# Patient Record
Sex: FEMALE | Race: ASIAN | NOT HISPANIC OR LATINO
[De-identification: names, ages, dates, MRNs, and addresses within clinical notes are randomized per-mention and may not be internally consistent; named-entity substitution may affect disease eponyms.]

---

## 2017-10-30 ENCOUNTER — APPOINTMENT (OUTPATIENT)
Dept: VASCULAR SURGERY | Facility: CLINIC | Age: 52
End: 2017-10-30
Payer: MEDICARE

## 2017-10-30 VITALS
HEIGHT: 62 IN | SYSTOLIC BLOOD PRESSURE: 130 MMHG | HEART RATE: 63 BPM | BODY MASS INDEX: 20.8 KG/M2 | OXYGEN SATURATION: 99 % | WEIGHT: 113 LBS | DIASTOLIC BLOOD PRESSURE: 74 MMHG

## 2017-10-30 DIAGNOSIS — L81.9 DISORDER OF PIGMENTATION, UNSPECIFIED: ICD-10-CM

## 2017-10-30 PROBLEM — Z00.00 ENCOUNTER FOR PREVENTIVE HEALTH EXAMINATION: Status: ACTIVE | Noted: 2017-10-30

## 2017-10-30 PROCEDURE — 99203 OFFICE O/P NEW LOW 30 MIN: CPT | Mod: 25

## 2017-10-30 PROCEDURE — 93970 EXTREMITY STUDY: CPT

## 2017-12-11 ENCOUNTER — APPOINTMENT (OUTPATIENT)
Dept: VASCULAR SURGERY | Facility: CLINIC | Age: 52
End: 2017-12-11

## 2019-03-01 ENCOUNTER — INPATIENT (INPATIENT)
Facility: HOSPITAL | Age: 54
LOS: 5 days | Discharge: HOME | End: 2019-03-07
Attending: HOSPITALIST | Admitting: HOSPITALIST
Payer: MEDICAID

## 2019-03-01 ENCOUNTER — OUTPATIENT (OUTPATIENT)
Dept: OUTPATIENT SERVICES | Facility: HOSPITAL | Age: 54
LOS: 1 days | End: 2019-03-01
Payer: MEDICAID

## 2019-03-01 VITALS
HEART RATE: 118 BPM | DIASTOLIC BLOOD PRESSURE: 74 MMHG | SYSTOLIC BLOOD PRESSURE: 121 MMHG | TEMPERATURE: 98 F | RESPIRATION RATE: 24 BRPM | OXYGEN SATURATION: 98 %

## 2019-03-01 NOTE — ED ADULT TRIAGE NOTE - CHIEF COMPLAINT QUOTE
Pt st" I have cough and shortness of breath this past week it started I was seen in [facility] just dx with Lung cancer. " + dry cough, +exhertional sob, Pt c/o lower back pain" .

## 2019-03-02 DIAGNOSIS — J90 PLEURAL EFFUSION, NOT ELSEWHERE CLASSIFIED: ICD-10-CM

## 2019-03-02 DIAGNOSIS — R07.9 CHEST PAIN, UNSPECIFIED: ICD-10-CM

## 2019-03-02 DIAGNOSIS — C34.90 MALIGNANT NEOPLASM OF UNSPECIFIED PART OF UNSPECIFIED BRONCHUS OR LUNG: ICD-10-CM

## 2019-03-02 DIAGNOSIS — I26.99 OTHER PULMONARY EMBOLISM WITHOUT ACUTE COR PULMONALE: ICD-10-CM

## 2019-03-02 DIAGNOSIS — Z29.9 ENCOUNTER FOR PROPHYLACTIC MEASURES, UNSPECIFIED: ICD-10-CM

## 2019-03-02 LAB
ALBUMIN FLD-MCNC: 2.9 G/DL — SIGNIFICANT CHANGE UP
ALBUMIN SERPL ELPH-MCNC: 3.6 G/DL — SIGNIFICANT CHANGE UP (ref 3.3–5)
ALP SERPL-CCNC: 63 U/L — SIGNIFICANT CHANGE UP (ref 40–120)
ALT FLD-CCNC: 19 U/L — SIGNIFICANT CHANGE UP (ref 4–33)
ANION GAP SERPL CALC-SCNC: 14 MMO/L — SIGNIFICANT CHANGE UP (ref 7–14)
APTT BLD: 27.1 SEC — LOW (ref 27.5–36.3)
AST SERPL-CCNC: 18 U/L — SIGNIFICANT CHANGE UP (ref 4–32)
BASOPHILS # BLD AUTO: 0.05 K/UL — SIGNIFICANT CHANGE UP (ref 0–0.2)
BASOPHILS NFR BLD AUTO: 0.7 % — SIGNIFICANT CHANGE UP (ref 0–2)
BILIRUB SERPL-MCNC: 0.3 MG/DL — SIGNIFICANT CHANGE UP (ref 0.2–1.2)
BODY FLUID TYPE: SIGNIFICANT CHANGE UP
BUN SERPL-MCNC: 8 MG/DL — SIGNIFICANT CHANGE UP (ref 7–23)
CALCIUM SERPL-MCNC: 9 MG/DL — SIGNIFICANT CHANGE UP (ref 8.4–10.5)
CHLORIDE SERPL-SCNC: 104 MMOL/L — SIGNIFICANT CHANGE UP (ref 98–107)
CLARITY SPEC: SIGNIFICANT CHANGE UP
CO2 SERPL-SCNC: 24 MMOL/L — SIGNIFICANT CHANGE UP (ref 22–31)
COLOR FLD: YELLOW — SIGNIFICANT CHANGE UP
CREAT SERPL-MCNC: 0.64 MG/DL — SIGNIFICANT CHANGE UP (ref 0.5–1.3)
CULTURE - ACID FAST SMEAR CONCENTRATED: SIGNIFICANT CHANGE UP
EOSINOPHIL # BLD AUTO: 0.68 K/UL — HIGH (ref 0–0.5)
EOSINOPHIL # FLD: 6 % — SIGNIFICANT CHANGE UP
EOSINOPHIL NFR BLD AUTO: 9.7 % — HIGH (ref 0–6)
GLUCOSE FLD-MCNC: 94 MG/DL — SIGNIFICANT CHANGE UP
GLUCOSE SERPL-MCNC: 92 MG/DL — SIGNIFICANT CHANGE UP (ref 70–99)
GRAM STN FLD: SIGNIFICANT CHANGE UP
HCT VFR BLD CALC: 40.5 % — SIGNIFICANT CHANGE UP (ref 34.5–45)
HGB BLD-MCNC: 12.5 G/DL — SIGNIFICANT CHANGE UP (ref 11.5–15.5)
IMM GRANULOCYTES NFR BLD AUTO: 0.3 % — SIGNIFICANT CHANGE UP (ref 0–1.5)
INR BLD: 1.03 — SIGNIFICANT CHANGE UP (ref 0.88–1.17)
LDH SERPL L TO P-CCNC: 417 U/L — SIGNIFICANT CHANGE UP
LYMPHOCYTES # BLD AUTO: 1.31 K/UL — SIGNIFICANT CHANGE UP (ref 1–3.3)
LYMPHOCYTES # BLD AUTO: 18.7 % — SIGNIFICANT CHANGE UP (ref 13–44)
LYMPHOCYTES NFR FLD: 38 % — SIGNIFICANT CHANGE UP
MACROPHAGES # FLD: 17 % — SIGNIFICANT CHANGE UP
MCHC RBC-ENTMCNC: 27.4 PG — SIGNIFICANT CHANGE UP (ref 27–34)
MCHC RBC-ENTMCNC: 30.9 % — LOW (ref 32–36)
MCV RBC AUTO: 88.6 FL — SIGNIFICANT CHANGE UP (ref 80–100)
MESOTHL CELL # FLD: 35 % — SIGNIFICANT CHANGE UP
MONOCYTES # BLD AUTO: 0.79 K/UL — SIGNIFICANT CHANGE UP (ref 0–0.9)
MONOCYTES # FLD: 3 % — SIGNIFICANT CHANGE UP
MONOCYTES NFR BLD AUTO: 11.3 % — SIGNIFICANT CHANGE UP (ref 2–14)
NEUTROPHILS # BLD AUTO: 4.15 K/UL — SIGNIFICANT CHANGE UP (ref 1.8–7.4)
NEUTROPHILS NFR BLD AUTO: 59.3 % — SIGNIFICANT CHANGE UP (ref 43–77)
NEUTS SEG NFR FLD MANUAL: 1 % — SIGNIFICANT CHANGE UP
NRBC # FLD: 0 K/UL — LOW (ref 25–125)
NT-PROBNP SERPL-SCNC: 54.85 PG/ML — SIGNIFICANT CHANGE UP
PLATELET # BLD AUTO: 507 K/UL — HIGH (ref 150–400)
PMV BLD: 9.3 FL — SIGNIFICANT CHANGE UP (ref 7–13)
POTASSIUM SERPL-MCNC: 3.9 MMOL/L — SIGNIFICANT CHANGE UP (ref 3.5–5.3)
POTASSIUM SERPL-SCNC: 3.9 MMOL/L — SIGNIFICANT CHANGE UP (ref 3.5–5.3)
PROT FLD-MCNC: 4.6 G/DL — SIGNIFICANT CHANGE UP
PROT SERPL-MCNC: 6.5 G/DL — SIGNIFICANT CHANGE UP (ref 6–8.3)
PROTHROM AB SERPL-ACNC: 11.5 SEC — SIGNIFICANT CHANGE UP (ref 9.8–13.1)
RBC # BLD: 4.57 M/UL — SIGNIFICANT CHANGE UP (ref 3.8–5.2)
RBC # FLD: 13.4 % — SIGNIFICANT CHANGE UP (ref 10.3–14.5)
RCV VOL RI: 1000 CELL/UL — HIGH (ref 0–5)
SODIUM SERPL-SCNC: 142 MMOL/L — SIGNIFICANT CHANGE UP (ref 135–145)
SPECIMEN SOURCE: SIGNIFICANT CHANGE UP
SPECIMEN SOURCE: SIGNIFICANT CHANGE UP
TOTAL CELLS COUNTED, BODY FLUID: 100 CELLS — SIGNIFICANT CHANGE UP
TOTAL NUCLEATED CELL COUNT, BODY FLUID: 1416 CELL/UL — HIGH (ref 0–5)
TROPONIN T, HIGH SENSITIVITY: < 6 NG/L — SIGNIFICANT CHANGE UP (ref ?–14)
WBC # BLD: 7 K/UL — SIGNIFICANT CHANGE UP (ref 3.8–10.5)
WBC # FLD AUTO: 7 K/UL — SIGNIFICANT CHANGE UP (ref 3.8–10.5)

## 2019-03-02 PROCEDURE — 88108 CYTOPATH CONCENTRATE TECH: CPT | Mod: 26

## 2019-03-02 PROCEDURE — 99223 1ST HOSP IP/OBS HIGH 75: CPT | Mod: GC

## 2019-03-02 PROCEDURE — 71045 X-RAY EXAM CHEST 1 VIEW: CPT | Mod: 26,59

## 2019-03-02 PROCEDURE — 99221 1ST HOSP IP/OBS SF/LOW 40: CPT

## 2019-03-02 PROCEDURE — 71275 CT ANGIOGRAPHY CHEST: CPT | Mod: 26

## 2019-03-02 PROCEDURE — 71046 X-RAY EXAM CHEST 2 VIEWS: CPT | Mod: 26

## 2019-03-02 RX ORDER — ENOXAPARIN SODIUM 100 MG/ML
48 INJECTION SUBCUTANEOUS
Qty: 0 | Refills: 0 | Status: DISCONTINUED | OUTPATIENT
Start: 2019-03-02 | End: 2019-03-02

## 2019-03-02 RX ORDER — DIPHENHYDRAMINE HCL 50 MG
25 CAPSULE ORAL EVERY 4 HOURS
Qty: 0 | Refills: 0 | Status: DISCONTINUED | OUTPATIENT
Start: 2019-03-02 | End: 2019-03-07

## 2019-03-02 RX ORDER — MORPHINE SULFATE 50 MG/1
2 CAPSULE, EXTENDED RELEASE ORAL ONCE
Qty: 0 | Refills: 0 | Status: DISCONTINUED | OUTPATIENT
Start: 2019-03-02 | End: 2019-03-02

## 2019-03-02 RX ORDER — ENOXAPARIN SODIUM 100 MG/ML
50 INJECTION SUBCUTANEOUS
Qty: 0 | Refills: 0 | Status: DISCONTINUED | OUTPATIENT
Start: 2019-03-02 | End: 2019-03-02

## 2019-03-02 RX ORDER — DIPHENHYDRAMINE HCL 50 MG
50 CAPSULE ORAL ONCE
Qty: 0 | Refills: 0 | Status: COMPLETED | OUTPATIENT
Start: 2019-03-02 | End: 2019-03-02

## 2019-03-02 RX ORDER — ACETAMINOPHEN 500 MG
650 TABLET ORAL EVERY 6 HOURS
Qty: 0 | Refills: 0 | Status: DISCONTINUED | OUTPATIENT
Start: 2019-03-02 | End: 2019-03-06

## 2019-03-02 RX ORDER — ENOXAPARIN SODIUM 100 MG/ML
50 INJECTION SUBCUTANEOUS
Qty: 0 | Refills: 0 | Status: DISCONTINUED | OUTPATIENT
Start: 2019-03-02 | End: 2019-03-03

## 2019-03-02 RX ORDER — OXYCODONE HYDROCHLORIDE 5 MG/1
5 TABLET ORAL EVERY 6 HOURS
Qty: 0 | Refills: 0 | Status: DISCONTINUED | OUTPATIENT
Start: 2019-03-02 | End: 2019-03-05

## 2019-03-02 RX ADMIN — ENOXAPARIN SODIUM 50 MILLIGRAM(S): 100 INJECTION SUBCUTANEOUS at 09:28

## 2019-03-02 RX ADMIN — Medication 650 MILLIGRAM(S): at 13:59

## 2019-03-02 RX ADMIN — Medication 650 MILLIGRAM(S): at 21:32

## 2019-03-02 RX ADMIN — Medication 650 MILLIGRAM(S): at 20:32

## 2019-03-02 RX ADMIN — ENOXAPARIN SODIUM 50 MILLIGRAM(S): 100 INJECTION SUBCUTANEOUS at 17:03

## 2019-03-02 RX ADMIN — Medication 650 MILLIGRAM(S): at 14:42

## 2019-03-02 RX ADMIN — MORPHINE SULFATE 2 MILLIGRAM(S): 50 CAPSULE, EXTENDED RELEASE ORAL at 05:17

## 2019-03-02 RX ADMIN — Medication 50 MILLIGRAM(S): at 21:52

## 2019-03-02 NOTE — H&P ADULT - ATTENDING COMMENTS
53 yo F recently dx adenocarcinoma of the lung c/b malignant pleural effusion s/p thoracentesis here for recurrent pleural effusion, also found to have acute PE. currently hemodynamically stable, satting well    -s/p chest tube insertion in ER, 2L fluid drained.   -thoracic surgery consult for chest tube management- likely will need pleurodesis vs. tunnelled chest tube placement given rapid recurrence of effusion  -will obtain OSH record and f/u oncology recs.   -therapeutic lovenox for PE. If pt/family unable to do injections will switch to NOAC on DC  -pain control with tylenol and oxycodone prn

## 2019-03-02 NOTE — H&P ADULT - FAMILY HISTORY
Mother  Still living? Yes, Estimated age: 71-80  Family history of diabetes mellitus (DM), Age at diagnosis: Age Unknown  Family history of hypertension, Age at diagnosis: Age Unknown     Sibling  Still living? Yes, Estimated age: 51-60  Family history of coronary artery disease, Age at diagnosis: Age Unknown

## 2019-03-02 NOTE — ED ADULT NURSE REASSESSMENT NOTE - CONDITION
pt had thoracentesis with tube placement and canister placed.  approx liter yellow fluid drained.  canister changed. pt c/o pain but reports breathing better.  given morphine as ordered.  reports feeling better. report given to rn antonette pt tfred to floor.

## 2019-03-02 NOTE — ED PROVIDER NOTE - OBJECTIVE STATEMENT
54F no pmh p/w acute onset SOB and TAMAYO/orthopnea at 3pm this afternoon. Seen at [facility] last week for 8 day admission during which had thoracentesis of a L sided pleural effusion and dx with lung cancer. Has not seen oncologist yet. not on blood thinners. No LE edema/pain. +substernal chest pain. No NV. No syncope.

## 2019-03-02 NOTE — H&P ADULT - HISTORY OF PRESENT ILLNESS
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 She states her symptoms began yesterday afternoon and worsened throughout the rest of the day, which prompted her to come to the ED last night. She was discharged from [place] with instructions to follow up with outpatient oncology which she has not yet done as she wanted to switch to the [facility] system and has an appointment scheduled with Dr. Tyler Stanton (pulmonology) this Tuesday. She also endorses a continuous dry cough with audible wheezing that has been present since her last hospitalization at [place]. On arrival to the ED she was also complaining of substernal chest pain which is now resolved. She had another thoracentesis performed in the ED last night and had some resolution of her shortness of breath following the procedure but is having significant pain in the area of the drain.     ED Course: She had a CXR and CTA performed followed by a thoracentesis and follow up CXR.     OSH Course: Hospitalized for 8 days at [facility] 8 weeks ago. Presented with productive cough refractory to outpatient antibiotics. Received IV antibiotics with no improvement. TB ruled out. CT done followed by thoracentesis. Discharged with 3 days of antibiotics and follow up with outpatient oncology was recommended. 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 She states her symptoms began yesterday afternoon and worsened throughout the rest of the day, which prompted her to come to the ED last night. She was discharged from [place] with instructions to follow up with outpatient oncology which she has not yet done as she wanted to switch to the [facility] system and has an appointment scheduled with Dr. Tyler Stanton (pulmonology) this Tuesday. She also endorses a continuous dry cough with audible wheezing that has been present since her last hospitalization at [place]. On arrival to the ED she was also complaining of substernal chest pain which is now resolved. She had another thoracentesis performed in the ED last night and had some resolution of her shortness of breath following the procedure but is having significant pain in the area of the drain.     ED Course: She had a CXR and CTA performed followed by a thoracentesis and follow up CXR.     OSH Course: Hospitalized for 8 days at [facility] 8 weeks ago. Presented with productive cough refractory to outpatient antibiotics. Received IV antibiotics with no improvement. TB ruled out. CT done followed by thoracentesis. Discharged with 3 days of antibiotics and follow up with outpatient oncology was recommended.         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 She states her symptoms began yesterday afternoon and worsened throughout the rest of the day, which prompted her to come to the ED last night.  She also endorses a continuous dry cough with audible wheezing that has been present since her last hospitalization at [place]. On arrival to the ED she was also complaining of substernal chest pain which is now resolved. She had another thoracentesis performed in the ED last night and had some resolution of her shortness of breath following the procedure but is having significant pain in the area of the drain. She denies fever, chills, night sweats, hemoptysis, calf swelling or pain, n/v/d. She was discharged from [place] with instructions to follow up with outpatient oncology which she has not yet done as she wanted to switch to the [facility] system and has an appointment scheduled with Dr. Tyler Stanton (pulmonology) this Tuesday.    ED Course: Initial CXR showed large R pleural effusion. CTA showed multiple acute PEs in the left upper and lower lobes and in the lingular artery, large R effusion with partial collapse of the R lung and obstruction of the right lower lobe bronchus, and a poorly defined mass in the right lower lobe. Thoracentesis was performed with 2L hazy yellow fluid removed and subsequent improvement in patient's SOB. Repeat CXR showed interval decrease in size of effusion.     OSH Course: Hospitalized for 8 days at [facility] 8 weeks ago. Presented with productive cough refractory to outpatient antibiotics. Received IV antibiotics with no improvement. TB ruled out. CT done followed by thoracentesis. Discharged with 3 days of antibiotics and follow up with outpatient oncology was recommended.       PCP: Dina Lee MD ([phone])   [facility]  HCP: None

## 2019-03-02 NOTE — H&P ADULT - NSHPSOCIALHISTORY_GEN_ALL_CORE
Moved [place] from [facility] [facility] in 2003 with last trip to [facility] 3 years ago. Lives in [place] with  of 27 years. Has 2 sons, ages 27 and 22. Has 8 siblings all in the area who she talks to on a daily basis. She says her family is a good support system. Works as a  at [employer].   She is not currently sexually active. She has no history of STIs.   She has never smoked tobacco products, does not drink alcohol, and has not used any recreational drugs.   She does not have a health care proxy.

## 2019-03-02 NOTE — H&P ADULT - PROBLEM SELECTOR PLAN 2
Large right pleural effusion with partial collapse of the right lung and   obstruction of the right lower lobe bronchus s/p thoracentesis in ED with 2L hazy yellow fluid removed. Drain in place  - Nucleated cells 1416, RBC 1000, , Pro 4.6, Alb 2.9, Glu 94  - Light's criteria - exudative effusion  - repeat CXR showed interval reduction in effusion size  - SOB improved following procedure  - Tylenol 650mg PO prn q6 for pain Large right pleural effusion with partial collapse of the right lung and obstruction of the right lower lobe bronchus s/p thoracentesis in ED with 2L hazy yellow fluid removed. Drain in place  - Nucleated cells 1416, RBC 1000, , Pro 4.6, Alb 2.9, Glu 94  - Light's criteria - exudative effusion  - repeat CXR showed interval reduction in effusion size  - SOB improved following procedure  - Tylenol 650mg PO prn q6 for pain Large right pleural effusion with partial collapse of the right lung and obstruction of the right lower lobe bronchus s/p thoracentesis in ED with 2L hazy yellow fluid removed. Drain in place  - Nucleated cells 1416, RBC 1000, , Pro 4.6, Alb 2.9, Glu 94  - Light's criteria - exudative effusion  - repeat CXR showed interval reduction in effusion size; SOB improved following procedure  - c/s thoracic surgery for evaluation of long term tube placement that the patient can go home with  - patient will need home care following discharge to help with tube/drain maintenance  - Tylenol 650mg PO prn q6 for mild-moderate pain  - oxycodone IR 5mg prn q6h for severe pain

## 2019-03-02 NOTE — PROVIDER CONTACT NOTE (OTHER) - ASSESSMENT
Patient complained of itchiness and rashes noted on bilateral legs and back. Vital stable. No acute distress noted.

## 2019-03-02 NOTE — H&P ADULT - NSHPREVIEWOFSYSTEMS_GEN_ALL_CORE
CONSTITUTIONAL: No fever, no chills, +4lb unintentional weight loss since December  EYES: No eye pain, no visual disturbance  Mouth: no pain in mouth, no cuts  RESPIRATORY: +dry cough, +sob, +audible wheezing  CARDIOVASCULAR: +substernal CP, no palpitations  GASTROINTESTINAL: no abdominal pain, no n/v/d  GENITOURINARY: No dysuria, no hematuria  NEUROLOGICAL: No headaches, +generalized weakness, no numbness or tingling  SKIN: No itching, no rashes  MUSCULOSKELETAL: No joint pain, no joint swelling  EXTREMITIES: no edema, no calf swelling or tenderness

## 2019-03-02 NOTE — ED ADULT NURSE NOTE - NS ED NURSE DISCH DISPOSITION
Update called to Dr. Berman. Patient's cervix remains the same and she's kenneth anywhere from 3-8 minutes apart.  She is uncomfortable, but at this point she is mostly exhausted. Per Dr. RE Shoemaker, because her cervical exam has not changed, we will send her home at this point and encourage warm baths, tylenol as needed for pain, benadryl can also be taken to help with sleep.     Admitted

## 2019-03-02 NOTE — H&P ADULT - ASSESSMENT
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 Found to have multiple PEs in the left upper and lower lobes and a large right-sided pleural effusion with associated partial collapse of R lung and obstruction of the right lower lobe bronchus. Most likely recurrent pleural effusion 2/2 to malignancy in R lung.

## 2019-03-02 NOTE — ED PROVIDER NOTE - ATTENDING CONTRIBUTION TO CARE
Seen and examined, NAD at rest, c/o inc. CP and SOB, pt. with recent pleural effusion drainage with transient relief, told Lung Ca. No fever/chills, no N/V, states sx became suddenly worse Fri. afternoon. Dec. b.s. R side, unlabored resp. at rest, heart reg, abd soft, NT to palp, no edema, NT calves.

## 2019-03-02 NOTE — CHART NOTE - NSCHARTNOTEFT_GEN_A_CORE
MD paged to bedside for new rash.    Per pt, noticed small red bumps on BL thighs this AM shortly after getting CT w/ IV contrast. Has had contrast x1 prior to this and does not recall reaction at this time. Since AM, rash has now spread to upper thighs, back, and chest. Also complaining of sore throat since early this afternoon. No associated w/ itching, pain SOB, lip swelling, tongue swelling, coughing, SOB. On PEx, hemodynamically stable, alert, and appropriately interactive. No evidence of angioedema, oral mucosa intact. No exudate in poster pharynx. Well demarcated red, easily blanching, splotchy papules on BL thighs, back, chest and neck.     Suspect allergic reaction to contrast in AM given temporal relation. Will give PO benadryl. If no better, worsening, or any evidence of hemodynamic or airway compromise, will escalate as appropriate w/ low threshold for MICU if any evidence of anaphylaxis (very low suspicion currently).       ICU Vital Signs Last 24 Hrs  T(C): 37.3 (02 Mar 2019 20:32), Max: 37.3 (02 Mar 2019 20:32)  T(F): 99.2 (02 Mar 2019 20:32), Max: 99.2 (02 Mar 2019 20:32)  HR: 85 (02 Mar 2019 20:32) (78 - 118)  BP: 101/57 (02 Mar 2019 20:32) (101/57 - 133/72)  BP(mean): --  ABP: --  ABP(mean): --  RR: 17 (02 Mar 2019 20:32) (17 - 26)  SpO2: 97% (02 Mar 2019 20:32) (94% - 98%)    Shari Fisher MD PGY-1  [phone]

## 2019-03-02 NOTE — ED PROVIDER NOTE - CLINICAL SUMMARY MEDICAL DECISION MAKING FREE TEXT BOX
54F 54F with newly diagnosed lung cancer, pleural effusion requiring thoracentesis last week, now with reaccumulation of fluid and small PE's. Breathing improved after drainage.

## 2019-03-02 NOTE — ED ADULT NURSE NOTE - CHIEF COMPLAINT QUOTE
Pt st" I have cough and shortness of breath this past week it started I was seen in [facility] just dx with Lung cancer. " + dry cough, +exhertional sob, Pt c/o lower back pain" .

## 2019-03-02 NOTE — H&P ADULT - PROBLEM SELECTOR PLAN 4
Pt advised to follow up with outpatient oncologist following discharge at OSH but has not yet done so.   - Ill-defined low-attenuation mass in the right lower lobe. Interlobular septal thickening concerning for lymphangitic spread. Superimposed infection is not excluded.  - Discharge papers from [facility]: poorly differentiated adenocarcinoma with involvement of hilar and mediastinal nodes  - c/s oncology for recs Pt advised to follow up with outpatient oncologist following discharge at OSH but has not yet done so.   - Ill-defined low-attenuation mass in the right lower lobe. Interlobular septal thickening concerning for lymphangitic spread. Superimposed infection is not excluded.  - Discharge papers from [facility]: poorly differentiated adenocarcinoma with involvement of hilar and mediastinal nodes  - c/s oncology for recs. Per oncology, will need records from [facility] and will reconsult oncology. Pt advised to follow up with outpatient oncologist following discharge at OSH but has not yet done so.   - Ill-defined low-attenuation mass in the right lower lobe. Interlobular septal thickening concerning for lymphangitic spread. Superimposed infection is not excluded.  - Discharge papers from [facility]: poorly differentiated adenocarcinoma with involvement of hilar and mediastinal nodes  - c/s oncology for recs. Per oncology, will need records from [facility] and will reconsult oncology. [facility] medical records open M-F 8a-4p ([phone])

## 2019-03-02 NOTE — H&P ADULT - NSHPPHYSICALEXAM_GEN_ALL_CORE
PHYSICAL EXAM:  GENERAL: NAD, lying in bed  HEAD:  Atraumatic, Normocephalic  EYES: EOMI, PERRLA, conjunctiva and sclera clear  ENT: Moist mucous membranes  NECK: Supple, No JVD  CHEST/LUNG: Decreased lung sounds at right base; No rales, rhonchi, wheezing, or rubs. Shallow breathing; thoracentesis drain in place on right side with associated pain in surrounding area   HEART: Regular rate and rhythm; No murmurs, rubs, or gallops  ABDOMEN: Bowel sounds present; Soft, Nontender, Nondistended. No hepatomegaly  EXTREMITIES:  2+ Peripheral Pulses, brisk capillary refill. No clubbing, cyanosis, or edema; no tenderness in calves bilaterally; no erythema or swelling in bialteral LE  NERVOUS SYSTEM:  Alert & Oriented X3, speech clear. No deficits   MSK: FROM all 4 extremities, generalized weakness  SKIN: No rashes or lesions PHYSICAL EXAM:  GENERAL: NAD, lying in bed  HEAD:  Atraumatic, Normocephalic  EYES: EOMI, PERRLA, conjunctiva and sclera clear  ENT: Moist mucous membranes  NECK: Supple, No JVD  CHEST/LUNG: Decreased lung sounds at right base; No rales, rhonchi, wheezing, or rubs. Shallow breathing; pleuritic chest pain on right side. thoracentesis drain in place on right side with associated pain in surrounding area   HEART: Regular rate and rhythm; No murmurs, rubs, or gallops  ABDOMEN: Bowel sounds present; Soft, Nontender, Nondistended. No hepatomegaly  EXTREMITIES:  2+ Peripheral Pulses, brisk capillary refill. No clubbing, cyanosis, or edema; no tenderness in calves bilaterally; no erythema or swelling in bialteral LE  NERVOUS SYSTEM:  Alert & Oriented X3, speech clear. No deficits   MSK: FROM all 4 extremities, generalized weakness  SKIN: No rashes or lesions

## 2019-03-02 NOTE — ED ADULT NURSE NOTE - OBJECTIVE STATEMENT
rec'd pt in room 11...c/o new onset of sob today...diff to speak in complete sentences.  pt was recently admitted to [facility] and diagnosed with right lung ca. scheduled for oncology and pulmonolgy next week.  has prod cough woth qhite sputum. chest pain .  20 gague isnerted left ac. blood sent. palved on monitor. opulse ox 94 on ra... placed on o2...pulse ox 97 now

## 2019-03-02 NOTE — H&P ADULT - NSHPLABSRESULTS_GEN_ALL_CORE
Personally reviewed available labs, imaging and ekg     Labs  CBC Full  -  ( 02 Mar 2019 00:04 )  WBC Count : 7.00 K/uL  Hemoglobin : 12.5 g/dL  Hematocrit : 40.5 %  Platelet Count - Automated : 507 K/uL  Mean Cell Volume : 88.6 fL  Mean Cell Hemoglobin : 27.4 pg  Mean Cell Hemoglobin Concentration : 30.9 %  Auto Neutrophil # : 4.15 K/uL  Auto Lymphocyte # : 1.31 K/uL  Auto Monocyte # : 0.79 K/uL  Auto Eosinophil # : 0.68 K/uL  Auto Basophil # : 0.05 K/uL  Auto Neutrophil % : 59.3 %  Auto Lymphocyte % : 18.7 %  Auto Monocyte % : 11.3 %  Auto Eosinophil % : 9.7 %  Auto Basophil % : 0.7 %    03-02    142  |  104  |  8   ----------------------------<  92  3.9   |  24  |  0.64    Ca    9.0      02 Mar 2019 00:04    TPro  6.5  /  Alb  3.6  /  TBili  0.3  /  DBili  x   /  AST  18  /  ALT  19  /  AlkPhos  63  03-02    PT/INR - ( 02 Mar 2019 00:04 )   PT: 11.5 SEC;   INR: 1.03       PTT - ( 02 Mar 2019 00:04 )  PTT:27.1 SEC      Imaging  < from: Xray Chest 2 Views PA/Lat (03.02.19 @ 01:06) >    EXAM:  XR CHEST PA LAT 2V      PROCEDURE DATE:  Mar  2 2019     INTERPRETATION:  CLINICAL INDICATION: Shortness of breath, lung cancer.    EXAM: Frontal and lateral radiographs of the chest.    COMPARISON: None available.IMPRESSION:   Large right pleural effusion.    MORGAN RITTER M.D., RADIOLOGY RESIDENT  This document has been electronically signed.  SUDHAKAR MANDEL M.D., ATTENDING RADIOLOGIST  This document has been electronically signed. Mar  2 2019  8:50AM      < end of copied text >      < from: CT Angio Chest w/ IV Cont (03.02.19 @ 02:58) >    EXAM:  CT ANGIO CHEST (W)AW IC      PROCEDURE DATE:  Mar  2 2019     INTERPRETATION:  CLINICAL INFORMATION: Shortness of breath, tachycardia,   recently diagnosed lung cancer    IMPRESSION:     Multiple acute pulmonary emboli involving involving left upper lobe,   lower lobe and lingular pulmonary artery branches. No evidence of right   heart strain.    Large right pleural effusion with partial collapse of the right lung and   obstruction of the right lower lobe bronchus.     Ill-defined low-attenuation mass in the right lower lobe suspicious for   the patient's known malignancy. Interlobular septal thickening throughout   the aerated right upper and middle lobes are concerning for lymphangitic   spread of disease. Superimposed infection is not excluded.    These findings were discussed with Dr. Blanchard on 3/2/2019 at 3:01 AM.    CHEYANNE KASPER M.D., RADIOLOGY RESIDENT  This document has been electronically signed.  RADHA KELLY M.D., ATTENDING RADIOLOGIST  This document has been electronically signed. Mar  2 2019  3:36AM    < end of copied text >        EKG

## 2019-03-02 NOTE — H&P ADULT - PROBLEM SELECTOR PLAN 1
Multiple acute pulmonary emboli involving left upper lobe,   lower lobe and lingular pulmonary artery branches. No evidence of right   heart strain. Likely 2/2 to hypercoagulable state of malignancy.   - No signs of DVT on physical exam  - Therapeutic Lovenox 50mg bid Multiple acute pulmonary emboli involving left upper lobe, lower lobe and lingular pulmonary artery branches. No evidence of right heart strain. Likely 2/2 to hypercoagulable state of malignancy.   - No signs of DVT on physical exam  - Therapeutic Lovenox 50mg bid Multiple acute pulmonary emboli involving left upper lobe, lower lobe and lingular pulmonary artery branches. No evidence of right heart strain. Likely 2/2 to hypercoagulable state of malignancy.   - No signs of DVT on physical exam  - Therapeutic Lovenox 50mg bid  - Will need long term anticoagulation following discharge; patient states she will not inject herself at home and does not think family will help her with injections. will need to consider PO anticoagulation  - if oncology recommends biopsy consider switching Lovenox to heparin for easier reversibility

## 2019-03-03 ENCOUNTER — TRANSCRIPTION ENCOUNTER (OUTPATIENT)
Age: 54
End: 2019-03-03

## 2019-03-03 LAB
ALBUMIN SERPL ELPH-MCNC: 2.9 G/DL — LOW (ref 3.3–5)
ALP SERPL-CCNC: 60 U/L — SIGNIFICANT CHANGE UP (ref 40–120)
ALT FLD-CCNC: 16 U/L — SIGNIFICANT CHANGE UP (ref 4–33)
ANION GAP SERPL CALC-SCNC: 15 MMO/L — HIGH (ref 7–14)
APTT BLD: 33.2 SEC — SIGNIFICANT CHANGE UP (ref 27.5–36.3)
APTT BLD: 45.9 SEC — HIGH (ref 27.5–36.3)
APTT BLD: 79.4 SEC — HIGH (ref 27.5–36.3)
AST SERPL-CCNC: 19 U/L — SIGNIFICANT CHANGE UP (ref 4–32)
BASOPHILS # BLD AUTO: 0.02 K/UL — SIGNIFICANT CHANGE UP (ref 0–0.2)
BASOPHILS NFR BLD AUTO: 0.3 % — SIGNIFICANT CHANGE UP (ref 0–2)
BILIRUB SERPL-MCNC: 0.6 MG/DL — SIGNIFICANT CHANGE UP (ref 0.2–1.2)
BUN SERPL-MCNC: 7 MG/DL — SIGNIFICANT CHANGE UP (ref 7–23)
CALCIUM SERPL-MCNC: 8.7 MG/DL — SIGNIFICANT CHANGE UP (ref 8.4–10.5)
CHLORIDE SERPL-SCNC: 102 MMOL/L — SIGNIFICANT CHANGE UP (ref 98–107)
CO2 SERPL-SCNC: 19 MMOL/L — LOW (ref 22–31)
CREAT SERPL-MCNC: 0.67 MG/DL — SIGNIFICANT CHANGE UP (ref 0.5–1.3)
EOSINOPHIL # BLD AUTO: 0.74 K/UL — HIGH (ref 0–0.5)
EOSINOPHIL NFR BLD AUTO: 10.7 % — HIGH (ref 0–6)
GLUCOSE SERPL-MCNC: 91 MG/DL — SIGNIFICANT CHANGE UP (ref 70–99)
HCT VFR BLD CALC: 42.7 % — SIGNIFICANT CHANGE UP (ref 34.5–45)
HCT VFR BLD CALC: 43.3 % — SIGNIFICANT CHANGE UP (ref 34.5–45)
HCV AB S/CO SERPL IA: 0.28 S/CO — SIGNIFICANT CHANGE UP (ref 0–0.79)
HCV AB SERPL-IMP: SIGNIFICANT CHANGE UP
HGB BLD-MCNC: 13.3 G/DL — SIGNIFICANT CHANGE UP (ref 11.5–15.5)
HGB BLD-MCNC: 13.7 G/DL — SIGNIFICANT CHANGE UP (ref 11.5–15.5)
IMM GRANULOCYTES NFR BLD AUTO: 0.3 % — SIGNIFICANT CHANGE UP (ref 0–1.5)
INR BLD: 1.03 — SIGNIFICANT CHANGE UP (ref 0.88–1.17)
LYMPHOCYTES # BLD AUTO: 0.96 K/UL — LOW (ref 1–3.3)
LYMPHOCYTES # BLD AUTO: 13.8 % — SIGNIFICANT CHANGE UP (ref 13–44)
MAGNESIUM SERPL-MCNC: 1.9 MG/DL — SIGNIFICANT CHANGE UP (ref 1.6–2.6)
MCHC RBC-ENTMCNC: 27.8 PG — SIGNIFICANT CHANGE UP (ref 27–34)
MCHC RBC-ENTMCNC: 28 PG — SIGNIFICANT CHANGE UP (ref 27–34)
MCHC RBC-ENTMCNC: 31.1 % — LOW (ref 32–36)
MCHC RBC-ENTMCNC: 31.6 % — LOW (ref 32–36)
MCV RBC AUTO: 88.5 FL — SIGNIFICANT CHANGE UP (ref 80–100)
MCV RBC AUTO: 89.1 FL — SIGNIFICANT CHANGE UP (ref 80–100)
MONOCYTES # BLD AUTO: 0.53 K/UL — SIGNIFICANT CHANGE UP (ref 0–0.9)
MONOCYTES NFR BLD AUTO: 7.6 % — SIGNIFICANT CHANGE UP (ref 2–14)
NEUTROPHILS # BLD AUTO: 4.67 K/UL — SIGNIFICANT CHANGE UP (ref 1.8–7.4)
NEUTROPHILS NFR BLD AUTO: 67.3 % — SIGNIFICANT CHANGE UP (ref 43–77)
NRBC # FLD: 0 K/UL — LOW (ref 25–125)
NRBC # FLD: 0 K/UL — LOW (ref 25–125)
PHOSPHATE SERPL-MCNC: 3.6 MG/DL — SIGNIFICANT CHANGE UP (ref 2.5–4.5)
PLATELET # BLD AUTO: 507 K/UL — HIGH (ref 150–400)
PLATELET # BLD AUTO: 541 K/UL — HIGH (ref 150–400)
PMV BLD: 9.5 FL — SIGNIFICANT CHANGE UP (ref 7–13)
PMV BLD: 9.6 FL — SIGNIFICANT CHANGE UP (ref 7–13)
POTASSIUM SERPL-MCNC: 4.2 MMOL/L — SIGNIFICANT CHANGE UP (ref 3.5–5.3)
POTASSIUM SERPL-SCNC: 4.2 MMOL/L — SIGNIFICANT CHANGE UP (ref 3.5–5.3)
PROT SERPL-MCNC: 5.9 G/DL — LOW (ref 6–8.3)
PROTHROM AB SERPL-ACNC: 11.5 SEC — SIGNIFICANT CHANGE UP (ref 9.8–13.1)
RBC # BLD: 4.79 M/UL — SIGNIFICANT CHANGE UP (ref 3.8–5.2)
RBC # BLD: 4.89 M/UL — SIGNIFICANT CHANGE UP (ref 3.8–5.2)
RBC # FLD: 13.2 % — SIGNIFICANT CHANGE UP (ref 10.3–14.5)
RBC # FLD: 13.4 % — SIGNIFICANT CHANGE UP (ref 10.3–14.5)
SODIUM SERPL-SCNC: 136 MMOL/L — SIGNIFICANT CHANGE UP (ref 135–145)
WBC # BLD: 6.89 K/UL — SIGNIFICANT CHANGE UP (ref 3.8–10.5)
WBC # BLD: 6.94 K/UL — SIGNIFICANT CHANGE UP (ref 3.8–10.5)
WBC # FLD AUTO: 6.89 K/UL — SIGNIFICANT CHANGE UP (ref 3.8–10.5)
WBC # FLD AUTO: 6.94 K/UL — SIGNIFICANT CHANGE UP (ref 3.8–10.5)

## 2019-03-03 PROCEDURE — 71045 X-RAY EXAM CHEST 1 VIEW: CPT | Mod: 26

## 2019-03-03 PROCEDURE — 99233 SBSQ HOSP IP/OBS HIGH 50: CPT | Mod: GC

## 2019-03-03 RX ORDER — HEPARIN SODIUM 5000 [USP'U]/ML
2000 INJECTION INTRAVENOUS; SUBCUTANEOUS EVERY 6 HOURS
Qty: 0 | Refills: 0 | Status: DISCONTINUED | OUTPATIENT
Start: 2019-03-03 | End: 2019-03-04

## 2019-03-03 RX ORDER — HEPARIN SODIUM 5000 [USP'U]/ML
4000 INJECTION INTRAVENOUS; SUBCUTANEOUS EVERY 6 HOURS
Qty: 0 | Refills: 0 | Status: DISCONTINUED | OUTPATIENT
Start: 2019-03-03 | End: 2019-03-04

## 2019-03-03 RX ORDER — HEPARIN SODIUM 5000 [USP'U]/ML
INJECTION INTRAVENOUS; SUBCUTANEOUS
Qty: 25000 | Refills: 0 | Status: DISCONTINUED | OUTPATIENT
Start: 2019-03-03 | End: 2019-03-04

## 2019-03-03 RX ADMIN — Medication 25 MILLIGRAM(S): at 18:58

## 2019-03-03 RX ADMIN — HEPARIN SODIUM 900 UNIT(S)/HR: 5000 INJECTION INTRAVENOUS; SUBCUTANEOUS at 11:41

## 2019-03-03 RX ADMIN — ENOXAPARIN SODIUM 50 MILLIGRAM(S): 100 INJECTION SUBCUTANEOUS at 06:57

## 2019-03-03 RX ADMIN — HEPARIN SODIUM 900 UNIT(S)/HR: 5000 INJECTION INTRAVENOUS; SUBCUTANEOUS at 18:58

## 2019-03-03 NOTE — CONSULT NOTE ADULT - SUBJECTIVE AND OBJECTIVE BOX
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 She presented to [facility] on 3/1/19 c/o worsening SOB and Orthopnea for one day.  A R-sided Thoracentesis was done in the ER.  The SOB improved but the drain site was painful.  Two liters of hazy, yellow flu were removed.     {[id number]}  Review of Systems:   CONSTITUTIONAL: No fever, no chills, +4lb unintentional weight loss since December  EYES: No eye pain, no visual disturbance  Mouth: no pain in mouth, no cuts  RESPIRATORY: +dry cough, +sob, +wheezing  CARDIOVASCULAR: +substernal CP, no palpitations  GASTROINTESTINAL: no abdominal pain, no n/v/d  GENITOURINARY: No dysuria, no hematuria  NEUROLOGICAL: No headaches, +generalized weakness, no numbness or tingling  SKIN: No itching, no rashes  MUSCULOSKELETAL: No joint pain, no joint swelling  EXTREMITIES: no edema, no calf swelling or tenderness    Other Review of Systems: All other review of systems negative, except as noted in HPI        Allergies and Intolerances:        Allergies:  	No Known Allergies:     {[id number]}Past Medical History:  Lung cancer.    {[id number]}Past Surgical History:  No significant past surgical history.{[id number]}    {[id number]}Social History:   [place] from [facility] in 2003 with last trip to [facility] 3 years ago. [place] 27 years. Has 2 sons, ages 27 and 22. Has 8 siblings all in the area who she talks to on a daily basis. She says her family is a good support system. Works as a  at [email or web address]. She is not currently sexually active. She has no history of STDs.  She has never smoked tobacco, does not drink alcohol, and has not used any recreational drugs.       {[id number]}    PHYSICAL EXAM:  GENERAL: NAD, lying in bed  HEAD:  Atraumatic, Normocephalic  EYES: EOMI, PERRLA, conjunctiva and sclera clear  ENT: Moist mucous membranes  NECK: Supple, No JVD  CHEST/LUNG: Decreased lung sounds at right base; No rales, rhonchi, wheezing. Shallow breathing due to pleuritic chest pain on right side. Thoracentesis drain in place on right side posteriorly with surrounding tenderness.  The drain is to an empty negative pressure canister    HEART: Regular rate and rhythm; No murmurs  ABDOMEN: Bowel sounds present; Soft, Nontender, Nondistended. 	  EXTREMITIES:  2+ Peripheral Pulses, capillary refill < 2 sec; No clubbing, cyanosis, or edema; no tenderness in calves bilaterally; no erythema or swelling in bilateral LE  NERVOUS SYSTEM:  Alert & Oriented X3, speech clear. No deficits   MSK: FROM all 4 extremities, generalized weakness  SKIN: No rashes or lesions        {[id number]} Labs and Results:  Labs 3/2/19:    	WBC Count : 7.00 K/uL  	Hemoglobin : 12.5 g/dL  	Hematocrit : 40.5 %  	Platelet Count - Automated : 507 K/uL  		  	142  |  104  |  8   	----------------------------<  92  	3.9   |  24  |  0.64    	Ca    9.0       	TPro  6.5  /  Alb  3.6  /  TBili  0.3  /  DBili  x   /  AST  18  /  ALT  19  /  AlkPhos  63  03-02    	PT/INR -  PT: 11.5 SEC;   INR: 1.03       PTT -   PTT:27.1 SEC      	Imaging    CXR: Large right pleural effusion.    CTA chest:     Multiple acute pulmonary emboli involving involving left upper lobe,   	lower lobe and lingular pulmonary artery branches. No evidence of right   	heart strain.Large right pleural effusion with partial collapse of the right lung and obstruction of the right lower lobe bronchus. Ill-defined low-attenuation mass in the right lower lobe suspicious for   	the patient's known malignancy. Interlobular septal thickening throughout the aerated right upper and middle lobes are concerning for lymphangitic spread of disease. Superimposed infection is not excluded.

## 2019-03-03 NOTE — CONSULT NOTE ADULT - SUBJECTIVE AND OBJECTIVE BOX
HPI:  54F with poorly differentiated adenocarcinoma of the right lung with involvement of the hilar and mediastinal nodes diagnosed 8 weeks ago at [facility] s/p thoracentesis for pleural effusion presenting with worsening shortness of breath and orthopnea x1 day. She states her symptoms began yesterday afternoon and worsened throughout the rest of the day, which prompted her to come to the ED last night.  She also endorses a continuous dry cough with audible wheezing that has been present since her last hospitalization at [facility]. On arrival to the ED she was also complaining of substernal chest pain which is now resolved. She had another thoracentesis performed in the ED last night and had some resolution of her shortness of breath following the procedure but is having significant pain in the area of the drain. She denies fever, chills, night sweats, hemoptysis, calf swelling or pain, n/v/d. She was discharged from [facility] with instructions to follow up with outpatient oncology which she has not yet done as she wanted to switch to the [facility] system and has an appointment scheduled with Dr. Tyler Stanton (pulmonology) this Tuesday.    Initial CXR showed large R pleural effusion. CTA showed multiple acute PEs in the left upper and lower lobes and in the lingular artery, large R effusion with partial collapse of the R lung and obstruction of the right lower lobe bronchus, and a poorly defined mass in the right lower lobe. Thoracentesis was performed with 2L hazy yellow fluid removed and subsequent improvement in patient's SOB. Repeat CXR showed interval decrease in size of effusion.     OSH Course: Hospitalized for 8 days at [facility] 8 weeks ago. Presented with productive cough refractory to outpatient antibiotics. Received IV antibiotics with no improvement. TB ruled out. CT done followed by thoracentesis. Discharged with 3 days of antibiotics and follow up with outpatient oncology was recommended.     Since admission: patient seen by CT Surgery with plans for Pleurex catheter placement and VATS on 3/4.        PAST MEDICAL & SURGICAL HISTORY:  Lung cancer  No significant past surgical history      Allergies  No Known Allergies      MEDICATIONS  (STANDING):  heparin  Infusion.  Unit(s)/Hr (9 mL/Hr) IV Continuous <Continuous>    MEDICATIONS  (PRN):  acetaminophen   Tablet .. 650 milliGRAM(s) Oral every 6 hours PRN Moderate Pain (4 - 6)  diphenhydrAMINE 25 milliGRAM(s) Oral every 4 hours PRN Rash and/or Itching  oxyCODONE    IR 5 milliGRAM(s) Oral every 6 hours PRN Severe Pain (7 - 10)      FAMILY HISTORY:  Family history of coronary artery disease (Sibling): Brother  Family history of hypertension (Mother): Mother  Family history of diabetes mellitus (DM) (Mother): Mother      SOCIAL HISTORY: No EtOH, no tobacco    REVIEW OF SYSTEMS:    CONSTITUTIONAL: No weakness, fevers or chills  EYES/ENT: No visual changes;  No vertigo or throat pain   NECK: No pain or stiffness  RESPIRATORY: No cough, wheezing, hemoptysis; No shortness of breath  CARDIOVASCULAR: No chest pain or palpitations  GASTROINTESTINAL: No abdominal or epigastric pain. No nausea, vomiting, or hematemesis; No diarrhea or constipation. No melena or hematochezia.  GENITOURINARY: No dysuria, frequency or hematuria  NEUROLOGICAL: No numbness or weakness  SKIN: No itching, burning, rashes, or lesions   All other review of systems is negative unless indicated above.        T(F): 98.9 (03-03-19 @ 05:26), Max: 99.2 (03-02-19 @ 20:32)  HR: 84 (03-03-19 @ 05:26)  BP: 111/69 (03-03-19 @ 05:26)  RR: 17 (03-03-19 @ 05:26)  SpO2: 98% (03-03-19 @ 05:26)  Wt(kg): --    GENERAL: NAD, well-developed  HEAD:  Atraumatic, Normocephalic  EYES: EOMI, PERRLA, conjunctiva and sclera clear  NECK: Supple, No JVD  CHEST/LUNG: Clear to auscultation bilaterally; No wheeze  HEART: Regular rate and rhythm; No murmurs, rubs, or gallops  ABDOMEN: Soft, Nontender, Nondistended; Bowel sounds present  EXTREMITIES:  2+ Peripheral Pulses, No clubbing, cyanosis, or edema  NEUROLOGY: non-focal  SKIN: No rashes or lesions                          13.7   6.94  )-----------( 507      ( 03 Mar 2019 07:00 )             43.3       03-03    136  |  102  |  7   ----------------------------<  91  4.2   |  19<L>  |  0.67    Ca    8.7      03 Mar 2019 07:00  Phos  3.6     03-03  Mg     1.9     03-03    TPro  5.9<L>  /  Alb  2.9<L>  /  TBili  0.6  /  DBili  x   /  AST  19  /  ALT  16  /  AlkPhos  60  03-03      Magnesium, Serum: 1.9 mg/dL (03-03 @ 07:00)  Phosphorus Level, Serum: 3.6 mg/dL (03-03 @ 07:00)      PT/INR - ( 03 Mar 2019 07:00 )   PT: 11.5 SEC;   INR: 1.03          PTT - ( 03 Mar 2019 07:00 )  PTT:33.2 SEC    PLEURAL FLUID  03-02 @ 07:54 --  --    NOS^No Organisms Seen  WBC^White Blood Cells  QNTY CELLS IN GRAM STAIN: FEW (2+)      PLEURAL FLUID  03-02 @ 07:53 --  --  --      RADIOLOGY:   EXAM:  CT ANGIO CHEST (W)AW IC      PROCEDURE DATE:  Mar  2 2019     INTERPRETATION:  CLINICAL INFORMATION: Shortness of breath, tachycardia, recently diagnosed lung cancer    COMPARISON: None    PROCEDURE:   CT Angiography of the Chest.  90 ml of Omnipaque 350 was injected intravenously. 10 ml were discarded.  Sagittal and coronal reformats were performed as well as MIPS.    FINDINGS:    CHEST:     LUNGS AND LARGE AIRWAYS: Compressive atelectasis and partial collapse of the right lung with residual aeration of the right middle and upper lobes. There are diffuse groundglass opacities with interlobular septal thickening throughout the aerated portions of the right lung. Obstruction of the right lower lobe bronchus, possibly due to secondary compression. There is a ill-defined low-attenuation mass centered within the atelectatic right lower lobe, not well delineated from the adjacent atelectatic lung parenchyma. Mild hypoenhancement of the atelectatic right lower lobe. Linear opacity in the lingula which may represent atelectasis versus scarring.  PLEURA: Large right pleural effusion.  VESSELS: Acute pulmonary embolism involving multiple left upper lobe, lower lobe as well as a lingular branch pulmonary artery.  HEART: Heart size is normal. Trace pericardial effusion. No evidence of right heart strain.  MEDIASTINUM AND DIANA: No lymphadenopathy.  CHEST WALL AND LOWER NECK: Within normal limits.  VISUALIZED UPPER ABDOMEN: Within normal limits.  BONES: Degenerative changes.    IMPRESSION:     Multiple acute pulmonary emboli involving involving left upper lobe, lower lobe and lingular pulmonary artery branches. No evidence of right heart strain.    Large right pleural effusion with partial collapse of the right lung and obstruction of the right lower lobe bronchus.     Ill-defined low-attenuation mass in the right lower lobe suspicious for the patient's known malignancy. Interlobular septal thickening throughout the aerated right upper and middle lobes are concerning for lymphangitic spread of disease. Superimposed infection is not excluded.    These findings were discussed with Dr. Blanchard on 3/2/2019 at 3:01 AM.      CHEYANNE KASPER M.D., RADIOLOGY RESIDENT  This document has been electronically signed.  RADHA KELLY M.D., ATTENDING RADIOLOGIST  This document has been electronically signed. Mar  2 2019  3:36AM HPI:  54F with poorly differentiated adenocarcinoma of the right lung with involvement of the hilar and mediastinal nodes diagnosed 8 weeks ago at [facility] s/p thoracentesis for pleural effusion presenting with worsening shortness of breath and orthopnea x1 day. She states her symptoms began yesterday afternoon and worsened throughout the rest of the day, which prompted her to come to the ED last night.  She also endorses a continuous dry cough with audible wheezing that has been present since her last hospitalization at [facility]. On arrival to the ED she was also complaining of substernal chest pain which is now resolved. She had another thoracentesis performed in the ED last night and had some resolution of her shortness of breath following the procedure but is having significant pain in the area of the drain. She denies fever, chills, night sweats, hemoptysis, calf swelling or pain, n/v/d. She was discharged from [facility] with instructions to follow up with outpatient oncology which she has not yet done as she wanted to switch to the [facility] system and has an appointment scheduled with Dr. Tyler Stnaton (pulmonology) this Tuesday.    Initial CXR showed large R pleural effusion. CTA showed multiple acute PEs in the left upper and lower lobes and in the lingular artery, large R effusion with partial collapse of the R lung and obstruction of the right lower lobe bronchus, and a poorly defined mass in the right lower lobe. Thoracentesis was performed with 2L hazy yellow fluid removed and subsequent improvement in patient's SOB. Repeat CXR showed interval decrease in size of effusion.     OSH Course: Hospitalized for 8 days at [facility] 8 weeks ago. Presented with productive cough refractory to outpatient antibiotics. Received IV antibiotics with no improvement. TB ruled out. CT done followed by thoracentesis. Discharged with 3 days of antibiotics and follow up with outpatient oncology was recommended.     Since admission: patient seen by CT Surgery with plans for Pleurex catheter placement and VATS on 3/4.    No family or personal history of malignancy.    Never smoker.  Lives at home with , has two grown sons.       PAST MEDICAL & SURGICAL HISTORY:  Lung cancer  No significant past surgical history      Allergies  No Known Allergies      MEDICATIONS  (STANDING):  heparin  Infusion.  Unit(s)/Hr (9 mL/Hr) IV Continuous <Continuous>    MEDICATIONS  (PRN):  acetaminophen   Tablet .. 650 milliGRAM(s) Oral every 6 hours PRN Moderate Pain (4 - 6)  diphenhydrAMINE 25 milliGRAM(s) Oral every 4 hours PRN Rash and/or Itching  oxyCODONE    IR 5 milliGRAM(s) Oral every 6 hours PRN Severe Pain (7 - 10)      FAMILY HISTORY:  Family history of coronary artery disease (Sibling): Brother  Family history of hypertension (Mother): Mother  Family history of diabetes mellitus (DM) (Mother): Mother      SOCIAL HISTORY: No EtOH, no tobacco    REVIEW OF SYSTEMS:    CONSTITUTIONAL: No weakness, fevers or chills  EYES/ENT: No visual changes;  No vertigo or throat pain   NECK: No pain or stiffness  RESPIRATORY: No cough, wheezing, hemoptysis; No shortness of breath  CARDIOVASCULAR: No chest pain or palpitations  GASTROINTESTINAL: No abdominal or epigastric pain. No nausea, vomiting, or hematemesis; No diarrhea or constipation. No melena or hematochezia.  GENITOURINARY: No dysuria, frequency or hematuria  NEUROLOGICAL: No numbness or weakness  SKIN: No itching, burning, rashes, or lesions   All other review of systems is negative unless indicated above.      T(F): 98.9 (03-03-19 @ 05:26), Max: 99.2 (03-02-19 @ 20:32)  HR: 84 (03-03-19 @ 05:26)  BP: 111/69 (03-03-19 @ 05:26)  RR: 17 (03-03-19 @ 05:26)  SpO2: 98% (03-03-19 @ 05:26)      GENERAL: NAD, well-developed  HEAD:  Atraumatic, Normocephalic  EYES: EOMI, conjunctiva and sclera clear  NECK: Supple  CHEST/LUNG: Decreased breath sounds in bases, R<L, no wheezes or crackles   HEART: Regular rate and rhythm; No murmurs, rubs, or gallops  ABDOMEN: Soft, Nontender, Nondistended; Bowel sounds present  EXTREMITIES:  No clubbing, cyanosis, or edema  NEUROLOGY: non-focal  PSYCH: appropriate affect                           13.7   6.94  )-----------( 507      ( 03 Mar 2019 07:00 )             43.3       03-03    136  |  102  |  7   ----------------------------<  91  4.2   |  19<L>  |  0.67    Ca    8.7      03 Mar 2019 07:00  Phos  3.6     03-03  Mg     1.9     03-03    TPro  5.9<L>  /  Alb  2.9<L>  /  TBili  0.6  /  DBili  x   /  AST  19  /  ALT  16  /  AlkPhos  60  03-03      Magnesium, Serum: 1.9 mg/dL (03-03 @ 07:00)  Phosphorus Level, Serum: 3.6 mg/dL (03-03 @ 07:00)      PT/INR - ( 03 Mar 2019 07:00 )   PT: 11.5 SEC;   INR: 1.03          PTT - ( 03 Mar 2019 07:00 )  PTT:33.2 SEC    PLEURAL FLUID  03-02 @ 07:54 --  --    NOS^No Organisms Seen  WBC^White Blood Cells  QNTY CELLS IN GRAM STAIN: FEW (2+)      PLEURAL FLUID  03-02 @ 07:53 --  --  --      RADIOLOGY:   EXAM:  CT ANGIO CHEST (W)AW IC      PROCEDURE DATE:  Mar  2 2019     INTERPRETATION:  CLINICAL INFORMATION: Shortness of breath, tachycardia, recently diagnosed lung cancer    COMPARISON: None    PROCEDURE:   CT Angiography of the Chest.  90 ml of Omnipaque 350 was injected intravenously. 10 ml were discarded.  Sagittal and coronal reformats were performed as well as MIPS.    FINDINGS:    CHEST:     LUNGS AND LARGE AIRWAYS: Compressive atelectasis and partial collapse of the right lung with residual aeration of the right middle and upper lobes. There are diffuse groundglass opacities with interlobular septal thickening throughout the aerated portions of the right lung. Obstruction of the right lower lobe bronchus, possibly due to secondary compression. There is a ill-defined low-attenuation mass centered within the atelectatic right lower lobe, not well delineated from the adjacent atelectatic lung parenchyma. Mild hypoenhancement of the atelectatic right lower lobe. Linear opacity in the lingula which may represent atelectasis versus scarring.  PLEURA: Large right pleural effusion.  VESSELS: Acute pulmonary embolism involving multiple left upper lobe, lower lobe as well as a lingular branch pulmonary artery.  HEART: Heart size is normal. Trace pericardial effusion. No evidence of right heart strain.  MEDIASTINUM AND DIANA: No lymphadenopathy.  CHEST WALL AND LOWER NECK: Within normal limits.  VISUALIZED UPPER ABDOMEN: Within normal limits.  BONES: Degenerative changes.    IMPRESSION:     Multiple acute pulmonary emboli involving involving left upper lobe, lower lobe and lingular pulmonary artery branches. No evidence of right heart strain.    Large right pleural effusion with partial collapse of the right lung and obstruction of the right lower lobe bronchus.     Ill-defined low-attenuation mass in the right lower lobe suspicious for the patient's known malignancy. Interlobular septal thickening throughout the aerated right upper and middle lobes are concerning for lymphangitic spread of disease. Superimposed infection is not excluded.    These findings were discussed with Dr. Blanchard on 3/2/2019 at 3:01 AM.      CHEYANNE KASPER M.D., RADIOLOGY RESIDENT  This document has been electronically signed.  RADHA KELLY M.D., ATTENDING RADIOLOGIST  This document has been electronically signed. Mar  2 2019  3:36AM

## 2019-03-03 NOTE — DISCHARGE NOTE ADULT - ADDITIONAL INSTRUCTIONS
Please take medications as prescribed. Please follow up with oncology Dr Cisneros 3/15/19 at 2pm. Please see Dr Morris (CT Surgery).

## 2019-03-03 NOTE — DISCHARGE NOTE ADULT - PROVIDER TOKENS
PROVIDER:[TOKEN:[[id number]]],PROVIDER:[TOKEN:[[id number]]],PROVIDER:[TOKEN:[[id number]]]

## 2019-03-03 NOTE — PROGRESS NOTE ADULT - PROBLEM SELECTOR PLAN 2
Large right pleural effusion with partial collapse of the right lung and obstruction of the right lower lobe bronchus s/p thoracentesis in ED with 2L hazy yellow fluid removed. Drain in place  - Nucleated cells 1416, RBC 1000, , Pro 4.6, Alb 2.9, Glu 94  - Light's criteria - exudative effusion  - Pleural fluid cx (3/2)- no organisms seen. AFB pleural fluid cx- Negative  - repeat CXR showed interval reduction in effusion size; SOB improved following procedure  - Plan for PleurX placement on 3/4  - patient will need home care following discharge to help with tube/drain maintenance  - Tylenol 650mg PO prn q6 for mild-moderate pain  - oxycodone IR 5mg prn q6h for severe pain

## 2019-03-03 NOTE — PROGRESS NOTE ADULT - PROBLEM SELECTOR PLAN 1
CT Angio- Multiple acute pulmonary emboli involving left upper lobe, lower lobe and lingular pulmonary artery branches. No evidence of right heart strain. Likely 2/2 to hypercoagulable state of malignancy.   - No signs of DVT on physical exam  - D/c Lovenox. Started heparin drip at 9 mL/hr  - Will need long term anticoagulation following discharge; patient states she will not inject herself at home and does not think family will help her with injections. will need to consider PO anticoagulation  - Pt reported urticaria. Started benadryl 25 mg Q4H PRN  - CT surg will plan for RT VATS w/ bx and PleurX catheter placement on 3/4. Will f/u with oncology if additional testing needed CT Angio- Multiple acute pulmonary emboli involving left upper lobe, lower lobe and lingular pulmonary artery branches. No evidence of right heart strain. Likely 2/2 to hypercoagulable state of malignancy.   - No signs of DVT on physical exam  - D/c Lovenox. Started heparin drip at 9 mL/hr  - Will need long term anticoagulation following discharge; patient states she will not inject herself at home and does not think family will help her with injections. will need to consider PO anticoagulation  - Pt reported urticaria. Started benadryl 25 mg Q4H PRN  - CT surg will plan for RT VATS w/ bx and PleurX catheter placement on 3/4.   - Per oncology recs- MRI brain w/ and w/o contrast, CT A/P w/ IV Contrast to complete staging   - f/u b/l LE dopplers CT Angio- Multiple acute pulmonary emboli involving left upper lobe, lower lobe and lingular pulmonary artery branches. No evidence of right heart strain. Likely 2/2 to hypercoagulable state of malignancy.   - No signs of DVT on physical exam  - D/c Lovenox. Started heparin drip at 9 mL/hr  - Will need long term anticoagulation following discharge; patient states she will not inject herself at home and does not think family will help her with injections. will need to consider PO anticoagulation  - Pt reported urticaria. Started benadryl 25 mg Q4H PRN  - CT surg will plan for RT VATS w/ bx and PleurX catheter placement on 3/4.   - Per oncology recs- MRI brain w/ and w/o contrast. Will hold CT A/P w/ IV Contrast for staging in the setting of urticaria possible 2/2 CTA  - f/u b/l LE dopplers

## 2019-03-03 NOTE — CONSULT NOTE ADULT - ASSESSMENT
54F w/ reportedly newly-diagnosed lung adenocarcinoma (at [facility] about 2 months ago, s/p thoracentesis and biopsy), presenting to [facility] with dyspnea, orthopnea.  Found to have multiple PEs in the left upper and lower lobes and a large right-sided pleural effusion with associated partial collapse of R lung and obstruction of the right lower lobe bronchus. Patient wants to follow at [facility] for management of lung cancer.    # Dyspnea:   - s/p thoracentesis with removal of 2L from right lung  - plan for Pleurex catheter by CT Surgery tomorrow 3/4    # Pulmonary embolisms:  - likely in the setting of newly-diagnosed malignancy  - currently on heparin gtt prior to procedure tomorrow, then transition to lovenox  - check bilateral LE dopplers     # Lung cancer:  - diagnosed at [facility], however no pathology or records available currently.  Primary team pursuing re-workup.  - plan for VATS tomorrow by CT Surgery for tissue  - recommend CT A/P and MRI Brain for staging purposes    Will follow.    Addie Piña MD  Hematology/Oncology Fellow, PGY-4  pager: [phone]  After 5pm or on weekends, please page the on-call fellow. 54F w/ reportedly newly-diagnosed lung adenocarcinoma (at [facility] about 2 months ago, s/p thoracentesis and biopsy), presenting to [facility] with dyspnea, orthopnea.  Found to have multiple PEs in the left upper and lower lobes and a large right-sided pleural effusion with associated partial collapse of R lung and obstruction of the right lower lobe bronchus. Patient wants to follow at [facility] for management of lung cancer.    # Dyspnea:   - s/p thoracentesis with removal of 2L fluid from right lung  - plan for Pleurex catheter by CT Surgery tomorrow 3/4    # Pulmonary embolisms:  - likely in the setting of newly-diagnosed malignancy  - currently on heparin gtt prior to procedure tomorrow, then transition to lovenox  - check bilateral LE dopplers     # Lung cancer:  - diagnosed at [facility], however no pathology or records available currently.  Primary team pursuing re-workup.  - plan for VATS tomorrow by CT Surgery for tissue  - recommend CT A/P and MRI Brain for staging purposes    Will follow.    Addie Piña MD  Hematology/Oncology Fellow, PGY-4  pager: [phone]  After 5pm or on weekends, please page the on-call fellow. 54F w/ reportedly newly-diagnosed lung adenocarcinoma (at [facility] about 2 months ago, s/p thoracentesis and biopsy), presenting to [facility] with dyspnea, orthopnea.  Found to have multiple PEs in the left upper and lower lobes and a large right-sided pleural effusion with associated partial collapse of R lung and obstruction of the right lower lobe bronchus. Patient wants to follow at [facility] for management of lung cancer.    # Dyspnea:   - s/p thoracentesis with removal of 2L fluid from right lung  - plan for Pleurex catheter by CT Surgery tomorrow 3/4    # Pulmonary embolisms:  - likely in the setting of newly-diagnosed malignancy  - currently on heparin gtt prior to procedure tomorrow, then transition to lovenox  - check bilateral LE dopplers     # Lung cancer:  - diagnosed at [facility], however no pathology or records available currently.  Primary team pursuing re-workup.  - plan for VATS tomorrow by CT Surgery for tissue  - recommend CT A/P and MRI Brain w/ contrast for staging purposes    Will follow.    Addie Piña MD  Hematology/Oncology Fellow, PGY-4  pager: [phone]  After 5pm or on weekends, please page the on-call fellow.

## 2019-03-03 NOTE — DISCHARGE NOTE ADULT - PLAN OF CARE
management of condition you came in with shortness of breath. you had a thoracentesis in the ED which drained greater then 2 Liter of fluid and samples were sent to the lab. You had a pleurx chest tube placed on the R side. It drained fluid until it was capped. A visiting nurse will come to you MWF to help drain it. please follow up with CT surgery. you expirenced a great deal of pain and were prescribed a short course of pain killer called dilaudid. please take it no more than prescribed. you were found to have a right leg below-the-knee blood clot. you are already on a blood thinner for treatment called lovenox. continue it as prescribed. you were found to have multiple blood clots in the lungs. you were started on a heparin drip and then transitioned to lovenox subcutaneous injections. your son will deliver these medications. You began to have a skin rash following administration of IV contrast for a CT scan. you were treated with benadryl and a cream. We believe you have an intolerance to contrast. We received all your paperwork from [facility]. you also received a brain MRI here, which was negative for metastasis. You were given a copy to take to your appointment with oncology on 3/15/19 2:00pm with Dr Cisneros, at [facility].

## 2019-03-03 NOTE — DISCHARGE NOTE ADULT - MEDICATION SUMMARY - MEDICATIONS TO TAKE
I will START or STAY ON the medications listed below when I get home from the hospital:    Dilaudid 2 mg oral tablet  -- 0.5 tab(s) by mouth every 6 hours, As Needed for pain MDD:4mg  -- Indication: For Pleural effusion    enoxaparin 60 mg/0.6 mL injectable solution  -- 50 milligram(s) subcutaneously 2 times a day   -- It is very important that you take or use this exactly as directed.  Do not skip doses or discontinue unless directed by your doctor.    -- Indication: For Pulmonary embolism

## 2019-03-03 NOTE — PROGRESS NOTE ADULT - SUBJECTIVE AND OBJECTIVE BOX
Dr. Wilmer Cerda  Internal Medicine PGY-1   Pager# [phone]    Patient is a 54y old  Female who presents with a chief complaint of Recurrent malignant pleural effusion (03 Mar 2019 02:12)      SUBJECTIVE / OVERNIGHT EVENTS: No acute overnight events. Pt was HD stable overnight. Pt reported diffuse rash that blanches and blotches (likely urticaria) and was started on benadryl.     MEDICATIONS  (STANDING):  heparin  Infusion.  Unit(s)/Hr (9 mL/Hr) IV Continuous <Continuous>    MEDICATIONS  (PRN):  acetaminophen   Tablet .. 650 milliGRAM(s) Oral every 6 hours PRN Moderate Pain (4 - 6)  diphenhydrAMINE 25 milliGRAM(s) Oral every 4 hours PRN Rash and/or Itching  oxyCODONE    IR 5 milliGRAM(s) Oral every 6 hours PRN Severe Pain (7 - 10)      Vital Signs Last 24 Hrs  T(C): 37.2 (03 Mar 2019 05:26), Max: 37.3 (02 Mar 2019 20:32)  T(F): 98.9 (03 Mar 2019 05:26), Max: 99.2 (02 Mar 2019 20:32)  HR: 84 (03 Mar 2019 05:26) (84 - 94)  BP: 111/69 (03 Mar 2019 05:26) (101/57 - 111/69)  BP(mean): --  RR: 17 (03 Mar 2019 05:26) (17 - 19)  SpO2: 98% (03 Mar 2019 05:26) (95% - 98%)  CAPILLARY BLOOD GLUCOSE        I&O's Summary      PHYSICAL EXAM:  GENERAL: NAD, well-developed  HEAD:  Atraumatic, Normocephalic  EYES: EOMI, PERRLA, conjunctiva and sclera clear  NECK: Supple, No JVD  CHEST/LUNG: Clear to auscultation bilaterally; No wheeze  HEART: Regular rate and rhythm; No murmurs, rubs, or gallops  ABDOMEN: Soft, Nontender, Nondistended; Bowel sounds present  EXTREMITIES:  2+ Peripheral Pulses, No clubbing, cyanosis, or edema  PSYCH: AAOx3  NEUROLOGY: non-focal  SKIN: No rashes or lesions    LABS:                        13.7   6.94  )-----------( 507      ( 03 Mar 2019 07:00 )             43.3     03-02    142  |  104  |  8   ----------------------------<  92  3.9   |  24  |  0.64    Ca    9.0      02 Mar 2019 00:04    TPro  6.5  /  Alb  3.6  /  TBili  0.3  /  DBili  x   /  AST  18  /  ALT  19  /  AlkPhos  63  03-02    PT/INR - ( 03 Mar 2019 07:00 )   PT: 11.5 SEC;   INR: 1.03          PTT - ( 03 Mar 2019 07:00 )  PTT:33.2 SEC          RADIOLOGY & ADDITIONAL TESTS:    Imaging Personally Reviewed:    Consultant(s) Notes Reviewed:      Care Discussed with Consultants/Other Providers: Dr. Wilmer Cerda  Internal Medicine PGY-1   Pager# [phone]    Patient is a 54y old  Female who presents with a chief complaint of Recurrent malignant pleural effusion (03 Mar 2019 02:12)      SUBJECTIVE / OVERNIGHT EVENTS: No acute overnight events. Pt was HD stable overnight. Pt reported diffuse rash that blanches and blotches (likely urticaria) and was started on benadryl.     MEDICATIONS  (STANDING):  heparin  Infusion.  Unit(s)/Hr (9 mL/Hr) IV Continuous <Continuous>    MEDICATIONS  (PRN):  acetaminophen   Tablet .. 650 milliGRAM(s) Oral every 6 hours PRN Moderate Pain (4 - 6)  diphenhydrAMINE 25 milliGRAM(s) Oral every 4 hours PRN Rash and/or Itching  oxyCODONE    IR 5 milliGRAM(s) Oral every 6 hours PRN Severe Pain (7 - 10)      Vital Signs Last 24 Hrs  T(C): 37.2 (03 Mar 2019 05:26), Max: 37.3 (02 Mar 2019 20:32)  T(F): 98.9 (03 Mar 2019 05:26), Max: 99.2 (02 Mar 2019 20:32)  HR: 84 (03 Mar 2019 05:26) (84 - 94)  BP: 111/69 (03 Mar 2019 05:26) (101/57 - 111/69)  BP(mean): --  RR: 17 (03 Mar 2019 05:26) (17 - 19)  SpO2: 98% (03 Mar 2019 05:26) (95% - 98%)  CAPILLARY BLOOD GLUCOSE        I&O's Summary      PHYSICAL EXAM:  GENERAL: NAD, well-developed  HEAD:  Atraumatic, Normocephalic  EYES: EOMI, PERRLA, conjunctiva and sclera clear  NECK: Supple, No JVD  CHEST/LUNG: Clear to auscultation bilaterally; No wheeze  HEART: Regular rate and rhythm; No murmurs, rubs, or gallops  ABDOMEN: Soft, Nontender, Nondistended; Bowel sounds present  EXTREMITIES:  2+ Peripheral Pulses, No clubbing, cyanosis, or edema  PSYCH: AAOx3  NEUROLOGY: non-focal  SKIN: diffuse rash to chest, abdomen, thigh and back    LABS:                        13.7   6.94  )-----------( 507      ( 03 Mar 2019 07:00 )             43.3     03-02    142  |  104  |  8   ----------------------------<  92  3.9   |  24  |  0.64    Ca    9.0      02 Mar 2019 00:04    TPro  6.5  /  Alb  3.6  /  TBili  0.3  /  DBili  x   /  AST  18  /  ALT  19  /  AlkPhos  63  03-02    PT/INR - ( 03 Mar 2019 07:00 )   PT: 11.5 SEC;   INR: 1.03          PTT - ( 03 Mar 2019 07:00 )  PTT:33.2 SEC          RADIOLOGY & ADDITIONAL TESTS:    Imaging Personally Reviewed:    Consultant(s) Notes Reviewed:      Care Discussed with Consultants/Other Providers:

## 2019-03-03 NOTE — DISCHARGE NOTE ADULT - CARE PROVIDERS DIRECT ADDRESSES
[email or web address]

## 2019-03-03 NOTE — DISCHARGE NOTE ADULT - HOSPITAL COURSE
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 She states her symptoms began yesterday afternoon and worsened throughout the rest of the day, which prompted her to come to the ED last night. Her initial CXR showed large R pleural effusion. CTA showed multiple acute PEs in the left upper and lower lobes and in the lingular artery, large R effusion with partial collapse of the R lung and obstruction of the right lower lobe bronchus, and a poorly defined mass in the right lower lobe. Thoracentesis was performed with 2L hazy yellow fluid removed and subsequent improvement in patient's SOB. Repeat CXR showed interval decrease in size of effusion. Patient admitted to medicine floor for further management. Patient will undergo RT VATS w/ bx and pleurX catheter placement on March 4th. 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 She states her symptoms began yesterday afternoon and worsened throughout the rest of the day, which prompted her to come to the ED last night. Her initial CXR showed large R pleural effusion. CTA showed multiple acute PEs in the left upper and lower lobes and in the lingular artery, large R effusion with partial collapse of the R lung and obstruction of the right lower lobe bronchus, and a poorly defined mass in the right lower lobe. Thoracentesis was performed with 2L hazy yellow fluid removed and subsequent improvement in patient's SOB. Repeat CXR showed interval decrease in size of effusion. Patient admitted to medicine floor for further management. Patient will undergo RT VATS w/ bx and pleurX catheter placement on March 4th.  Patient was intermittently on PCA dilaudid for pain control but was transitioned to PO dilaudid. Patient's pleurx cath was capped on 3/6/19 and patient to receive VNS for pleux drainage MWF. Patient showed slightly higher LFTs likely in setting of concaminant IV tylenol use, downtrending after discontinuation.  Patient to follow up with oncologist, CT surgeon, and PCP.

## 2019-03-03 NOTE — DISCHARGE NOTE ADULT - CARE PLAN
Principal Discharge DX:	Pleural effusion  Goal:	management of condition  Assessment and plan of treatment:	you came in with shortness of breath. you had a thoracentesis in the ED which drained greater then 2 Liter of fluid and samples were sent to the lab. You had a pleurx chest tube placed on the R side. It drained fluid until it was capped. A visiting nurse will come to you MWF to help drain it. please follow up with CT surgery. you expirenced a great deal of pain and were prescribed a short course of pain killer called dilaudid. please take it no more than prescribed.  Secondary Diagnosis:	DVT (deep venous thrombosis)  Goal:	management of condition  Assessment and plan of treatment:	you were found to have a right leg below-the-knee blood clot. you are already on a blood thinner for treatment called lovenox. continue it as prescribed.  Secondary Diagnosis:	Pulmonary thromboembolism  Goal:	management of condition  Assessment and plan of treatment:	you were found to have multiple blood clots in the lungs. you were started on a heparin drip and then transitioned to lovenox subcutaneous injections. your son will deliver these medications.  Secondary Diagnosis:	Skin rash  Goal:	management of condition  Assessment and plan of treatment:	You began to have a skin rash following administration of IV contrast for a CT scan. you were treated with benadryl and a cream. We believe you have an intolerance to contrast.  Secondary Diagnosis:	Lung cancer  Goal:	management of condition  Assessment and plan of treatment:	We received all your paperwork from [facility]. you also received a brain MRI here, which was negative for metastasis. You were given a copy to take to your appointment with oncology on 3/15/19 2:00pm with Dr Cisneros, at [facility].

## 2019-03-03 NOTE — CONSULT NOTE ADULT - ATTENDING COMMENTS
Will follow path result from VATS planned for tomorrow. Please send pleural fluid for cytology.   MRI brain with contrast and CT a/p with contrast to complete staging.   Treatment plan will be based on the path and molecular results.

## 2019-03-03 NOTE — DISCHARGE NOTE ADULT - PATIENT PORTAL LINK FT
You can access the [facility] Patient Portal, offered by [facility], by registering with the following website: [email or web address]

## 2019-03-03 NOTE — DISCHARGE NOTE ADULT - CARE PROVIDER_API CALL
Candace Cisneros; MBBS)  H[facility]; [facility]; Medical Oncology  [address], [place] [id number]  Phone: [phone]  Fax: [phone]  Follow Up Time:     Asif Morris)  Surgery; Thoracic Surgery  [address] [place] [address]  Phone: [phone]  Fax: [phone]  Follow Up Time:     Dina Lee)  Internal Medicine  [address] [address] [place] [address]  Phone: [phone]  Fax: [phone]  Follow Up Time:

## 2019-03-03 NOTE — PROGRESS NOTE ADULT - PROBLEM SELECTOR PLAN 4
Pt advised to follow up with outpatient oncologist following discharge at OSH but has not yet done so.   - Ill-defined low-attenuation mass in the right lower lobe. Interlobular septal thickening concerning for lymphangitic spread. Superimposed infection is not excluded.  - Discharge papers from [facility]: poorly differentiated adenocarcinoma with involvement of hilar and mediastinal nodes  - c/s oncology for recs. Per oncology, will need records from [facility] and will reconsult oncology. [facility] medical records open M-F 8a-4p ([phone])

## 2019-03-04 ENCOUNTER — RESULT REVIEW (OUTPATIENT)
Age: 54
End: 2019-03-04

## 2019-03-04 DIAGNOSIS — I82.409 ACUTE EMBOLISM AND THROMBOSIS OF UNSPECIFIED DEEP VEINS OF UNSPECIFIED LOWER EXTREMITY: ICD-10-CM

## 2019-03-04 LAB
ALBUMIN SERPL ELPH-MCNC: 3 G/DL — LOW (ref 3.3–5)
ALP SERPL-CCNC: 62 U/L — SIGNIFICANT CHANGE UP (ref 40–120)
ALT FLD-CCNC: 22 U/L — SIGNIFICANT CHANGE UP (ref 4–33)
ANION GAP SERPL CALC-SCNC: 12 MMO/L — SIGNIFICANT CHANGE UP (ref 7–14)
APTT BLD: 139.6 SEC — CRITICAL HIGH (ref 27.5–36.3)
APTT BLD: 32.4 SEC — SIGNIFICANT CHANGE UP (ref 27.5–36.3)
APTT BLD: 50.1 SEC — HIGH (ref 27.5–36.3)
AST SERPL-CCNC: 30 U/L — SIGNIFICANT CHANGE UP (ref 4–32)
BASOPHILS # BLD AUTO: 0.03 K/UL — SIGNIFICANT CHANGE UP (ref 0–0.2)
BASOPHILS NFR BLD AUTO: 0.5 % — SIGNIFICANT CHANGE UP (ref 0–2)
BILIRUB SERPL-MCNC: 0.3 MG/DL — SIGNIFICANT CHANGE UP (ref 0.2–1.2)
BLD GP AB SCN SERPL QL: NEGATIVE — SIGNIFICANT CHANGE UP
BLD GP AB SCN SERPL QL: NEGATIVE — SIGNIFICANT CHANGE UP
BUN SERPL-MCNC: 6 MG/DL — LOW (ref 7–23)
CALCIUM SERPL-MCNC: 8.7 MG/DL — SIGNIFICANT CHANGE UP (ref 8.4–10.5)
CHLORIDE SERPL-SCNC: 104 MMOL/L — SIGNIFICANT CHANGE UP (ref 98–107)
CO2 SERPL-SCNC: 25 MMOL/L — SIGNIFICANT CHANGE UP (ref 22–31)
CREAT SERPL-MCNC: 0.7 MG/DL — SIGNIFICANT CHANGE UP (ref 0.5–1.3)
EOSINOPHIL # BLD AUTO: 0.81 K/UL — HIGH (ref 0–0.5)
EOSINOPHIL NFR BLD AUTO: 12.2 % — HIGH (ref 0–6)
GLUCOSE SERPL-MCNC: 99 MG/DL — SIGNIFICANT CHANGE UP (ref 70–99)
HCT VFR BLD CALC: 44.9 % — SIGNIFICANT CHANGE UP (ref 34.5–45)
HCT VFR BLD CALC: 44.9 % — SIGNIFICANT CHANGE UP (ref 34.5–45)
HGB BLD-MCNC: 14 G/DL — SIGNIFICANT CHANGE UP (ref 11.5–15.5)
HGB BLD-MCNC: 14 G/DL — SIGNIFICANT CHANGE UP (ref 11.5–15.5)
IMM GRANULOCYTES NFR BLD AUTO: 0.5 % — SIGNIFICANT CHANGE UP (ref 0–1.5)
INR BLD: 0.99 — SIGNIFICANT CHANGE UP (ref 0.88–1.17)
LYMPHOCYTES # BLD AUTO: 1.26 K/UL — SIGNIFICANT CHANGE UP (ref 1–3.3)
LYMPHOCYTES # BLD AUTO: 19 % — SIGNIFICANT CHANGE UP (ref 13–44)
MCHC RBC-ENTMCNC: 27.3 PG — SIGNIFICANT CHANGE UP (ref 27–34)
MCHC RBC-ENTMCNC: 27.3 PG — SIGNIFICANT CHANGE UP (ref 27–34)
MCHC RBC-ENTMCNC: 31.2 % — LOW (ref 32–36)
MCHC RBC-ENTMCNC: 31.2 % — LOW (ref 32–36)
MCV RBC AUTO: 87.7 FL — SIGNIFICANT CHANGE UP (ref 80–100)
MCV RBC AUTO: 87.7 FL — SIGNIFICANT CHANGE UP (ref 80–100)
MONOCYTES # BLD AUTO: 0.59 K/UL — SIGNIFICANT CHANGE UP (ref 0–0.9)
MONOCYTES NFR BLD AUTO: 8.9 % — SIGNIFICANT CHANGE UP (ref 2–14)
NEUTROPHILS # BLD AUTO: 3.92 K/UL — SIGNIFICANT CHANGE UP (ref 1.8–7.4)
NEUTROPHILS NFR BLD AUTO: 58.9 % — SIGNIFICANT CHANGE UP (ref 43–77)
NRBC # FLD: 0 K/UL — LOW (ref 25–125)
NRBC # FLD: 0 K/UL — LOW (ref 25–125)
PLATELET # BLD AUTO: 504 K/UL — HIGH (ref 150–400)
PLATELET # BLD AUTO: 504 K/UL — HIGH (ref 150–400)
PMV BLD: 9.6 FL — SIGNIFICANT CHANGE UP (ref 7–13)
PMV BLD: 9.6 FL — SIGNIFICANT CHANGE UP (ref 7–13)
POTASSIUM SERPL-MCNC: 4 MMOL/L — SIGNIFICANT CHANGE UP (ref 3.5–5.3)
POTASSIUM SERPL-SCNC: 4 MMOL/L — SIGNIFICANT CHANGE UP (ref 3.5–5.3)
PROT SERPL-MCNC: 5.9 G/DL — LOW (ref 6–8.3)
PROTHROM AB SERPL-ACNC: 11.3 SEC — SIGNIFICANT CHANGE UP (ref 9.8–13.1)
RBC # BLD: 5.12 M/UL — SIGNIFICANT CHANGE UP (ref 3.8–5.2)
RBC # BLD: 5.12 M/UL — SIGNIFICANT CHANGE UP (ref 3.8–5.2)
RBC # FLD: 13.2 % — SIGNIFICANT CHANGE UP (ref 10.3–14.5)
RBC # FLD: 13.2 % — SIGNIFICANT CHANGE UP (ref 10.3–14.5)
RH IG SCN BLD-IMP: POSITIVE — SIGNIFICANT CHANGE UP
RH IG SCN BLD-IMP: POSITIVE — SIGNIFICANT CHANGE UP
SODIUM SERPL-SCNC: 141 MMOL/L — SIGNIFICANT CHANGE UP (ref 135–145)
WBC # BLD: 6.64 K/UL — SIGNIFICANT CHANGE UP (ref 3.8–10.5)
WBC # BLD: 6.64 K/UL — SIGNIFICANT CHANGE UP (ref 3.8–10.5)
WBC # FLD AUTO: 6.64 K/UL — SIGNIFICANT CHANGE UP (ref 3.8–10.5)
WBC # FLD AUTO: 6.64 K/UL — SIGNIFICANT CHANGE UP (ref 3.8–10.5)

## 2019-03-04 PROCEDURE — 88360 TUMOR IMMUNOHISTOCHEM/MANUAL: CPT | Mod: 26

## 2019-03-04 PROCEDURE — 99233 SBSQ HOSP IP/OBS HIGH 50: CPT | Mod: GC

## 2019-03-04 PROCEDURE — 32609 THORACOSCOPY W/BX PLEURA: CPT

## 2019-03-04 PROCEDURE — 88305 TISSUE EXAM BY PATHOLOGIST: CPT | Mod: 26

## 2019-03-04 PROCEDURE — 88342 IMHCHEM/IMCYTCHM 1ST ANTB: CPT | Mod: 26

## 2019-03-04 PROCEDURE — 37191 INS ENDOVAS VENA CAVA FILTR: CPT

## 2019-03-04 PROCEDURE — 88341 IMHCHEM/IMCYTCHM EA ADD ANTB: CPT | Mod: 26

## 2019-03-04 PROCEDURE — 70553 MRI BRAIN STEM W/O & W/DYE: CPT | Mod: 26

## 2019-03-04 PROCEDURE — 32552 REMOVE LUNG CATHETER: CPT

## 2019-03-04 PROCEDURE — 71045 X-RAY EXAM CHEST 1 VIEW: CPT | Mod: 26

## 2019-03-04 PROCEDURE — 93970 EXTREMITY STUDY: CPT | Mod: 26

## 2019-03-04 PROCEDURE — 88331 PATH CONSLTJ SURG 1 BLK 1SPC: CPT | Mod: 26

## 2019-03-04 RX ORDER — HYDROMORPHONE HYDROCHLORIDE 2 MG/ML
0.5 INJECTION INTRAMUSCULAR; INTRAVENOUS; SUBCUTANEOUS
Qty: 0 | Refills: 0 | Status: DISCONTINUED | OUTPATIENT
Start: 2019-03-04 | End: 2019-03-06

## 2019-03-04 RX ORDER — ONDANSETRON 8 MG/1
4 TABLET, FILM COATED ORAL EVERY 6 HOURS
Qty: 0 | Refills: 0 | Status: DISCONTINUED | OUTPATIENT
Start: 2019-03-04 | End: 2019-03-06

## 2019-03-04 RX ORDER — NALOXONE HYDROCHLORIDE 4 MG/.1ML
0.1 SPRAY NASAL
Qty: 0 | Refills: 0 | Status: DISCONTINUED | OUTPATIENT
Start: 2019-03-04 | End: 2019-03-06

## 2019-03-04 RX ORDER — ALBUMIN HUMAN 25 %
250 VIAL (ML) INTRAVENOUS ONCE
Qty: 0 | Refills: 0 | Status: COMPLETED | OUTPATIENT
Start: 2019-03-04 | End: 2019-03-05

## 2019-03-04 RX ORDER — ONDANSETRON 8 MG/1
4 TABLET, FILM COATED ORAL ONCE
Qty: 0 | Refills: 0 | Status: DISCONTINUED | OUTPATIENT
Start: 2019-03-04 | End: 2019-03-05

## 2019-03-04 RX ORDER — ALBUMIN HUMAN 25 %
250 VIAL (ML) INTRAVENOUS ONCE
Qty: 0 | Refills: 0 | Status: COMPLETED | OUTPATIENT
Start: 2019-03-04 | End: 2019-03-04

## 2019-03-04 RX ORDER — HYDROMORPHONE HYDROCHLORIDE 2 MG/ML
0.5 INJECTION INTRAMUSCULAR; INTRAVENOUS; SUBCUTANEOUS
Qty: 0 | Refills: 0 | Status: DISCONTINUED | OUTPATIENT
Start: 2019-03-04 | End: 2019-03-05

## 2019-03-04 RX ORDER — METOCLOPRAMIDE HCL 10 MG
10 TABLET ORAL ONCE
Qty: 0 | Refills: 0 | Status: DISCONTINUED | OUTPATIENT
Start: 2019-03-04 | End: 2019-03-05

## 2019-03-04 RX ORDER — HYDROMORPHONE HYDROCHLORIDE 2 MG/ML
30 INJECTION INTRAMUSCULAR; INTRAVENOUS; SUBCUTANEOUS
Qty: 0 | Refills: 0 | Status: DISCONTINUED | OUTPATIENT
Start: 2019-03-04 | End: 2019-03-05

## 2019-03-04 RX ADMIN — HEPARIN SODIUM 800 UNIT(S)/HR: 5000 INJECTION INTRAVENOUS; SUBCUTANEOUS at 03:08

## 2019-03-04 RX ADMIN — HEPARIN SODIUM 0 UNIT(S)/HR: 5000 INJECTION INTRAVENOUS; SUBCUTANEOUS at 01:56

## 2019-03-04 RX ADMIN — Medication 500 MILLILITER(S): at 22:45

## 2019-03-04 RX ADMIN — HYDROMORPHONE HYDROCHLORIDE 30 MILLILITER(S): 2 INJECTION INTRAMUSCULAR; INTRAVENOUS; SUBCUTANEOUS at 23:05

## 2019-03-04 NOTE — CONSULT NOTE ADULT - SUBJECTIVE AND OBJECTIVE BOX
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 She states her symptoms began yesterday afternoon and worsened throughout the rest of the day, which prompted her to come to the ED last night.  She also endorses a continuous dry cough with audible wheezing that has been present since her last hospitalization at [facility]. On arrival to the ED she was also complaining of substernal chest pain which is now resolved. She had another thoracentesis performed in the ED last night and had some resolution of her shortness of breath following the procedure but is having significant pain in the area of the drain. She denies fever, chills, night sweats, hemoptysis, calf swelling or pain, n/v/d. She was discharged from [facility] with instructions to follow up with outpatient oncology which she has not yet done as she wanted to switch to the [facility] system and has an appointment scheduled with Dr. Tyler Stanton (pulmonology) this Tuesday.    ED Course: Initial CXR showed large R pleural effusion. CTA showed multiple acute PEs in the left upper and lower lobes and in the lingular artery, large R effusion with partial collapse of the R lung and obstruction of the right lower lobe bronchus, and a poorly defined mass in the right lower lobe. Thoracentesis was performed with 2L hazy yellow fluid removed and subsequent improvement in patient's SOB. Repeat CXR showed interval decrease in size of effusion.     OSH Course: Hospitalized for 8 days at [facility] 8 weeks ago. Presented with productive cough refractory to outpatient antibiotics. Received IV antibiotics with no improvement. TB ruled out. CT done followed by thoracentesis. Discharged with 3 days of antibiotics and follow up with outpatient oncology was recommended.  (02 Mar 2019 08:42)        PAST MEDICAL & SURGICAL HISTORY:  Lung cancer  No significant past surgical history    FAMILY HISTORY:  Family history of coronary artery disease (Sibling): Brother  Family history of hypertension (Mother): Mother  Family history of diabetes mellitus (DM) (Mother): Mother    ALLERGIES: No Known Allergies    CURRENT MEDICATIONS  MEDICATIONS (STANDING):   MEDICATIONS (PRN):acetaminophen   Tablet .. 650 milliGRAM(s) Oral every 6 hours PRN Moderate Pain (4 - 6)  diphenhydrAMINE 25 milliGRAM(s) Oral every 4 hours PRN Rash and/or Itching  oxyCODONE    IR 5 milliGRAM(s) Oral every 6 hours PRN Severe Pain (7 - 10)    --------------------------------------------------------------------------------------------    Vitals:   T(C): 36.8 (03-04-19 @ 05:16), Max: 37.7 (03-03-19 @ 13:19)  HR: 91 (03-04-19 @ 05:16) (91 - 102)  BP: 114/67 (03-04-19 @ 05:16) (108/87 - 124/84)  RR: 16 (03-04-19 @ 05:16) (16 - 18)  SpO2: 97% (03-04-19 @ 05:16) (95% - 99%)    Height (cm): 157.48 (03-02 @ 00:42)  Weight (kg): 48.1 (03-02 @ 00:42)  BMI (kg/m2): 19.4 (03-02 @ 00:42)  BSA (m2): 1.46 (03-02 @ 00:42)    PHYSICAL EXAM:  General: Alert, NAD  Neuro: A+Ox3  HEENT: NC/AT, no asymmetry, no scleral icterus  Cardio: Adequately perfused  Resp: Airway patent, unlabored breathing  GI/Abd: Soft, NT/ND, no rebound/guarding, no masses palpated  Vascular: All 4 extremities warm, B/l radial pulses palpable, b/l femoral pulse palpable, no wounds  Musculoskeletal: All 4 extremities moving spontaneously, no limitations    --------------------------------------------------------------------------------------------    LABS  CBC (03-04 @ 06:50)                              14.0                           6.64    )----------------(  504<H>     58.9  % Neutrophils, 19.0  % Lymphocytes, ANC: 3.92                                44.9    CBC (03-03 @ 17:32)                              13.3                           6.89    )----------------(  541<H>     --    % Neutrophils, --    % Lymphocytes, ANC: --                                  42.7      BMP (03-04 @ 06:50)             141     |  104     |  6<L>  		Ca++ --      Ca 8.7                ---------------------------------( 99    		Mg --                 4.0     |  25      |  0.70  			Ph --      BMP (03-03 @ 07:00)             136     |  102     |  7     		Ca++ --      Ca 8.7                ---------------------------------( 91    		Mg 1.9                4.2     |  19<L>   |  0.67  			Ph 3.6       LFTs (03-04 @ 06:50)      TPro 5.9<L> / Alb 3.0<L> / TBili 0.3 / DBili -- / AST 30 / ALT 22 / AlkPhos 62  LFTs (03-03 @ 07:00)      TPro 5.9<L> / Alb 2.9<L> / TBili 0.6 / DBili -- / AST 19 / ALT 16 / AlkPhos 60    Coags (03-04 @ 06:50)  aPTT 50.1<H> / INR 0.99 / PT 11.3  Coags (03-04 @ 00:40)  aPTT 139.6<HH> / INR -- / PT --    --------------------------------------------------------------------------------------------    MICROBIOLOGY    -> PLEURAL FLUID Culture (03-02 @ 07:54)       NOS^No Organisms Seen  WBC^White Blood Cells  QNTY CELLS IN GRAM STAIN: FEW (2+)      NO ORGANISMS ISOLATED AT 24 HOURS  NG    -> PLEURAL FLUID Culture (03-02 @ 07:53)     NG    NG  NG    --------------------------------------------------------------------------------------------

## 2019-03-04 NOTE — PROGRESS NOTE ADULT - SUBJECTIVE AND OBJECTIVE BOX
****************************  Aziza Tenorio, PGY-1  [facility] Pager #: [phone]  NS Pager #: [phone]  ****************************    Patient is a 54y old  Female who presents with a chief complaint of PE w/ malignancy; pleural effusion w/ malignancy (03 Mar 2019 15:33)     INTERVAL HPI/OVERNIGHT EVENTS: No acute events overnight. Hep gtt stopped at MN. Patient NPO after MN       MEDICATIONS  (STANDING):    MEDICATIONS  (PRN):  acetaminophen   Tablet .. 650 milliGRAM(s) Oral every 6 hours PRN Moderate Pain (4 - 6)  diphenhydrAMINE 25 milliGRAM(s) Oral every 4 hours PRN Rash and/or Itching  oxyCODONE    IR 5 milliGRAM(s) Oral every 6 hours PRN Severe Pain (7 - 10)    Allergies:  No Known Allergies    Intolerances:      REVIEW OF SYSTEMS:  Constitutional: Denies fever, weight loss, fatigue  Resp: Denies SOB, cough, hemoptysis  CV: Denies chest pain, palpitations, dizziness  GI: Denies abdominal pain, N/V/D/C, melena, hematochezia  : Denies dysuria, increased frequency, hematuria  Neuro: Denies HA, weakness, numbness  Skin: Denies rashes, lesions  Lymph Nodes: Denies enlarged glands  MSK: Denies joint pain, swelling    VITAL SIGNS:  T(C): 36.8 (03-04-19 @ 05:16), Max: 37.7 (03-03-19 @ 13:19)  HR: 91 (03-04-19 @ 05:16) (91 - 102)  BP: 114/67 (03-04-19 @ 05:16) (108/87 - 124/84)  RR: 16 (03-04-19 @ 05:16) (16 - 18)  SpO2: 97% (03-04-19 @ 05:16) (95% - 99%)    PHYSICAL EXAM:  GENERAL: NAD, well-developed  HEAD:  Atraumatic, Normocephalic  EYES: EOMI, PERRLA, conjunctiva and sclera clear  NECK: Supple, No JVD  CHEST/LUNG: Clear to auscultation bilaterally; No wheeze  HEART: Regular rate and rhythm; No murmurs, rubs, or gallops  ABDOMEN: Soft, Nontender, Nondistended; Bowel sounds present  EXTREMITIES:  2+ Peripheral Pulses, No clubbing, cyanosis, or edema  PSYCH: AAOx3  NEUROLOGY: non-focal  SKIN: diffuse rash to chest, abdomen, thigh and back      LABS:                        13.3   6.89  )-----------( 541      ( 03 Mar 2019 17:32 )             42.7       Ca    8.7        03 Mar 2019 07:00    PTT - ( 04 Mar 2019 00:40 )  PTT:139.6 SEC  CAPILLARY BLOOD GLUCOSE        BLOOD CULTURE    RADIOLOGY & ADDITIONAL TESTS:    Imaging Personally Reviewed:  [ ] YES     Consultant(s) Notes Reviewed:      Care Discussed with Consultants/Other Providers: ****************************  Aziza Tenorio, PGY-1  [facility] Pager #: [phone]  NS Pager #: [phone]  ****************************    Patient is a 54y old  Female who presents with a chief complaint of PE w/ malignancy; pleural effusion w/ malignancy (03 Mar 2019 15:33)     INTERVAL HPI/OVERNIGHT EVENTS: No acute events overnight. Hep gtt stopped at MN. Patient NPO after MN       MEDICATIONS  (STANDING):    MEDICATIONS  (PRN):  acetaminophen   Tablet .. 650 milliGRAM(s) Oral every 6 hours PRN Moderate Pain (4 - 6)  diphenhydrAMINE 25 milliGRAM(s) Oral every 4 hours PRN Rash and/or Itching  oxyCODONE    IR 5 milliGRAM(s) Oral every 6 hours PRN Severe Pain (7 - 10)    Allergies:  No Known Allergies    Intolerances:      REVIEW OF SYSTEMS:  Constitutional: Denies fever, weight loss, fatigue  Resp: Denies SOB, cough, hemoptysis  CV: Denies chest pain, palpitations, dizziness  GI: Denies abdominal pain, N/V/D/C, melena, hematochezia  : Denies dysuria, increased frequency, hematuria  Neuro: Denies HA, weakness, numbness  Skin: Denies rashes, lesions  Lymph Nodes: Denies enlarged glands  MSK: Denies joint pain, swelling    VITAL SIGNS:  T(C): 36.8 (03-04-19 @ 05:16), Max: 37.7 (03-03-19 @ 13:19)  HR: 91 (03-04-19 @ 05:16) (91 - 102)  BP: 114/67 (03-04-19 @ 05:16) (108/87 - 124/84)  RR: 16 (03-04-19 @ 05:16) (16 - 18)  SpO2: 97% (03-04-19 @ 05:16) (95% - 99%)    PHYSICAL EXAM:  GENERAL: NAD, well-developed  EYES: EOMI, PERRLA, conjunctiva and sclera clear  NECK: Supple, No JVD  CHEST/LUNG: decreased breath sounds on R base, R drain in place with serosanguinous fluid. rash around site   HEART: Regular rate and rhythm; No murmurs, rubs, or gallops  ABDOMEN: Soft, Nontender, Nondistended; Bowel sounds present  EXTREMITIES:  no LE edema. pruritic rash on inner thighs   PSYCH: AAOx3  NEUROLOGY: non-focal  SKIN: diffuse rash to chest, abdomen, thigh and back      LABS:                        13.3   6.89  )-----------( 541      ( 03 Mar 2019 17:32 )             42.7       Ca    8.7        03 Mar 2019 07:00    PTT - ( 04 Mar 2019 00:40 )  PTT:139.6 SEC  CAPILLARY BLOOD GLUCOSE        BLOOD CULTURE    RADIOLOGY & ADDITIONAL TESTS:  < from: US Duplex Venous Lower Ext Complete, Bilateral (03.04.19 @ 09:44) >  IMPRESSION:     DVT mid right peroneal vein. (Below the knee).    < end of copied text >      Imaging Personally Reviewed:  [ ] YES     Consultant(s) Notes Reviewed:      Care Discussed with Consultants/Other Providers:

## 2019-03-04 NOTE — BRIEF OPERATIVE NOTE - OPERATION/FINDINGS
Pleural studding, pleural effusion  Frozen section from pleural biopsy consistent with carcinoma
Placement of retrievable IVC Filter via Right femoral vein access.

## 2019-03-04 NOTE — BRIEF OPERATIVE NOTE - PROCEDURE
<<-----Click on this checkbox to enter Procedure Insertion of PleurX catheter system  03/04/2019  Right VATS, pleural biopsy, drainage of pleural effusion, insertion of pleurX catheter  Active  [id number]

## 2019-03-04 NOTE — PROGRESS NOTE ADULT - PROBLEM SELECTOR PLAN 1
CT Angio- Multiple acute pulmonary emboli involving left upper lobe, lower lobe and lingular pulmonary artery branches. No evidence of right heart strain. Likely 2/2 to hypercoagulable state of malignancy.   - No signs of DVT on physical exam  - D/c Lovenox. Started heparin drip at 9 mL/hr  - Will need long term anticoagulation following discharge; patient states she will not inject herself at home and does not think family will help her with injections. will need to consider PO anticoagulation  - Pt reported urticaria. Started benadryl 25 mg Q4H PRN  - CT surg will plan for RT VATS w/ bx and PleurX catheter placement on 3/4.   - Per oncology recs- MRI brain w/ and w/o contrast. Will hold CT A/P w/ IV Contrast for staging in the setting of urticaria possible 2/2 CTA  - f/u b/l LE dopplers CT Angio- Multiple acute pulmonary emboli involving left upper lobe, lower lobe and lingular pulmonary artery branches. No evidence of right heart strain. Likely 2/2 to hypercoagulable state of malignancy.   - No signs of DVT on physical exam  - D/c Lovenox. Started heparin drip at 9 mL/hr: on hold since MN for procedure today. will follow up with CT surgery regarding when to restart   - Will need long term anticoagulation following discharge; patient states she will not inject herself at home and does not think family will help her with injections. will need to consider PO anticoagulation  - Pt reported urticaria. Started benadryl 25 mg Q4H PRN  - CT surg will plan for RT VATS w/ bx and PleurX catheter placement TODAY   - Per oncology recs- MRI brain w/ and w/o contrast. = NO METASTASIS TO BRAIN  - Will hold CT A/P w/ IV Contrast for staging in the setting of urticaria possible 2/2 CTA  - f/u b/l LE dopplers

## 2019-03-04 NOTE — PROGRESS NOTE ADULT - PROBLEM SELECTOR PLAN 5
- DVT prophylaxis - heparin drip  - Diet - regular Pt advised to follow up with outpatient oncologist following discharge at OSH but has not yet done so.   - Ill-defined low-attenuation mass in the right lower lobe. Interlobular septal thickening concerning for lymphangitic spread. Superimposed infection is not excluded.  - Discharge papers from 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 Per oncology, will need records from [facility] and will reconsult oncology. [facility] medical records open M-F 8a-4p ([phone]) = FAXED

## 2019-03-04 NOTE — BRIEF OPERATIVE NOTE - PROCEDURE
<<-----Click on this checkbox to enter Procedure IVC filter placement  03/04/2019    Active  [id number]

## 2019-03-04 NOTE — PROGRESS NOTE ADULT - PROBLEM SELECTOR PLAN 4
Pt advised to follow up with outpatient oncologist following discharge at OSH but has not yet done so.   - Ill-defined low-attenuation mass in the right lower lobe. Interlobular septal thickening concerning for lymphangitic spread. Superimposed infection is not excluded.  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 Per oncology, will need records from [facility] and will reconsult oncology. [facility] medical records open M-F 8a-4p ([phone]) Pt reported substernal chest pain while in ED now resolved.  - Trop T <6, BNP 54.85  - f/u EKG

## 2019-03-04 NOTE — PROGRESS NOTE ADULT - PROBLEM SELECTOR PLAN 3
Pt reported substernal chest pain while in ED now resolved.  - Trop T <6, BNP 54.85  - f/u EKG - new below the knee DVT (R) sided   - on hep gtt for PEs

## 2019-03-04 NOTE — CONSULT NOTE ADULT - REASON FOR ADMISSION
PE w/ malignancy; pleural effusion w/ malignancy
PE w/ malignancy; pleural effusion w/ malignancy
Recurrent malignant pleural effusion

## 2019-03-04 NOTE — CONSULT NOTE ADULT - ASSESSMENT
54F with right malignant pleural effusion and PE planning for right VATS and PleurX catheter placement today    - Will plan for IVC filter placement today in OR  - Discussed with patient and consent obtained (in chart)    Vascular surgery  Pager [phone]

## 2019-03-04 NOTE — BRIEF OPERATIVE NOTE - PRE-OP DX
DVT (deep venous thrombosis)  03/04/2019    Active  Zena Rosales  Pleural effusion  03/04/2019    Active  Pete Bravo

## 2019-03-05 ENCOUNTER — APPOINTMENT (OUTPATIENT)
Dept: PULMONOLOGY | Facility: CLINIC | Age: 54
End: 2019-03-05

## 2019-03-05 ENCOUNTER — RESULT REVIEW (OUTPATIENT)
Age: 54
End: 2019-03-05

## 2019-03-05 DIAGNOSIS — R21 RASH AND OTHER NONSPECIFIC SKIN ERUPTION: ICD-10-CM

## 2019-03-05 LAB
ALBUMIN SERPL ELPH-MCNC: 3.7 G/DL — SIGNIFICANT CHANGE UP (ref 3.3–5)
ALP SERPL-CCNC: 52 U/L — SIGNIFICANT CHANGE UP (ref 40–120)
ALT FLD-CCNC: 20 U/L — SIGNIFICANT CHANGE UP (ref 4–33)
ANION GAP SERPL CALC-SCNC: 11 MMO/L — SIGNIFICANT CHANGE UP (ref 7–14)
APTT BLD: 73.1 SEC — HIGH (ref 27.5–36.3)
APTT BLD: 87.7 SEC — HIGH (ref 27.5–36.3)
AST SERPL-CCNC: 27 U/L — SIGNIFICANT CHANGE UP (ref 4–32)
BILIRUB SERPL-MCNC: 0.5 MG/DL — SIGNIFICANT CHANGE UP (ref 0.2–1.2)
BUN SERPL-MCNC: 8 MG/DL — SIGNIFICANT CHANGE UP (ref 7–23)
CALCIUM SERPL-MCNC: 8.8 MG/DL — SIGNIFICANT CHANGE UP (ref 8.4–10.5)
CHLORIDE SERPL-SCNC: 101 MMOL/L — SIGNIFICANT CHANGE UP (ref 98–107)
CO2 SERPL-SCNC: 27 MMOL/L — SIGNIFICANT CHANGE UP (ref 22–31)
CREAT SERPL-MCNC: 0.63 MG/DL — SIGNIFICANT CHANGE UP (ref 0.5–1.3)
GLUCOSE SERPL-MCNC: 107 MG/DL — HIGH (ref 70–99)
HCT VFR BLD CALC: 39.2 % — SIGNIFICANT CHANGE UP (ref 34.5–45)
HGB BLD-MCNC: 11.9 G/DL — SIGNIFICANT CHANGE UP (ref 11.5–15.5)
MAGNESIUM SERPL-MCNC: 1.8 MG/DL — SIGNIFICANT CHANGE UP (ref 1.6–2.6)
MCHC RBC-ENTMCNC: 27.7 PG — SIGNIFICANT CHANGE UP (ref 27–34)
MCHC RBC-ENTMCNC: 30.4 % — LOW (ref 32–36)
MCV RBC AUTO: 91.2 FL — SIGNIFICANT CHANGE UP (ref 80–100)
NRBC # FLD: 0 K/UL — LOW (ref 25–125)
PHOSPHATE SERPL-MCNC: 3.5 MG/DL — SIGNIFICANT CHANGE UP (ref 2.5–4.5)
PLATELET # BLD AUTO: 481 K/UL — HIGH (ref 150–400)
PMV BLD: 9.2 FL — SIGNIFICANT CHANGE UP (ref 7–13)
POTASSIUM SERPL-MCNC: 4.5 MMOL/L — SIGNIFICANT CHANGE UP (ref 3.5–5.3)
POTASSIUM SERPL-SCNC: 4.5 MMOL/L — SIGNIFICANT CHANGE UP (ref 3.5–5.3)
PROT SERPL-MCNC: 5.9 G/DL — LOW (ref 6–8.3)
RBC # BLD: 4.3 M/UL — SIGNIFICANT CHANGE UP (ref 3.8–5.2)
RBC # FLD: 13.1 % — SIGNIFICANT CHANGE UP (ref 10.3–14.5)
SODIUM SERPL-SCNC: 139 MMOL/L — SIGNIFICANT CHANGE UP (ref 135–145)
WBC # BLD: 8.99 K/UL — SIGNIFICANT CHANGE UP (ref 3.8–10.5)
WBC # FLD AUTO: 8.99 K/UL — SIGNIFICANT CHANGE UP (ref 3.8–10.5)

## 2019-03-05 PROCEDURE — 99233 SBSQ HOSP IP/OBS HIGH 50: CPT | Mod: GC

## 2019-03-05 PROCEDURE — 88341 IMHCHEM/IMCYTCHM EA ADD ANTB: CPT | Mod: 26

## 2019-03-05 PROCEDURE — 88112 CYTOPATH CELL ENHANCE TECH: CPT | Mod: 26

## 2019-03-05 PROCEDURE — 88342 IMHCHEM/IMCYTCHM 1ST ANTB: CPT | Mod: 26

## 2019-03-05 PROCEDURE — 88305 TISSUE EXAM BY PATHOLOGIST: CPT | Mod: 26

## 2019-03-05 PROCEDURE — 71045 X-RAY EXAM CHEST 1 VIEW: CPT | Mod: 26

## 2019-03-05 PROCEDURE — 99232 SBSQ HOSP IP/OBS MODERATE 35: CPT

## 2019-03-05 RX ORDER — HEPARIN SODIUM 5000 [USP'U]/ML
INJECTION INTRAVENOUS; SUBCUTANEOUS
Qty: 25000 | Refills: 0 | Status: DISCONTINUED | OUTPATIENT
Start: 2019-03-05 | End: 2019-03-06

## 2019-03-05 RX ORDER — ACETAMINOPHEN 500 MG
750 TABLET ORAL ONCE
Qty: 0 | Refills: 0 | Status: COMPLETED | OUTPATIENT
Start: 2019-03-05 | End: 2019-03-05

## 2019-03-05 RX ORDER — SODIUM CHLORIDE 9 MG/ML
1000 INJECTION INTRAMUSCULAR; INTRAVENOUS; SUBCUTANEOUS
Qty: 0 | Refills: 0 | Status: DISCONTINUED | OUTPATIENT
Start: 2019-03-05 | End: 2019-03-06

## 2019-03-05 RX ORDER — RIVAROXABAN 15 MG-20MG
1 KIT ORAL
Qty: 42 | Refills: 0 | OUTPATIENT
Start: 2019-03-05 | End: 2019-03-25

## 2019-03-05 RX ORDER — HEPARIN SODIUM 5000 [USP'U]/ML
4000 INJECTION INTRAVENOUS; SUBCUTANEOUS EVERY 6 HOURS
Qty: 0 | Refills: 0 | Status: DISCONTINUED | OUTPATIENT
Start: 2019-03-05 | End: 2019-03-06

## 2019-03-05 RX ORDER — APIXABAN 2.5 MG/1
1 TABLET, FILM COATED ORAL
Qty: 30 | Refills: 0 | OUTPATIENT
Start: 2019-03-05 | End: 2019-04-03

## 2019-03-05 RX ORDER — APIXABAN 2.5 MG/1
2 TABLET, FILM COATED ORAL
Qty: 28 | Refills: 0 | OUTPATIENT
Start: 2019-03-05 | End: 2019-03-11

## 2019-03-05 RX ORDER — ACETAMINOPHEN 500 MG
750 TABLET ORAL ONCE
Qty: 0 | Refills: 0 | Status: COMPLETED | OUTPATIENT
Start: 2019-03-05 | End: 2019-03-06

## 2019-03-05 RX ORDER — RIVAROXABAN 15 MG-20MG
1 KIT ORAL
Qty: 30 | Refills: 0 | OUTPATIENT
Start: 2019-03-05 | End: 2019-04-03

## 2019-03-05 RX ORDER — HYDROMORPHONE HYDROCHLORIDE 2 MG/ML
30 INJECTION INTRAMUSCULAR; INTRAVENOUS; SUBCUTANEOUS
Qty: 0 | Refills: 0 | Status: DISCONTINUED | OUTPATIENT
Start: 2019-03-05 | End: 2019-03-06

## 2019-03-05 RX ORDER — HYDROCORTISONE 1 %
1 OINTMENT (GRAM) TOPICAL DAILY
Qty: 0 | Refills: 0 | Status: DISCONTINUED | OUTPATIENT
Start: 2019-03-05 | End: 2019-03-07

## 2019-03-05 RX ADMIN — HEPARIN SODIUM 900 UNIT(S)/HR: 5000 INJECTION INTRAVENOUS; SUBCUTANEOUS at 06:26

## 2019-03-05 RX ADMIN — Medication 300 MILLIGRAM(S): at 18:57

## 2019-03-05 RX ADMIN — HYDROMORPHONE HYDROCHLORIDE 30 MILLILITER(S): 2 INJECTION INTRAMUSCULAR; INTRAVENOUS; SUBCUTANEOUS at 02:14

## 2019-03-05 RX ADMIN — SODIUM CHLORIDE 50 MILLILITER(S): 9 INJECTION INTRAMUSCULAR; INTRAVENOUS; SUBCUTANEOUS at 13:11

## 2019-03-05 RX ADMIN — HEPARIN SODIUM 900 UNIT(S)/HR: 5000 INJECTION INTRAVENOUS; SUBCUTANEOUS at 14:07

## 2019-03-05 RX ADMIN — HYDROMORPHONE HYDROCHLORIDE 30 MILLILITER(S): 2 INJECTION INTRAMUSCULAR; INTRAVENOUS; SUBCUTANEOUS at 13:00

## 2019-03-05 RX ADMIN — Medication 750 MILLIGRAM(S): at 19:12

## 2019-03-05 RX ADMIN — Medication 300 MILLIGRAM(S): at 11:58

## 2019-03-05 RX ADMIN — SODIUM CHLORIDE 10 MILLILITER(S): 9 INJECTION INTRAMUSCULAR; INTRAVENOUS; SUBCUTANEOUS at 16:32

## 2019-03-05 RX ADMIN — Medication 750 MILLIGRAM(S): at 12:13

## 2019-03-05 RX ADMIN — Medication 500 MILLILITER(S): at 00:17

## 2019-03-05 RX ADMIN — HEPARIN SODIUM 900 UNIT(S)/HR: 5000 INJECTION INTRAVENOUS; SUBCUTANEOUS at 23:11

## 2019-03-05 RX ADMIN — HYDROMORPHONE HYDROCHLORIDE 30 MILLILITER(S): 2 INJECTION INTRAMUSCULAR; INTRAVENOUS; SUBCUTANEOUS at 07:35

## 2019-03-05 RX ADMIN — HYDROMORPHONE HYDROCHLORIDE 30 MILLILITER(S): 2 INJECTION INTRAMUSCULAR; INTRAVENOUS; SUBCUTANEOUS at 19:04

## 2019-03-05 NOTE — PROGRESS NOTE ADULT - SUBJECTIVE AND OBJECTIVE BOX
Pt is without compalaints in PACU s/p PleurX.  Despite low BP she is without dizziness.      PleurX in place, site is clean and dry; to hemevac, no AL, minimal drainage.      CXR: No PTX    A: s/p R Pleurx    P: Plan for drainage as per primary team

## 2019-03-05 NOTE — PROGRESS NOTE ADULT - SUBJECTIVE AND OBJECTIVE BOX
Patient seen and examined with Dr. Morris.   Patient comfortable. No complaints. AVSS.   PleurX catheter to pleurevac on suction. 150cc output for 24 hours.     Will cap prior to DC home.   Call #[phone] prior to discharge.   Plan to have pleurX drained at home Mon, Wed, & Fri.   Patient to F/U with Oncology.     Iwona DALY  Thoracic Surgery   #[phone]

## 2019-03-05 NOTE — PROGRESS NOTE ADULT - SUBJECTIVE AND OBJECTIVE BOX
Anesthesia Pain Management Service    SUBJECTIVE: I'm still in alot of pain     Pain Scale Score	At rest: _8_ 	With Activity: ___ 	[X ] Refer to charted pain scores    THERAPY:    [ ] IV PCA Morphine		[ ] 5 mg/mL	[ ] 1 mg/mL  [X ] IV PCA Hydromorphone	[ ] 5 mg/mL	[X ] 1 mg/mL  [ ] IV PCA Fentanyl		[ ] 50 micrograms/mL    Demand dose __0.2_ lockout __6_ (minutes) Continuous Rate _0__ Total: _2.1__  mg used (in past 24 hours)      MEDICATIONS  (STANDING):  acetaminophen  IVPB .. 750 milliGRAM(s) IV Intermittent once  acetaminophen  IVPB .. 750 milliGRAM(s) IV Intermittent once  acetaminophen  IVPB .. 750 milliGRAM(s) IV Intermittent once  heparin  Infusion.  Unit(s)/Hr (9 mL/Hr) IV Continuous <Continuous>  HYDROmorphone PCA (1 mG/mL) 30 milliLiter(s) PCA Continuous PCA Continuous    MEDICATIONS  (PRN):  acetaminophen   Tablet .. 650 milliGRAM(s) Oral every 6 hours PRN Moderate Pain (4 - 6)  diphenhydrAMINE 25 milliGRAM(s) Oral every 4 hours PRN Rash and/or Itching  heparin  Injectable 4000 Unit(s) IV Push every 6 hours PRN For aPTT less than 40  HYDROmorphone PCA (1 mG/mL) Rescue Clinician Bolus 0.5 milliGRAM(s) IV Push every 15 minutes PRN for Pain Scale GREATER THAN 6  naloxone Injectable 0.1 milliGRAM(s) IV Push every 3 minutes PRN For ANY of the following changes in patient status:  A. RR LESS THAN 10 breaths per minute, B. Oxygen saturation LESS THAN 90%, C. Sedation score of 6  ondansetron Injectable 4 milliGRAM(s) IV Push every 6 hours PRN Nausea      OBJECTIVE: laying in bed     Sedation Score:	[ X] Alert	[ ] Drowsy 	[ ] Arousable	[ ] Asleep	[ ] Unresponsive    Side Effects:	[X ] None	[ ] Nausea	[ ] Vomiting	[ ] Pruritus  		[ ] Other:    Vital Signs Last 24 Hrs  T(C): 37.2 (05 Mar 2019 10:01), Max: 37.3 (05 Mar 2019 05:34)  T(F): 98.9 (05 Mar 2019 10:01), Max: 99.1 (05 Mar 2019 05:34)  HR: 65 (05 Mar 2019 10:01) (65 - 108)  BP: 109/63 (05 Mar 2019 10:01) (83/54 - 117/64)  BP(mean): 56 (05 Mar 2019 05:34) (56 - 66)  RR: 18 (05 Mar 2019 10:01) (15 - 28)  SpO2: 100% (05 Mar 2019 10:01) (95% - 100%)    ASSESSMENT/ PLAN    Therapy to  be:	[ X] Continue   [ ] Discontinued   [ ] Change to prn Analgesics    Documentation and Verification of current medications:   [X] Done	[ ] Not done, not elligible    Comments: CT in place, continue current therapy

## 2019-03-05 NOTE — PROGRESS NOTE ADULT - SUBJECTIVE AND OBJECTIVE BOX
ANESTHESIA POSTOP CHECK    54y Female POSTOP DAY 1 S/P     Vital Signs Last 24 Hrs  T(C): 36.8 (05 Mar 2019 14:01), Max: 37.3 (05 Mar 2019 05:34)  T(F): 98.2 (05 Mar 2019 14:01), Max: 99.1 (05 Mar 2019 05:34)  HR: 94 (05 Mar 2019 14:01) (65 - 108)  BP: 108/60 (05 Mar 2019 14:01) (83/54 - 117/64)  BP(mean): 56 (05 Mar 2019 05:34) (56 - 66)  RR: 18 (05 Mar 2019 14:01) (15 - 28)  SpO2: 97% (05 Mar 2019 14:01) (95% - 100%)  I&O's Summary    04 Mar 2019 07:01  -  05 Mar 2019 07:00  --------------------------------------------------------  IN: 850 mL / OUT: 150 mL / NET: 700 mL        [X ] NO APPARENT ANESTHESIA COMPLICATIONS      Comments:

## 2019-03-05 NOTE — PROGRESS NOTE ADULT - SUBJECTIVE AND OBJECTIVE BOX
INTERVAL HPI/OVERNIGHT EVENTS:  Patient seen at bedside. She is s/p VATS, bx and pleurex placement yesterday.       VITAL SIGNS:  T(F): 98.2 (03-05-19 @ 14:01)  HR: 94 (03-05-19 @ 14:01)  BP: 108/60 (03-05-19 @ 14:01)  RR: 18 (03-05-19 @ 14:01)  SpO2: 97% (03-05-19 @ 14:01)  Wt(kg): --    PHYSICAL EXAM:    Constitutional: NAD, resting in bed comfortably  Eyes: EOMI, sclera non-icteric  Neck: supple, no LAP  Respiratory: CTA b/l, good air entry b/l, no wheezing, rhonchi or crackels  Cardiovascular: RRR, normal S1S2, no M/R/G  Gastrointestinal: soft, NTND  Extremities: no edema  Neurological: AAOx3, non focal  Skin: Normal temperature    MEDICATIONS  (STANDING):  acetaminophen  IVPB .. 750 milliGRAM(s) IV Intermittent once  acetaminophen  IVPB .. 750 milliGRAM(s) IV Intermittent once  heparin  Infusion.  Unit(s)/Hr (9 mL/Hr) IV Continuous <Continuous>  HYDROmorphone PCA (1 mG/mL) 30 milliLiter(s) PCA Continuous PCA Continuous  sodium chloride 0.9%. 1000 milliLiter(s) (10 mL/Hr) IV Continuous <Continuous>    MEDICATIONS  (PRN):  acetaminophen   Tablet .. 650 milliGRAM(s) Oral every 6 hours PRN Moderate Pain (4 - 6)  diphenhydrAMINE 25 milliGRAM(s) Oral every 4 hours PRN Rash and/or Itching  heparin  Injectable 4000 Unit(s) IV Push every 6 hours PRN For aPTT less than 40  hydrocortisone 2.5% Cream 1 Application(s) Topical daily PRN Rash  HYDROmorphone PCA (1 mG/mL) Rescue Clinician Bolus 0.5 milliGRAM(s) IV Push every 15 minutes PRN for Pain Scale GREATER THAN 6  naloxone Injectable 0.1 milliGRAM(s) IV Push every 3 minutes PRN For ANY of the following changes in patient status:  A. RR LESS THAN 10 breaths per minute, B. Oxygen saturation LESS THAN 90%, C. Sedation score of 6  ondansetron Injectable 4 milliGRAM(s) IV Push every 6 hours PRN Nausea      Allergies    No Known Allergies    Intolerances    IV contrast (Rash)      LABS:                        11.9   8.99  )-----------( 481      ( 05 Mar 2019 09:25 )             39.2     03-05    139  |  101  |  8   ----------------------------<  107<H>  4.5   |  27  |  0.63    Ca    8.8      05 Mar 2019 09:25  Phos  3.5     03-05  Mg     1.8     03-05    TPro  5.9<L>  /  Alb  3.7  /  TBili  0.5  /  DBili  x   /  AST  27  /  ALT  20  /  AlkPhos  52  03-05    PT/INR - ( 04 Mar 2019 06:50 )   PT: 11.3 SEC;   INR: 0.99          PTT - ( 05 Mar 2019 13:10 )  PTT:73.1 SEC      RADIOLOGY & ADDITIONAL TESTS:  Studies reviewed.

## 2019-03-05 NOTE — PROGRESS NOTE ADULT - SUBJECTIVE AND OBJECTIVE BOX
****************************  Aziza Tenorio, PGY-1  [facility] Pager #: [phone]  NS Pager #: [phone]  ****************************    Patient is a 54y old  Female who presents with a chief complaint of PE w/ malignancy; pleural effusion w/ malignancy (05 Mar 2019 05:13)     INTERVAL HPI/OVERNIGHT EVENTS: patient now s/p pleurx placement, pleural biopsy, and drainage of pleural effusion by CT surg. pt also s/p IVC filter placement by vascular. as per surgery recs, patient restarted on hep gtt at 6am today. Patient's PCA not working, patient given pushes      MEDICATIONS  (STANDING):  heparin  Infusion.  Unit(s)/Hr (9 mL/Hr) IV Continuous <Continuous>  HYDROmorphone PCA (1 mG/mL) 30 milliLiter(s) PCA Continuous PCA Continuous    MEDICATIONS  (PRN):  acetaminophen   Tablet .. 650 milliGRAM(s) Oral every 6 hours PRN Moderate Pain (4 - 6)  diphenhydrAMINE 25 milliGRAM(s) Oral every 4 hours PRN Rash and/or Itching  heparin  Injectable 4000 Unit(s) IV Push every 6 hours PRN For aPTT less than 40  HYDROmorphone PCA (1 mG/mL) Rescue Clinician Bolus 0.5 milliGRAM(s) IV Push every 15 minutes PRN for Pain Scale GREATER THAN 6  naloxone Injectable 0.1 milliGRAM(s) IV Push every 3 minutes PRN For ANY of the following changes in patient status:  A. RR LESS THAN 10 breaths per minute, B. Oxygen saturation LESS THAN 90%, C. Sedation score of 6  ondansetron Injectable 4 milliGRAM(s) IV Push every 6 hours PRN Nausea  oxyCODONE    IR 5 milliGRAM(s) Oral every 6 hours PRN Severe Pain (7 - 10)    Allergies:  No Known Allergies    Intolerances:  IV contrast (Rash)      REVIEW OF SYSTEMS:  Constitutional: Denies fever, weight loss, fatigue  Resp: Denies SOB, cough, hemoptysis  CV: Denies chest pain, palpitations, dizziness  GI: Denies abdominal pain, N/V/D/C, melena, hematochezia  : Denies dysuria, increased frequency, hematuria  Neuro: Denies HA, weakness, numbness  Skin: Denies rashes, lesions  Lymph Nodes: Denies enlarged glands  MSK: Denies joint pain, swelling    VITAL SIGNS:  T(C): 37.3 (03-05-19 @ 05:34), Max: 37.3 (03-05-19 @ 05:34)  HR: 76 (03-05-19 @ 05:34) (67 - 108)  BP: 91/44 (03-05-19 @ 05:34) (83/54 - 117/64)  RR: 16 (03-05-19 @ 05:34) (15 - 28)  SpO2: 100% (03-05-19 @ 05:34) (95% - 100%)    PHYSICAL EXAM:        LABS:      Ca    8.7        04 Mar 2019 06:50      CAPILLARY BLOOD GLUCOSE        BLOOD CULTURE    RADIOLOGY & ADDITIONAL TESTS:    Imaging Personally Reviewed:  [ ] YES     Consultant(s) Notes Reviewed:  vascular, ct surgery    Care Discussed with Consultants/Other Providers: ****************************  Aziza Tenorio, PGY-1  [facility] Pager #: [phone]  NS Pager #: [phone]  ****************************    Patient is a 54y old  Female who presents with a chief complaint of PE w/ malignancy; pleural effusion w/ malignancy (05 Mar 2019 05:13)     INTERVAL HPI/OVERNIGHT EVENTS: patient now s/p pleurx placement, pleural biopsy, and drainage of pleural effusion by CT surg. pt also s/p IVC filter placement by vascular. as per surgery recs, patient restarted on hep gtt at 6am today. Patient's PCA not working, patient given pushes      MEDICATIONS  (STANDING):  heparin  Infusion.  Unit(s)/Hr (9 mL/Hr) IV Continuous <Continuous>  HYDROmorphone PCA (1 mG/mL) 30 milliLiter(s) PCA Continuous PCA Continuous    MEDICATIONS  (PRN):  acetaminophen   Tablet .. 650 milliGRAM(s) Oral every 6 hours PRN Moderate Pain (4 - 6)  diphenhydrAMINE 25 milliGRAM(s) Oral every 4 hours PRN Rash and/or Itching  heparin  Injectable 4000 Unit(s) IV Push every 6 hours PRN For aPTT less than 40  HYDROmorphone PCA (1 mG/mL) Rescue Clinician Bolus 0.5 milliGRAM(s) IV Push every 15 minutes PRN for Pain Scale GREATER THAN 6  naloxone Injectable 0.1 milliGRAM(s) IV Push every 3 minutes PRN For ANY of the following changes in patient status:  A. RR LESS THAN 10 breaths per minute, B. Oxygen saturation LESS THAN 90%, C. Sedation score of 6  ondansetron Injectable 4 milliGRAM(s) IV Push every 6 hours PRN Nausea  oxyCODONE    IR 5 milliGRAM(s) Oral every 6 hours PRN Severe Pain (7 - 10)    Allergies:  No Known Allergies    Intolerances:  IV contrast (Rash)      REVIEW OF SYSTEMS:  Constitutional: Denies fever, weight loss, fatigue  Resp: Denies SOB, cough, hemoptysis  CV: Denies chest pain, palpitations, dizziness  GI: Denies abdominal pain, N/V/D/C, melena, hematochezia  : Denies dysuria, increased frequency, hematuria  Neuro: Denies HA, weakness, numbness  Skin: Denies rashes, lesions  Lymph Nodes: Denies enlarged glands  MSK: Denies joint pain, swelling    VITAL SIGNS:  T(C): 37.3 (03-05-19 @ 05:34), Max: 37.3 (03-05-19 @ 05:34)  HR: 76 (03-05-19 @ 05:34) (67 - 108)  BP: 91/44 (03-05-19 @ 05:34) (83/54 - 117/64)  RR: 16 (03-05-19 @ 05:34) (15 - 28)  SpO2: 100% (03-05-19 @ 05:34) (95% - 100%)    PHYSICAL EXAM:  GENERAL: in mild distress   EYES: EOMI, PERRLA, conjunctiva and sclera clear  NECK: Supple, No JVD  CHEST/LUNG: decreased breath sounds on R base, R pleurx in place with serosanguinous fluid. rash around site   HEART: Regular rate and rhythm; No murmurs, rubs, or gallops  ABDOMEN: Soft, Nontender, Nondistended; Bowel sounds present  EXTREMITIES:  no LE edema. pruritic rash on inner thighs   PSYCH: AAOx3  NEUROLOGY: non-focal  SKIN: diffuse rash to chest, abdomen, thigh and back      LABS:      Ca    8.7        04 Mar 2019 06:50      CAPILLARY BLOOD GLUCOSE        BLOOD CULTURE    RADIOLOGY & ADDITIONAL TESTS:    Imaging Personally Reviewed:  [ ] YES     Consultant(s) Notes Reviewed:  vascular, ct surgery    Care Discussed with Consultants/Other Providers:

## 2019-03-05 NOTE — PROGRESS NOTE ADULT - SUBJECTIVE AND OBJECTIVE BOX
Vascular Surgery Progress Note      SUBJECTIVE: Patient seen and examined. No acute postoperative events. No complaints.      OBJECTIVE:     ** Vital signs / I&O's **    Vital Signs Last 24 Hrs  T(C): 37.3 (05 Mar 2019 05:34), Max: 37.3 (05 Mar 2019 05:34)  T(F): 99.1 (05 Mar 2019 05:34), Max: 99.1 (05 Mar 2019 05:34)  HR: 76 (05 Mar 2019 05:34) (67 - 108)  BP: 91/44 (05 Mar 2019 05:34) (83/54 - 117/64)  BP(mean): 56 (05 Mar 2019 05:34) (56 - 66)  RR: 16 (05 Mar 2019 05:34) (15 - 28)  SpO2: 100% (05 Mar 2019 05:34) (95% - 100%)      04 Mar 2019 07:01  -  05 Mar 2019 07:00  --------------------------------------------------------  IN:    IV PiggyBack: 500 mL    Oral Fluid: 350 mL  Total IN: 850 mL    OUT:    Chest Tube: 150 mL  Total OUT: 150 mL    Total NET: 700 mL      ** Physical Exam **  Gen: Alert, NAD  Ext: R groin soft, no hematoma    ** Labs **                          11.9   8.99  )-----------( 481      ( 05 Mar 2019 09:25 )             39.2     04 Mar 2019 06:50    141    |  104    |  6      ----------------------------<  99     4.0     |  25     |  0.70     Ca    8.7        04 Mar 2019 06:50    TPro  5.9    /  Alb  3.0    /  TBili  0.3    /  DBili  x      /  AST  30     /  ALT  22     /  AlkPhos  62     04 Mar 2019 06:50    PT/INR - ( 04 Mar 2019 06:50 )   PT: 11.3 SEC;   INR: 0.99          PTT - ( 04 Mar 2019 09:05 )  PTT:32.4 SEC  CAPILLARY BLOOD GLUCOSE      LIVER FUNCTIONS - ( 04 Mar 2019 06:50 )  Alb: 3.0 g/dL / Pro: 5.9 g/dL / ALK PHOS: 62 u/L / ALT: 22 u/L / AST: 30 u/L / GGT: x             MEDICATIONS  (STANDING):  heparin  Infusion.  Unit(s)/Hr (9 mL/Hr) IV Continuous <Continuous>  HYDROmorphone PCA (1 mG/mL) 30 milliLiter(s) PCA Continuous PCA Continuous    MEDICATIONS  (PRN):  acetaminophen   Tablet .. 650 milliGRAM(s) Oral every 6 hours PRN Moderate Pain (4 - 6)  diphenhydrAMINE 25 milliGRAM(s) Oral every 4 hours PRN Rash and/or Itching  heparin  Injectable 4000 Unit(s) IV Push every 6 hours PRN For aPTT less than 40  HYDROmorphone PCA (1 mG/mL) Rescue Clinician Bolus 0.5 milliGRAM(s) IV Push every 15 minutes PRN for Pain Scale GREATER THAN 6  naloxone Injectable 0.1 milliGRAM(s) IV Push every 3 minutes PRN For ANY of the following changes in patient status:  A. RR LESS THAN 10 breaths per minute, B. Oxygen saturation LESS THAN 90%, C. Sedation score of 6  ondansetron Injectable 4 milliGRAM(s) IV Push every 6 hours PRN Nausea

## 2019-03-05 NOTE — PROGRESS NOTE ADULT - PROBLEM SELECTOR PLAN 5
Pt advised to follow up with outpatient oncologist following discharge at OSH but has not yet done so.   - Ill-defined low-attenuation mass in the right lower lobe. Interlobular septal thickening concerning for lymphangitic spread. Superimposed infection is not excluded.  - Discharge papers from [facility]: poorly differentiated adenocarcinoma with involvement of hilar and mediastinal nodes  - c/s oncology for recs. Per oncology, will need records from [facility] and will reconsult oncology. [facility] medical records open M-F 8a-4p ([phone]) = FAXED developed after receiving IV contrast  c/w benadryl PRN

## 2019-03-05 NOTE — PROGRESS NOTE ADULT - PROBLEM SELECTOR PLAN 2
- s/p RT VATS w/ bx and PleurX catheter placement   - presented w Large right pleural effusion with partial collapse of the right lung and obstruction of the right lower lobe bronchus s/p thoracentesis in ED with 2L hazy yellow fluid removed. Drain in place  - Nucleated cells 1416, RBC 1000, , Pro 4.6, Alb 2.9, Glu 94  - Light's criteria - exudative effusion  - Pleural fluid cx (3/2)- no organisms seen. AFB pleural fluid cx- Negative  - repeat CXR showed interval reduction in effusion size; SOB improved following procedure  - patient will need home care following discharge to help with tube/drain maintenance  - Tylenol 650mg PO prn q6 for mild-moderate pain  - oxycodone IR 5mg prn q6h for severe pain  - PCA started after OR however not working ? will follow up with anesthesia CT Angio- Multiple acute pulmonary emboli involving left upper lobe, lower lobe and lingular pulmonary artery branches. No evidence of right heart strain. Likely 2/2 to hypercoagulable state of malignancy.   - found to have R below the knee DVT yesterday (3/4)  - heparin drip restarted 6am this AM  - Will need long term anticoagulation following discharge; patient states she will not inject herself at home and does not think family will help her with injections. will need to consider PO anticoagulation  - Pt reported urticaria. Started benadryl 25 mg Q4H PRN  - s/p RT VATS w/ bx and PleurX catheter placement   - Per oncology recs- MRI brain w/ and w/o contrast. = NO METASTASIS TO BRAIN  - Will hold CT A/P w/ IV Contrast for staging in the setting of urticaria possible 2/2 CTA  - s/p IVC filter placement yesterday by vascular

## 2019-03-05 NOTE — PROGRESS NOTE ADULT - PROBLEM SELECTOR PLAN 4
Pt reported substernal chest pain while in ED now resolved.  - Trop T <6, BNP 54.85  - f/u EKG Pt advised to follow up with outpatient oncologist following discharge at OSH but has not yet done so.   - Ill-defined low-attenuation mass in the right lower lobe. Interlobular septal thickening concerning for lymphangitic spread. Superimposed infection is not excluded.  - Discharge papers from [facility]: poorly differentiated adenocarcinoma with involvement of hilar and mediastinal nodes  - c/s oncology for recs. Per oncology, will need records from [facility] and will reconsult oncology. [facility] medical records open M-F 8a-4p ([phone]) = FAXED

## 2019-03-05 NOTE — PROGRESS NOTE ADULT - SUBJECTIVE AND OBJECTIVE BOX
ANESTHESIA POSTOP CHECK    54y Female POSTOP DAY 1 S/P     Vital Signs Last 24 Hrs  T(C): 37.3 (05 Mar 2019 05:34), Max: 37.3 (05 Mar 2019 05:34)  T(F): 99.1 (05 Mar 2019 05:34), Max: 99.1 (05 Mar 2019 05:34)  HR: 76 (05 Mar 2019 05:34) (67 - 108)  BP: 91/44 (05 Mar 2019 05:34) (83/54 - 117/64)  BP(mean): 56 (05 Mar 2019 05:34) (56 - 66)  RR: 16 (05 Mar 2019 05:34) (15 - 28)  SpO2: 100% (05 Mar 2019 05:34) (95% - 100%)  I&O's Summary    04 Mar 2019 07:01  -  05 Mar 2019 07:00  --------------------------------------------------------  IN: 850 mL / OUT: 150 mL / NET: 700 mL        [x ] NO APPARENT ANESTHESIA COMPLICATIONS      Comments:

## 2019-03-05 NOTE — PROGRESS NOTE ADULT - SUBJECTIVE AND OBJECTIVE BOX
Anesthesia Pain Management Service    SUBJECTIVE: Pt doing well with IV PCA without problems reported.    Therapy:	  [ X] IV PCA	   [ ] Epidural           [ ] s/p Spinal Opoid              [ ] Postpartum infusion	  [ ] Patient controlled regional anesthesia (PCRA)    [ ] prn Analgesics    Allergies    No Known Allergies    Intolerances    IV contrast (Rash)    MEDICATIONS  (STANDING):  acetaminophen  IVPB .. 750 milliGRAM(s) IV Intermittent once  acetaminophen  IVPB .. 750 milliGRAM(s) IV Intermittent once  heparin  Infusion.  Unit(s)/Hr (9 mL/Hr) IV Continuous <Continuous>  HYDROmorphone PCA (1 mG/mL) 30 milliLiter(s) PCA Continuous PCA Continuous  sodium chloride 0.9%. 1000 milliLiter(s) (50 mL/Hr) IV Continuous <Continuous>    MEDICATIONS  (PRN):  acetaminophen   Tablet .. 650 milliGRAM(s) Oral every 6 hours PRN Moderate Pain (4 - 6)  diphenhydrAMINE 25 milliGRAM(s) Oral every 4 hours PRN Rash and/or Itching  heparin  Injectable 4000 Unit(s) IV Push every 6 hours PRN For aPTT less than 40  HYDROmorphone PCA (1 mG/mL) Rescue Clinician Bolus 0.5 milliGRAM(s) IV Push every 15 minutes PRN for Pain Scale GREATER THAN 6  naloxone Injectable 0.1 milliGRAM(s) IV Push every 3 minutes PRN For ANY of the following changes in patient status:  A. RR LESS THAN 10 breaths per minute, B. Oxygen saturation LESS THAN 90%, C. Sedation score of 6  ondansetron Injectable 4 milliGRAM(s) IV Push every 6 hours PRN Nausea      OBJECTIVE:   [X] No new signs     [ ] Other:    Side Effects:  [X ] None			[ ] Other:    Assessment of Catheter Site:		[ ] Intact		[ ] Other:    ASSESSMENT/PLAN  [ X] Continue current therapy    [ ] Therapy changed to:    [ ] IV PCA       [ ] Epidural     [ ] prn Analgesics     Comments:    Progress Note written now but Patient was seen earlier.

## 2019-03-05 NOTE — CHART NOTE - NSCHARTNOTEFT_GEN_A_CORE
Post-operative Check    SUBJECTIVE: No acute events in the immediate post-operative period. Pain well controlled. Pain well controlled. Denies n/v. Denies cp/sob.     OBJECTIVE:  T(C): 36.4 (03-04-19 @ 23:00), Max: 36.8 (03-04-19 @ 05:16)  HR: 97 (03-05-19 @ 00:00) (67 - 108)  BP: 89/47 (03-05-19 @ 00:00) (83/54 - 117/64)  RR: 18 (03-05-19 @ 00:00) (16 - 28)  SpO2: 100% (03-05-19 @ 00:00) (95% - 100%)      03-04-19 @ 07:01  -  03-05-19 @ 00:18  --------------------------------------------------------  IN: 600 mL / OUT: 120 mL / NET: 480 mL        Physical Exam:   NAD, awake and alert  Respirations nonlabored  R groin soft, dressing c/d/i, femoral pulse palpable  Lower extremities wwp bilaterally, palpable dp      ASSESSMENT:   JOSE CEJA is a 54y Female POD#0 from IVC filter placement and right VATS.     PLAN:  - Pain management  - Follow UOP  - ok to resume heparin per thoracic surgery  - f/u with Dr. Michael after discharge  - care per throacic    C Surgery [id number]

## 2019-03-05 NOTE — PROGRESS NOTE ADULT - PROBLEM SELECTOR PLAN 1
CT Angio- Multiple acute pulmonary emboli involving left upper lobe, lower lobe and lingular pulmonary artery branches. No evidence of right heart strain. Likely 2/2 to hypercoagulable state of malignancy.   - found to have R below the knee DVT yesterday (3/4)  - heparin drip restarted 6am this AM  - Will need long term anticoagulation following discharge; patient states she will not inject herself at home and does not think family will help her with injections. will need to consider PO anticoagulation  - Pt reported urticaria. Started benadryl 25 mg Q4H PRN  - s/p RT VATS w/ bx and PleurX catheter placement   - Per oncology recs- MRI brain w/ and w/o contrast. = NO METASTASIS TO BRAIN  - Will hold CT A/P w/ IV Contrast for staging in the setting of urticaria possible 2/2 CTA  - s/p IVC filter placement yesterday by vascular - s/p RT VATS w/ bx and PleurX catheter placement   - presented w Large malignant right pleural effusion with partial collapse of the right lung and obstruction of the right lower lobe bronchus s/p thoracentesis in ED with 2L hazy yellow fluid removed. Drain in place  - Nucleated cells 1416, RBC 1000, , Pro 4.6, Alb 2.9, Glu 94  - Light's criteria - exudative effusion  - Pleural fluid cx (3/2)- no organisms seen. AFB pleural fluid cx- Negative  - repeat CXR showed interval reduction in effusion size; SOB improved following procedure  - patient will need home care following discharge to help with tube/drain maintenance  - Tylenol 650mg PO prn q6 for mild-moderate pain  - oxycodone IR 5mg prn q6h for severe pain  - PCA started after OR however not working ? will follow up with anesthesia

## 2019-03-06 DIAGNOSIS — R52 PAIN, UNSPECIFIED: ICD-10-CM

## 2019-03-06 LAB
ALBUMIN SERPL ELPH-MCNC: 3.2 G/DL — LOW (ref 3.3–5)
ALP SERPL-CCNC: 91 U/L — SIGNIFICANT CHANGE UP (ref 40–120)
ALT FLD-CCNC: 89 U/L — HIGH (ref 4–33)
ANION GAP SERPL CALC-SCNC: 11 MMO/L — SIGNIFICANT CHANGE UP (ref 7–14)
APTT BLD: 97.2 SEC — HIGH (ref 27.5–36.3)
AST SERPL-CCNC: 184 U/L — HIGH (ref 4–32)
BILIRUB SERPL-MCNC: 0.3 MG/DL — SIGNIFICANT CHANGE UP (ref 0.2–1.2)
BUN SERPL-MCNC: 5 MG/DL — LOW (ref 7–23)
CALCIUM SERPL-MCNC: 8.8 MG/DL — SIGNIFICANT CHANGE UP (ref 8.4–10.5)
CHLORIDE SERPL-SCNC: 101 MMOL/L — SIGNIFICANT CHANGE UP (ref 98–107)
CO2 SERPL-SCNC: 26 MMOL/L — SIGNIFICANT CHANGE UP (ref 22–31)
CREAT SERPL-MCNC: 0.61 MG/DL — SIGNIFICANT CHANGE UP (ref 0.5–1.3)
GLUCOSE SERPL-MCNC: 96 MG/DL — SIGNIFICANT CHANGE UP (ref 70–99)
HCT VFR BLD CALC: 42 % — SIGNIFICANT CHANGE UP (ref 34.5–45)
HGB BLD-MCNC: 13.3 G/DL — SIGNIFICANT CHANGE UP (ref 11.5–15.5)
MAGNESIUM SERPL-MCNC: 1.9 MG/DL — SIGNIFICANT CHANGE UP (ref 1.6–2.6)
MCHC RBC-ENTMCNC: 27.9 PG — SIGNIFICANT CHANGE UP (ref 27–34)
MCHC RBC-ENTMCNC: 31.7 % — LOW (ref 32–36)
MCV RBC AUTO: 88.1 FL — SIGNIFICANT CHANGE UP (ref 80–100)
NRBC # FLD: 0 K/UL — LOW (ref 25–125)
PHOSPHATE SERPL-MCNC: 3.4 MG/DL — SIGNIFICANT CHANGE UP (ref 2.5–4.5)
PLATELET # BLD AUTO: 464 K/UL — HIGH (ref 150–400)
PMV BLD: 9.4 FL — SIGNIFICANT CHANGE UP (ref 7–13)
POTASSIUM SERPL-MCNC: 4.1 MMOL/L — SIGNIFICANT CHANGE UP (ref 3.5–5.3)
POTASSIUM SERPL-SCNC: 4.1 MMOL/L — SIGNIFICANT CHANGE UP (ref 3.5–5.3)
PROT SERPL-MCNC: 5.7 G/DL — LOW (ref 6–8.3)
RBC # BLD: 4.77 M/UL — SIGNIFICANT CHANGE UP (ref 3.8–5.2)
RBC # FLD: 13.3 % — SIGNIFICANT CHANGE UP (ref 10.3–14.5)
SODIUM SERPL-SCNC: 138 MMOL/L — SIGNIFICANT CHANGE UP (ref 135–145)
WBC # BLD: 7.35 K/UL — SIGNIFICANT CHANGE UP (ref 3.8–10.5)
WBC # FLD AUTO: 7.35 K/UL — SIGNIFICANT CHANGE UP (ref 3.8–10.5)

## 2019-03-06 PROCEDURE — 99233 SBSQ HOSP IP/OBS HIGH 50: CPT | Mod: GC

## 2019-03-06 RX ORDER — LIDOCAINE 4 G/100G
1 CREAM TOPICAL DAILY
Qty: 0 | Refills: 0 | Status: DISCONTINUED | OUTPATIENT
Start: 2019-03-06 | End: 2019-03-07

## 2019-03-06 RX ORDER — ENOXAPARIN SODIUM 100 MG/ML
50 INJECTION SUBCUTANEOUS
Qty: 3000 | Refills: 0 | OUTPATIENT
Start: 2019-03-06 | End: 2019-04-04

## 2019-03-06 RX ORDER — HYDROMORPHONE HYDROCHLORIDE 2 MG/ML
3 INJECTION INTRAMUSCULAR; INTRAVENOUS; SUBCUTANEOUS
Qty: 0 | Refills: 0 | Status: DISCONTINUED | OUTPATIENT
Start: 2019-03-06 | End: 2019-03-07

## 2019-03-06 RX ORDER — ENOXAPARIN SODIUM 100 MG/ML
50 INJECTION SUBCUTANEOUS
Qty: 0 | Refills: 0 | Status: DISCONTINUED | OUTPATIENT
Start: 2019-03-06 | End: 2019-03-07

## 2019-03-06 RX ADMIN — HYDROMORPHONE HYDROCHLORIDE 3 MILLIGRAM(S): 2 INJECTION INTRAMUSCULAR; INTRAVENOUS; SUBCUTANEOUS at 13:10

## 2019-03-06 RX ADMIN — Medication 300 MILLIGRAM(S): at 00:43

## 2019-03-06 RX ADMIN — HYDROMORPHONE HYDROCHLORIDE 30 MILLILITER(S): 2 INJECTION INTRAMUSCULAR; INTRAVENOUS; SUBCUTANEOUS at 08:04

## 2019-03-06 RX ADMIN — LIDOCAINE 1 PATCH: 4 CREAM TOPICAL at 18:02

## 2019-03-06 RX ADMIN — HEPARIN SODIUM 900 UNIT(S)/HR: 5000 INJECTION INTRAVENOUS; SUBCUTANEOUS at 09:23

## 2019-03-06 RX ADMIN — ENOXAPARIN SODIUM 50 MILLIGRAM(S): 100 INJECTION SUBCUTANEOUS at 17:36

## 2019-03-06 RX ADMIN — LIDOCAINE 1 PATCH: 4 CREAM TOPICAL at 12:50

## 2019-03-06 RX ADMIN — Medication 750 MILLIGRAM(S): at 00:57

## 2019-03-06 RX ADMIN — HYDROMORPHONE HYDROCHLORIDE 3 MILLIGRAM(S): 2 INJECTION INTRAMUSCULAR; INTRAVENOUS; SUBCUTANEOUS at 12:35

## 2019-03-06 NOTE — PROGRESS NOTE ADULT - PROBLEM SELECTOR PLAN 4
Pt advised to follow up with outpatient oncologist following discharge at OSH but has not yet done so.   - Ill-defined low-attenuation mass in the right lower lobe. Interlobular septal thickening concerning for lymphangitic spread. Superimposed infection is not excluded.  - Discharge papers from [facility]: poorly differentiated adenocarcinoma with involvement of hilar and mediastinal nodes; Records obtained and in chart including complete staging  - c/s oncology for recs. Per oncology, will need records from [facility]: in chart - new below the knee DVT (R) sided   - on hep gtt for PEs  - s/p IVC filter placement yesterday

## 2019-03-06 NOTE — PROGRESS NOTE ADULT - PROBLEM SELECTOR PLAN 5
developed after receiving IV contrast  c/w benadryl PRN Pt advised to follow up with outpatient oncologist following discharge at OSH but has not yet done so.   - Ill-defined low-attenuation mass in the right lower lobe. Interlobular septal thickening concerning for lymphangitic spread. Superimposed infection is not excluded.  - Discharge papers from [facility]: poorly differentiated adenocarcinoma with involvement of hilar and mediastinal nodes; Records obtained and in chart including complete staging  - c/s oncology for recs. Per oncology, will need records from [facility]: in chart

## 2019-03-06 NOTE — PROGRESS NOTE ADULT - PROBLEM SELECTOR PLAN 1
- s/p RT VATS w/ bx and PleurX catheter placement   - presented w Large malignant right pleural effusion with partial collapse of the right lung and obstruction of the right lower lobe bronchus s/p thoracentesis in ED with 2L hazy yellow fluid removed. Drain in place  - Nucleated cells 1416, RBC 1000, , Pro 4.6, Alb 2.9, Glu 94  - Light's criteria - exudative effusion  - Pleural fluid cx (3/2)- no organisms seen. AFB pleural fluid cx- Negative  - repeat CXR showed interval reduction in effusion size; SOB improved following procedure  - patient will need home care following discharge to help with tube/drain maintenance  - Tylenol 650mg PO prn q6 for mild-moderate pain  - oxycodone IR 5mg prn q6h for severe pain  - PCA for pain management d/c IV PCA  - start dilaudid 3q3  - got IV tylenol, transminitis. holding further tylenol  - lidocaine patch d/c IV PCA  - start dilaudid PO 3mg q3 PRN  - got IV tylenol, transaminitis. holding further tylenol  - lidocaine patch

## 2019-03-06 NOTE — PROGRESS NOTE ADULT - SUBJECTIVE AND OBJECTIVE BOX
****************************  Aziza Tenorio, PGY-1  [facility] Pager #: [phone]  NS Pager #: [phone]  ****************************    Patient is a 54y old  Female who presents with a chief complaint of PE w/ malignancy; pleural effusion w/ malignancy (05 Mar 2019 17:40)     INTERVAL HPI/OVERNIGHT EVENTS: no acute events overnight.       MEDICATIONS  (STANDING):  heparin  Infusion.  Unit(s)/Hr (9 mL/Hr) IV Continuous <Continuous>  HYDROmorphone PCA (1 mG/mL) 30 milliLiter(s) PCA Continuous PCA Continuous  sodium chloride 0.9%. 1000 milliLiter(s) (10 mL/Hr) IV Continuous <Continuous>    MEDICATIONS  (PRN):  acetaminophen   Tablet .. 650 milliGRAM(s) Oral every 6 hours PRN Moderate Pain (4 - 6)  diphenhydrAMINE 25 milliGRAM(s) Oral every 4 hours PRN Rash and/or Itching  heparin  Injectable 4000 Unit(s) IV Push every 6 hours PRN For aPTT less than 40  hydrocortisone 2.5% Cream 1 Application(s) Topical daily PRN Rash  HYDROmorphone PCA (1 mG/mL) Rescue Clinician Bolus 0.5 milliGRAM(s) IV Push every 15 minutes PRN for Pain Scale GREATER THAN 6  naloxone Injectable 0.1 milliGRAM(s) IV Push every 3 minutes PRN For ANY of the following changes in patient status:  A. RR LESS THAN 10 breaths per minute, B. Oxygen saturation LESS THAN 90%, C. Sedation score of 6  ondansetron Injectable 4 milliGRAM(s) IV Push every 6 hours PRN Nausea    Allergies:  No Known Allergies    Intolerances:  IV contrast (Rash)      REVIEW OF SYSTEMS:  Constitutional: Denies fever, weight loss, fatigue  Resp: Denies SOB, cough, hemoptysis  CV: Denies chest pain, palpitations, dizziness  GI: Denies abdominal pain, N/V/D/C, melena, hematochezia  : Denies dysuria, increased frequency, hematuria  Neuro: Denies HA, weakness, numbness  Skin: Denies rashes, lesions  Lymph Nodes: Denies enlarged glands  MSK: Denies joint pain, swelling    VITAL SIGNS:  T(C): 36.8 (03-06-19 @ 05:54), Max: 37.2 (03-05-19 @ 10:01)  HR: 74 (03-06-19 @ 05:54) (65 - 94)  BP: 106/67 (03-06-19 @ 05:54) (99/50 - 115/60)  RR: 18 (03-06-19 @ 05:54) (18 - 18)  SpO2: 99% (03-06-19 @ 05:54) (97% - 100%)    PHYSICAL EXAM:  General: NAD, well-groomed, well-developed  Eyes: Conjunctiva and sclera clear  ENMT: Moist mucous membranes  Neck: Supple  Chest: Clear to auscultation bilaterally; no rales, rhonchi, or wheezing  Heart: Regular rate and rhythm; no murmurs, rubs, or gallops  Abd: +BS, soft, nontender, nondistended  Nervous System: AAOX3  Psych: Appropriate affect and mood  Ext:  no LE edema    LABS:                        11.9   8.99  )-----------( 481      ( 05 Mar 2019 09:25 )             39.2     05 Mar 2019 09:25    139    |  101    |  8      ----------------------------<  107    4.5     |  27     |  0.63     Ca    8.8        05 Mar 2019 09:25  Phos  3.5       05 Mar 2019 09:25  Mg     1.8       05 Mar 2019 09:25    TPro  5.9    /  Alb  3.7    /  TBili  0.5    /  DBili  x      /  AST  27     /  ALT  20     /  AlkPhos  52     05 Mar 2019 09:25  PTT - ( 05 Mar 2019 20:10 )  PTT:87.7 SEC  CAPILLARY BLOOD GLUCOSE        BLOOD CULTURE    RADIOLOGY & ADDITIONAL TESTS:    Imaging Personally Reviewed:  [ ] YES     Consultant(s) Notes Reviewed:      Care Discussed with Consultants/Other Providers: ****************************  Aziza Tenorio, PGY-1  [facility] Pager #: [phone]  NS Pager #: [phone]  ****************************    Patient is a 54y old  Female who presents with a chief complaint of PE w/ malignancy; pleural effusion w/ malignancy (05 Mar 2019 17:40)     INTERVAL HPI/OVERNIGHT EVENTS: no acute events overnight. patient still complains of pain, 8/10. patient also somewhat SOB but 2/2 to pain.       MEDICATIONS  (STANDING):  heparin  Infusion.  Unit(s)/Hr (9 mL/Hr) IV Continuous <Continuous>  HYDROmorphone PCA (1 mG/mL) 30 milliLiter(s) PCA Continuous PCA Continuous  sodium chloride 0.9%. 1000 milliLiter(s) (10 mL/Hr) IV Continuous <Continuous>    MEDICATIONS  (PRN):  acetaminophen   Tablet .. 650 milliGRAM(s) Oral every 6 hours PRN Moderate Pain (4 - 6)  diphenhydrAMINE 25 milliGRAM(s) Oral every 4 hours PRN Rash and/or Itching  heparin  Injectable 4000 Unit(s) IV Push every 6 hours PRN For aPTT less than 40  hydrocortisone 2.5% Cream 1 Application(s) Topical daily PRN Rash  HYDROmorphone PCA (1 mG/mL) Rescue Clinician Bolus 0.5 milliGRAM(s) IV Push every 15 minutes PRN for Pain Scale GREATER THAN 6  naloxone Injectable 0.1 milliGRAM(s) IV Push every 3 minutes PRN For ANY of the following changes in patient status:  A. RR LESS THAN 10 breaths per minute, B. Oxygen saturation LESS THAN 90%, C. Sedation score of 6  ondansetron Injectable 4 milliGRAM(s) IV Push every 6 hours PRN Nausea    Allergies:  No Known Allergies    Intolerances:  IV contrast (Rash)      REVIEW OF SYSTEMS:  Constitutional: Denies fever, weight loss, fatigue  Resp: Denies SOB, cough, hemoptysis  CV: Denies chest pain, palpitations, dizziness  GI: Denies abdominal pain, N/V/D/C, melena, hematochezia  : Denies dysuria, increased frequency, hematuria  Neuro: Denies HA, weakness, numbness  Skin: Denies rashes, lesions  Lymph Nodes: Denies enlarged glands  MSK: Denies joint pain, swelling    VITAL SIGNS:  T(C): 36.8 (03-06-19 @ 05:54), Max: 37.2 (03-05-19 @ 10:01)  HR: 74 (03-06-19 @ 05:54) (65 - 94)  BP: 106/67 (03-06-19 @ 05:54) (99/50 - 115/60)  RR: 18 (03-06-19 @ 05:54) (18 - 18)  SpO2: 99% (03-06-19 @ 05:54) (97% - 100%)    PHYSICAL EXAM:  GENERAL: in mild distress   EYES: EOMI, PERRLA, conjunctiva and sclera clear  NECK: Supple, No JVD  CHEST/LUNG: decreased breath sounds on R base, R pleurx in place with serosanguinous fluid; to gravity. rash around site (improving)  HEART: Regular rate and rhythm; No murmurs, rubs, or gallops  ABDOMEN: Soft, Nontender, Nondistended; Bowel sounds present  EXTREMITIES:  no LE edema. pruritic rash on inner thighs   PSYCH: AAOx3  NEUROLOGY: non-focal  SKIN: diffuse rash to chest, abdomen, thigh and back    LABS:                        11.9   8.99  )-----------( 481      ( 05 Mar 2019 09:25 )             39.2     05 Mar 2019 09:25    139    |  101    |  8      ----------------------------<  107    4.5     |  27     |  0.63     Ca    8.8        05 Mar 2019 09:25  Phos  3.5       05 Mar 2019 09:25  Mg     1.8       05 Mar 2019 09:25    TPro  5.9    /  Alb  3.7    /  TBili  0.5    /  DBili  x      /  AST  27     /  ALT  20     /  AlkPhos  52     05 Mar 2019 09:25  PTT - ( 05 Mar 2019 20:10 )  PTT:87.7 SEC  CAPILLARY BLOOD GLUCOSE        BLOOD CULTURE    RADIOLOGY & ADDITIONAL TESTS:    Imaging Personally Reviewed:  [ ] YES     Consultant(s) Notes Reviewed:      Care Discussed with Consultants/Other Providers:

## 2019-03-06 NOTE — PROGRESS NOTE ADULT - SUBJECTIVE AND OBJECTIVE BOX
Patient seen and examined with Dr. Morris.   Patient c/o pain at PleurX site. AVSS.   PleurX catheter to pleurevac on waterseal. 200cc output for 24 hours.       Will cap prior to DC home.   Transistion PCA to PO   Plan to have pleurX drained at home Mon, Wed, & Fri.   Patient to F/U with Oncology

## 2019-03-06 NOTE — PROGRESS NOTE ADULT - PROBLEM SELECTOR PLAN 2
CT Angio- Multiple acute pulmonary emboli involving left upper lobe, lower lobe and lingular pulmonary artery branches. No evidence of right heart strain. Likely 2/2 to hypercoagulable state of malignancy.   - found to have R below the knee DVT yesterday (3/4)  - heparin drip restarted 6am this AM  - Will need long term anticoagulation following discharge; patient states she will not inject herself at home and does not think family will help her with injections. will need to consider PO anticoagulation  - Pt reported urticaria. Started benadryl 25 mg Q4H PRN  - s/p RT VATS w/ bx and PleurX catheter placement   - Per oncology recs- MRI brain w/ and w/o contrast. = NO METASTASIS TO BRAIN  - Will hold CT A/P w/ IV Contrast for staging in the setting of urticaria possible 2/2 CTA  - s/p IVC filter placement yesterday by vascular - s/p RT VATS w/ bx and PleurX catheter placement   - presented w Large malignant right pleural effusion with partial collapse of the right lung and obstruction of the right lower lobe bronchus s/p thoracentesis in ED with 2L hazy yellow fluid removed. Drain in place  - Nucleated cells 1416, RBC 1000, , Pro 4.6, Alb 2.9, Glu 94  - Light's criteria - exudative effusion  - Pleural fluid cx (3/2)- no organisms seen. AFB pleural fluid cx- Negative  - repeat CXR showed interval reduction in effusion size; SOB improved following procedure  - patient will need home care following discharge to help with tube/drain maintenance

## 2019-03-06 NOTE — PROGRESS NOTE ADULT - PROBLEM SELECTOR PLAN 3
- new below the knee DVT (R) sided   - on hep gtt for PEs  - s/p IVC filter placement yesterday CT Angio- Multiple acute pulmonary emboli involving left upper lobe, lower lobe and lingular pulmonary artery branches. No evidence of right heart strain. Likely 2/2 to hypercoagulable state of malignancy.   - found to have R below the knee DVT yesterday (3/4)  - heparin drip restarted 6am this AM  - Will need long term anticoagulation following discharge; patient states she will not inject herself at home and does not think family will help her with injections. will need to consider PO anticoagulation  - Pt reported urticaria. Started benadryl 25 mg Q4H PRN  - s/p RT VATS w/ bx and PleurX catheter placement   - Per oncology recs- MRI brain w/ and w/o contrast. = NO METASTASIS TO BRAIN  - Will hold CT A/P w/ IV Contrast for staging in the setting of urticaria possible 2/2 CTA  - s/p IVC filter placement yesterday by vascular

## 2019-03-06 NOTE — PROGRESS NOTE ADULT - PROBLEM SELECTOR PLAN 6
- DVT prophylaxis - heparin drip restarted; IVC filter in place; will transition   - Diet - regular  - dispo: needs VNS for pleurx developed after receiving IV contrast  c/w benadryl PRN

## 2019-03-06 NOTE — PROGRESS NOTE ADULT - SUBJECTIVE AND OBJECTIVE BOX
Anesthesia Pain Management Service    SUBJECTIVE: Pt now off IV PCA without problems reported.    Therapy:	  [ X] IV PCA	   [ ] Epidural           [ ] s/p Spinal Opoid              [ ] Postpartum infusion	  [ ] Patient controlled regional anesthesia (PCRA)    [ ] prn Analgesics    Allergies    No Known Allergies    Intolerances    IV contrast (Rash)    MEDICATIONS  (STANDING):  heparin  Infusion.  Unit(s)/Hr (9 mL/Hr) IV Continuous <Continuous>  lidocaine   Patch 1 Patch Transdermal daily    MEDICATIONS  (PRN):  diphenhydrAMINE 25 milliGRAM(s) Oral every 4 hours PRN Rash and/or Itching  heparin  Injectable 4000 Unit(s) IV Push every 6 hours PRN For aPTT less than 40  hydrocortisone 2.5% Cream 1 Application(s) Topical daily PRN Rash  HYDROmorphone   Tablet 3 milliGRAM(s) Oral every 3 hours PRN mild to severe pain (1-10)      OBJECTIVE:   [X] No new signs     [ ] Other:    Side Effects:  [X ] None			[ ] Other:    Assessment of Catheter Site:		[ ] Intact		[ ] Other:    ASSESSMENT/PLAN  [ ] Continue current therapy    [X ] Therapy changed to:    [ ] IV PCA       [ ] Epidural     [ X] prn Analgesics     Comments: PRN Oral/IV opioids and/or non-opioid adjuvant analgesics to be used at this point.    Progress Note written now but Patient was seen earlier.

## 2019-03-06 NOTE — PROGRESS NOTE ADULT - SUBJECTIVE AND OBJECTIVE BOX
Anesthesia Pain Management Service    SUBJECTIVE: I'm still in a lot of pain     Pain Scale Score	At rest: _7__ 	With Activity: ___ 	[X ] Refer to charted pain scores    THERAPY:    [ ] IV PCA Morphine		[ ] 5 mg/mL	[ ] 1 mg/mL  [X ] IV PCA Hydromorphone	[ ] 5 mg/mL	[X ] 1 mg/mL  [ ] IV PCA Fentanyl		[ ] 50 micrograms/mL    Demand dose __0.2_ lockout __6_ (minutes) Continuous Rate _0__ Total: _3.2__   mg used (in past 24 hrs)      MEDICATIONS  (STANDING):  heparin  Infusion.  Unit(s)/Hr (9 mL/Hr) IV Continuous <Continuous>  lidocaine   Patch 1 Patch Transdermal daily    MEDICATIONS  (PRN):  acetaminophen   Tablet .. 650 milliGRAM(s) Oral every 6 hours PRN Moderate Pain (4 - 6)  diphenhydrAMINE 25 milliGRAM(s) Oral every 4 hours PRN Rash and/or Itching  heparin  Injectable 4000 Unit(s) IV Push every 6 hours PRN For aPTT less than 40  hydrocortisone 2.5% Cream 1 Application(s) Topical daily PRN Rash  HYDROmorphone   Tablet 3 milliGRAM(s) Oral every 3 hours PRN mild to severe pain (1-10)      OBJECTIVE: laying in bed     Sedation Score:	[ X] Alert	[ ] Drowsy 	[ ] Arousable	[ ] Asleep	[ ] Unresponsive    Side Effects:	[X ] None	[ ] Nausea	[ ] Vomiting	[ ] Pruritus  		[ ] Other:    Vital Signs Last 24 Hrs  T(C): 36.8 (06 Mar 2019 08:15), Max: 36.9 (06 Mar 2019 01:36)  T(F): 98.2 (06 Mar 2019 08:15), Max: 98.4 (06 Mar 2019 01:36)  HR: 78 (06 Mar 2019 08:15) (69 - 94)  BP: 110/70 (06 Mar 2019 08:15) (99/50 - 115/60)  BP(mean): --  RR: 17 (06 Mar 2019 08:15) (17 - 18)  SpO2: 99% (06 Mar 2019 08:15) (97% - 100%)    ASSESSMENT/ PLAN    Therapy to  be:	[ ] Continue   [ X] Discontinued   [X ] Change to prn Analgesics    Documentation and Verification of current medications:   [X] Done	[ ] Not done, not elligible    Comments: PRN Oral/IV opioids and/or Adjuvant medication to be ordered at this point.  PCA D/C by team and orders written by team.

## 2019-03-07 ENCOUNTER — OUTPATIENT (OUTPATIENT)
Dept: OUTPATIENT SERVICES | Facility: HOSPITAL | Age: 54
LOS: 1 days | Discharge: HOME | End: 2019-03-07

## 2019-03-07 VITALS
DIASTOLIC BLOOD PRESSURE: 62 MMHG | HEART RATE: 95 BPM | RESPIRATION RATE: 17 BRPM | OXYGEN SATURATION: 95 % | SYSTOLIC BLOOD PRESSURE: 102 MMHG | TEMPERATURE: 98 F

## 2019-03-07 DIAGNOSIS — C34.90 MALIGNANT NEOPLASM OF UNSPECIFIED PART OF UNSPECIFIED BRONCHUS OR LUNG: ICD-10-CM

## 2019-03-07 LAB
ALBUMIN SERPL ELPH-MCNC: 2.9 G/DL — LOW (ref 3.3–5)
ALP SERPL-CCNC: 112 U/L — SIGNIFICANT CHANGE UP (ref 40–120)
ALT FLD-CCNC: 74 U/L — HIGH (ref 4–33)
ANION GAP SERPL CALC-SCNC: 13 MMO/L — SIGNIFICANT CHANGE UP (ref 7–14)
APTT BLD: 32.2 SEC — SIGNIFICANT CHANGE UP (ref 27.5–36.3)
AST SERPL-CCNC: 75 U/L — HIGH (ref 4–32)
BACTERIA FLD CULT: SIGNIFICANT CHANGE UP
BILIRUB SERPL-MCNC: 0.3 MG/DL — SIGNIFICANT CHANGE UP (ref 0.2–1.2)
BUN SERPL-MCNC: 6 MG/DL — LOW (ref 7–23)
CALCIUM SERPL-MCNC: 8.8 MG/DL — SIGNIFICANT CHANGE UP (ref 8.4–10.5)
CHLORIDE SERPL-SCNC: 100 MMOL/L — SIGNIFICANT CHANGE UP (ref 98–107)
CO2 SERPL-SCNC: 24 MMOL/L — SIGNIFICANT CHANGE UP (ref 22–31)
CREAT SERPL-MCNC: 0.66 MG/DL — SIGNIFICANT CHANGE UP (ref 0.5–1.3)
GLUCOSE SERPL-MCNC: 101 MG/DL — HIGH (ref 70–99)
HCT VFR BLD CALC: 39.9 % — SIGNIFICANT CHANGE UP (ref 34.5–45)
HGB BLD-MCNC: 12.4 G/DL — SIGNIFICANT CHANGE UP (ref 11.5–15.5)
MAGNESIUM SERPL-MCNC: 1.9 MG/DL — SIGNIFICANT CHANGE UP (ref 1.6–2.6)
MCHC RBC-ENTMCNC: 27.9 PG — SIGNIFICANT CHANGE UP (ref 27–34)
MCHC RBC-ENTMCNC: 31.1 % — LOW (ref 32–36)
MCV RBC AUTO: 89.7 FL — SIGNIFICANT CHANGE UP (ref 80–100)
NRBC # FLD: 0 K/UL — LOW (ref 25–125)
PH FLD: 7.4 PH — SIGNIFICANT CHANGE UP
PHOSPHATE SERPL-MCNC: 3.3 MG/DL — SIGNIFICANT CHANGE UP (ref 2.5–4.5)
PLATELET # BLD AUTO: 510 K/UL — HIGH (ref 150–400)
PMV BLD: 9.6 FL — SIGNIFICANT CHANGE UP (ref 7–13)
POTASSIUM SERPL-MCNC: 4.1 MMOL/L — SIGNIFICANT CHANGE UP (ref 3.5–5.3)
POTASSIUM SERPL-SCNC: 4.1 MMOL/L — SIGNIFICANT CHANGE UP (ref 3.5–5.3)
PROT SERPL-MCNC: 5.6 G/DL — LOW (ref 6–8.3)
RBC # BLD: 4.45 M/UL — SIGNIFICANT CHANGE UP (ref 3.8–5.2)
RBC # FLD: 13.2 % — SIGNIFICANT CHANGE UP (ref 10.3–14.5)
SODIUM SERPL-SCNC: 137 MMOL/L — SIGNIFICANT CHANGE UP (ref 135–145)
WBC # BLD: 7.61 K/UL — SIGNIFICANT CHANGE UP (ref 3.8–10.5)
WBC # FLD AUTO: 7.61 K/UL — SIGNIFICANT CHANGE UP (ref 3.8–10.5)

## 2019-03-07 PROCEDURE — 99239 HOSP IP/OBS DSCHRG MGMT >30: CPT

## 2019-03-07 RX ORDER — ENOXAPARIN SODIUM 100 MG/ML
50 INJECTION SUBCUTANEOUS
Qty: 3000 | Refills: 0 | OUTPATIENT
Start: 2019-03-07 | End: 2019-04-05

## 2019-03-07 RX ORDER — HYDROMORPHONE HYDROCHLORIDE 2 MG/ML
0.5 INJECTION INTRAMUSCULAR; INTRAVENOUS; SUBCUTANEOUS
Qty: 14 | Refills: 0 | OUTPATIENT
Start: 2019-03-07 | End: 2019-03-13

## 2019-03-07 RX ADMIN — LIDOCAINE 1 PATCH: 4 CREAM TOPICAL at 00:34

## 2019-03-07 RX ADMIN — HYDROMORPHONE HYDROCHLORIDE 3 MILLIGRAM(S): 2 INJECTION INTRAMUSCULAR; INTRAVENOUS; SUBCUTANEOUS at 02:40

## 2019-03-07 RX ADMIN — LIDOCAINE 1 PATCH: 4 CREAM TOPICAL at 12:55

## 2019-03-07 RX ADMIN — HYDROMORPHONE HYDROCHLORIDE 3 MILLIGRAM(S): 2 INJECTION INTRAMUSCULAR; INTRAVENOUS; SUBCUTANEOUS at 01:44

## 2019-03-07 RX ADMIN — ENOXAPARIN SODIUM 50 MILLIGRAM(S): 100 INJECTION SUBCUTANEOUS at 06:13

## 2019-03-07 RX ADMIN — ENOXAPARIN SODIUM 50 MILLIGRAM(S): 100 INJECTION SUBCUTANEOUS at 17:10

## 2019-03-07 NOTE — PROGRESS NOTE ADULT - PROBLEM SELECTOR PLAN 3
CT Angio- Multiple acute pulmonary emboli involving left upper lobe, lower lobe and lingular pulmonary artery branches. No evidence of right heart strain. Likely 2/2 to hypercoagulable state of malignancy.   - found to have R below the knee DVT yesterday (3/4)  - heparin drip restarted 6am this AM  - Will need long term anticoagulation following discharge; patient states she will not inject herself at home and does not think family will help her with injections. will need to consider PO anticoagulation  - Pt reported urticaria. Started benadryl 25 mg Q4H PRN  - s/p RT VATS w/ bx and PleurX catheter placement   - Per oncology recs- MRI brain w/ and w/o contrast. = NO METASTASIS TO BRAIN  - Will hold CT A/P w/ IV Contrast for staging in the setting of urticaria possible 2/2 CTA  - s/p IVC filter placement yesterday by vascular

## 2019-03-07 NOTE — PHYSICAL THERAPY INITIAL EVALUATION ADULT - GENERAL OBSERVATIONS, REHAB EVAL
Consult received, chart reviewed. Patient received sitting at edge of bed, NAD. Patient agreed to Evaluation from Physical Therapist.

## 2019-03-07 NOTE — PHYSICAL THERAPY INITIAL EVALUATION ADULT - DISCHARGE DISPOSITION, PT EVAL
Home with no skilled PT needs. Pt. presents without gross impairment and is independent with functional mobility. Skilled, therapeutic PT intervention not indicated at this time. Pt. will be discharged from PT program.

## 2019-03-07 NOTE — PHYSICAL THERAPY INITIAL EVALUATION ADULT - PERTINENT HX OF CURRENT PROBLEM, REHAB EVAL
Pt. admitted for PE w/ malignancy; pleural effusion w/ malignancy. Pt. is s/p R VATS, pleurx placement, pleural biopsy, drainage of pleural effusion by CT surgery, and s/p IVC filter placement by vascular. Per documentation Pt. found to have multiple PE's in the left upper and lower lobes and a large right-sided pleural effusion. PMH of adenocarcinoma of the right lung

## 2019-03-07 NOTE — PROGRESS NOTE ADULT - ATTENDING COMMENTS
Pt was seen and examined at noon. Pt lying in bed comfortably. AAOx3, denied cp/sob/dizziness.   adenocarcinoma of the lung c/b malignant pleural effusion s/p chest tube insertion, plan for VATS tomorrow. f/u CT sx  acute PE. currently hemodynamically stable, oxygen saturating well. c/w hep gtt with pending procedure
Patient was seen and examined personally by me. I have discussed the plan and reviewed the resident's note and agree with the above physical exam findings including assessment and plan except as indicated below.    S/p right VATS, PleurX catheter and IVC filter placement 3/4. Drainage MWF and management as per thoracic surgery  Pain better controlled. C/w PO dilaudid 3mg q3 PRN (give 1 mg tabs for home), lidocaine patch to right chest wall, hot packs PRN.  Transaminitis improving  Lovenox covered and teaching done with son by RN  Outpatient followup at [facility] for further management of lung adenoca.   Stable for DC home with home services. DC time: 34 min
Patient was seen and examined personally by me. I have discussed the plan and reviewed the resident's note and agree with the above physical exam findings including assessment and plan except as indicated below.    Pending right VATS and PleurX catheter placement today  Patient and son at bedside refusing lovenox on discharge stating they are both uncomfortable and no other family member available to inject  Check for insurance coverage of DOACs
Patient was seen and examined personally by me. I have discussed the plan and reviewed the resident's note and agree with the above physical exam findings including assessment and plan except as indicated below.    S/p right VATS, PleurX catheter and IVC filter placement 3/4. Drainage and management as per thoracic surgery  Pain management with Dilaudid PCA  Check for insurance coverage of DOACs and if covered, transition to DOAC from hep gtt  Outpatient followup at [facility] for further management of lung adenoca.   Records from [facility] obtained and in chart including complete staging  Plan for DC home likely on 3/6
Patient was seen and examined personally by me. I have discussed the plan and reviewed the resident's note and agree with the above physical exam findings including assessment and plan except as indicated below.    S/p right VATS, PleurX catheter and IVC filter placement 3/4. Drainage MWF and management as per thoracic surgery  DC PCA, switch to PO dilaudid 3mg q3 PRN, lidocaine patch to right chest wall, hot packs PRN. Hold off further Tylenol given new transaminitis  Patient now agreeable to lovenox injections. Check coverage and medication teaching to be done with patient and son  Outpatient followup at [facility] for further management of lung adenoca.   Records from [facility] obtained and in chart including complete staging  DC planning pending medication teaching and pain control

## 2019-03-07 NOTE — PHYSICAL THERAPY INITIAL EVALUATION ADULT - DID THE PATIENT HAVE SURGERY?
yes/s/p R VATS, pleurx placement, pleural biopsy, drainage of pleural effusion by CT surgery, and s/p IVC filter

## 2019-03-07 NOTE — PROGRESS NOTE ADULT - PROBLEM SELECTOR PLAN 6
developed after receiving IV contrast  c/w benadryl PRN developed after receiving IV contrast, improving  c/w benadryl PRN

## 2019-03-07 NOTE — PROGRESS NOTE ADULT - PROBLEM SELECTOR PLAN 1
d/c IV PCA  - start dilaudid PO 3mg q3 PRN  - got IV tylenol, transaminitis. holding further tylenol  - lidocaine patch

## 2019-03-07 NOTE — PROGRESS NOTE ADULT - PROBLEM SELECTOR PLAN 2
- s/p RT VATS w/ bx and PleurX catheter placement   - presented w Large malignant right pleural effusion with partial collapse of the right lung and obstruction of the right lower lobe bronchus s/p thoracentesis in ED with 2L hazy yellow fluid removed. Drain in place  - Nucleated cells 1416, RBC 1000, , Pro 4.6, Alb 2.9, Glu 94  - Light's criteria - exudative effusion  - Pleural fluid cx (3/2)- no organisms seen. AFB pleural fluid cx- Negative  - repeat CXR showed interval reduction in effusion size; SOB improved following procedure  - patient will need home care following discharge to help with tube/drain maintenance

## 2019-03-07 NOTE — PROGRESS NOTE ADULT - SUBJECTIVE AND OBJECTIVE BOX
****************************  Aziza Tenorio, PGY-1  [facility] Pager #: [phone]  NS Pager #: [phone]  ****************************    Patient is a 54y old  Female who presents with a chief complaint of PE w/ malignancy; pleural effusion w/ malignancy (06 Mar 2019 16:21)     INTERVAL HPI/OVERNIGHT EVENTS: no acute events overnight       MEDICATIONS  (STANDING):  enoxaparin Injectable 50 milliGRAM(s) SubCutaneous two times a day  lidocaine   Patch 1 Patch Transdermal daily    MEDICATIONS  (PRN):  diphenhydrAMINE 25 milliGRAM(s) Oral every 4 hours PRN Rash and/or Itching  hydrocortisone 2.5% Cream 1 Application(s) Topical daily PRN Rash  HYDROmorphone   Tablet 3 milliGRAM(s) Oral every 3 hours PRN mild to severe pain (1-10)    Allergies:  No Known Allergies    Intolerances:  IV contrast (Rash)      REVIEW OF SYSTEMS:  Constitutional: Denies fever, weight loss, fatigue  Resp: Denies SOB, cough, hemoptysis  CV: Denies chest pain, palpitations, dizziness  GI: Denies abdominal pain, N/V/D/C, melena, hematochezia  : Denies dysuria, increased frequency, hematuria  Neuro: Denies HA, weakness, numbness  Skin: Denies rashes, lesions  Lymph Nodes: Denies enlarged glands  MSK: Denies joint pain, swelling    VITAL SIGNS:  T(C): 37.3 (03-07-19 @ 05:27), Max: 37.3 (03-07-19 @ 05:27)  HR: 93 (03-07-19 @ 05:27) (78 - 93)  BP: 102/56 (03-07-19 @ 05:27) (102/56 - 118/60)  RR: 18 (03-07-19 @ 05:27) (17 - 18)  SpO2: 95% (03-07-19 @ 05:27) (95% - 100%)    PHYSICAL EXAM:  General: NAD, well-groomed, well-developed  Eyes: Conjunctiva and sclera clear  ENMT: Moist mucous membranes  Neck: Supple  Chest: Clear to auscultation bilaterally; no rales, rhonchi, or wheezing  Heart: Regular rate and rhythm; no murmurs, rubs, or gallops  Abd: +BS, soft, nontender, nondistended  Nervous System: AAOX3  Psych: Appropriate affect and mood  Ext:  no LE edema    LABS:      Ca    8.8        06 Mar 2019 06:50    PTT - ( 07 Mar 2019 06:15 )  PTT:32.2 SEC  CAPILLARY BLOOD GLUCOSE        BLOOD CULTURE    RADIOLOGY & ADDITIONAL TESTS:    Imaging Personally Reviewed:  [ ] YES     Consultant(s) Notes Reviewed:      Care Discussed with Consultants/Other Providers: ****************************  Aziza Tenorio, PGY-1  [facility] Pager #: [phone]  NS Pager #: [phone]  ****************************    Patient is a 54y old  Female who presents with a chief complaint of PE w/ malignancy; pleural effusion w/ malignancy (06 Mar 2019 16:21)     INTERVAL HPI/OVERNIGHT EVENTS: no acute events overnight       MEDICATIONS  (STANDING):  enoxaparin Injectable 50 milliGRAM(s) SubCutaneous two times a day  lidocaine   Patch 1 Patch Transdermal daily    MEDICATIONS  (PRN):  diphenhydrAMINE 25 milliGRAM(s) Oral every 4 hours PRN Rash and/or Itching  hydrocortisone 2.5% Cream 1 Application(s) Topical daily PRN Rash  HYDROmorphone   Tablet 3 milliGRAM(s) Oral every 3 hours PRN mild to severe pain (1-10)    Allergies:  No Known Allergies    Intolerances:  IV contrast (Rash)      REVIEW OF SYSTEMS:  Constitutional: Denies fever, weight loss, fatigue  Resp: Denies SOB, cough, hemoptysis  CV: Denies chest pain, palpitations, dizziness  GI: Denies abdominal pain, N/V/D/C, melena, hematochezia  : Denies dysuria, increased frequency, hematuria  Neuro: Denies HA, weakness, numbness  Skin: Denies rashes, lesions  Lymph Nodes: Denies enlarged glands  MSK: Denies joint pain, swelling    VITAL SIGNS:  T(C): 37.3 (03-07-19 @ 05:27), Max: 37.3 (03-07-19 @ 05:27)  HR: 93 (03-07-19 @ 05:27) (78 - 93)  BP: 102/56 (03-07-19 @ 05:27) (102/56 - 118/60)  RR: 18 (03-07-19 @ 05:27) (17 - 18)  SpO2: 95% (03-07-19 @ 05:27) (95% - 100%)    PHYSICAL EXAM:  General: NAD, well-groomed, well-developed  Eyes: Conjunctiva and sclera clear  ENMT: Moist mucous membranes  Neck: Supple  Chest: Clear to auscultation bilaterally; no rales, rhonchi, or wheezing, right pleurX catheter capped  Heart: Regular rate and rhythm; no murmurs, rubs, or gallops  Abd: +BS, soft, nontender, nondistended  Nervous System: AAOX3  Psych: Appropriate affect and mood  Ext:  no LE edema    LABS:      Ca    8.8        06 Mar 2019 06:50    PTT - ( 07 Mar 2019 06:15 )  PTT:32.2 SEC  CAPILLARY BLOOD GLUCOSE        BLOOD CULTURE    RADIOLOGY & ADDITIONAL TESTS:    Imaging Personally Reviewed:  [ ] YES     Consultant(s) Notes Reviewed:      Care Discussed with Consultants/Other Providers:

## 2019-03-07 NOTE — PROGRESS NOTE ADULT - PROBLEM SELECTOR PLAN 5
Pt advised to follow up with outpatient oncologist following discharge at OSH but has not yet done so.   - Ill-defined low-attenuation mass in the right lower lobe. Interlobular septal thickening concerning for lymphangitic spread. Superimposed infection is not excluded.  - Discharge papers from [facility]: poorly differentiated adenocarcinoma with involvement of hilar and mediastinal nodes; Records obtained and in chart including complete staging  - c/s oncology for recs. Per oncology, will need records from [facility]: in chart

## 2019-03-07 NOTE — PROGRESS NOTE ADULT - ASSESSMENT
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 Found to have multiple PEs in the left upper and lower lobes and a large right-sided pleural effusion with associated partial collapse of R lung and obstruction of the right lower lobe bronchus. Most likely recurrent pleural effusion 2/2 to malignancy in R lung, now s/p R VATS, pleurx placement, pleural biopsy, drainage of pleural effusion by CT surgery, and s/p IVC filter placement by vascular (3/4)
54F w/ reportedly newly-diagnosed lung adenocarcinoma (at [facility] about 2 months ago, s/p thoracentesis and biopsy), presenting to [facility] with dyspnea, orthopnea.  Found to have multiple PEs in the left upper and lower lobes and a large right-sided pleural effusion with associated partial collapse of R lung and obstruction of the right lower lobe bronchus. Patient wants to follow at [facility] for management of lung cancer.    Brain MRI negative    -Follow pathology and molecular studies  -AC with lovenox and continue on discharge when cleared by surgery  -CT A/P w/ contrast for staging   -outpatient oncology follow up      Will follow. Please do not hesitate to call back with questions.     Thuy Harper MD  Medical Oncology Attending
54F with right malignant pleural effusion and PE s/p IVC filter placement via R femoral vein and right VATS and PleurX catheter placement on 3/4    - No acute postoperative events  - Will sign off, please call back with any questions    Vascular surgery  Pager [phone]
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 Found to have multiple PEs in the left upper and lower lobes and a large right-sided pleural effusion with associated partial collapse of R lung and obstruction of the right lower lobe bronchus. Most likely recurrent pleural effusion 2/2 to malignancy in R lung, now s/p R VATS, pleurx placement, pleural biopsy, drainage of pleural effusion by CT surgery, and s/p IVC filter placement by vascular (3/4)
54y F with poorly differentiated adenocarcinoma of the right lung with involvement of the hilar and mediastinal nodes diagnosed 8 weeks ago at [facility] s/p thoracentesis presenting with worsening shortness of breath and orthopnea x1 day. Found to have multiple PEs in the left upper and lower lobes and a large right-sided pleural effusion with associated partial collapse of R lung and obstruction of the right lower lobe bronchus. Most likely recurrent pleural effusion 2/2 to malignancy in R lung, now s/p R VATS, pleurx placement, pleural biopsy, drainage of pleural effusion by CT surgery, and s/p IVC filter placement by vascular (3/4)
54y F with poorly differentiated adenocarcinoma of the right lung with involvement of the hilar and mediastinal nodes diagnosed 8 weeks ago at [facility] s/p thoracentesis presenting with worsening shortness of breath and orthopnea x1 day. Found to have multiple PEs in the left upper and lower lobes and a large right-sided pleural effusion with associated partial collapse of R lung and obstruction of the right lower lobe bronchus. Most likely recurrent pleural effusion 2/2 to malignancy in R lung.
54y F with poorly differentiated adenocarcinoma of the right lung with involvement of the hilar and mediastinal nodes diagnosed 8 weeks ago at [facility] s/p thoracentesis presenting with worsening shortness of breath and orthopnea x1 day. Found to have multiple PEs in the left upper and lower lobes and a large right-sided pleural effusion with associated partial collapse of R lung and obstruction of the right lower lobe bronchus. Most likely recurrent pleural effusion 2/2 to malignancy in R lung, NPO for pleurx procedure today

## 2019-03-07 NOTE — PROGRESS NOTE ADULT - REASON FOR ADMISSION
PE w/ malignancy; pleural effusion w/ malignancy

## 2019-03-13 ENCOUNTER — APPOINTMENT (OUTPATIENT)
Dept: HEMATOLOGY ONCOLOGY | Facility: CLINIC | Age: 54
End: 2019-03-13
Payer: MEDICAID

## 2019-03-13 VITALS
SYSTOLIC BLOOD PRESSURE: 100 MMHG | HEART RATE: 96 BPM | BODY MASS INDEX: 18.55 KG/M2 | TEMPERATURE: 97.7 F | OXYGEN SATURATION: 96 % | RESPIRATION RATE: 14 BRPM | WEIGHT: 103.4 LBS | HEIGHT: 62.6 IN | DIASTOLIC BLOOD PRESSURE: 70 MMHG

## 2019-03-13 DIAGNOSIS — Z71.89 OTHER SPECIFIED COUNSELING: ICD-10-CM

## 2019-03-13 PROCEDURE — 99214 OFFICE O/P EST MOD 30 MIN: CPT

## 2019-03-14 ENCOUNTER — APPOINTMENT (OUTPATIENT)
Dept: PULMONOLOGY | Facility: CLINIC | Age: 54
End: 2019-03-14
Payer: MEDICAID

## 2019-03-14 VITALS
HEART RATE: 106 BPM | BODY MASS INDEX: 20.5 KG/M2 | HEIGHT: 59.92 IN | SYSTOLIC BLOOD PRESSURE: 95 MMHG | RESPIRATION RATE: 17 BRPM | OXYGEN SATURATION: 94 % | TEMPERATURE: 97.5 F | DIASTOLIC BLOOD PRESSURE: 67 MMHG | WEIGHT: 104.4 LBS

## 2019-03-14 VITALS — HEIGHT: 61.5 IN | BODY MASS INDEX: 19.2 KG/M2 | WEIGHT: 103 LBS

## 2019-03-14 PROCEDURE — ZZZZZ: CPT

## 2019-03-14 PROCEDURE — 99204 OFFICE O/P NEW MOD 45 MIN: CPT | Mod: 25

## 2019-03-14 PROCEDURE — 94729 DIFFUSING CAPACITY: CPT

## 2019-03-14 PROCEDURE — 94060 EVALUATION OF WHEEZING: CPT

## 2019-03-14 PROCEDURE — 94726 PLETHYSMOGRAPHY LUNG VOLUMES: CPT

## 2019-03-14 RX ORDER — ENOXAPARIN SODIUM 300 MG/3ML
INJECTION INTRAVENOUS; SUBCUTANEOUS
Refills: 0 | Status: ACTIVE | COMMUNITY

## 2019-03-14 NOTE — REVIEW OF SYSTEMS
[Poor Appetite] : poor appetite [As Noted in HPI] : as noted in HPI [Anemia] : anemia [Negative] : Neurologic

## 2019-03-14 NOTE — HISTORY OF PRESENT ILLNESS
[FreeTextEntry1] : 54-year-old female with cough and weight loss. Was eventually admitted to [facility] where a chest radiograph demonstrated a right pleural effusion. She was transferred to [facility] where she underwent video-assisted thoracoscopic surgery with pleural biopsies and insertion of a Pleurx catheter and an IVC filter. A CT PA also demonstrated left-sided pulmonary emboli. Adenocarcinoma was identified in the pleural biopsies. PDL-1 positive. EGFR not yet identified. Currently draining a significant amount of fluid every third day, approximately 750 cc. She is having pain in her right side but denies any other symptomatology. She tends to under report her symptoms.

## 2019-03-14 NOTE — ASSESSMENT
[FreeTextEntry1] : Stage IV adenocarcinoma, positive PDL1. Currently draining Pleurx. On Lovenox for pulmonary emboli. IVC filter in place. Patient to see\par Dr Cisneros tomorrow.

## 2019-03-14 NOTE — PHYSICAL EXAM
[General Appearance - In No Acute Distress] : no acute distress [Normal Conjunctiva] : the conjunctiva exhibited no abnormalities [Neck Appearance] : the appearance of the neck was normal [Heart Rate And Rhythm] : heart rate and rhythm were normal [Heart Sounds] : normal S1 and S2 [] : no respiratory distress [Respiration, Rhythm And Depth] : normal respiratory rhythm and effort [Abnormal Walk] : normal gait [Nail Clubbing] : no clubbing of the fingernails [Cyanosis, Localized] : no localized cyanosis [Skin Lesions] : no skin lesions [No Focal Deficits] : no focal deficits [Affect] : the affect was normal [Mood] : the mood was normal [FreeTextEntry1] : dullness to percussion right base; decreased bs; Pleurx

## 2019-03-15 ENCOUNTER — APPOINTMENT (OUTPATIENT)
Dept: HEMATOLOGY ONCOLOGY | Facility: CLINIC | Age: 54
End: 2019-03-15

## 2019-03-15 VITALS
WEIGHT: 104.06 LBS | DIASTOLIC BLOOD PRESSURE: 70 MMHG | BODY MASS INDEX: 20.38 KG/M2 | OXYGEN SATURATION: 98 % | RESPIRATION RATE: 16 BRPM | HEART RATE: 118 BPM | SYSTOLIC BLOOD PRESSURE: 84 MMHG | TEMPERATURE: 97.7 F

## 2019-03-15 RX ORDER — PROCHLORPERAZINE MALEATE 10 MG/1
10 TABLET ORAL
Qty: 90 | Refills: 3 | Status: ACTIVE | COMMUNITY
Start: 2019-03-15 | End: 1900-01-01

## 2019-03-15 RX ORDER — HYDROCODONE BITARTRATE AND HOMATROPINE METHYLBROMIDE 5; 1.5 MG/5ML; MG/5ML
5-1.5 SYRUP ORAL
Qty: 300 | Refills: 0 | Status: ACTIVE | COMMUNITY
Start: 2019-03-14 | End: 1900-01-01

## 2019-03-15 NOTE — ASSESSMENT
[FreeTextEntry1] : 54f with newly diagnosed advanced adenocarcinoma of the lung, with pdl1>90%, foundation testing is pending, presenting for a new patient visit. \par \par -PET/CT\par -Awaiting foundation one results\par -If no actionable mutations, will plan for pembrolizumab [id number]\par -palliative care consult\par -RTC 3/26

## 2019-03-15 NOTE — HISTORY OF PRESENT ILLNESS
[de-identified] : 54f, never smoker, with newly diagnosed advanced NSCLC, presenting to establish care. \par Ms Kennedy presented to [facility] with shortness of breath, imaging showed a lung mass, she was told she has lung cancer based on a positive pleural effusion. She experienced worsening SOB and presented to [facility] on 3/1/2019. She was found to have multiple PEs in the left upper and lower lobes and a large right-sided pleural effusion with associated partial collapse of R lung and obstruction of the right lower lobe bronchus. She was evaluated by thoracic surgery and underwent a Right VATS, pleural biopsy, drainage of pleural effusion, insertion of pleurX catheter. She also had Placement of retrievable IVC Filter. I met Ms. Kennedy while she was admitted at [facility].  \par Pathology came back as metastatic adenocarcinoma of lung, pleural effusion + for malignant cells. PDL1>90%. Foundation testing is still pending. \par During hospitalization, an MRI brain was obtained, showing no evidence of metastatic disease. \par \par Today, patient presents to [facility] care. She feels very anxious and nervous about her diagnosis. She reports pain in her spine, at the lower thoracic level and she reports pain at the site of the pleurex catheter. She was discharged with Dilaudid from the hospital. She reports poor appetite and continued weight loss. \par She lives with her family and has good support.\par She is a  at [employer]. \par She has no f/h of cancer. \par

## 2019-03-15 NOTE — PHYSICAL EXAM
[Restricted in physically strenuous activity but ambulatory and able to carry out work of a light or sedentary nature] : Status 1- Restricted in physically strenuous activity but ambulatory and able to carry out work of a light or sedentary nature, e.g., light house work, office work [Thin] : thin [Normal] : affect appropriate [de-identified] : In no acute distress. anxious. [de-identified] : Pleurex in place. Decreased breath sounds on the lower right lung field. [de-identified] : mild tenderness on the lower thoracic spine

## 2019-03-19 ENCOUNTER — TRANSCRIPTION ENCOUNTER (OUTPATIENT)
Age: 54
End: 2019-03-19

## 2019-03-19 ENCOUNTER — INPATIENT (INPATIENT)
Facility: HOSPITAL | Age: 54
LOS: 13 days | Discharge: HOME HEALTH | End: 2019-04-02
Attending: THORACIC SURGERY (CARDIOTHORACIC VASCULAR SURGERY) | Admitting: THORACIC SURGERY (CARDIOTHORACIC VASCULAR SURGERY)
Payer: MEDICAID

## 2019-03-19 ENCOUNTER — APPOINTMENT (OUTPATIENT)
Dept: THORACIC SURGERY | Facility: CLINIC | Age: 54
End: 2019-03-19
Payer: MEDICAID

## 2019-03-19 VITALS
BODY MASS INDEX: 20.62 KG/M2 | OXYGEN SATURATION: 92 % | HEART RATE: 123 BPM | DIASTOLIC BLOOD PRESSURE: 70 MMHG | RESPIRATION RATE: 18 BRPM | SYSTOLIC BLOOD PRESSURE: 90 MMHG | HEIGHT: 59.84 IN | WEIGHT: 105 LBS

## 2019-03-19 VITALS
DIASTOLIC BLOOD PRESSURE: 26 MMHG | RESPIRATION RATE: 20 BRPM | HEART RATE: 124 BPM | TEMPERATURE: 98 F | SYSTOLIC BLOOD PRESSURE: 104 MMHG | OXYGEN SATURATION: 100 %

## 2019-03-19 DIAGNOSIS — I83.90 ASYMPTOMATIC VARICOSE VEINS OF UNSPECIFIED LOWER EXTREMITY: ICD-10-CM

## 2019-03-19 PROCEDURE — 99215 OFFICE O/P EST HI 40 MIN: CPT

## 2019-03-19 RX ORDER — METOCLOPRAMIDE 10 MG/1
10 TABLET ORAL EVERY 6 HOURS
Qty: 120 | Refills: 2 | Status: ACTIVE | COMMUNITY
Start: 2019-03-19 | End: 1900-01-01

## 2019-03-19 NOTE — ED ADULT NURSE NOTE - OBJECTIVE STATEMENT
pt received in room 15, A&Ox3, c/o chills, white phlegm producing cough, n/v, sob, since 5 pm. pt recently diagnosed with lung CA, has a pleural vac in R lung, breathing even & unlabored, denies cp, abd pain, dizziness, headache. pt received in room 15, A&Ox3, c/o chills, white phlegm producing cough, n/v, sob, since 5 pm. pt recently diagnosed with lung CA, has a pleural vac in R lung, breathing even & unlabored, denies cp, abd pain, dizziness, headache. 22G IV placed,in R hand, labs sent, will continue to monitor.

## 2019-03-19 NOTE — ED PROVIDER NOTE - NS ED ROS FT
General: No fevers, + chills  HEENT: No headaches, dysphagia, changes in vision, hoarseness, nasal congestion  CVS: + chest pain, + palpitations   Resp: + shortness of breath, no wheezing   GI: No abdominal pain, nausea, vomiting, changes in bowel habits  Renal: No dysuria, hematuria   Ext: No edema, no claudication   Skin: No rashes or itching   Neuro: No confusion, + generalized weakness  Endocrine: No excessive heat or cold symptoms

## 2019-03-19 NOTE — ED ADULT TRIAGE NOTE - CHIEF COMPLAINT QUOTE
pt diagnosed with lung ca 4 weeks ago, c.o sob today. also endorsing cough, no chest pain or fever, ekg in progress

## 2019-03-19 NOTE — ED PROVIDER NOTE - PHYSICAL EXAMINATION
General: tachypneic + cachectic   HEAD: Atraumatic, Normocephalic  ENT: conjunctiva and sclera clear, neck supple, no erythema or exudates in the oropharynx  Chest: Lack of breath sounds in RML and RLL. Clear on left. No wheezing.   Heart: Tachycardic, regular, no murmurs, gallops   Abd: Nl BS, soft, NT, ND   Ext: 2+ peripheral pulses, no clubbing, cyanosis, edema  Neuro: AOx3 no focal deficits  Skin: Warm, dry, intact, no rash

## 2019-03-19 NOTE — ED PROVIDER NOTE - CARE PLAN
Principal Discharge DX:	Pericardial effusion  Secondary Diagnosis:	Pleural effusion  Secondary Diagnosis:	Lung cancer

## 2019-03-19 NOTE — ED PROVIDER NOTE - CLINICAL SUMMARY MEDICAL DECISION MAKING FREE TEXT BOX
55 yo F w lung adenocarcinoma c/b r pleural effusion requiring frequent thora, PE on lovenox pw acutely worsening sob and cp today   - concerning for faster reaccumulation of R pleural effusion vs worsening/new PE   - labs, cta of chest , supplement o2 53 yo F w lung adenocarcinoma c/b r pleural effusion requiring frequent thora, PE on lovenox pw acutely worsening sob and cp today concerning for faster reaccumulation of R pleural effusion vs worsening/new PE vs cardiac etiology.  Is ill appearing and in moderate distress, though breathing comfortably when seated.  Obtain labs, cta of chest, supplement o2.

## 2019-03-19 NOTE — ED PROVIDER NOTE - ATTENDING CONTRIBUTION TO CARE
Attending Attestation: Dr. Gomez  I have personally performed a history and physical examination of the patient and discussed management with the resident as well as the patient.  I reviewed the resident's note and agree with the documented findings and plan of care.  I have authored and modified critical sections of the Provider Note, including but not limited to HPI, Physical Exam and MDM. 55 yo F w lung adenocarcinoma c/b r pleural effusion requiring frequent thora, PE on lovenox pw acutely worsening sob and cp today concerning for faster reaccumulation of R pleural effusion vs worsening/new PE vs cardiac etiology.  Is ill appearing and in moderate distress, though breathing comfortably when seated.  Obtain labs, cta of chest, supplement o2.

## 2019-03-19 NOTE — ED PROVIDER NOTE - PROGRESS NOTE DETAILS
Sign out follow-up: Pt pending CXR and CTA. Bedside POCUS lungs and heart performed. Right side loculated effusion right lung. Moderate to large pericardial effusion with bowing of right ventricle.  and . FALKOWSKA. Thoracic surgery PA-C at bedside. 600cc fluid drained from pleurex. Thoracic surgery PAENA at bedside. 600cc fluid drained from pleurex. Pt reports to date she has had 500cc drained daily but he surgeon planned to have 1L drained tomorrow. BETI. Dr. Huston (CT): Discussed case. Pt may have pericardial effusion drained by IR in AM. Not a candidate for thoracic surgery service due to caner. Dr. Huston (CT): Discussed case. Pt may have pericardial effusion drained by IR in AM. Pericardial window not necessary. Not a candidate for thoracic surgery service due to caner. Recommend medical admission for IR consult. BETI. MICU has evaluated pt. CTA pending. Cardiology called for evaluation of pericardial effusion for tamponade concern. MICU has evaluated pt. CTA pending. BETI. CCU called for evaluation of pericardial effusion for tamponade concern. Pt back from CT. Appears comfortable. VSS. Cardiology fellow called in by CCU for official echo. Becki. Thoracic surgery PAENA at bedside. 600cc fluid drained from Pleurex. Pt reports to date she has had 500cc drained daily but he surgeon planned to have 1L drained tomorrow. BETI. Dr. Huston (CT): Discussed concern of pericardial tamponade and ?trasnfer to [facility] for pericardial window due to likelihood of reaccumulation of fluid from cancer. Surgeon recommend Pt may have pericardial effusion drained by IR in AM. Pericardial window not necessary. Not a candidate for thoracic surgery service due to caner diagnosis. Recommend medical admission for IR consult in AM. BETI. Cardiology fellow at bedside. BETI. Thoracic surgery PAENA at bedside. 600cc fluid drained from Pleurex. Pt reports to date she has had 500cc drained daily but he surgeon planned to have amount increased to 1L tomorrow. BETI. Dr. Huston (CT): Discussed concern of pericardial tamponade and ?transfer to [facility] for pericardial window due to likelihood of reaccumulation of fluid from cancer. Surgeon recommend Pt may have pericardial effusion drained by IR in AM. Pericardial window not necessary. Not a candidate for thoracic surgery service due to cancer diagnosis. Recommend medical admission for IR consult in AM. In pt need ICU bed then recommends MICU or CCU. BETI. Cardiology fellow reports pericardial tamponade but will require thoracic intervention do to location of fluid. Thoracic PA-C calls to report pt is to remain NPO for OR in AM. May be admitted to CTICU. YANICK Dr. Ni (thoracic attending): Discussed concern of pericardial tamponade and ?transfer to [facility] for pericardial window due to likelihood of reaccumulation of fluid from cancer vs CTICU admit at [facility]. Surgeon recommend Pt may have pericardial effusion drained by IR in AM. Pericardial window not necessary. Not a candidate for thoracic surgery service due to cancer diagnosis. Recommend medical admission for IR consult in AM. If pt needs ICU bed then recommends MICU or CCU. BETI.

## 2019-03-19 NOTE — ED PROVIDER NOTE - OBJECTIVE STATEMENT
53 yo F w recent dx of R lung adenocarcinoma with mediastinal and hilar lymphadenopathy c/b R pleural effusion s/p pleurax and VATS, PE (3/4) on lovenox s/p IVC filter presenting with acutely worse shortness of breath and substernal chest pain today. Patient reports pain is worse with deep breaths. Patient has a home aid who drains the R lung every 3 days - last was yesterday. Patient has a chronic cough from several months unchanged from before. No fevers. + chills. No nausea, vomiting.  Patient has been bed bound for several weeks due to generalized weakness and dyspnea on exertion 53 yo F w recent dx of R lung adenocarcinoma with mediastinal and hilar lymphadenopathy c/b R pleural effusion s/p pleurex and VATS, PE (3/4) on lovenox s/p IVC filter presenting with acutely worse shortness of breath and substernal chest pain today. Patient reports pain is worse with deep breaths. Pleurex drained every 3 days - last was yesterday (500 ml each time, rx'd by doc). Patient has a chronic cough from several months unchanged from before. No fevers. + chills. No nausea, vomiting.  Patient has been bed bound for several weeks due to generalized weakness and dyspnea on exertion.  Ill appearing.

## 2019-03-20 ENCOUNTER — RESULT REVIEW (OUTPATIENT)
Age: 54
End: 2019-03-20

## 2019-03-20 DIAGNOSIS — I31.3 PERICARDIAL EFFUSION (NONINFLAMMATORY): ICD-10-CM

## 2019-03-20 LAB
ALBUMIN SERPL ELPH-MCNC: 2.4 G/DL — LOW (ref 3.3–5)
ALBUMIN SERPL ELPH-MCNC: 2.9 G/DL — LOW (ref 3.3–5)
ALP SERPL-CCNC: 165 U/L — HIGH (ref 40–120)
ALP SERPL-CCNC: 170 U/L — HIGH (ref 40–120)
ALT FLD-CCNC: 34 U/L — HIGH (ref 4–33)
ALT FLD-CCNC: 37 U/L — HIGH (ref 4–33)
ANION GAP SERPL CALC-SCNC: 11 MMO/L — SIGNIFICANT CHANGE UP (ref 7–14)
ANION GAP SERPL CALC-SCNC: 16 MMO/L — HIGH (ref 7–14)
AST SERPL-CCNC: 22 U/L — SIGNIFICANT CHANGE UP (ref 4–32)
AST SERPL-CCNC: 22 U/L — SIGNIFICANT CHANGE UP (ref 4–32)
B PERT DNA SPEC QL NAA+PROBE: NOT DETECTED — SIGNIFICANT CHANGE UP
BASE EXCESS BLDA CALC-SCNC: -3.5 MMOL/L — SIGNIFICANT CHANGE UP
BASE EXCESS BLDA CALC-SCNC: -5.1 MMOL/L — SIGNIFICANT CHANGE UP
BASE EXCESS BLDA CALC-SCNC: -5.2 MMOL/L — SIGNIFICANT CHANGE UP
BASE EXCESS BLDV CALC-SCNC: 1.2 MMOL/L — SIGNIFICANT CHANGE UP
BASOPHILS # BLD AUTO: 0.05 K/UL — SIGNIFICANT CHANGE UP (ref 0–0.2)
BASOPHILS NFR BLD AUTO: 0.4 % — SIGNIFICANT CHANGE UP (ref 0–2)
BILIRUB SERPL-MCNC: 0.2 MG/DL — SIGNIFICANT CHANGE UP (ref 0.2–1.2)
BILIRUB SERPL-MCNC: < 0.2 MG/DL — LOW (ref 0.2–1.2)
BLD GP AB SCN SERPL QL: NEGATIVE — SIGNIFICANT CHANGE UP
BLOOD GAS VENOUS - CREATININE: 0.74 MG/DL — SIGNIFICANT CHANGE UP (ref 0.5–1.3)
BUN SERPL-MCNC: 13 MG/DL — SIGNIFICANT CHANGE UP (ref 7–23)
BUN SERPL-MCNC: 15 MG/DL — SIGNIFICANT CHANGE UP (ref 7–23)
C PNEUM DNA SPEC QL NAA+PROBE: NOT DETECTED — SIGNIFICANT CHANGE UP
CA-I BLDA-SCNC: 1.13 MMOL/L — LOW (ref 1.15–1.29)
CA-I BLDA-SCNC: 1.14 MMOL/L — LOW (ref 1.15–1.29)
CALCIUM SERPL-MCNC: 7.9 MG/DL — LOW (ref 8.4–10.5)
CALCIUM SERPL-MCNC: 8.9 MG/DL — SIGNIFICANT CHANGE UP (ref 8.4–10.5)
CHLORIDE BLDV-SCNC: 100 MMOL/L — SIGNIFICANT CHANGE UP (ref 96–108)
CHLORIDE SERPL-SCNC: 106 MMOL/L — SIGNIFICANT CHANGE UP (ref 98–107)
CHLORIDE SERPL-SCNC: 96 MMOL/L — LOW (ref 98–107)
CO2 SERPL-SCNC: 19 MMOL/L — LOW (ref 22–31)
CO2 SERPL-SCNC: 19 MMOL/L — LOW (ref 22–31)
CREAT SERPL-MCNC: 0.61 MG/DL — SIGNIFICANT CHANGE UP (ref 0.5–1.3)
CREAT SERPL-MCNC: 0.75 MG/DL — SIGNIFICANT CHANGE UP (ref 0.5–1.3)
EOSINOPHIL # BLD AUTO: 0.1 K/UL — SIGNIFICANT CHANGE UP (ref 0–0.5)
EOSINOPHIL NFR BLD AUTO: 0.9 % — SIGNIFICANT CHANGE UP (ref 0–6)
FLUAV H1 2009 PAND RNA SPEC QL NAA+PROBE: NOT DETECTED — SIGNIFICANT CHANGE UP
FLUAV H1 RNA SPEC QL NAA+PROBE: NOT DETECTED — SIGNIFICANT CHANGE UP
FLUAV H3 RNA SPEC QL NAA+PROBE: NOT DETECTED — SIGNIFICANT CHANGE UP
FLUAV SUBTYP SPEC NAA+PROBE: NOT DETECTED — SIGNIFICANT CHANGE UP
FLUBV RNA SPEC QL NAA+PROBE: NOT DETECTED — SIGNIFICANT CHANGE UP
GAS PNL BLDV: 131 MMOL/L — LOW (ref 136–146)
GLUCOSE BLDA-MCNC: 153 MG/DL — HIGH (ref 70–99)
GLUCOSE BLDA-MCNC: 153 MG/DL — HIGH (ref 70–99)
GLUCOSE BLDV-MCNC: 139 — HIGH (ref 70–99)
GLUCOSE SERPL-MCNC: 131 MG/DL — HIGH (ref 70–99)
GLUCOSE SERPL-MCNC: 145 MG/DL — HIGH (ref 70–99)
GRAM STN WND: SIGNIFICANT CHANGE UP
HADV DNA SPEC QL NAA+PROBE: NOT DETECTED — SIGNIFICANT CHANGE UP
HCO3 BLDA-SCNC: 20 MMOL/L — LOW (ref 22–26)
HCO3 BLDA-SCNC: 21 MMOL/L — LOW (ref 22–26)
HCO3 BLDA-SCNC: 22 MMOL/L — SIGNIFICANT CHANGE UP (ref 22–26)
HCO3 BLDV-SCNC: 24 MMOL/L — SIGNIFICANT CHANGE UP (ref 20–27)
HCOV PNL SPEC NAA+PROBE: SIGNIFICANT CHANGE UP
HCT VFR BLD CALC: 38.2 % — SIGNIFICANT CHANGE UP (ref 34.5–45)
HCT VFR BLD CALC: 41.5 % — SIGNIFICANT CHANGE UP (ref 34.5–45)
HCT VFR BLDA CALC: 35 % — SIGNIFICANT CHANGE UP (ref 34.5–46.5)
HCT VFR BLDA CALC: 37.4 % — SIGNIFICANT CHANGE UP (ref 34.5–46.5)
HCT VFR BLDV CALC: 42 % — SIGNIFICANT CHANGE UP (ref 34.5–45)
HGB BLD-MCNC: 11.8 G/DL — SIGNIFICANT CHANGE UP (ref 11.5–15.5)
HGB BLD-MCNC: 12.6 G/DL — SIGNIFICANT CHANGE UP (ref 11.5–15.5)
HGB BLDA-MCNC: 11.4 G/DL — LOW (ref 11.5–15.5)
HGB BLDA-MCNC: 12.2 G/DL — SIGNIFICANT CHANGE UP (ref 11.5–15.5)
HGB BLDV-MCNC: 13.7 G/DL — SIGNIFICANT CHANGE UP (ref 11.5–15.5)
HMPV RNA SPEC QL NAA+PROBE: NOT DETECTED — SIGNIFICANT CHANGE UP
HPIV1 RNA SPEC QL NAA+PROBE: NOT DETECTED — SIGNIFICANT CHANGE UP
HPIV2 RNA SPEC QL NAA+PROBE: NOT DETECTED — SIGNIFICANT CHANGE UP
HPIV3 RNA SPEC QL NAA+PROBE: NOT DETECTED — SIGNIFICANT CHANGE UP
HPIV4 RNA SPEC QL NAA+PROBE: NOT DETECTED — SIGNIFICANT CHANGE UP
IMM GRANULOCYTES NFR BLD AUTO: 0.6 % — SIGNIFICANT CHANGE UP (ref 0–1.5)
LACTATE BLDV-MCNC: 2.9 MMOL/L — HIGH (ref 0.5–2)
LYMPHOCYTES # BLD AUTO: 1.14 K/UL — SIGNIFICANT CHANGE UP (ref 1–3.3)
LYMPHOCYTES # BLD AUTO: 10 % — LOW (ref 13–44)
MAGNESIUM SERPL-MCNC: 2 MG/DL — SIGNIFICANT CHANGE UP (ref 1.6–2.6)
MAGNESIUM SERPL-MCNC: 2.2 MG/DL — SIGNIFICANT CHANGE UP (ref 1.6–2.6)
MCHC RBC-ENTMCNC: 27.2 PG — SIGNIFICANT CHANGE UP (ref 27–34)
MCHC RBC-ENTMCNC: 27.3 PG — SIGNIFICANT CHANGE UP (ref 27–34)
MCHC RBC-ENTMCNC: 30.4 % — LOW (ref 32–36)
MCHC RBC-ENTMCNC: 30.9 % — LOW (ref 32–36)
MCV RBC AUTO: 88 FL — SIGNIFICANT CHANGE UP (ref 80–100)
MCV RBC AUTO: 89.8 FL — SIGNIFICANT CHANGE UP (ref 80–100)
MONOCYTES # BLD AUTO: 1.06 K/UL — HIGH (ref 0–0.9)
MONOCYTES NFR BLD AUTO: 9.3 % — SIGNIFICANT CHANGE UP (ref 2–14)
NEUTROPHILS # BLD AUTO: 8.95 K/UL — HIGH (ref 1.8–7.4)
NEUTROPHILS NFR BLD AUTO: 78.8 % — HIGH (ref 43–77)
NRBC # FLD: 0 K/UL — LOW (ref 25–125)
NRBC # FLD: 0 K/UL — LOW (ref 25–125)
PCO2 BLDA: 31 MMHG — LOW (ref 32–48)
PCO2 BLDA: 35 MMHG — SIGNIFICANT CHANGE UP (ref 32–48)
PCO2 BLDA: 39 MMHG — SIGNIFICANT CHANGE UP (ref 32–48)
PCO2 BLDV: 48 MMHG — SIGNIFICANT CHANGE UP (ref 41–51)
PH BLDA: 7.33 PH — LOW (ref 7.35–7.45)
PH BLDA: 7.39 PH — SIGNIFICANT CHANGE UP (ref 7.35–7.45)
PH BLDA: 7.4 PH — SIGNIFICANT CHANGE UP (ref 7.35–7.45)
PH BLDV: 7.36 PH — SIGNIFICANT CHANGE UP (ref 7.32–7.43)
PHOSPHATE SERPL-MCNC: 4.1 MG/DL — SIGNIFICANT CHANGE UP (ref 2.5–4.5)
PHOSPHATE SERPL-MCNC: 4.8 MG/DL — HIGH (ref 2.5–4.5)
PLATELET # BLD AUTO: 674 K/UL — HIGH (ref 150–400)
PLATELET # BLD AUTO: 683 K/UL — HIGH (ref 150–400)
PMV BLD: 9.2 FL — SIGNIFICANT CHANGE UP (ref 7–13)
PMV BLD: 9.3 FL — SIGNIFICANT CHANGE UP (ref 7–13)
PO2 BLDA: 113 MMHG — HIGH (ref 83–108)
PO2 BLDA: 330 MMHG — HIGH (ref 83–108)
PO2 BLDA: 91 MMHG — SIGNIFICANT CHANGE UP (ref 83–108)
PO2 BLDV: 27 MMHG — LOW (ref 35–40)
POTASSIUM BLDA-SCNC: 4.3 MMOL/L — SIGNIFICANT CHANGE UP (ref 3.4–4.5)
POTASSIUM BLDA-SCNC: 4.5 MMOL/L — SIGNIFICANT CHANGE UP (ref 3.4–4.5)
POTASSIUM BLDV-SCNC: 6.1 MMOL/L — HIGH (ref 3.4–4.5)
POTASSIUM SERPL-MCNC: 4.6 MMOL/L — SIGNIFICANT CHANGE UP (ref 3.5–5.3)
POTASSIUM SERPL-MCNC: 5.7 MMOL/L — HIGH (ref 3.5–5.3)
POTASSIUM SERPL-SCNC: 4.6 MMOL/L — SIGNIFICANT CHANGE UP (ref 3.5–5.3)
POTASSIUM SERPL-SCNC: 5.7 MMOL/L — HIGH (ref 3.5–5.3)
PROT SERPL-MCNC: 5 G/DL — LOW (ref 6–8.3)
PROT SERPL-MCNC: 6.4 G/DL — SIGNIFICANT CHANGE UP (ref 6–8.3)
RBC # BLD: 4.34 M/UL — SIGNIFICANT CHANGE UP (ref 3.8–5.2)
RBC # BLD: 4.62 M/UL — SIGNIFICANT CHANGE UP (ref 3.8–5.2)
RBC # FLD: 13.2 % — SIGNIFICANT CHANGE UP (ref 10.3–14.5)
RBC # FLD: 13.3 % — SIGNIFICANT CHANGE UP (ref 10.3–14.5)
RH IG SCN BLD-IMP: POSITIVE — SIGNIFICANT CHANGE UP
RSV RNA SPEC QL NAA+PROBE: NOT DETECTED — SIGNIFICANT CHANGE UP
RV+EV RNA SPEC QL NAA+PROBE: NOT DETECTED — SIGNIFICANT CHANGE UP
SAO2 % BLDA: 95.9 % — SIGNIFICANT CHANGE UP (ref 95–99)
SAO2 % BLDA: 97.8 % — SIGNIFICANT CHANGE UP (ref 95–99)
SAO2 % BLDA: 99.5 % — HIGH (ref 95–99)
SAO2 % BLDV: 36.1 % — LOW (ref 60–85)
SODIUM BLDA-SCNC: 132 MMOL/L — LOW (ref 136–146)
SODIUM BLDA-SCNC: 132 MMOL/L — LOW (ref 136–146)
SODIUM SERPL-SCNC: 131 MMOL/L — LOW (ref 135–145)
SODIUM SERPL-SCNC: 136 MMOL/L — SIGNIFICANT CHANGE UP (ref 135–145)
SPECIMEN SOURCE: SIGNIFICANT CHANGE UP
WBC # BLD: 11.37 K/UL — HIGH (ref 3.8–10.5)
WBC # BLD: 15.8 K/UL — HIGH (ref 3.8–10.5)
WBC # FLD AUTO: 11.37 K/UL — HIGH (ref 3.8–10.5)
WBC # FLD AUTO: 15.8 K/UL — HIGH (ref 3.8–10.5)

## 2019-03-20 PROCEDURE — 88305 TISSUE EXAM BY PATHOLOGIST: CPT | Mod: 26

## 2019-03-20 PROCEDURE — 88341 IMHCHEM/IMCYTCHM EA ADD ANTB: CPT | Mod: 26

## 2019-03-20 PROCEDURE — 99291 CRITICAL CARE FIRST HOUR: CPT | Mod: 25

## 2019-03-20 PROCEDURE — 71275 CT ANGIOGRAPHY CHEST: CPT | Mod: 26

## 2019-03-20 PROCEDURE — 93308 TTE F-UP OR LMTD: CPT | Mod: 26

## 2019-03-20 PROCEDURE — 33025 INCISION OF HEART SAC: CPT

## 2019-03-20 PROCEDURE — 88305 TISSUE EXAM BY PATHOLOGIST: CPT | Mod: 26,59

## 2019-03-20 PROCEDURE — 99292 CRITICAL CARE ADDL 30 MIN: CPT | Mod: 25

## 2019-03-20 PROCEDURE — 88342 IMHCHEM/IMCYTCHM 1ST ANTB: CPT | Mod: 26

## 2019-03-20 PROCEDURE — 99223 1ST HOSP IP/OBS HIGH 75: CPT

## 2019-03-20 PROCEDURE — 88112 CYTOPATH CELL ENHANCE TECH: CPT | Mod: 26

## 2019-03-20 PROCEDURE — 71045 X-RAY EXAM CHEST 1 VIEW: CPT | Mod: 26

## 2019-03-20 PROCEDURE — 93306 TTE W/DOPPLER COMPLETE: CPT | Mod: 26

## 2019-03-20 PROCEDURE — 99222 1ST HOSP IP/OBS MODERATE 55: CPT

## 2019-03-20 RX ORDER — DIPHENHYDRAMINE HCL 50 MG
50 CAPSULE ORAL ONCE
Qty: 0 | Refills: 0 | Status: COMPLETED | OUTPATIENT
Start: 2019-03-20 | End: 2019-03-20

## 2019-03-20 RX ORDER — OXYCODONE AND ACETAMINOPHEN 5; 325 MG/1; MG/1
1 TABLET ORAL EVERY 4 HOURS
Qty: 0 | Refills: 0 | Status: DISCONTINUED | OUTPATIENT
Start: 2019-03-20 | End: 2019-03-20

## 2019-03-20 RX ORDER — HYDROMORPHONE HYDROCHLORIDE 2 MG/ML
0.5 INJECTION INTRAMUSCULAR; INTRAVENOUS; SUBCUTANEOUS
Qty: 0 | Refills: 0 | Status: DISCONTINUED | OUTPATIENT
Start: 2019-03-20 | End: 2019-03-21

## 2019-03-20 RX ORDER — DOCUSATE SODIUM 100 MG
100 CAPSULE ORAL THREE TIMES A DAY
Qty: 0 | Refills: 0 | Status: DISCONTINUED | OUTPATIENT
Start: 2019-03-20 | End: 2019-04-02

## 2019-03-20 RX ORDER — SODIUM CHLORIDE 9 MG/ML
1000 INJECTION INTRAMUSCULAR; INTRAVENOUS; SUBCUTANEOUS ONCE
Qty: 0 | Refills: 0 | Status: COMPLETED | OUTPATIENT
Start: 2019-03-20 | End: 2019-03-20

## 2019-03-20 RX ORDER — ALBUMIN HUMAN 25 %
250 VIAL (ML) INTRAVENOUS
Qty: 0 | Refills: 0 | Status: COMPLETED | OUTPATIENT
Start: 2019-03-20 | End: 2019-03-20

## 2019-03-20 RX ORDER — PHENYLEPHRINE HYDROCHLORIDE 10 MG/ML
0.3 INJECTION INTRAVENOUS
Qty: 40 | Refills: 0 | Status: DISCONTINUED | OUTPATIENT
Start: 2019-03-20 | End: 2019-03-24

## 2019-03-20 RX ORDER — OXYCODONE AND ACETAMINOPHEN 5; 325 MG/1; MG/1
2 TABLET ORAL EVERY 6 HOURS
Qty: 0 | Refills: 0 | Status: DISCONTINUED | OUTPATIENT
Start: 2019-03-20 | End: 2019-03-20

## 2019-03-20 RX ORDER — SODIUM CHLORIDE 9 MG/ML
1000 INJECTION INTRAMUSCULAR; INTRAVENOUS; SUBCUTANEOUS
Qty: 0 | Refills: 0 | Status: DISCONTINUED | OUTPATIENT
Start: 2019-03-20 | End: 2019-03-22

## 2019-03-20 RX ORDER — SENNA PLUS 8.6 MG/1
2 TABLET ORAL AT BEDTIME
Qty: 0 | Refills: 0 | Status: DISCONTINUED | OUTPATIENT
Start: 2019-03-20 | End: 2019-04-02

## 2019-03-20 RX ORDER — SODIUM CHLORIDE 9 MG/ML
500 INJECTION INTRAMUSCULAR; INTRAVENOUS; SUBCUTANEOUS
Qty: 0 | Refills: 0 | Status: COMPLETED | OUTPATIENT
Start: 2019-03-20 | End: 2019-03-20

## 2019-03-20 RX ORDER — OXYCODONE AND ACETAMINOPHEN 5; 325 MG/1; MG/1
1 TABLET ORAL EVERY 4 HOURS
Qty: 0 | Refills: 0 | Status: DISCONTINUED | OUTPATIENT
Start: 2019-03-20 | End: 2019-03-26

## 2019-03-20 RX ORDER — HEPARIN SODIUM 5000 [USP'U]/ML
5000 INJECTION INTRAVENOUS; SUBCUTANEOUS EVERY 12 HOURS
Qty: 0 | Refills: 0 | Status: DISCONTINUED | OUTPATIENT
Start: 2019-03-20 | End: 2019-04-02

## 2019-03-20 RX ADMIN — Medication 125 MILLILITER(S): at 17:52

## 2019-03-20 RX ADMIN — HYDROMORPHONE HYDROCHLORIDE 0.5 MILLIGRAM(S): 2 INJECTION INTRAMUSCULAR; INTRAVENOUS; SUBCUTANEOUS at 17:00

## 2019-03-20 RX ADMIN — SODIUM CHLORIDE 1000 MILLILITER(S): 9 INJECTION INTRAMUSCULAR; INTRAVENOUS; SUBCUTANEOUS at 02:56

## 2019-03-20 RX ADMIN — Medication 125 MILLILITER(S): at 16:00

## 2019-03-20 RX ADMIN — SODIUM CHLORIDE 1000 MILLILITER(S): 9 INJECTION INTRAMUSCULAR; INTRAVENOUS; SUBCUTANEOUS at 01:13

## 2019-03-20 RX ADMIN — Medication 125 MILLIGRAM(S): at 00:28

## 2019-03-20 RX ADMIN — SODIUM CHLORIDE 1000 MILLILITER(S): 9 INJECTION INTRAMUSCULAR; INTRAVENOUS; SUBCUTANEOUS at 07:06

## 2019-03-20 RX ADMIN — Medication 125 MILLILITER(S): at 18:00

## 2019-03-20 RX ADMIN — Medication 50 MILLIGRAM(S): at 02:05

## 2019-03-20 RX ADMIN — HYDROMORPHONE HYDROCHLORIDE 0.5 MILLIGRAM(S): 2 INJECTION INTRAMUSCULAR; INTRAVENOUS; SUBCUTANEOUS at 17:15

## 2019-03-20 RX ADMIN — Medication 125 MILLILITER(S): at 19:00

## 2019-03-20 RX ADMIN — SODIUM CHLORIDE 1000 MILLILITER(S): 9 INJECTION INTRAMUSCULAR; INTRAVENOUS; SUBCUTANEOUS at 18:00

## 2019-03-20 RX ADMIN — HEPARIN SODIUM 5000 UNIT(S): 5000 INJECTION INTRAVENOUS; SUBCUTANEOUS at 17:04

## 2019-03-20 NOTE — CONSULT NOTE ADULT - NSICDXPROBLEM_GEN_ALL_CORE_FT
PROBLEM DIAGNOSES  Problem: Pericardial effusion  Recommendation: Likely malignant pericardial effusion. Patient with tachycardia, increased JVP, BP systolic 90s, tachypnea to high 20s. Patient verbalized feeling better after pleural effusion drainage.   Patient s/p CT chest which showed moderate to large high density pericardial  effusion, concerning for tamponade physiology.   TTE in process   Patient on Lovenox for pulmonary embolism.  Last dose yesterday evening  S/p 2L IV fluids. IVF bolus prn hypotension.   CTS on board for pericardial drainage. Patient accepted to CTS and is planning for OR drainage  Continuous cardiac monitoring.

## 2019-03-20 NOTE — H&P ADULT - HISTORY OF PRESENT ILLNESS
HPI- 53 yo F w recent dx of R lung adenocarcinoma with mediastinal and hilar lymphadenopathy c/b R pleural effusion s/p pleurx and VATS, PE (3/4) on lovenox s/p IVC filter presenting with acutely worse shortness of breath and substernal chest pain today. Patient reports pain is worse with deep breaths. Patient has a home aid who drains the R lung every 3 days - last was yesterday. Patient has a chronic cough from several months unchanged from before. No fevers. + chills. No nausea, vomiting.  Patient has been bed bound for several weeks due to generalized weakness and dyspnea on exertion.  Patient seen in thoracic office by Dr. Morris today, she was advised the increase the amount of drainage.  Chest x-ray with large pleural effusion, 600cc of fluid drained from pleurx cath.  Ultrasound done by ER showing pericardial effusion.    PAST MEDICAL & SURGICAL HISTORY:  Lung cancer  PE- s/p IVC filter  pleurx cath placement, pleural biopsy     FAMILY HISTORY:  Family history of coronary artery disease (Sibling): Brother  Family history of hypertension (Mother): Mother  Family history of diabetes mellitus (DM) (Mother): Mother    Allergies    No Known Allergies    Intolerances    IV contrast (Rash)    Home Medications:  Lovenox 	     REVIEW OF SYSTEMS      General:+weight loss     Skin/Breast: No Rashes/ Lesions/ Masses  	  Ophthalmologic: No Blurry vision/ Glaucoma/ Blindness  	  ENMT: No Hearing loss/ Drainage/ Lesions	    Respiratory and Thorax: + Cough/ Wheezing/ SOB/ Hemoptysis/ Sputum production. +TAMAYO  	  Cardiovascular: + Chest pain	    Gastrointestinal: No Nausea/ Vomiting/ Constipation/ Appetite Change	    Genitourinary: No Heamturia/ Dysuria/ Frequency change/ Impotence	    Musculoskeletal: No Pain/ Weakness/ Claudication	    Neurological: No Seizures/ TIA/CVA/ Parastesias	    Psychiatric: No Dementia/ Depression/ SI/HI	    Hematology/Lymphatics: No hx of bleeding/ Edema	    Endocrine:	No Hyperglycemia/ Hypoglycemia    Allergic/Immunologic:	 No Anaphylaxis/ Intolerance/ Recent illnesses    PHYSICAL EXAM:  Vital Signs Last 24 Hrs  T(C): 36.8 (19 Mar 2019 22:02), Max: 36.8 (19 Mar 2019 22:02)  T(F): 98.2 (19 Mar 2019 22:02), Max: 98.2 (19 Mar 2019 22:02)  HR: 113 (20 Mar 2019 02:06) (110 - 124)  BP: 94/71 (20 Mar 2019 02:06) (93/62 - 104/26)  BP(mean): --  RR: 20 (20 Mar 2019 02:04) (20 - 20)  SpO2: 100% (20 Mar 2019 02:04) (100% - 100%)    General: in mild respiratory distress   Neurology: A&Ox3, nonfocal, PAZ x 4  Eyes: PERRLA/ EOMI, Gross vision intact  ENT/Neck: Neck supple, trachea midline, No JVD, Gross hearing intact  Respiratory: +SOB, on nasal cannula   CV: tachycardiac   Abdominal: Soft, NT, ND +BS,   Extremities: No edema, + peripheral pulses  Skin: No Rashes, Hematoma, Ecchymosis  Tubes: Right pleurx cath in place, drained 600 cc of straw colored drainage

## 2019-03-20 NOTE — BRIEF OPERATIVE NOTE - NSICDXBRIEFPROCEDURE_GEN_ALL_CORE_FT
PROCEDURES:  Creation, pericardial window, subxiphoid approach 20-Mar-2019 10:39:36  Pete Bravo
PROCEDURES:  Flexible bronchoscopy by cardiothoracic surgery 20-Mar-2019 08:15:11  Raymon Dang

## 2019-03-20 NOTE — BRIEF OPERATIVE NOTE - NSICDXBRIEFPOSTOP_GEN_ALL_CORE_FT
POST-OP DIAGNOSIS:  Pericardial effusion with cardiac tamponade 20-Mar-2019 10:41:06  Pete Bravo
POST-OP DIAGNOSIS:  Tracheal stenosis 20-Mar-2019 08:15:46  Raymon Dang

## 2019-03-20 NOTE — CONSULT NOTE ADULT - SUBJECTIVE AND OBJECTIVE BOX
HPI- 55 yo F w recent dx of R lung adenocarcinoma with mediastinal and hilar lymphadenopathy c/b R pleural effusion s/p pleurx and VATS, PE (3/4) on lovenox s/p IVC filter presenting with acutely worse shortness of breath and substernal chest pain today. Patient reports pain is worse with deep breaths. Patient has a home aid who drains the R lung every 3 days - last was yesterday. Patient has a chronic cough from several months unchanged from before. No fevers. + chills. No nausea, vomiting.  Patient has been bed bound for several weeks due to generalized weakness and dyspnea on exertion.  Patient seen in thoracic office by Dr. Morris today, she was advised the increase the amount of drainage.  Chest x-ray with large pleural effusion, 600cc of fluid drained from pleurx cath.  Ultrasound done by ER showing pericardial effusion.    PAST MEDICAL & SURGICAL HISTORY:  Lung cancer  PE- s/p IVC filter  pleurx cath placement, pleural biopsy     FAMILY HISTORY:  Family history of coronary artery disease (Sibling): Brother  Family history of hypertension (Mother): Mother  Family history of diabetes mellitus (DM) (Mother): Mother    Allergies    No Known Allergies    Intolerances    IV contrast (Rash)    Home Medications:  Lovenox 	     REVIEW OF SYSTEMS      General:+weight loss     Skin/Breast: No Rashes/ Lesions/ Masses  	  Ophthalmologic: No Blurry vision/ Glaucoma/ Blindness  	  ENMT: No Hearing loss/ Drainage/ Lesions	    Respiratory and Thorax: + Cough/ Wheezing/ SOB/ Hemoptysis/ Sputum production. +TAMAYO  	  Cardiovascular: + Chest pain	    Gastrointestinal: No Nausea/ Vomiting/ Constipation/ Appetite Change	    Genitourinary: No Heamturia/ Dysuria/ Frequency change/ Impotence	    Musculoskeletal: No Pain/ Weakness/ Claudication	    Neurological: No Seizures/ TIA/CVA/ Parastesias	    Psychiatric: No Dementia/ Depression/ SI/HI	    Hematology/Lymphatics: No hx of bleeding/ Edema	    Endocrine:	No Hyperglycemia/ Hypoglycemia    Allergic/Immunologic:	 No Anaphylaxis/ Intolerance/ Recent illnesses    PHYSICAL EXAM:  Vital Signs Last 24 Hrs  T(C): 36.8 (19 Mar 2019 22:02), Max: 36.8 (19 Mar 2019 22:02)  T(F): 98.2 (19 Mar 2019 22:02), Max: 98.2 (19 Mar 2019 22:02)  HR: 113 (20 Mar 2019 02:06) (110 - 124)  BP: 94/71 (20 Mar 2019 02:06) (93/62 - 104/26)  BP(mean): --  RR: 20 (20 Mar 2019 02:04) (20 - 20)  SpO2: 100% (20 Mar 2019 02:04) (100% - 100%)    General: in mild respiratory distress   Neurology: A&Ox3, nonfocal, PAZ x 4  Eyes: PERRLA/ EOMI, Gross vision intact  ENT/Neck: Neck supple, trachea midline, No JVD, Gross hearing intact  Respiratory: +SOB, on nasal cannula   CV: tachycardiac   Abdominal: Soft, NT, ND +BS,   Extremities: No edema, + peripheral pulses  Skin: No Rashes, Hematoma, Ecchymosis  Tubes: Right pleurx cath in place, drained 600 cc of straw colored drainage

## 2019-03-20 NOTE — PROGRESS NOTE ADULT - SUBJECTIVE AND OBJECTIVE BOX
JOSE CEJA   MRN#: [id number]     The patient is a 54y Female who was seen, evaluated, & examined with the CTICU staff post-operatively with a multidisciplinary care plan formulated & implemented.  All available clinical, laboratory, radiographic, pharmacologic, and electrocardiographic data were reviewed & analyzed.      The patient was in the CTICU in critical condition secondary to:     persistent cardiopulmonary dysfunction  cardiac tamponade-cardiovascular dysfunction    For support and evaluation & prevention of further decompensation secondary to persistent cardiopulmonary dysfunction and cardiac tamponade-cardiovascular dysfunction, respiratory status required:     supplemental oxygen with nasal cannula  continuous pulse oximetry monitoring  following ABGs with A-line monitoring    Invasive hemodynamic monitoring with an A-line was required for continuous MAP/BP monitoring to ensure adequate cardiovascular support and to evaluate for & help prevent decompensation while receiving intermittent volume expansion secondary to cardiac tamponade-cardiovascular dysfunction.    In addition:  cardiac tamponade by imaging now s/p window with drain  Fluid appeared bloody in OR (on Lovenox for PE); still with high output from drains - continue to monitor, check labs in PM, continue to hold Lovenox  Stable on nasal cannula    The patient required critical care management and I provided 80 minutes of non-continuous care to the patient in addition to  discussing the patient and plan at length with the CTICU staff and helping coordinate care. JOSE CEJA   MRN#: [id number]     The patient is a 54y Female who was seen, evaluated, & examined with the CTICU staff post-operatively with a multidisciplinary care plan formulated & implemented.  All available clinical, laboratory, radiographic, pharmacologic, and electrocardiographic data were reviewed & analyzed.      The patient was in the CTICU in critical condition secondary to:     persistent cardiopulmonary dysfunction  cardiac tamponade-cardiovascular dysfunction    For support and evaluation & prevention of further decompensation secondary to persistent cardiopulmonary dysfunction and cardiac tamponade-cardiovascular dysfunction, respiratory status required:     supplemental oxygen with nasal cannula  continuous pulse oximetry monitoring  following ABGs with A-line monitoring    Invasive hemodynamic monitoring with an A-line was required for continuous MAP/BP monitoring to ensure adequate cardiovascular support and to evaluate for & help prevent decompensation while receiving intermittent volume expansion secondary to cardiac tamponade-cardiovascular dysfunction.    In addition:  cardiac tamponade by imaging now s/p window with drain  Fluid appeared bloody in OR (on Lovenox for PE); still with high output from drains - continue to monitor, PM labs unremarkable, continue to hold Lovenox  Borderline hypotensive requiring several liters of fluid resuscitation - bedside echo did not show significant effusion of signs of profound hypovolemia - will monitor but may need pressors  Stable on nasal cannula    The patient required critical care management and I provided 120 minutes of non-continuous care to the patient in addition to  discussing the patient and plan at length with the CTICU staff and helping coordinate care.

## 2019-03-20 NOTE — H&P ADULT - NSICDXFAMILYHX_GEN_ALL_CORE_FT
FAMILY HISTORY:  Family history of diabetes mellitus (DM), Mother  Family history of hypertension, Mother    Sibling  Still living? Yes, Estimated age: 51-60  Family history of coronary artery disease, Age at diagnosis: Age Unknown

## 2019-03-20 NOTE — BRIEF OPERATIVE NOTE - OPERATION/FINDINGS
Approximately 500cc thin sanguinous fluid (not clotting) drained
Surveillance flexible bronchoscopy. No / minimal granulation tissue.

## 2019-03-20 NOTE — H&P ADULT - ASSESSMENT
Assessment and Recommendation:   · Assessment		  55 yo F w recent dx of R lung adenocarcinoma with mediastinal and hilar lymphadenopathy c/b R pleural effusion s/p pleurx and VATS, PE (3/4) on lovenox s/p IVC filter presenting with acutely worse shortness of breath and substernal chest pain today.  CXR with pleural effusion and ultrasound finding of pericardial effusion.

## 2019-03-20 NOTE — H&P ADULT - NSICDXPROBLEM_GEN_ALL_CORE_FT
PROBLEM DIAGNOSES  Problem: Pericardial effusion  Assessment and Plan: NPO  STAT labs  IVF  HOld Lovenox.  Echo and CT scan reviewed w  Dr Morris, Will plan for subxyphoid pericardial window today  Admit to CTICU

## 2019-03-20 NOTE — CONSULT NOTE ADULT - ATTENDING COMMENTS
lung adenoca with right sided pleural effusion with R pleurx p/w pericardial effusion with signs of early tamponade  CT surgery   Pt hemodynamically stable, will likely need pericardial window for definitive treatment
I have personally reviewed the echo images which are consistent with tamponade physiology.  The patient is scheduled for operative drainage of the pericardial effusion.

## 2019-03-20 NOTE — BRIEF OPERATIVE NOTE - NSICDXBRIEFPREOP_GEN_ALL_CORE_FT
PRE-OP DIAGNOSIS:  Pericardial effusion with cardiac tamponade 20-Mar-2019 10:40:54  Pete Bravo
PRE-OP DIAGNOSIS:  Tracheal stenosis 20-Mar-2019 08:15:24  Raymon Dang

## 2019-03-20 NOTE — CONSULT NOTE ADULT - SUBJECTIVE AND OBJECTIVE BOX
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 Patient reports pain is worse with deep breaths. Patient has a home aid who drains the R lung every 3 days, last drainage was yesterday. Patient has a chronic cough from several months unchanged from before. + chills. Patient has been bed bound for several weeks due to generalized weakness and dyspnea on exertion. In ER, Chest x-ray with large pleural effusion, 600cc of fluid drained from Pleurx cath.  Ultrasound done by ER showing pericardial effusion. Cardiology consulted for pericardial effusion. Patient seen by CTS, plan for effusion drainage in am. CT scan pending. Patient tachycardia to 100s and tachypneic in ER. S/p 2L NS.     Allergies  No Known Allergies    Intolerances  IV contrast (Rash)  	  MEDICATIONS  enoxaparin 60 mg/0.6 mL injectable solution: 50 milligram(s) subcutaneously 2 times a day   Dilaudid 2 mg oral tablet: 0.5 tab(s) orally every 6 hours, As Needed for pain MDD:4mg    PAST MEDICAL & SURGICAL HISTORY:  Lung cancer  No significant past surgical history    FAMILY HISTORY:  Family history of coronary artery disease (Sibling): Brother  Family history of hypertension (Mother): Mother  Family history of diabetes mellitus (DM) (Mother): Mother    SOCIAL HISTORY  Marital Status:    Occupation:  at [employer]  Lives with:     SUBSTANCE USE  Tobacco Usage:  (x ) never smoked   ( ) former smoker  ( ) current smoker; Packs per day:   Alcohol Usage: (x ) none  ( ) occasional ( ) 2-3 times a week ( ) daily; Last drink:   Recreational drugs (x) None    REVIEW OF SYSTEMS:  CONSTITUTIONAL: No fevers, + chills, + fatigue, No weight gain  EYES: No vision changes   ENT: No congestion, No ear pain, No sore throat.  NECK: No pain, No stiffness  RESPIRATORY: + shortness of breath, No cough, No wheezing, No hemoptysis  CARDIOVASCULAR: + chest pain. + palpitations, + TAMAYO, No orthopnea, No paroxysmal nocturnal dyspnea, No pleuritic pain  GASTROINTESTINAL: No abdominal pain, No nausea, No vomiting, No hematemesis, No diarrhea No constipation. No melena  GENITOURINARY: No dysuria, No frequency, No incontinence, No hematuria  NEUROLOGICAL: No dizziness, No lightheadedness, No syncope, No LOC, No headache, No numbness or weakness  EXTREMITIES: No Edema, No joint pain, No joint swelling.  PSYCHIATRIC: No anxiety, No depression  SKIN: No diaphoresis. No itching, No rashes, No pressure ulcers    All other review of systems is negative unless indicated above.  VITAL SIGNS  T(C): 36.8 (03-19-19 @ 22:02), Max: 36.8 (03-19-19 @ 22:02)  HR: 116 (03-20-19 @ 04:00) (110 - 124)  BP: 98/61 (03-20-19 @ 04:00) (93/62 - 104/26)  RR: 20 (03-20-19 @ 04:00) (20 - 20)  SpO2: 99% (03-20-19 @ 04:00) (99% - 100%)  Wt(kg): --    PHYSICAL EXAM:    Appearance: NAD, no distress  HEENT:   Normal oral mucosa, PERRL, EOMI  Cardiovascular: Regular rate and rhythm, Normal S1 S2, No JVD, No murmurs, No edema  Respiratory: Lungs clear to auscultation. No rales, No rhonchi, No wheezing. No tenderness to palpation  Gastrointestinal:  Soft, Non-tender, + BS  Neurologic: Non-focal, A&Ox3  Skin: Warm and dry, No rashes, No ecchymoses, No cyanosis  Extremities: No clubbing, cyanosis or edema  Vascular: Peripheral pulses palpable 2+ bilaterally  Psychiatry: Mood & affect appropriate    LABORATORY VALUES	 	                      12.6   11.37 )-----------( 674      ( 20 Mar 2019 00:25 )             41.5   03-20    131<L>  |  96<L>  |  15  ----------------------------<  131<H>  5.7<H>   |  19<L>  |  0.75    Ca    8.9      20 Mar 2019 00:25  Phos  4.8     03-20  Mg     2.2     03-20    TPro  6.4  /  Alb  2.9<L>  /  TBili  0.2  /  DBili  x   /  AST  22  /  ALT  37<H>  /  AlkPhos  165<H>  03-20  LIVER FUNCTIONS - ( 20 Mar 2019 00:25 )  Alb: 2.9 g/dL / Pro: 6.4 g/dL / ALK PHOS: 165 u/L / ALT: 37 u/L / AST: 22 u/L / GGT: x           CARDIAC MARKERS:  Troponin T, High Sensitivity: < 6 ng/L (03-20-19 @ 00:25)  Blood Gas Venous - Lactate: 2.9 mmol/L (03-20 @ 00:25)    03-20 @ 00:25  229E Corona Virus --  Adenovirus Not Detected  Bordetella pertussis --  Chlamydia pneumoniae Not Detected  Entero/Rhino Virus Not Detected  HKU1 Coronavirus --  hMPV Not Detected  Influenza A Not Detected  Influenza AH1 Not Detected  Influenza AH1 2009 Not Detected  Influenza AH3 Not Detected  Influenza B Not Detected  Mycoplasma pneumoniae Not Detected  NL63 Coronavirus --  OC43 Corornavirus --  Parainfluenza 1 Not Detected  Parainfluenza 2 Not Detected    TELEMETRY: 	    ECG:  	  RADIOLOGY:  OTHER: 	    PREVIOUS DIAGNOSTIC TESTING:    [ ] Echocardiogram:   [ ] Catheterization:  [ ] Stress Test:  	    ASSESSMENT AND PLAN      PLAN  	      # [id number] HISTORY OF PRESENT ILLNESS:    Outpatient Cardiologist: None     Patient is a 54y old  Female who presents with a chief complaint of SOB and chest pain   HPI:   54y F with recent diagnosis of 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 Patient reports pain is worse with deep breaths. Patient has a home aid who drains the R lung every 3 days, last drainage was yesterday. Patient has a chronic cough from several months unchanged from before. Patient stated she was feeling better  + chills. Patient has been bed bound for several weeks due to generalized weakness and dyspnea on exertion. In ER, Chest x-ray with large pleural effusion, 600cc of fluid drained from Pleurx cath.  Ultrasound done by ER showing pericardial effusion. Cardiology consulted for pericardial effusion. Patient seen by CTS, plan for effusion drainage in am. CT scan pending. Patient tachycardia to 100s and tachypneic in ER. S/p 2L NS.     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  NECK: No pain, No stiffness  RESPIRATORY: + shortness of breath, + cough, No wheezing, No hemoptysis  CARDIOVASCULAR: + chest pain. + palpitations, + TAMAYO, No orthopnea, No paroxysmal nocturnal dyspnea, No pleuritic pain  GASTROINTESTINAL: No abdominal pain, + poor appetite and poor PO intake, + nausea, + vomiting, No hematemesis, No diarrhea No constipation. No melena  GENITOURINARY: No dysuria, No frequency, No incontinence, No hematuria  NEUROLOGICAL: No dizziness, No lightheadedness, No syncope, No LOC, No headache, No numbness or weakness  EXTREMITIES: No Edema, No joint pain, No joint swelling.  PSYCHIATRIC: No anxiety, No depression  SKIN: No diaphoresis. No itching, No rashes, No pressure ulcers    All other review of systems is negative unless indicated above.  VITAL SIGNS  T(C): 36.8 (03-19-19 @ 22:02), Max: 36.8 (03-19-19 @ 22:02)  HR: 116 (03-20-19 @ 04:00) (110 - 124)  BP: 98/61 (03-20-19 @ 04:00) (93/62 - 104/26)  RR: 20 (03-20-19 @ 04:00) (20 - 20)  SpO2: 99% (03-20-19 @ 04:00) (99% - 100%)  Wt(kg): --    PHYSICAL EXAM:    Appearance: NAD, no distress  HEENT:   Normal oral mucosa, PERRL, EOMI  Cardiovascular: Regular rate and rhythm, Normal S1 S2, No JVD, No murmurs, No edema  Respiratory: Lungs clear to auscultation. No rales, No rhonchi, No wheezing. No tenderness to palpation  Gastrointestinal:  Soft, Non-tender, + BS  Neurologic: Non-focal, A&Ox3  Skin: Warm and dry, No rashes, No ecchymoses, No cyanosis  Extremities: No clubbing, cyanosis or edema  Vascular: Peripheral pulses palpable 2+ bilaterally  Psychiatry: Mood & affect appropriate    LABORATORY VALUES	 	                      12.6   11.37 )-----------( 674      ( 20 Mar 2019 00:25 )             41.5   03-20    131<L>  |  96<L>  |  15  ----------------------------<  131<H>  5.7<H>   |  19<L>  |  0.75    Ca    8.9      20 Mar 2019 00:25  Phos  4.8     03-20  Mg     2.2     03-20    TPro  6.4  /  Alb  2.9<L>  /  TBili  0.2  /  DBili  x   /  AST  22  /  ALT  37<H>  /  AlkPhos  165<H>  03-20  LIVER FUNCTIONS - ( 20 Mar 2019 00:25 )  Alb: 2.9 g/dL / Pro: 6.4 g/dL / ALK PHOS: 165 u/L / ALT: 37 u/L / AST: 22 u/L / GGT: x           CARDIAC MARKERS:  Troponin T, High Sensitivity: < 6 ng/L (03-20-19 @ 00:25)  Blood Gas Venous - Lactate: 2.9 mmol/L (03-20 @ 00:25)    03-20 @ 00:25  229E Corona Virus --  Adenovirus Not Detected  Bordetella pertussis --  Chlamydia pneumoniae Not Detected  Entero/Rhino Virus Not Detected  HKU1 Coronavirus --  hMPV Not Detected  Influenza A Not Detected  Influenza AH1 Not Detected  Influenza AH1 2009 Not Detected  Influenza AH3 Not Detected  Influenza B Not Detected  Mycoplasma pneumoniae Not Detected  NL63 Coronavirus --  OC43 Corornavirus --  Parainfluenza 1 Not Detected  Parainfluenza 2 Not Detected    TELEMETRY: 	    ECG:  	  RADIOLOGY:  OTHER: 	    PREVIOUS DIAGNOSTIC TESTING:    [ ] Echocardiogram:   [ ] Catheterization:  [ ] Stress Test:  	    ASSESSMENT AND PLAN      PLAN  	      # [id number] HISTORY OF PRESENT ILLNESS:    Outpatient Cardiologist: None     Patient is a 54y old  Female who presents with a chief complaint of SOB and chest pain   HPI:   54y F with recent diagnosis of 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 Patient reports pain is worse with deep breaths. Patient has a home aid who drains the R lung every 3 days, last drainage was yesterday. Patient has a chronic cough from several months unchanged from before. Patient stated she was feeling better for a week after discharge, then was feeling SOB. Chest pain worse with cough. Also admits pain with inspiration. Patient had been nauseous and had been vomiting for past week and was not eating well.  + chills. Patient has been bed bound for several weeks due to generalized weakness and dyspnea on exertion. In ER, Chest x-ray with large pleural effusion, 600cc of fluid drained from Pleurx cath.  Ultrasound done by ER showing pericardial effusion. Cardiology consulted for pericardial effusion. Patient seen by CTS, plan for effusion drainage in am. CT scan pending. Patient tachycardia to 100s and tachypneic in ER. S/p 2L NS.     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  NECK: No pain, No stiffness  RESPIRATORY: + shortness of breath, + cough, No wheezing, No hemoptysis  CARDIOVASCULAR: + chest pain. + palpitations, + TAMAYO, No orthopnea, No paroxysmal nocturnal dyspnea, No pleuritic pain  GASTROINTESTINAL: No abdominal pain, + poor appetite and poor PO intake, + nausea, + vomiting, No hematemesis, No diarrhea No constipation. No melena  GENITOURINARY: No dysuria, No frequency, No incontinence, No hematuria  NEUROLOGICAL: No dizziness, No lightheadedness, No syncope, No LOC, No headache, No numbness or weakness  EXTREMITIES: No Edema, No joint pain, No joint swelling.  PSYCHIATRIC: No anxiety, No depression  SKIN: No diaphoresis. No itching, No rashes, No pressure ulcers    All other review of systems is negative unless indicated above.  VITAL SIGNS  T(C): 36.8 (03-19-19 @ 22:02), Max: 36.8 (03-19-19 @ 22:02)  HR: 116 (03-20-19 @ 04:00) (110 - 124)  BP: 98/61 (03-20-19 @ 04:00) (93/62 - 104/26)  RR: 20 (03-20-19 @ 04:00) (20 - 20)  SpO2: 99% (03-20-19 @ 04:00) (99% - 100%)  Wt(kg): --    PHYSICAL EXAM:    Appearance: NAD, no distress  HEENT:   Normal oral mucosa, PERRL, EOMI  Cardiovascular: Regular rate and rhythm, Normal S1 S2, No JVD, No murmurs, No edema  Respiratory: Lungs clear to auscultation. No rales, No rhonchi, No wheezing. No tenderness to palpation  Gastrointestinal:  Soft, Non-tender, + BS  Neurologic: Non-focal, A&Ox3  Skin: Warm and dry, No rashes, No ecchymoses, No cyanosis  Extremities: No clubbing, cyanosis or edema  Vascular: Peripheral pulses palpable 2+ bilaterally  Psychiatry: Mood & affect appropriate    LABORATORY VALUES	 	                      12.6   11.37 )-----------( 674      ( 20 Mar 2019 00:25 )             41.5   03-20    131<L>  |  96<L>  |  15  ----------------------------<  131<H>  5.7<H>   |  19<L>  |  0.75    Ca    8.9      20 Mar 2019 00:25  Phos  4.8     03-20  Mg     2.2     03-20    TPro  6.4  /  Alb  2.9<L>  /  TBili  0.2  /  DBili  x   /  AST  22  /  ALT  37<H>  /  AlkPhos  165<H>  03-20  LIVER FUNCTIONS - ( 20 Mar 2019 00:25 )  Alb: 2.9 g/dL / Pro: 6.4 g/dL / ALK PHOS: 165 u/L / ALT: 37 u/L / AST: 22 u/L / GGT: x           CARDIAC MARKERS:  Troponin T, High Sensitivity: < 6 ng/L (03-20-19 @ 00:25)  Blood Gas Venous - Lactate: 2.9 mmol/L (03-20 @ 00:25)    03-20 @ 00:25  229E Corona Virus --  Adenovirus Not Detected  Bordetella pertussis --  Chlamydia pneumoniae Not Detected  Entero/Rhino Virus Not Detected  HKU1 Coronavirus --  hMPV Not Detected  Influenza A Not Detected  Influenza AH1 Not Detected  Influenza AH1 2009 Not Detected  Influenza AH3 Not Detected  Influenza B Not Detected  Mycoplasma pneumoniae Not Detected  NL63 Coronavirus --  OC43 Corornavirus --  Parainfluenza 1 Not Detected  Parainfluenza 2 Not Detected    TELEMETRY: 	    ECG:  	  RADIOLOGY:  OTHER: 	    PREVIOUS DIAGNOSTIC TESTING:    [ ] Echocardiogram:   [ ] Catheterization:  [ ] Stress Test:  	    ASSESSMENT AND PLAN  54y F with recent diagnosis of 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 CT scan done result pending. 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 Patient reports pain is worse with deep breaths. Patient has a home aid who drains the R lung every 3 days, last drainage was yesterday. Patient has a chronic cough from several months unchanged from before. Patient stated she was feeling better for a week after discharge, then was feeling SOB. Chest pain worse with cough. Also admits pain with inspiration. Patient had been nauseous and had been vomiting for past week and was not eating well.  + chills. Patient has been bed bound for several weeks due to generalized weakness and dyspnea on exertion. In ER, Chest x-ray with large pleural effusion, 600cc of fluid drained from Pleurx cath.  Ultrasound done by ER showing pericardial effusion. Cardiology consulted for pericardial effusion. Patient seen by CTS, plan for effusion drainage in am. CT scan pending. Patient tachycardia to 100s and tachypneic in ER. S/p 2L NS.     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  NECK: No pain, No stiffness  RESPIRATORY: + shortness of breath, + cough, No wheezing, No hemoptysis  CARDIOVASCULAR: + chest pain. + palpitations, + TAMAYO, No orthopnea, No paroxysmal nocturnal dyspnea, No pleuritic pain  GASTROINTESTINAL: No abdominal pain, + poor appetite and poor PO intake, + nausea, + vomiting, No hematemesis, No diarrhea No constipation. No melena  GENITOURINARY: No dysuria, No frequency, No incontinence, No hematuria  NEUROLOGICAL: No dizziness, No lightheadedness, No syncope, No LOC, No headache, No numbness or weakness  EXTREMITIES: No Edema, No joint pain, No joint swelling.  PSYCHIATRIC: No anxiety, No depression  SKIN: No diaphoresis. No itching, No rashes, No pressure ulcers    All other review of systems is negative unless indicated above.  VITAL SIGNS  T(C): 36.8 (03-19-19 @ 22:02), Max: 36.8 (03-19-19 @ 22:02)  HR: 116 (03-20-19 @ 04:00) (110 - 124)  BP: 98/61 (03-20-19 @ 04:00) (93/62 - 104/26)  RR: 20 (03-20-19 @ 04:00) (20 - 20)  SpO2: 99% (03-20-19 @ 04:00) (99% - 100%)  Wt(kg): --    PHYSICAL EXAM:    Appearance: NAD, no distress  HEENT:   Normal oral mucosa, PERRL, EOMI  Cardiovascular: Regular rate and rhythm, Normal S1 S2, No JVD, No murmurs, No edema  Respiratory: Lungs clear to auscultation. No rales, No rhonchi, No wheezing. No tenderness to palpation  Gastrointestinal:  Soft, Non-tender, + BS  Neurologic: Non-focal, A&Ox3  Skin: Warm and dry, No rashes, No ecchymoses, No cyanosis  Extremities: No clubbing, cyanosis or edema  Vascular: Peripheral pulses palpable 2+ bilaterally  Psychiatry: Mood & affect appropriate    LABORATORY VALUES	 	                      12.6   11.37 )-----------( 674      ( 20 Mar 2019 00:25 )             41.5   03-20    131<L>  |  96<L>  |  15  ----------------------------<  131<H>  5.7<H>   |  19<L>  |  0.75    Ca    8.9      20 Mar 2019 00:25  Phos  4.8     03-20  Mg     2.2     03-20    TPro  6.4  /  Alb  2.9<L>  /  TBili  0.2  /  DBili  x   /  AST  22  /  ALT  37<H>  /  AlkPhos  165<H>  03-20  LIVER FUNCTIONS - ( 20 Mar 2019 00:25 )  Alb: 2.9 g/dL / Pro: 6.4 g/dL / ALK PHOS: 165 u/L / ALT: 37 u/L / AST: 22 u/L / GGT: x           CARDIAC MARKERS:  Troponin T, High Sensitivity: < 6 ng/L (03-20-19 @ 00:25)  Blood Gas Venous - Lactate: 2.9 mmol/L (03-20 @ 00:25)    03-20 @ 00:25  229E Corona Virus --  Adenovirus Not Detected  Bordetella pertussis --  Chlamydia pneumoniae Not Detected  Entero/Rhino Virus Not Detected  HKU1 Coronavirus --  hMPV Not Detected  Influenza A Not Detected  Influenza AH1 Not Detected  Influenza AH1 2009 Not Detected  Influenza AH3 Not Detected  Influenza B Not Detected  Mycoplasma pneumoniae Not Detected  NL63 Coronavirus --  OC43 Corornavirus --  Parainfluenza 1 Not Detected  Parainfluenza 2 Not Detected    TELEMETRY: 	    ECG:  	  RADIOLOGY:   OTHER: 	    PREVIOUS DIAGNOSTIC TESTING:    [ ] Echocardiogram:   [ ] Catheterization: < from: CT Angio Chest w/ IV Cont (03.20.19 @ 05:24) > Interval development of a moderate to large high density pericardial  effusion, concerning for tamponade physiology. No pulmonary embolism, as clinically questioned. Interval placement of a right-sided Pleurx catheter since prior CT.Large right pleural effusion, decreased compared to prior CT. Ill-defined low-attenuation right lower lobe mass.  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 CT scan done result pending. 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 Patient with tachycardia, increased JVP, BP systolic 90s, tachypnea to high 20s. Patient verbalized feeling better after pleural effusion drainage.   Patient s/p CT chest which showed moderate to large high density pericardial  effusion, concerning for tamponade physiology.   TTE in process   Patient on Lovenox for pulmonary embolism.    S/p 2L IV fluids. IVF bolus prn hypotension.   CTS on board for pericardial drainage.   In the setting of tamponade, patient will benefit form ICU setting for closer monitoring.   Continuous cardiac monitoring.       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 Patient reports pain is worse with deep breaths. Patient has a home aid who drains the R lung every 3 days, last drainage was yesterday. Patient has a chronic cough from several months unchanged from before. Patient stated she was feeling better for a week after discharge, then was feeling SOB. Chest pain worse with cough. Also admits pain with inspiration. Patient had been nauseous and had been vomiting for past week and was not eating well.  + chills. Patient has been bed bound for several weeks due to generalized weakness and dyspnea on exertion. In ER, Chest x-ray with large pleural effusion, 600cc of fluid drained from Pleurx cath.  Ultrasound done by ER showing pericardial effusion. Cardiology consulted for pericardial effusion. Patient seen by CTS, plan for effusion drainage in am. CT scan pending. Patient tachycardia to 100s and tachypneic in ER. S/p 2L NS.     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  NECK: No pain, No stiffness  RESPIRATORY: + shortness of breath, + cough, No wheezing, No hemoptysis  CARDIOVASCULAR: + chest pain. + palpitations, + TAMAYO, No orthopnea, No paroxysmal nocturnal dyspnea, No pleuritic pain  GASTROINTESTINAL: No abdominal pain, + poor appetite and poor PO intake, + nausea, + vomiting, No hematemesis, No diarrhea No constipation. No melena  GENITOURINARY: No dysuria, No frequency, No incontinence, No hematuria  NEUROLOGICAL: No dizziness, No lightheadedness, No syncope, No LOC, No headache, No numbness or weakness  EXTREMITIES: No Edema, No joint pain, No joint swelling.  PSYCHIATRIC: No anxiety, No depression  SKIN: No diaphoresis. No itching, No rashes, No pressure ulcers    All other review of systems is negative unless indicated above.  VITAL SIGNS  T(C): 36.8 (03-19-19 @ 22:02), Max: 36.8 (03-19-19 @ 22:02)  HR: 116 (03-20-19 @ 04:00) (110 - 124)  BP: 98/61 (03-20-19 @ 04:00) (93/62 - 104/26)  RR: 20 (03-20-19 @ 04:00) (20 - 20)  SpO2: 99% (03-20-19 @ 04:00) (99% - 100%)  Wt(kg): --    PHYSICAL EXAM:    Appearance: NAD, no distress  HEENT:   Normal oral mucosa, PERRL, EOMI  Cardiovascular: Regular rate and rhythm, Normal S1 S2, No JVD, No murmurs, No edema  Respiratory: Lungs clear to auscultation. No rales, No rhonchi, No wheezing. No tenderness to palpation  Gastrointestinal:  Soft, Non-tender, + BS  Neurologic: Non-focal, A&Ox3  Skin: Warm and dry, No rashes, No ecchymoses, No cyanosis  Extremities: No clubbing, cyanosis or edema  Vascular: Peripheral pulses palpable 2+ bilaterally  Psychiatry: Mood & affect appropriate    LABORATORY VALUES	 	                      12.6   11.37 )-----------( 674      ( 20 Mar 2019 00:25 )             41.5   03-20    131<L>  |  96<L>  |  15  ----------------------------<  131<H>  5.7<H>   |  19<L>  |  0.75    Ca    8.9      20 Mar 2019 00:25  Phos  4.8     03-20  Mg     2.2     03-20    TPro  6.4  /  Alb  2.9<L>  /  TBili  0.2  /  DBili  x   /  AST  22  /  ALT  37<H>  /  AlkPhos  165<H>  03-20  LIVER FUNCTIONS - ( 20 Mar 2019 00:25 )  Alb: 2.9 g/dL / Pro: 6.4 g/dL / ALK PHOS: 165 u/L / ALT: 37 u/L / AST: 22 u/L / GGT: x           CARDIAC MARKERS:  Troponin T, High Sensitivity: < 6 ng/L (03-20-19 @ 00:25)  Blood Gas Venous - Lactate: 2.9 mmol/L (03-20 @ 00:25)    03-20 @ 00:25  229E Corona Virus --  Adenovirus Not Detected  Bordetella pertussis --  Chlamydia pneumoniae Not Detected  Entero/Rhino Virus Not Detected  HKU1 Coronavirus --  hMPV Not Detected  Influenza A Not Detected  Influenza AH1 Not Detected  Influenza AH1 2009 Not Detected  Influenza AH3 Not Detected  Influenza B Not Detected  Mycoplasma pneumoniae Not Detected  NL63 Coronavirus --  OC43 Corornavirus --  Parainfluenza 1 Not Detected  Parainfluenza 2 Not Detected    TELEMETRY: 	    ECG:  	  RADIOLOGY:   OTHER: 	    PREVIOUS DIAGNOSTIC TESTING:    [ ] Echocardiogram:   [ ] Catheterization: < from: CT Angio Chest w/ IV Cont (03.20.19 @ 05:24) > Interval development of a moderate to large high density pericardial  effusion, concerning for tamponade physiology. No pulmonary embolism, as clinically questioned. Interval placement of a right-sided Pleurx catheter since prior CT.Large right pleural effusion, decreased compared to prior CT. Ill-defined low-attenuation right lower lobe mass.  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 CT scan done result pending. 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 Patient with tachycardia, increased JVP, BP systolic 90s, tachypnea to high 20s. Patient verbalized feeling better after pleural effusion drainage.   Patient s/p CT chest which showed moderate to large high density pericardial  effusion, concerning for tamponade physiology.   TTE in process   Patient on Lovenox for pulmonary embolism.  Last dose yesterday evening  S/p 2L IV fluids. IVF bolus prn hypotension.   CTS on board for pericardial drainage. Patient accepted to CTS and is planning for OR drainage  Continuous cardiac monitoring.       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 Patient reports pain is worse with deep breaths. Patient has a home aid who drains the R lung every 3 days, last drainage was yesterday. Patient has a chronic cough from several months unchanged from before. Patient stated she was feeling better for a week after discharge, then was feeling SOB. Chest pain worse with cough. Also admits pain with inspiration. Patient had been nauseous and had been vomiting for past week and was not eating well.  + chills. Patient has been bed bound for several weeks due to generalized weakness and dyspnea on exertion. In ER, Chest x-ray with large pleural effusion, 600cc of fluid drained from Pleurx cath.  Ultrasound done by ER showing pericardial effusion. Cardiology consulted for pericardial effusion. Patient seen by CTS, plan for effusion drainage in am. CT scan pending. Patient tachycardia to 100s and tachypneic in ER. S/p 2L NS.     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  NECK: No pain, No stiffness  RESPIRATORY: + shortness of breath, + cough, No wheezing, No hemoptysis  CARDIOVASCULAR: + chest pain. + palpitations, + TAMAYO, No orthopnea, No paroxysmal nocturnal dyspnea, No pleuritic pain  GASTROINTESTINAL: No abdominal pain, + poor appetite and poor PO intake, + nausea, + vomiting, No hematemesis, No diarrhea No constipation. No melena  GENITOURINARY: No dysuria, No frequency, No incontinence, No hematuria  NEUROLOGICAL: No dizziness, No lightheadedness, No syncope, No LOC, No headache, No numbness or weakness  EXTREMITIES: No Edema, No joint pain, No joint swelling.  PSYCHIATRIC: No anxiety, No depression  SKIN: No diaphoresis. No itching, No rashes, No pressure ulcers    All other review of systems is negative unless indicated above.  VITAL SIGNS  T(C): 36.8 (03-19-19 @ 22:02), Max: 36.8 (03-19-19 @ 22:02)  HR: 116 (03-20-19 @ 04:00) (110 - 124)  BP: 98/61 (03-20-19 @ 04:00) (93/62 - 104/26)  RR: 20 (03-20-19 @ 04:00) (20 - 20)  SpO2: 99% (03-20-19 @ 04:00) (99% - 100%)  Wt(kg): --    PHYSICAL EXAM:    Appearance: NAD, no distress  HEENT:   Normal oral mucosa, PERRL, EOMI  Cardiovascular: Regular rate and rhythm, Normal S1 S2, No JVD, No murmurs, No edema  Respiratory: Lungs clear to auscultation. No rales, No rhonchi, No wheezing. No tenderness to palpation  Gastrointestinal:  Soft, Non-tender, + BS  Neurologic: Non-focal, A&Ox3  Skin: Warm and dry, No rashes, No ecchymoses, No cyanosis  Extremities: No clubbing, cyanosis or edema  Vascular: Peripheral pulses palpable 2+ bilaterally  Psychiatry: Mood & affect appropriate    LABORATORY VALUES	 	                      12.6   11.37 )-----------( 674      ( 20 Mar 2019 00:25 )             41.5   03-20    131<L>  |  96<L>  |  15  ----------------------------<  131<H>  5.7<H>   |  19<L>  |  0.75    Ca    8.9      20 Mar 2019 00:25  Phos  4.8     03-20  Mg     2.2     03-20    TPro  6.4  /  Alb  2.9<L>  /  TBili  0.2  /  DBili  x   /  AST  22  /  ALT  37<H>  /  AlkPhos  165<H>  03-20  LIVER FUNCTIONS - ( 20 Mar 2019 00:25 )  Alb: 2.9 g/dL / Pro: 6.4 g/dL / ALK PHOS: 165 u/L / ALT: 37 u/L / AST: 22 u/L / GGT: x           CARDIAC MARKERS:  Troponin T, High Sensitivity: < 6 ng/L (03-20-19 @ 00:25)  Blood Gas Venous - Lactate: 2.9 mmol/L (03-20 @ 00:25)    03-20 @ 00:25  229E Corona Virus --  Adenovirus Not Detected  Bordetella pertussis --  Chlamydia pneumoniae Not Detected  Entero/Rhino Virus Not Detected  HKU1 Coronavirus --  hMPV Not Detected  Influenza A Not Detected  Influenza AH1 Not Detected  Influenza AH1 2009 Not Detected  Influenza AH3 Not Detected  Influenza B Not Detected  Mycoplasma pneumoniae Not Detected  NL63 Coronavirus --  OC43 Corornavirus --  Parainfluenza 1 Not Detected  Parainfluenza 2 Not Detected    TELEMETRY: 	    ECG:  	  RADIOLOGY:   OTHER: 	    PREVIOUS DIAGNOSTIC TESTING:    [ ] Echocardiogram:   [ ] Catheterization: < from: CT Angio Chest w/ IV Cont (03.20.19 @ 05:24) > Interval development of a moderate to large high density pericardial  effusion, concerning for tamponade physiology. No pulmonary embolism, as clinically questioned. Interval placement of a right-sided Pleurx catheter since prior CT.Large right pleural effusion, decreased compared to prior CT. Ill-defined low-attenuation right lower lobe mass.  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 Patient reports pain is worse with deep breaths. Patient has a home aid who drains the R lung every 3 days, last drainage was yesterday. Patient has a chronic cough from several months unchanged from before. Patient stated she was feeling better for a week after discharge, then was feeling SOB. Chest pain worse with cough. Also admits pain with inspiration. Patient had been nauseous and had been vomiting for past week and was not eating well.  + chills. Patient has been bed bound for several weeks due to generalized weakness and dyspnea on exertion. In ER, Chest x-ray with large pleural effusion, 600cc of fluid drained from Pleurx cath.  Ultrasound done by ER showing pericardial effusion. Cardiology consulted for pericardial effusion. Patient seen by CTS, plan for effusion drainage in am. CT scan pending. Patient tachycardia to 100s and tachypneic in ER. S/p 2L NS.     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  NECK: No pain, No stiffness  RESPIRATORY: + shortness of breath, + cough, No wheezing, No hemoptysis  CARDIOVASCULAR: + chest pain. + palpitations, + TAMAYO, No orthopnea, No paroxysmal nocturnal dyspnea, No pleuritic pain  GASTROINTESTINAL: No abdominal pain, + poor appetite and poor PO intake, + nausea, + vomiting, No hematemesis, No diarrhea No constipation. No melena  GENITOURINARY: No dysuria, No frequency, No incontinence, No hematuria  NEUROLOGICAL: No dizziness, No lightheadedness, No syncope, No LOC, No headache, No numbness or weakness  EXTREMITIES: No Edema, No joint pain, No joint swelling.  PSYCHIATRIC: No anxiety, No depression  SKIN: No diaphoresis. No itching, No rashes, No pressure ulcers    All other review of systems is negative unless indicated above.  VITAL SIGNS  T(C): 36.8 (03-19-19 @ 22:02), Max: 36.8 (03-19-19 @ 22:02)  HR: 116 (03-20-19 @ 04:00) (110 - 124)  BP: 98/61 (03-20-19 @ 04:00) (93/62 - 104/26)  RR: 20 (03-20-19 @ 04:00) (20 - 20)  SpO2: 99% (03-20-19 @ 04:00) (99% - 100%)  Wt(kg): --    PHYSICAL EXAM:    Appearance: NAD, no distress, cachectic  HEENT:   Normal oral mucosa, PERRL, EOMI  Cardiovascular: Regular rate and rhythm, Normal S1 S2, No JVD, No murmurs, No edema  Respiratory: Diminished Rt lobe. No rales, No rhonchi, No wheezing. No tenderness to palpation  Gastrointestinal:  Soft, Non-tender, + BS  Neurologic: Non-focal, A&Ox3  Skin: Warm and dry, No rashes, No ecchymoses, No cyanosis  Extremities: No clubbing, cyanosis or edema  Vascular: Peripheral pulses palpable 2+ bilaterally  Psychiatry: Mood & affect appropriate    LABORATORY VALUES	 	                      12.6   11.37 )-----------( 674      ( 20 Mar 2019 00:25 )             41.5   03-20    131<L>  |  96<L>  |  15  ----------------------------<  131<H>  5.7<H>   |  19<L>  |  0.75    Ca    8.9      20 Mar 2019 00:25  Phos  4.8     03-20  Mg     2.2     03-20    TPro  6.4  /  Alb  2.9<L>  /  TBili  0.2  /  DBili  x   /  AST  22  /  ALT  37<H>  /  AlkPhos  165<H>  03-20  LIVER FUNCTIONS - ( 20 Mar 2019 00:25 )  Alb: 2.9 g/dL / Pro: 6.4 g/dL / ALK PHOS: 165 u/L / ALT: 37 u/L / AST: 22 u/L / GGT: x           CARDIAC MARKERS:  Troponin T, High Sensitivity: < 6 ng/L (03-20-19 @ 00:25)  Blood Gas Venous - Lactate: 2.9 mmol/L (03-20 @ 00:25)    03-20 @ 00:25  229E Corona Virus --  Adenovirus Not Detected  Bordetella pertussis --  Chlamydia pneumoniae Not Detected  Entero/Rhino Virus Not Detected  HKU1 Coronavirus --  hMPV Not Detected  Influenza A Not Detected  Influenza AH1 Not Detected  Influenza AH1 2009 Not Detected  Influenza AH3 Not Detected  Influenza B Not Detected  Mycoplasma pneumoniae Not Detected  NL63 Coronavirus --  OC43 Corornavirus --  Parainfluenza 1 Not Detected  Parainfluenza 2 Not Detected    TELEMETRY: 	    ECG:  	  RADIOLOGY:   OTHER: 	    PREVIOUS DIAGNOSTIC TESTING:    [ ] Echocardiogram:   [ ] Catheterization: < from: CT Angio Chest w/ IV Cont (03.20.19 @ 05:24) > Interval development of a moderate to large high density pericardial  effusion, concerning for tamponade physiology. No pulmonary embolism, as clinically questioned. Interval placement of a right-sided Pleurx catheter since prior CT.Large right pleural effusion, decreased compared to prior CT. Ill-defined low-attenuation right lower lobe mass.  [ ] Stress Test:  	        # [id number]

## 2019-03-20 NOTE — CONSULT NOTE ADULT - SUBJECTIVE AND OBJECTIVE BOX
[facility] Accept Note    CHIEF COMPLAINT: Shortness of Breath, Chest Pain    HPI / INTERVAL HISTORY:  53 yo F w recent dx of R lung adenocarcinoma with mediastinal and hilar lymphadenopathy c/b R pleural effusion s/p pleurx and VATS, PE (3/4) on lovenox s/p IVC filter presenting with acutely worse shortness of breath and substernal chest pain today. Patient reports pain is worse with deep breaths. Patient has a home aid who drains the R lung every 3 days - last was yesterday. Patient has a chronic cough from several months unchanged from before. No fevers. + chills. No nausea, vomiting.  Patient denies CP with exertion, but activity has been extremely limited due to deconditioning and weakness 2/2 underlying malignancy.  Patient seen in thoracic office by Dr. Morris on day of presentation and, she was advised the increase the amount of drainage.  Chest x-ray with large pleural effusion, 600cc of fluid drained from pleurx cath.  Ultrasound done by ER showing pericardial effusion.    PAST MEDICAL & SURGICAL HISTORY:  Lung cancer  No significant past surgical history      FAMILY HISTORY:  Family history of coronary artery disease (Sibling): Brother  Family history of hypertension (Mother): Mother  Family history of diabetes mellitus (DM) (Mother): Mother      SOCIAL HISTORY:  Denies alcohol, tobacco, or illicit drug use    HOME MEDICATIONS:      Allergies    No Known Allergies    Intolerances    IV contrast (Rash)        REVIEW OF SYSTEMS:  CONSTITUTIONAL: No fevers, + chills, + fatigue, No weight gain  EYES: No vision changes   ENT: No congestion, No ear pain, No sore throat.  NECK: No pain, No stiffness  RESPIRATORY: + shortness of breath, + cough, No wheezing, No hemoptysis  CARDIOVASCULAR: + chest pain. + palpitations, + TAMAYO, No orthopnea, No paroxysmal nocturnal dyspnea, No pleuritic pain  GASTROINTESTINAL: No abdominal pain, + poor appetite and poor PO intake, + nausea, + vomiting, No hematemesis, No diarrhea No constipation. No melena  GENITOURINARY: No dysuria, No frequency, No incontinence, No hematuria  NEUROLOGICAL: No dizziness, No lightheadedness, No syncope, No LOC, No headache, No numbness or weakness  EXTREMITIES: No Edema, No joint pain, No joint swelling.  PSYCHIATRIC: No anxiety, No depression  SKIN: No diaphoresis. No itching, No rashes, No pressure ulcers    OBJECTIVE:  ICU Vital Signs Last 24 Hrs  T(C): 36.8 (19 Mar 2019 22:02), Max: 36.8 (19 Mar 2019 22:02)  T(F): 98.2 (19 Mar 2019 22:02), Max: 98.2 (19 Mar 2019 22:02)  HR: 108 (20 Mar 2019 05:31) (108 - 124)  BP: 104/72 (20 Mar 2019 05:31) (93/62 - 104/72)  BP(mean): --  ABP: --  ABP(mean): --  RR: 12 (20 Mar 2019 05:31) (12 - 20)  SpO2: 100% (20 Mar 2019 05:31) (99% - 100%)    Appearance: NAD, no distress, appears chronically ill  HEENT:  Normal oral mucosa, PERRL, EOMI  Cardiovascular: tachycardic, Normal S1 S2, No JVD, No murmurs, No edema  Respiratory: Lungs clear to auscultation. No rales, No rhonchi, No wheezing. PleurX catheter in place on right side without erythema or drainage  Gastrointestinal:  Soft, Non-tender, + BS  Neurologic: Non-focal, A&Ox3  Skin: Warm and dry, No rashes, No ecchymoses, No cyanosis  Extremities: No clubbing, cyanosis or edema  Vascular: Peripheral pulses palpable 2+ bilaterally  Psychiatry: Mood & affect appropriate    LINES:     HOSPITAL MEDICATIONS:  MEDICATIONS  (STANDING):    MEDICATIONS  (PRN):      LABS:                        12.6   11.37 )-----------( 674      ( 20 Mar 2019 00:25 )             41.5     Hgb Trend: 12.6<--  03-20    131<L>  |  96<L>  |  15  ----------------------------<  131<H>  5.7<H>   |  19<L>  |  0.75    Ca    8.9      20 Mar 2019 00:25  Phos  4.8     03-20  Mg     2.2     03-20    TPro  6.4  /  Alb  2.9<L>  /  TBili  0.2  /  DBili  x   /  AST  22  /  ALT  37<H>  /  AlkPhos  165<H>  03-20    Creatinine Trend: 0.75<--, 0.66<--, 0.61<--, 0.63<--, 0.70<--, 0.67<--        Venous Blood Gas:  03-20 @ 00:25  7.36/48/27/24/36.1  VBG Lactate: 2.9

## 2019-03-20 NOTE — PROGRESS NOTE ADULT - SUBJECTIVE AND OBJECTIVE BOX
: William Joy MD    INDICATION: Hypotension    PROCEDURE:  LIMITED TRANSTHORACIC ECHO    FINDINGS:  Left ventricular size is normal   Left ventricular function is grossly normal, EF is 60-65%  Unable to assess diastolic function or for regional wall motion abnormalities    Right ventricle was not well visualized    Aortic valve is not thickened or calcified  Aortic valve has no regurgitation    Mitral valve is not thickened or calcified and without annular calcification  Mitral valve has no regurgitation    Tricuspid valve was not well visualized    Left and right atria were not well visualized    IVC size is normal with <50% respirophasic change on inspiration    Possible small anterior pericardial effusion    INTERPRETATION:  ? small anterior pericardial effusion  CVP is approximately 8  Unable to rule out tamponade by echocardiography due to poor acoustic windows

## 2019-03-21 PROBLEM — I83.90 VARICOSE VEINS: Status: ACTIVE | Noted: 2017-10-30

## 2019-03-21 LAB
ANION GAP SERPL CALC-SCNC: 13 MMO/L — SIGNIFICANT CHANGE UP (ref 7–14)
ANION GAP SERPL CALC-SCNC: 13 MMO/L — SIGNIFICANT CHANGE UP (ref 7–14)
BASOPHILS # BLD AUTO: 0 K/UL — SIGNIFICANT CHANGE UP (ref 0–0.2)
BASOPHILS NFR BLD AUTO: 0 % — SIGNIFICANT CHANGE UP (ref 0–2)
BUN SERPL-MCNC: 12 MG/DL — SIGNIFICANT CHANGE UP (ref 7–23)
BUN SERPL-MCNC: 12 MG/DL — SIGNIFICANT CHANGE UP (ref 7–23)
CALCIUM SERPL-MCNC: 8 MG/DL — LOW (ref 8.4–10.5)
CALCIUM SERPL-MCNC: 8 MG/DL — LOW (ref 8.4–10.5)
CHLORIDE SERPL-SCNC: 104 MMOL/L — SIGNIFICANT CHANGE UP (ref 98–107)
CHLORIDE SERPL-SCNC: 104 MMOL/L — SIGNIFICANT CHANGE UP (ref 98–107)
CO2 SERPL-SCNC: 19 MMOL/L — LOW (ref 22–31)
CO2 SERPL-SCNC: 19 MMOL/L — LOW (ref 22–31)
CREAT SERPL-MCNC: 0.55 MG/DL — SIGNIFICANT CHANGE UP (ref 0.5–1.3)
CREAT SERPL-MCNC: 0.55 MG/DL — SIGNIFICANT CHANGE UP (ref 0.5–1.3)
CULTURE - ACID FAST SMEAR CONCENTRATED: SIGNIFICANT CHANGE UP
EOSINOPHIL # BLD AUTO: 0 K/UL — SIGNIFICANT CHANGE UP (ref 0–0.5)
EOSINOPHIL NFR BLD AUTO: 0 % — SIGNIFICANT CHANGE UP (ref 0–6)
GLUCOSE SERPL-MCNC: 108 MG/DL — HIGH (ref 70–99)
GLUCOSE SERPL-MCNC: 108 MG/DL — HIGH (ref 70–99)
HCT VFR BLD CALC: 31.9 % — LOW (ref 34.5–45)
HCT VFR BLD CALC: 31.9 % — LOW (ref 34.5–45)
HGB BLD-MCNC: 9.9 G/DL — LOW (ref 11.5–15.5)
HGB BLD-MCNC: 9.9 G/DL — LOW (ref 11.5–15.5)
IMM GRANULOCYTES NFR BLD AUTO: 0.6 % — SIGNIFICANT CHANGE UP (ref 0–1.5)
LYMPHOCYTES # BLD AUTO: 0.74 K/UL — LOW (ref 1–3.3)
LYMPHOCYTES # BLD AUTO: 5.9 % — LOW (ref 13–44)
MAGNESIUM SERPL-MCNC: 2 MG/DL — SIGNIFICANT CHANGE UP (ref 1.6–2.6)
MCHC RBC-ENTMCNC: 27.8 PG — SIGNIFICANT CHANGE UP (ref 27–34)
MCHC RBC-ENTMCNC: 27.8 PG — SIGNIFICANT CHANGE UP (ref 27–34)
MCHC RBC-ENTMCNC: 31 % — LOW (ref 32–36)
MCHC RBC-ENTMCNC: 31 % — LOW (ref 32–36)
MCV RBC AUTO: 89.6 FL — SIGNIFICANT CHANGE UP (ref 80–100)
MCV RBC AUTO: 89.6 FL — SIGNIFICANT CHANGE UP (ref 80–100)
MONOCYTES # BLD AUTO: 1.23 K/UL — HIGH (ref 0–0.9)
MONOCYTES NFR BLD AUTO: 9.8 % — SIGNIFICANT CHANGE UP (ref 2–14)
NEUTROPHILS # BLD AUTO: 10.49 K/UL — HIGH (ref 1.8–7.4)
NEUTROPHILS NFR BLD AUTO: 83.7 % — HIGH (ref 43–77)
NRBC # FLD: 0 K/UL — LOW (ref 25–125)
NRBC # FLD: 0 K/UL — LOW (ref 25–125)
PHOSPHATE SERPL-MCNC: 3.2 MG/DL — SIGNIFICANT CHANGE UP (ref 2.5–4.5)
PLATELET # BLD AUTO: 589 K/UL — HIGH (ref 150–400)
PLATELET # BLD AUTO: 589 K/UL — HIGH (ref 150–400)
PMV BLD: 9.4 FL — SIGNIFICANT CHANGE UP (ref 7–13)
PMV BLD: 9.4 FL — SIGNIFICANT CHANGE UP (ref 7–13)
POTASSIUM SERPL-MCNC: 4 MMOL/L — SIGNIFICANT CHANGE UP (ref 3.5–5.3)
POTASSIUM SERPL-MCNC: 4 MMOL/L — SIGNIFICANT CHANGE UP (ref 3.5–5.3)
POTASSIUM SERPL-SCNC: 4 MMOL/L — SIGNIFICANT CHANGE UP (ref 3.5–5.3)
POTASSIUM SERPL-SCNC: 4 MMOL/L — SIGNIFICANT CHANGE UP (ref 3.5–5.3)
RBC # BLD: 3.56 M/UL — LOW (ref 3.8–5.2)
RBC # BLD: 3.56 M/UL — LOW (ref 3.8–5.2)
RBC # FLD: 13.3 % — SIGNIFICANT CHANGE UP (ref 10.3–14.5)
RBC # FLD: 13.3 % — SIGNIFICANT CHANGE UP (ref 10.3–14.5)
SODIUM SERPL-SCNC: 136 MMOL/L — SIGNIFICANT CHANGE UP (ref 135–145)
SODIUM SERPL-SCNC: 136 MMOL/L — SIGNIFICANT CHANGE UP (ref 135–145)
SPECIMEN SOURCE: SIGNIFICANT CHANGE UP
SPECIMEN SOURCE: SIGNIFICANT CHANGE UP
WBC # BLD: 12.53 K/UL — HIGH (ref 3.8–10.5)
WBC # BLD: 12.53 K/UL — HIGH (ref 3.8–10.5)
WBC # FLD AUTO: 12.53 K/UL — HIGH (ref 3.8–10.5)
WBC # FLD AUTO: 12.53 K/UL — HIGH (ref 3.8–10.5)

## 2019-03-21 PROCEDURE — 99291 CRITICAL CARE FIRST HOUR: CPT

## 2019-03-21 PROCEDURE — 99292 CRITICAL CARE ADDL 30 MIN: CPT

## 2019-03-21 PROCEDURE — 71045 X-RAY EXAM CHEST 1 VIEW: CPT | Mod: 26

## 2019-03-21 RX ORDER — ALBUMIN HUMAN 25 %
250 VIAL (ML) INTRAVENOUS ONCE
Qty: 0 | Refills: 0 | Status: COMPLETED | OUTPATIENT
Start: 2019-03-21 | End: 2019-03-21

## 2019-03-21 RX ORDER — ACETAMINOPHEN 500 MG
750 TABLET ORAL ONCE
Qty: 0 | Refills: 0 | Status: COMPLETED | OUTPATIENT
Start: 2019-03-21 | End: 2019-03-21

## 2019-03-21 RX ORDER — ONDANSETRON 8 MG/1
4 TABLET, FILM COATED ORAL ONCE
Qty: 0 | Refills: 0 | Status: COMPLETED | OUTPATIENT
Start: 2019-03-21 | End: 2019-03-21

## 2019-03-21 RX ADMIN — HYDROMORPHONE HYDROCHLORIDE 0.5 MILLIGRAM(S): 2 INJECTION INTRAMUSCULAR; INTRAVENOUS; SUBCUTANEOUS at 03:50

## 2019-03-21 RX ADMIN — Medication 500 MILLILITER(S): at 13:00

## 2019-03-21 RX ADMIN — PHENYLEPHRINE HYDROCHLORIDE 5.39 MICROGRAM(S)/KG/MIN: 10 INJECTION INTRAVENOUS at 15:02

## 2019-03-21 RX ADMIN — HYDROMORPHONE HYDROCHLORIDE 0.5 MILLIGRAM(S): 2 INJECTION INTRAMUSCULAR; INTRAVENOUS; SUBCUTANEOUS at 04:05

## 2019-03-21 RX ADMIN — HEPARIN SODIUM 5000 UNIT(S): 5000 INJECTION INTRAVENOUS; SUBCUTANEOUS at 17:18

## 2019-03-21 RX ADMIN — HEPARIN SODIUM 5000 UNIT(S): 5000 INJECTION INTRAVENOUS; SUBCUTANEOUS at 05:26

## 2019-03-21 RX ADMIN — Medication 750 MILLIGRAM(S): at 20:49

## 2019-03-21 RX ADMIN — ONDANSETRON 4 MILLIGRAM(S): 8 TABLET, FILM COATED ORAL at 20:47

## 2019-03-21 RX ADMIN — Medication 300 MILLIGRAM(S): at 20:47

## 2019-03-21 RX ADMIN — PHENYLEPHRINE HYDROCHLORIDE 5.39 MICROGRAM(S)/KG/MIN: 10 INJECTION INTRAVENOUS at 20:47

## 2019-03-21 RX ADMIN — SODIUM CHLORIDE 1000 MILLILITER(S): 9 INJECTION INTRAMUSCULAR; INTRAVENOUS; SUBCUTANEOUS at 06:26

## 2019-03-21 NOTE — PROGRESS NOTE ADULT - SUBJECTIVE AND OBJECTIVE BOX
JOSE CEJA   MRN#: [id number]     The patient is a 54y Female who was seen, evaluated, & examined with the CTICU staff post-operatively with a multidisciplinary care plan formulated & implemented.  All available clinical, laboratory, radiographic, pharmacologic, and electrocardiographic data were reviewed & analyzed.      The patient was in the CTICU in critical condition secondary to:     persistent cardiopulmonary dysfunction  cardiac tamponade-cardiovascular dysfunction    For support and evaluation & prevention of further decompensation secondary to persistent cardiopulmonary dysfunction and cardiac tamponade-cardiovascular dysfunction, respiratory status required:     supplemental oxygen with nasal cannula  continuous pulse oximetry monitoring  following ABGs with A-line monitoring    Invasive hemodynamic monitoring with an A-line was required for continuous MAP/BP monitoring to ensure adequate cardiovascular support and to evaluate for & help prevent decompensation while receiving intermittent volume expansion and IV Phenylephrine infusion secondary to cardiac tamponade-cardiovascular dysfunction.    In addition:  cardiac tamponade by imaging now s/p window with drain  Fluid appeared bloody in OR (on Lovenox for PE); high output from drains but trailed off overnight - continue to monitor, labs unremarkable, continue to hold Lovenox  Hypotensive requiring several liters of fluid resuscitation and now Phenylephrine - bedside echo did not show significant effusion or signs of profound hypovolemia   Will wean pressors as tolerated - pain medication could be contributing to hypotension so may need pressors as long as tubes are in  Stable on nasal cannula    The patient required critical care management and I provided 80 minutes of non-continuous care to the patient in addition to  discussing the patient and plan at length with the CTICU staff and helping coordinate care.

## 2019-03-21 NOTE — PROGRESS NOTE ADULT - SUBJECTIVE AND OBJECTIVE BOX
POST ANESTHESIA EVALUATION    54y Female POSTOP DAY 1 S/P     MENTAL STATUS: Patient participation [ x ] Awake     [  ] Arousable     [  ] Sedated    AIRWAY PATENCY: [ x ] Satisfactory  [  ] Other:     Vital Signs Last 24 Hrs  T(C): 36.7 (21 Mar 2019 07:01), Max: 36.8 (20 Mar 2019 20:00)  T(F): 98.1 (21 Mar 2019 07:01), Max: 98.2 (20 Mar 2019 20:00)  HR: 92 (21 Mar 2019 10:00) (87 - 116)  BP: --  BP(mean): --  RR: 25 (21 Mar 2019 10:00) (3 - 26)  SpO2: 96% (21 Mar 2019 10:00) (95% - 100%)  I&O's Summary    20 Mar 2019 07:01  -  21 Mar 2019 07:00  --------------------------------------------------------  IN: 2221 mL / OUT: 1850 mL / NET: 371 mL    21 Mar 2019 07:01  -  21 Mar 2019 11:11  --------------------------------------------------------  IN: 349 mL / OUT: 110 mL / NET: 239 mL          NAUSEA/ VOMITTING:  [ x ] NONE  [  ] CONTROLLED [  ] OTHER     PAIN: [ x ] CONTROLLED WITH CURRENT REGIMEN  [  ] OTHER    [x  ] NO APPARENT ANESTHESIA COMPLICATIONS      Comments:

## 2019-03-22 LAB
ALBUMIN SERPL ELPH-MCNC: 3 G/DL — LOW (ref 3.3–5)
ALP SERPL-CCNC: 122 U/L — HIGH (ref 40–120)
ALT FLD-CCNC: 35 U/L — HIGH (ref 4–33)
ANION GAP SERPL CALC-SCNC: 12 MMO/L — SIGNIFICANT CHANGE UP (ref 7–14)
AST SERPL-CCNC: 55 U/L — HIGH (ref 4–32)
BASE EXCESS BLDA CALC-SCNC: -1.6 MMOL/L — SIGNIFICANT CHANGE UP
BILIRUB SERPL-MCNC: 0.3 MG/DL — SIGNIFICANT CHANGE UP (ref 0.2–1.2)
BUN SERPL-MCNC: 10 MG/DL — SIGNIFICANT CHANGE UP (ref 7–23)
CALCIUM SERPL-MCNC: 7.7 MG/DL — LOW (ref 8.4–10.5)
CHLORIDE BLDA-SCNC: 107 MMOL/L — SIGNIFICANT CHANGE UP (ref 96–108)
CHLORIDE SERPL-SCNC: 105 MMOL/L — SIGNIFICANT CHANGE UP (ref 98–107)
CO2 SERPL-SCNC: 19 MMOL/L — LOW (ref 22–31)
CREAT BLDA-MCNC: 0.56 MG/DL — SIGNIFICANT CHANGE UP (ref 0.5–1.3)
CREAT SERPL-MCNC: 0.55 MG/DL — SIGNIFICANT CHANGE UP (ref 0.5–1.3)
GLUCOSE BLDA-MCNC: 111 MG/DL — HIGH (ref 70–99)
GLUCOSE SERPL-MCNC: 109 MG/DL — HIGH (ref 70–99)
HCO3 BLDA-SCNC: 23 MMOL/L — SIGNIFICANT CHANGE UP (ref 22–26)
HCT VFR BLD CALC: 33 % — LOW (ref 34.5–45)
HCT VFR BLDA CALC: 33.1 % — LOW (ref 34.5–46.5)
HGB BLD-MCNC: 10.3 G/DL — LOW (ref 11.5–15.5)
HGB BLDA-MCNC: 10.7 G/DL — LOW (ref 11.5–15.5)
LACTATE BLDA-SCNC: 1.4 MMOL/L — SIGNIFICANT CHANGE UP (ref 0.5–2)
MCHC RBC-ENTMCNC: 27.4 PG — SIGNIFICANT CHANGE UP (ref 27–34)
MCHC RBC-ENTMCNC: 31.2 % — LOW (ref 32–36)
MCV RBC AUTO: 87.8 FL — SIGNIFICANT CHANGE UP (ref 80–100)
NRBC # FLD: 0 K/UL — LOW (ref 25–125)
PCO2 BLDA: 36 MMHG — SIGNIFICANT CHANGE UP (ref 32–48)
PH BLDA: 7.41 PH — SIGNIFICANT CHANGE UP (ref 7.35–7.45)
PLATELET # BLD AUTO: 565 K/UL — HIGH (ref 150–400)
PMV BLD: 9.1 FL — SIGNIFICANT CHANGE UP (ref 7–13)
PO2 BLDA: 75 MMHG — LOW (ref 83–108)
POTASSIUM BLDA-SCNC: 3.6 MMOL/L — SIGNIFICANT CHANGE UP (ref 3.4–4.5)
POTASSIUM SERPL-MCNC: 4 MMOL/L — SIGNIFICANT CHANGE UP (ref 3.5–5.3)
POTASSIUM SERPL-SCNC: 4 MMOL/L — SIGNIFICANT CHANGE UP (ref 3.5–5.3)
PROT SERPL-MCNC: 4.9 G/DL — LOW (ref 6–8.3)
RBC # BLD: 3.76 M/UL — LOW (ref 3.8–5.2)
RBC # FLD: 13.8 % — SIGNIFICANT CHANGE UP (ref 10.3–14.5)
SAO2 % BLDA: 94.5 % — LOW (ref 95–99)
SODIUM BLDA-SCNC: 133 MMOL/L — LOW (ref 136–146)
SODIUM SERPL-SCNC: 136 MMOL/L — SIGNIFICANT CHANGE UP (ref 135–145)
WBC # BLD: 10.3 K/UL — SIGNIFICANT CHANGE UP (ref 3.8–10.5)
WBC # FLD AUTO: 10.3 K/UL — SIGNIFICANT CHANGE UP (ref 3.8–10.5)

## 2019-03-22 PROCEDURE — 71045 X-RAY EXAM CHEST 1 VIEW: CPT | Mod: 26

## 2019-03-22 PROCEDURE — 99292 CRITICAL CARE ADDL 30 MIN: CPT

## 2019-03-22 PROCEDURE — 99291 CRITICAL CARE FIRST HOUR: CPT

## 2019-03-22 RX ORDER — LANOLIN ALCOHOL/MO/W.PET/CERES
5 CREAM (GRAM) TOPICAL AT BEDTIME
Qty: 0 | Refills: 0 | Status: DISCONTINUED | OUTPATIENT
Start: 2019-03-22 | End: 2019-03-24

## 2019-03-22 RX ORDER — BENZOCAINE AND MENTHOL 5; 1 G/100ML; G/100ML
1 LIQUID ORAL EVERY 4 HOURS
Qty: 0 | Refills: 0 | Status: DISCONTINUED | OUTPATIENT
Start: 2019-03-22 | End: 2019-04-02

## 2019-03-22 RX ORDER — SODIUM CHLORIDE 9 MG/ML
1000 INJECTION, SOLUTION INTRAVENOUS
Qty: 0 | Refills: 0 | Status: DISCONTINUED | OUTPATIENT
Start: 2019-03-22 | End: 2019-03-25

## 2019-03-22 RX ADMIN — HEPARIN SODIUM 5000 UNIT(S): 5000 INJECTION INTRAVENOUS; SUBCUTANEOUS at 06:05

## 2019-03-22 RX ADMIN — SODIUM CHLORIDE 75 MILLILITER(S): 9 INJECTION, SOLUTION INTRAVENOUS at 22:51

## 2019-03-22 RX ADMIN — HEPARIN SODIUM 5000 UNIT(S): 5000 INJECTION INTRAVENOUS; SUBCUTANEOUS at 17:54

## 2019-03-22 RX ADMIN — BENZOCAINE AND MENTHOL 1 LOZENGE: 5; 1 LIQUID ORAL at 10:00

## 2019-03-22 RX ADMIN — Medication 100 MILLIGRAM(S): at 06:06

## 2019-03-22 RX ADMIN — PHENYLEPHRINE HYDROCHLORIDE 5.39 MICROGRAM(S)/KG/MIN: 10 INJECTION INTRAVENOUS at 22:51

## 2019-03-22 RX ADMIN — SENNA PLUS 2 TABLET(S): 8.6 TABLET ORAL at 00:00

## 2019-03-22 RX ADMIN — Medication 100 MILLIGRAM(S): at 00:00

## 2019-03-22 RX ADMIN — BENZOCAINE AND MENTHOL 1 LOZENGE: 5; 1 LIQUID ORAL at 22:52

## 2019-03-22 NOTE — CONSULT LETTER
[Consult Letter:] : I had the pleasure of evaluating your patient, [unfilled]. [Dear  ___] : Dear  [unfilled], [Please see my note below.] : Please see my note below. [( Thank you for referring [unfilled] for consultation for _____ )] : Thank you for referring [unfilled] for consultation for [unfilled] [Consult Closing:] : Thank you very much for allowing me to participate in the care of this patient.  If you have any questions, please do not hesitate to contact me. [Sincerely,] : Sincerely, [DrErick  ___] : Dr. MARTINEZ [FreeTextEntry2] : Thuy Harper MD (Hem/Onc)\par Tyler Stanton MD (Pul) [FreeTextEntry3] : Asif Morris MD, MPH \par System Director of Thoracic Surgery \par Director of [facility] [place] \par Professor Cardiovascular & Thoracic Surgery  \par [facility] [employer] at [facility]\par

## 2019-03-22 NOTE — HISTORY OF PRESENT ILLNESS
[FreeTextEntry1] : 53 y/o F, never smoker, w/ no sig PMHx, who presented to [facility] ED in Feb c/o productive cough x 2 months, she was hospitalized and during work-up found lung mass and pleural effusion s/p Thoracentesis, path revealed +Mets AdenoCA. \par \par Patient then presented to [facility] ED beginning of March c/o worsening SOB, CXR showed large Rt sided pleural effusion. CTA on 3/2/19 showed multiple acute P.E. in the MUMTAZ and LLL and in the lingular artery, a large Rt sided pleural effusion w/ partial collapse of the Rt lung and obstruction of the RLL bronchus, a poorly defined RLL mass seen. She is s/p thoracentesis w/ 2L drained.\par Then s/p Rt VATS drainage of 1L Rt pleural effusion, pleural bx, and placement of Rt PleurX on 3/4/19. Path of Rt pleural fluid revealed +Mets AdenoCA, lung primary. Rt pleura revealed Mets poorly-diff NSCC. +TTF-1, negative P40, +PD-L1.\par Also s/p IVC filter placement on 3/4/19 by Dr. Patricia Michael.\par Patient was discharged home on 3/7/19 on enoxaparin 60mg/0.6mL SQ injection. Rt pleurX to be drained 3 times a week by V[facility].\par \par PFTs on 3/14/19: FVC 37%, FEV1 36%.\par \par Patient is here today for F/U. Admits to SOB, productive cough, chills, dysphagia, decreased appetite and wt loss.

## 2019-03-22 NOTE — PROGRESS NOTE ADULT - SUBJECTIVE AND OBJECTIVE BOX
JOSE CEJA   MRN#: [id number]     The patient is a 54y Female who was seen, evaluated, & examined with the CTICU staff post-operatively with a multidisciplinary care plan formulated & implemented.  All available clinical, laboratory, radiographic, pharmacologic, and electrocardiographic data were reviewed & analyzed.      The patient was in the CTICU in critical condition secondary to:     persistent cardiopulmonary dysfunction  cardiac tamponade-cardiovascular dysfunction  undifferentiated shock    For support and evaluation & prevention of further decompensation secondary to persistent cardiopulmonary dysfunction and cardiac tamponade-cardiovascular dysfunction, respiratory status required:     supplemental oxygen with nasal cannula  continuous pulse oximetry monitoring  following ABGs with A-line monitoring    Invasive hemodynamic monitoring with an A-line was required for continuous MAP/BP monitoring to ensure adequate cardiovascular support and to evaluate for & help prevent decompensation while receiving intermittent volume expansion and IV Phenylephrine infusion secondary to cardiac tamponade-cardiovascular dysfunction.    In addition:  cardiac tamponade by imaging now s/p window with drain  Fluid appeared bloody in OR (on Lovenox for PE); high output from drains initially but has progressively decreased - continue to monitor and hold Lovenox  Hypotensive despite several liters of fluid resuscitation and now requiring Phenylephrine - recent bedside echo did not show significant effusion or signs of profound hypovolemia   Likely vasodilated from SIRS response  Will wean pressors as tolerated - pain medication could be contributing to hypotension so may need pressors as long as tubes are in  Stable on nasal cannula    The patient required critical care management and I provided 60 minutes of non-continuous care to the patient in addition to  discussing the patient and plan at length with the CTICU staff and helping coordinate care.

## 2019-03-22 NOTE — PROGRESS NOTE ADULT - SUBJECTIVE AND OBJECTIVE BOX
JOSE CEJA                     MRN-[id number]    HPI:  HPI- 53 yo F w recent dx of R lung adenocarcinoma with mediastinal and hilar lymphadenopathy c/b R pleural effusion s/p pleurx and VATS, PE (3/4) on lovenox s/p IVC filter presenting with acutely worse shortness of breath and substernal chest pain today. Patient reports pain is worse with deep breaths. Patient has a home aid who drains the R lung every 3 days - last was yesterday. Patient has a chronic cough from several months unchanged from before. No fevers. + chills. No nausea, vomiting.  Patient has been bed bound for several weeks due to generalized weakness and dyspnea on exertion.  Patient seen in thoracic office by Dr. Morris today, she was advised the increase the amount of drainage.  Chest x-ray with large pleural effusion, 600cc of fluid drained from pleurx cath.  Ultrasound done by ER showing pericardial effusion.      Procedure: Pericardial window      Issues:  cardiac tamponade-cardiovascular dysfunction  Hypotension  Lung ca  PE  Post op pain        PAST MEDICAL & SURGICAL HISTORY:  Lung cancer  No significant past surgical history            VITAL SIGNS:  Vital Signs Last 24 Hrs  T(C): 36.8 (22 Mar 2019 04:00), Max: 37.4 (21 Mar 2019 20:00)  T(F): 98.2 (22 Mar 2019 04:00), Max: 99.4 (21 Mar 2019 20:00)  HR: 87 (22 Mar 2019 04:00) (84 - 97)  BP: 95/66 (22 Mar 2019 03:00) (81/54 - 141/54)  BP(mean): 77 (22 Mar 2019 03:00) (61 - 83)  RR: 20 (22 Mar 2019 04:00) (14 - 31)  SpO2: 95% (22 Mar 2019 04:00) (93% - 100%)    I/Os:   I&O's Detail    20 Mar 2019 07:01  -  21 Mar 2019 07:00  --------------------------------------------------------  IN:    Oral Fluid: 50 mL    phenylephrine   Infusion: 71 mL    sodium chloride 0.9%.: 2100 mL  Total IN: 2221 mL    OUT:    Chest Tube: 170 mL    Chest Tube: 1280 mL    Voided: 400 mL  Total OUT: 1850 mL    Total NET: 371 mL      21 Mar 2019 07:01  -  22 Mar 2019 05:20  --------------------------------------------------------  IN:    IV PiggyBack: 325 mL    phenylephrine   Infusion: 398 mL    sodium chloride 0.9%.: 1700 mL  Total IN: 2423 mL    OUT:    Chest Tube: 120 mL    Chest Tube: 505 mL    Voided: 800 mL  Total OUT: 1425 mL    Total NET: 998 mL          CAPILLARY BLOOD GLUCOSE      POCT Blood Glucose.: 84 mg/dL (21 Mar 2019 20:18)      =======================MEDICATIONS===================  MEDICATIONS  (STANDING):  docusate sodium 100 milliGRAM(s) Oral three times a day  heparin  Injectable 5000 Unit(s) SubCutaneous every 12 hours  phenylephrine    Infusion 0.3 MICROgram(s)/kG/Min (5.389 mL/Hr) IV Continuous <Continuous>  senna 2 Tablet(s) Oral at bedtime  sodium chloride 0.9%. 1000 milliLiter(s) (100 mL/Hr) IV Continuous <Continuous>    MEDICATIONS  (PRN):  HYDROmorphone  Injectable 0.5 milliGRAM(s) IV Push every 3 hours PRN Severe Pain (7 - 10)  oxyCODONE    5 mG/acetaminophen 325 mG 1 Tablet(s) Oral every 4 hours PRN Moderate Pain (4 - 6)      PHYSICAL EXAM============================  General:                         Awake, alert, not in any distress  Neuro:                            Moving all extremities to commands.   Respiratory:	Air entry fair and  bilateral conducted sounds                                           Effort even and unlabored.  CV:		Regular rate and rhythm. Normal S1/S2                                          Distal pulses present.  Abdomen:	                     Soft, non-distended. Bowel sounds present   Skin:		No rash.  Extremities:	Warm, no cyanosis or edema.  Palpable pulses    ============================LABS=========================                        10.3   10.30 )-----------( 565      ( 22 Mar 2019 03:00 )             33.0     03-22    136  |  105  |  10  ----------------------------<  109<H>  4.0   |  19<L>  |  0.55    Ca    7.7<L>      22 Mar 2019 03:00  Phos  3.2     03-21  Mg     2.0     03-21    TPro  4.9<L>  /  Alb  3.0<L>  /  TBili  0.3  /  DBili  x   /  AST  55<H>  /  ALT  35<H>  /  AlkPhos  122<H>  03-22    LIVER FUNCTIONS - ( 22 Mar 2019 03:00 )  Alb: 3.0 g/dL / Pro: 4.9 g/dL / ALK PHOS: 122 u/L / ALT: 35 u/L / AST: 55 u/L / GGT: x             ABG - ( 22 Mar 2019 03:00 )  pH, Arterial: 7.41  pH, Blood: x     /  pCO2: 36    /  pO2: 75    / HCO3: 23    / Base Excess: -1.6  /  SaO2: 94.5                  ============================IMAGING STUDIES=========================  < from: Xray Chest 1 View- PORTABLE-Routine (03.21.19 @ 06:26) >  INTERPRETATION:     Right-sided Pleurx catheter and pericardial drain in place. Increasing   opacity at the right lung base may represent pneumonia. Left lung and   right upper lobe are relatively clear. No pneumothorax. The cardiac   silhouette is stable and not enlarged.      COMPARISON:  March 20      IMPRESSION:  Follow-up with Pleurx catheter and pericardial drain in   place, no signs of pericardial effusion but opacity of the right lower   hemithorax that could represent aspiration pneumonia.        =============================NEUROLOGY============================  Pain control with Tylenol IV , PRN Dilaudid    ==============================RESPIRATORY========================  Pt is on  3 L nasal canula   Comfortable, not in any distress.  Using incentive spirometry   Monitor chest tube output  Chest tube to suction , pericardial drain to suction  Continue bronchodilators, pulmonary toilet    ============================CARDIOVASCULAR======================  cardiac tamponade by imaging now s/p window with drain  Fluid appeared bloody in OR (on Lovenox for PE); high output from drains but trailed off overnight - continue to monitor  Continue hemodynamic monitoring.  Hypotension,  on pressors  Cont IVF resuscitation  Hold Levonox  =====================RENAL===================  Continue IVF   Monitor I/Os and electrolytes    ====================GASTROINTESTINAL===================  On clears, tolerating  Continue GI prophylaxis with  Protonix  Continue Zofran / Reglan for nausea - PRN	    ========================HEMATOLOGIC/ONCOLOGIC====================  Monitor chest tube output. No signs of active bleeding.   Follow CBC in AM    ============================INFECTIOUS DISEASE========================  Monitor for fever / leukocytosis.  All surgical incision / chest tube  sites look clean      Pt is on GI & DVT prophylaxis  OOB & ambulate       Pertinent clinical, laboratory, radiographic, hemodynamic, echocardiographic, respiratory data, microbiologic data and chart were reviewed and analyzed frequently throughout the course of the day and night  Patient seen, examined and plan discussed with CT Surgery / CTICU team during rounds.    Pt's status discussed with family at bedside, updated status    The patient required critical care management and I provided 45 minutes of non-continuous care to the patient in addition to  discussing the patient and plan at length with the CTICU staff and helping coordinate care.    Kayliupshivani QUISPEP

## 2019-03-22 NOTE — PHYSICAL EXAM
[Sclera] : the sclera and conjunctiva were normal [PERRL With Normal Accommodation] : pupils were equal in size, round, and reactive to light [Extraocular Movements] : extraocular movements were intact [Neck Appearance] : the appearance of the neck was normal [Neck Cervical Mass (___cm)] : no neck mass was observed [Jugular Venous Distention Increased] : there was no jugular-venous distention [Thyroid Diffuse Enlargement] : the thyroid was not enlarged [Thyroid Nodule] : there were no palpable thyroid nodules [Auscultation Breath Sounds / Voice Sounds] : lungs were clear to auscultation bilaterally [Heart Rate And Rhythm] : heart rate was normal and rhythm regular [Heart Sounds] : normal S1 and S2 [Murmurs] : no murmurs [Heart Sounds Gallop] : no gallops [Heart Sounds Pericardial Friction Rub] : no pericardial rub [Examination Of The Chest] : the chest was normal in appearance [2+] : left 2+ [Breast Appearance] : normal in appearance [Breast Palpation Mass] : no palpable masses [Bowel Sounds] : normal bowel sounds [Abdomen Soft] : soft [Abdomen Tenderness] : non-tender [Abdomen Mass (___ Cm)] : no abdominal mass palpated [Cervical Lymph Nodes Enlarged Posterior Bilaterally] : posterior cervical [Cervical Lymph Nodes Enlarged Anterior Bilaterally] : anterior cervical [Supraclavicular Lymph Nodes Enlarged Bilaterally] : supraclavicular [No CVA Tenderness] : no ~M costovertebral angle tenderness [No Spinal Tenderness] : no spinal tenderness [Abnormal Walk] : normal gait [Nail Clubbing] : no clubbing  or cyanosis of the fingernails [Musculoskeletal - Swelling] : no joint swelling seen [Motor Tone] : muscle strength and tone were normal [Skin Color & Pigmentation] : normal skin color and pigmentation [Skin Turgor] : normal skin turgor [] : no rash [Sensation] : the sensory exam was normal to light touch and pinprick [Deep Tendon Reflexes (DTR)] : deep tendon reflexes were 2+ and symmetric [No Focal Deficits] : no focal deficits [Oriented To Time, Place, And Person] : oriented to person, place, and time [Impaired Insight] : insight and judgment were intact [Affect] : the affect was normal [FreeTextEntry1] : deferred

## 2019-03-22 NOTE — DATA REVIEWED
[FreeTextEntry1] : Rt VATS drainage of 1L Rt pleural effusion, pleural bx, and placement of Rt PleurX on 3/4/19. Path of Rt pleural fluid revealed +Mets AdenoCA, lung primary. Rt pleura revealed Mets poorly-diff NSCC. +TTF-1, negative P40, +PD-L1.

## 2019-03-23 LAB
ANION GAP SERPL CALC-SCNC: 12 MMO/L — SIGNIFICANT CHANGE UP (ref 7–14)
BUN SERPL-MCNC: 4 MG/DL — LOW (ref 7–23)
CALCIUM SERPL-MCNC: 7.7 MG/DL — LOW (ref 8.4–10.5)
CHLORIDE SERPL-SCNC: 104 MMOL/L — SIGNIFICANT CHANGE UP (ref 98–107)
CO2 SERPL-SCNC: 20 MMOL/L — LOW (ref 22–31)
CREAT SERPL-MCNC: 0.47 MG/DL — LOW (ref 0.5–1.3)
GLUCOSE SERPL-MCNC: 96 MG/DL — SIGNIFICANT CHANGE UP (ref 70–99)
HCT VFR BLD CALC: 36.6 % — SIGNIFICANT CHANGE UP (ref 34.5–45)
HGB BLD-MCNC: 11.3 G/DL — LOW (ref 11.5–15.5)
MAGNESIUM SERPL-MCNC: 1.7 MG/DL — SIGNIFICANT CHANGE UP (ref 1.6–2.6)
MCHC RBC-ENTMCNC: 27.5 PG — SIGNIFICANT CHANGE UP (ref 27–34)
MCHC RBC-ENTMCNC: 30.9 % — LOW (ref 32–36)
MCV RBC AUTO: 89.1 FL — SIGNIFICANT CHANGE UP (ref 80–100)
NRBC # FLD: 0 K/UL — LOW (ref 25–125)
PHOSPHATE SERPL-MCNC: 2.2 MG/DL — LOW (ref 2.5–4.5)
PLATELET # BLD AUTO: 524 K/UL — HIGH (ref 150–400)
PMV BLD: 9.2 FL — SIGNIFICANT CHANGE UP (ref 7–13)
POTASSIUM SERPL-MCNC: 3.9 MMOL/L — SIGNIFICANT CHANGE UP (ref 3.5–5.3)
POTASSIUM SERPL-SCNC: 3.9 MMOL/L — SIGNIFICANT CHANGE UP (ref 3.5–5.3)
RBC # BLD: 4.11 M/UL — SIGNIFICANT CHANGE UP (ref 3.8–5.2)
RBC # FLD: 13.3 % — SIGNIFICANT CHANGE UP (ref 10.3–14.5)
SODIUM SERPL-SCNC: 136 MMOL/L — SIGNIFICANT CHANGE UP (ref 135–145)
WBC # BLD: 10.01 K/UL — SIGNIFICANT CHANGE UP (ref 3.8–10.5)
WBC # FLD AUTO: 10.01 K/UL — SIGNIFICANT CHANGE UP (ref 3.8–10.5)

## 2019-03-23 PROCEDURE — 99292 CRITICAL CARE ADDL 30 MIN: CPT

## 2019-03-23 PROCEDURE — 99291 CRITICAL CARE FIRST HOUR: CPT

## 2019-03-23 PROCEDURE — 71045 X-RAY EXAM CHEST 1 VIEW: CPT | Mod: 26

## 2019-03-23 RX ORDER — POTASSIUM PHOSPHATE, MONOBASIC POTASSIUM PHOSPHATE, DIBASIC 236; 224 MG/ML; MG/ML
15 INJECTION, SOLUTION INTRAVENOUS ONCE
Qty: 0 | Refills: 0 | Status: COMPLETED | OUTPATIENT
Start: 2019-03-23 | End: 2019-03-23

## 2019-03-23 RX ORDER — FUROSEMIDE 40 MG
5 TABLET ORAL ONCE
Qty: 0 | Refills: 0 | Status: COMPLETED | OUTPATIENT
Start: 2019-03-23 | End: 2019-03-23

## 2019-03-23 RX ORDER — MAGNESIUM SULFATE 500 MG/ML
1 VIAL (ML) INJECTION ONCE
Qty: 0 | Refills: 0 | Status: COMPLETED | OUTPATIENT
Start: 2019-03-23 | End: 2019-03-23

## 2019-03-23 RX ORDER — POTASSIUM CHLORIDE 20 MEQ
20 PACKET (EA) ORAL ONCE
Qty: 0 | Refills: 0 | Status: DISCONTINUED | OUTPATIENT
Start: 2019-03-23 | End: 2019-03-23

## 2019-03-23 RX ORDER — LANOLIN ALCOHOL/MO/W.PET/CERES
3 CREAM (GRAM) TOPICAL AT BEDTIME
Qty: 0 | Refills: 0 | Status: DISCONTINUED | OUTPATIENT
Start: 2019-03-23 | End: 2019-04-02

## 2019-03-23 RX ORDER — CALCIUM GLUCONATE 100 MG/ML
2 VIAL (ML) INTRAVENOUS ONCE
Qty: 0 | Refills: 0 | Status: COMPLETED | OUTPATIENT
Start: 2019-03-23 | End: 2019-03-23

## 2019-03-23 RX ADMIN — Medication 200 GRAM(S): at 09:53

## 2019-03-23 RX ADMIN — Medication 5 MILLIGRAM(S): at 09:53

## 2019-03-23 RX ADMIN — BENZOCAINE AND MENTHOL 1 LOZENGE: 5; 1 LIQUID ORAL at 15:16

## 2019-03-23 RX ADMIN — SODIUM CHLORIDE 75 MILLILITER(S): 9 INJECTION, SOLUTION INTRAVENOUS at 20:00

## 2019-03-23 RX ADMIN — POTASSIUM PHOSPHATE, MONOBASIC POTASSIUM PHOSPHATE, DIBASIC 62.5 MILLIMOLE(S): 236; 224 INJECTION, SOLUTION INTRAVENOUS at 13:37

## 2019-03-23 RX ADMIN — HEPARIN SODIUM 5000 UNIT(S): 5000 INJECTION INTRAVENOUS; SUBCUTANEOUS at 17:31

## 2019-03-23 RX ADMIN — BENZOCAINE AND MENTHOL 1 LOZENGE: 5; 1 LIQUID ORAL at 22:47

## 2019-03-23 RX ADMIN — HEPARIN SODIUM 5000 UNIT(S): 5000 INJECTION INTRAVENOUS; SUBCUTANEOUS at 04:49

## 2019-03-23 RX ADMIN — Medication 100 GRAM(S): at 11:32

## 2019-03-23 NOTE — PROGRESS NOTE ADULT - SUBJECTIVE AND OBJECTIVE BOX
JOSE CEJA   MRN#: [id number]     The patient is a 54y Female who was seen, evaluated, & examined with the CTICU staff post-operatively with a multidisciplinary care plan formulated & implemented.  All available clinical, laboratory, radiographic, pharmacologic, and electrocardiographic data were reviewed & analyzed.      The patient was in the CTICU in critical condition secondary to:     persistent cardiopulmonary dysfunction  cardiac tamponade-cardiovascular dysfunction  undifferentiated shock    For support and evaluation & prevention of further decompensation secondary to persistent cardiopulmonary dysfunction and cardiac tamponade-cardiovascular dysfunction, respiratory status required:     supplemental oxygen with nasal cannula  continuous pulse oximetry monitoring  following ABGs with A-line monitoring    Invasive hemodynamic monitoring with an A-line was required for continuous MAP/BP monitoring to ensure adequate cardiovascular support and to evaluate for & help prevent decompensation while receiving intermittent volume expansion and IV Phenylephrine infusion secondary to cardiac tamponade-cardiovascular dysfunction.    In addition:  Cardiac tamponade by imaging now s/p window with drain  Fluid appeared bloody in OR (on Lovenox for PE); high output from drains initially but has progressively decreased - Pleurex now to water seal - continue to monitor and hold Lovenox  Hypotensive despite several liters of fluid resuscitation and now requiring Phenylephrine - recent bedside echo did not show significant effusion or signs of profound hypovolemia   Likely vasodilated from SIRS response - on minimal Phenylephrine now - will attempt to wean to off  Stable on nasal cannula    The patient required critical care management and I provided 80 minutes of non-continuous care to the patient in addition to  discussing the patient and plan at length with the CTICU staff and helping coordinate care.

## 2019-03-24 ENCOUNTER — APPOINTMENT (OUTPATIENT)
Dept: NUCLEAR MEDICINE | Facility: IMAGING CENTER | Age: 54
End: 2019-03-24

## 2019-03-24 LAB
ALBUMIN SERPL ELPH-MCNC: 2.5 G/DL — LOW (ref 3.3–5)
ALP SERPL-CCNC: 98 U/L — SIGNIFICANT CHANGE UP (ref 40–120)
ALT FLD-CCNC: 14 U/L — SIGNIFICANT CHANGE UP (ref 4–33)
ANION GAP SERPL CALC-SCNC: 8 MMO/L — SIGNIFICANT CHANGE UP (ref 7–14)
AST SERPL-CCNC: 9 U/L — SIGNIFICANT CHANGE UP (ref 4–32)
BILIRUB SERPL-MCNC: 0.3 MG/DL — SIGNIFICANT CHANGE UP (ref 0.2–1.2)
BUN SERPL-MCNC: 3 MG/DL — LOW (ref 7–23)
CALCIUM SERPL-MCNC: 7.8 MG/DL — LOW (ref 8.4–10.5)
CHLORIDE SERPL-SCNC: 103 MMOL/L — SIGNIFICANT CHANGE UP (ref 98–107)
CO2 SERPL-SCNC: 24 MMOL/L — SIGNIFICANT CHANGE UP (ref 22–31)
CREAT SERPL-MCNC: 0.54 MG/DL — SIGNIFICANT CHANGE UP (ref 0.5–1.3)
GLUCOSE SERPL-MCNC: 104 MG/DL — HIGH (ref 70–99)
HCT VFR BLD CALC: 37.9 % — SIGNIFICANT CHANGE UP (ref 34.5–45)
HGB BLD-MCNC: 12.2 G/DL — SIGNIFICANT CHANGE UP (ref 11.5–15.5)
MCHC RBC-ENTMCNC: 27.4 PG — SIGNIFICANT CHANGE UP (ref 27–34)
MCHC RBC-ENTMCNC: 32.2 % — SIGNIFICANT CHANGE UP (ref 32–36)
MCV RBC AUTO: 85 FL — SIGNIFICANT CHANGE UP (ref 80–100)
NRBC # FLD: 0 K/UL — LOW (ref 25–125)
PLATELET # BLD AUTO: 528 K/UL — HIGH (ref 150–400)
PMV BLD: 8.8 FL — SIGNIFICANT CHANGE UP (ref 7–13)
POTASSIUM SERPL-MCNC: 4.1 MMOL/L — SIGNIFICANT CHANGE UP (ref 3.5–5.3)
POTASSIUM SERPL-SCNC: 4.1 MMOL/L — SIGNIFICANT CHANGE UP (ref 3.5–5.3)
PROT SERPL-MCNC: 4.9 G/DL — LOW (ref 6–8.3)
RBC # BLD: 4.46 M/UL — SIGNIFICANT CHANGE UP (ref 3.8–5.2)
RBC # FLD: 13.5 % — SIGNIFICANT CHANGE UP (ref 10.3–14.5)
SODIUM SERPL-SCNC: 135 MMOL/L — SIGNIFICANT CHANGE UP (ref 135–145)
WBC # BLD: 8.94 K/UL — SIGNIFICANT CHANGE UP (ref 3.8–10.5)
WBC # FLD AUTO: 8.94 K/UL — SIGNIFICANT CHANGE UP (ref 3.8–10.5)

## 2019-03-24 PROCEDURE — 99291 CRITICAL CARE FIRST HOUR: CPT

## 2019-03-24 PROCEDURE — 71045 X-RAY EXAM CHEST 1 VIEW: CPT | Mod: 26

## 2019-03-24 RX ADMIN — HEPARIN SODIUM 5000 UNIT(S): 5000 INJECTION INTRAVENOUS; SUBCUTANEOUS at 17:28

## 2019-03-24 RX ADMIN — HEPARIN SODIUM 5000 UNIT(S): 5000 INJECTION INTRAVENOUS; SUBCUTANEOUS at 06:53

## 2019-03-24 RX ADMIN — SODIUM CHLORIDE 75 MILLILITER(S): 9 INJECTION, SOLUTION INTRAVENOUS at 08:00

## 2019-03-24 RX ADMIN — BENZOCAINE AND MENTHOL 1 LOZENGE: 5; 1 LIQUID ORAL at 20:30

## 2019-03-24 NOTE — PROGRESS NOTE ADULT - SUBJECTIVE AND OBJECTIVE BOX
JOSE CEJA                     [id number]    HPI:  HPI- 55 yo F w recent dx of R lung adenocarcinoma with mediastinal and hilar lymphadenopathy c/b R pleural effusion s/p pleurx and VATS, PE (3/4) on lovenox s/p IVC filter presenting with acutely worse shortness of breath and substernal chest pain today. Patient reports pain is worse with deep breaths. Patient has a home aid who drains the R lung every 3 days - last was yesterday. Patient has a chronic cough from several months unchanged from before. No fevers. + chills. No nausea, vomiting.  Patient has been bed bound for several weeks due to generalized weakness and dyspnea on exertion.  Patient seen in thoracic office by Dr. Morris today, she was advised the increase the amount of drainage.  Chest x-ray with large pleural effusion, 600cc of fluid drained from pleurx cath.  Ultrasound done by ER showing pericardial effusion.    PAST MEDICAL & SURGICAL HISTORY:  Lung cancer  PE- s/p IVC filter  pleurx cath placement, pleural biopsy       Procedure: Pericardial window      Issues:  cardiac tamponade-cardiovascular dysfunction  Hypotension  Lung ca  PE  Post op pain      PAST MEDICAL & SURGICAL HISTORY:  Lung cancer  No significant past surgical history            VITAL SIGNS:  Vital Signs Last 24 Hrs  T(C): 37.7 (24 Mar 2019 04:00), Max: 37.7 (23 Mar 2019 20:00)  T(F): 99.8 (24 Mar 2019 04:00), Max: 99.8 (23 Mar 2019 20:00)  HR: 104 (24 Mar 2019 06:00) (90 - 125)  BP: 124/76 (24 Mar 2019 06:00) (89/50 - 124/76)  BP(mean): 91 (24 Mar 2019 06:00) (60 - 109)  RR: 20 (24 Mar 2019 06:00) (12 - 33)  SpO2: 95% (24 Mar 2019 06:00) (94% - 100%)    I/Os:   I&O's Detail    22 Mar 2019 07:01  -  23 Mar 2019 07:00  --------------------------------------------------------  IN:    lactated ringers.: 1800 mL    phenylephrine   Infusion: 144 mL  Total IN: 1944 mL    OUT:    Chest Tube: 75 mL    Chest Tube: 175 mL    Voided: 1900 mL  Total OUT: 2150 mL    Total NET: -206 mL      23 Mar 2019 07:01  -  24 Mar 2019 06:15  --------------------------------------------------------  IN:    IV PiggyBack: 250 mL    lactated ringers.: 1650 mL    Oral Fluid: 100 mL  Total IN: 2000 mL    OUT:    Chest Tube: 130 mL    Chest Tube: 285 mL    Voided: 1800 mL  Total OUT: 2215 mL    Total NET: -215 mL          CAPILLARY BLOOD GLUCOSE          =======================MEDICATIONS===================  MEDICATIONS  (STANDING):  docusate sodium 100 milliGRAM(s) Oral three times a day  heparin  Injectable 5000 Unit(s) SubCutaneous every 12 hours  lactated ringers. 1000 milliLiter(s) (75 mL/Hr) IV Continuous <Continuous>  melatonin 5 milliGRAM(s) Oral at bedtime  melatonin 3 milliGRAM(s) Oral at bedtime  phenylephrine    Infusion 0.3 MICROgram(s)/kG/Min (5.389 mL/Hr) IV Continuous <Continuous>  senna 2 Tablet(s) Oral at bedtime    MEDICATIONS  (PRN):  benzocaine 15 mG/menthol 3.6 mG (Sugar-Free) Lozenge 1 Lozenge Oral every 4 hours PRN cough  HYDROmorphone  Injectable 0.5 milliGRAM(s) IV Push every 3 hours PRN Severe Pain (7 - 10)  oxyCODONE    5 mG/acetaminophen 325 mG 1 Tablet(s) Oral every 4 hours PRN Moderate Pain (4 - 6)          PHYSICAL EXAM============================  General:                         Awake, alert, not in any distress  Neuro:                            Moving all extremities to commands.   Respiratory:	Air entry fair and  bilateral conducted sounds                                           Effort even and unlabored.  CV:		Regular rate and rhythm. Normal S1/S2                                          Distal pulses present.  Abdomen:	                     Soft, non-distended. Bowel sounds present   Skin:		No rash.  Extremities:	Warm, no cyanosis or edema.  Palpable pulses    ============================LABS=========================                        12.2   8.94  )-----------( 528      ( 24 Mar 2019 04:00 )             37.9     03-24    135  |  103  |  3<L>  ----------------------------<  104<H>  4.1   |  24  |  0.54    Ca    7.8<L>      24 Mar 2019 04:00  Phos  2.2     03-23  Mg     1.7     03-23    TPro  4.9<L>  /  Alb  2.5<L>  /  TBili  0.3  /  DBili  x   /  AST  9   /  ALT  14  /  AlkPhos  98  03-24    LIVER FUNCTIONS - ( 24 Mar 2019 04:00 )  Alb: 2.5 g/dL / Pro: 4.9 g/dL / ALK PHOS: 98 u/L / ALT: 14 u/L / AST: 9 u/L / GGT: x                   ============================IMAGING STUDIES=========================  < from: Xray Chest 1 View- PORTABLE-Routine (03.23.19 @ 05:49) >  EXAM:  Portable frontal chest from 3/23/2019 at 0 549. Compared to prior study   from 3/22/2019.    IMPRESSION:  Right pleural chest tube and pericardial drain remain in place.    Persistent right pleural-parenchymal disease in right lung and left   basilar consolidative changes.    No pneumothorax.    Stable cardiac and mediastinal silhouettes.    Trachea midline.    ============================NEUROLOGY============================  Pain control with Tylenol IV , PRN Dilaudid    ==============================RESPIRATORY========================  Pt is on  3 L nasal canula   Comfortable, not in any distress.  Using incentive spirometry   Monitor chest tube output  Chest tube & , pericardial drain to water seal  Continue bronchodilators, pulmonary toilet    ============================CARDIOVASCULAR======================  cardiac tamponade by imaging now s/p window with drain  Fluid appeared bloody in OR (on Lovenox for PE); high output from drains but trailed off overnight - continue to monitor  Continue hemodynamic monitoring.  Hypotension,  on pressors  Cont IVF resuscitation  Hold Levonox  =====================RENAL===================  Continue IVF   Monitor I/Os and electrolytes    ====================GASTROINTESTINAL===================  On reg,  tolerating  Continue GI prophylaxis with  Protonix  Continue Zofran / Reglan for nausea - PRN	    ========================HEMATOLOGIC/ONCOLOGIC====================  Monitor chest tube output. No signs of active bleeding.   Follow CBC in AM    ============================INFECTIOUS DISEASE========================  Monitor for fever / leukocytosis.  All surgical incision / chest tube  sites look clean      Pt is on GI & DVT prophylaxis  OOB & ambulate       Pertinent clinical, laboratory, radiographic, hemodynamic, echocardiographic, respiratory data, microbiologic data and chart were reviewed and analyzed frequently throughout the course of the day and night  Patient seen, examined and plan discussed with CT Surgery / CTICU team during rounds.    Pt's status discussed with family at bedside, updated status    The patient required critical care management and I provided 30 minutes of non-continuous care to the patient in addition to  discussing the patient and plan at length with the CTICU staff and helping coordinate care.    Jazmyne Patel DO, FACEP

## 2019-03-25 ENCOUNTER — TRANSCRIPTION ENCOUNTER (OUTPATIENT)
Age: 54
End: 2019-03-25

## 2019-03-25 DIAGNOSIS — Z98.890 OTHER SPECIFIED POSTPROCEDURAL STATES: Chronic | ICD-10-CM

## 2019-03-25 LAB
ANION GAP SERPL CALC-SCNC: 9 MMO/L — SIGNIFICANT CHANGE UP (ref 7–14)
APTT BLD: 27.4 SEC — LOW (ref 27.5–36.3)
BUN SERPL-MCNC: 3 MG/DL — LOW (ref 7–23)
CALCIUM SERPL-MCNC: 7.8 MG/DL — LOW (ref 8.4–10.5)
CHLORIDE SERPL-SCNC: 102 MMOL/L — SIGNIFICANT CHANGE UP (ref 98–107)
CO2 SERPL-SCNC: 25 MMOL/L — SIGNIFICANT CHANGE UP (ref 22–31)
CREAT SERPL-MCNC: 0.56 MG/DL — SIGNIFICANT CHANGE UP (ref 0.5–1.3)
CULTURE - SURGICAL SITE: SIGNIFICANT CHANGE UP
GLUCOSE SERPL-MCNC: 105 MG/DL — HIGH (ref 70–99)
HCT VFR BLD CALC: 36.7 % — SIGNIFICANT CHANGE UP (ref 34.5–45)
HGB BLD-MCNC: 11.7 G/DL — SIGNIFICANT CHANGE UP (ref 11.5–15.5)
INR BLD: 1.1 — SIGNIFICANT CHANGE UP (ref 0.88–1.17)
MAGNESIUM SERPL-MCNC: 1.8 MG/DL — SIGNIFICANT CHANGE UP (ref 1.6–2.6)
MCHC RBC-ENTMCNC: 27.1 PG — SIGNIFICANT CHANGE UP (ref 27–34)
MCHC RBC-ENTMCNC: 31.9 % — LOW (ref 32–36)
MCV RBC AUTO: 85.2 FL — SIGNIFICANT CHANGE UP (ref 80–100)
NRBC # FLD: 0 K/UL — LOW (ref 25–125)
PHOSPHATE SERPL-MCNC: 2.9 MG/DL — SIGNIFICANT CHANGE UP (ref 2.5–4.5)
PLATELET # BLD AUTO: 509 K/UL — HIGH (ref 150–400)
PMV BLD: 8.9 FL — SIGNIFICANT CHANGE UP (ref 7–13)
POTASSIUM SERPL-MCNC: 4.1 MMOL/L — SIGNIFICANT CHANGE UP (ref 3.5–5.3)
POTASSIUM SERPL-SCNC: 4.1 MMOL/L — SIGNIFICANT CHANGE UP (ref 3.5–5.3)
PROTHROM AB SERPL-ACNC: 12.3 SEC — SIGNIFICANT CHANGE UP (ref 9.8–13.1)
RBC # BLD: 4.31 M/UL — SIGNIFICANT CHANGE UP (ref 3.8–5.2)
RBC # FLD: 13.4 % — SIGNIFICANT CHANGE UP (ref 10.3–14.5)
SODIUM SERPL-SCNC: 136 MMOL/L — SIGNIFICANT CHANGE UP (ref 135–145)
WBC # BLD: 8.77 K/UL — SIGNIFICANT CHANGE UP (ref 3.8–10.5)
WBC # FLD AUTO: 8.77 K/UL — SIGNIFICANT CHANGE UP (ref 3.8–10.5)

## 2019-03-25 PROCEDURE — 99222 1ST HOSP IP/OBS MODERATE 55: CPT

## 2019-03-25 PROCEDURE — 71045 X-RAY EXAM CHEST 1 VIEW: CPT | Mod: 26

## 2019-03-25 PROCEDURE — 93306 TTE W/DOPPLER COMPLETE: CPT | Mod: 26

## 2019-03-25 RX ADMIN — HEPARIN SODIUM 5000 UNIT(S): 5000 INJECTION INTRAVENOUS; SUBCUTANEOUS at 06:03

## 2019-03-25 RX ADMIN — HEPARIN SODIUM 5000 UNIT(S): 5000 INJECTION INTRAVENOUS; SUBCUTANEOUS at 17:00

## 2019-03-25 RX ADMIN — Medication 3 MILLIGRAM(S): at 22:13

## 2019-03-25 RX ADMIN — Medication 200 MILLIGRAM(S): at 06:04

## 2019-03-25 NOTE — DISCHARGE NOTE NURSING/CASE MANAGEMENT/SOCIAL WORK - NSDCPNINST_GEN_ALL_CORE
Call 911 for chest pain, and difficulty breathing. Report to your doctor any foul odor drainage, redness, and swelling to incisions

## 2019-03-25 NOTE — CONSULT NOTE ADULT - SUBJECTIVE AND OBJECTIVE BOX
Patient is a 54y old  Female who presents with a chief complaint of Pericardial effusion (25 Mar 2019 13:03)      HPI:  HPI- 53 yo F w recent dx of R lung adenocarcinoma with mediastinal and hilar lymphadenopathy c/b R pleural effusion s/p pleurx and VATS, PE (3/4) on lovenox s/p IVC filter presenting with acutely worse shortness of breath and substernal chest pain today. Patient reports pain is worse with deep breaths. Patient has a home aid who drains the R lung every 3 days - last was yesterday. Patient has a chronic cough from several months unchanged from before. No fevers. + chills. No nausea, vomiting.  Patient has been bed bound for several weeks due to generalized weakness and dyspnea on exertion.  Patient seen in thoracic office by Dr. Morris today, she was advised the increase the amount of drainage.  Chest x-ray with large pleural effusion, 600cc of fluid drained from pleurx cath.  Ultrasound done by ER showing pericardial effusion.    PAST MEDICAL & SURGICAL HISTORY:  Lung cancer  PE- s/p IVC filter  pleurx cath placement, pleural biopsy     FAMILY HISTORY:  Family history of coronary artery disease (Sibling): Brother  Family history of hypertension (Mother): Mother  Family history of diabetes mellitus (DM) (Mother): Mother    Allergies    No Known Allergies    Intolerances    IV contrast (Rash)    Home Medications:  Lovenox 	     REVIEW OF SYSTEMS      General:+weight loss     Skin/Breast: No Rashes/ Lesions/ Masses  	  Ophthalmologic: No Blurry vision/ Glaucoma/ Blindness  	  ENMT: No Hearing loss/ Drainage/ Lesions	    Respiratory and Thorax: + Cough/ Wheezing/ SOB/ Hemoptysis/ Sputum production. +TAMAYO  	  Cardiovascular: + Chest pain	    Gastrointestinal: No Nausea/ Vomiting/ Constipation/ Appetite Change	    Genitourinary: No Heamturia/ Dysuria/ Frequency change/ Impotence	    Musculoskeletal: No Pain/ Weakness/ Claudication	    Neurological: No Seizures/ TIA/CVA/ Parastesias	    Psychiatric: No Dementia/ Depression/ SI/HI	    Hematology/Lymphatics: No hx of bleeding/ Edema	    Endocrine:	No Hyperglycemia/ Hypoglycemia    Allergic/Immunologic:	 No Anaphylaxis/ Intolerance/ Recent illnesses    PHYSICAL EXAM:  Vital Signs Last 24 Hrs  T(C): 36.8 (19 Mar 2019 22:02), Max: 36.8 (19 Mar 2019 22:02)  T(F): 98.2 (19 Mar 2019 22:02), Max: 98.2 (19 Mar 2019 22:02)  HR: 113 (20 Mar 2019 02:06) (110 - 124)  BP: 94/71 (20 Mar 2019 02:06) (93/62 - 104/26)  BP(mean): --  RR: 20 (20 Mar 2019 02:04) (20 - 20)  SpO2: 100% (20 Mar 2019 02:04) (100% - 100%)    General: in mild respiratory distress   Neurology: A&Ox3, nonfocal, PAZ x 4  Eyes: PERRLA/ EOMI, Gross vision intact  ENT/Neck: Neck supple, trachea midline, No JVD, Gross hearing intact  Respiratory: +SOB, on nasal cannula   CV: tachycardiac   Abdominal: Soft, NT, ND +BS,   Extremities: No edema, + peripheral pulses  Skin: No Rashes, Hematoma, Ecchymosis  Tubes: Right pleurx cath in place, drained 600 cc of straw colored drainage (20 Mar 2019 07:04)       Oncologic History:      ROS: as above     PAST MEDICAL & SURGICAL HISTORY:  Lung cancer  History of lung surgery      SOCIAL HISTORY:    FAMILY HISTORY:  Family history of coronary artery disease (Sibling): Brother  Family history of hypertension: Mother  Family history of diabetes mellitus (DM): Mother      MEDICATIONS  (STANDING):  docusate sodium 100 milliGRAM(s) Oral three times a day  heparin  Injectable 5000 Unit(s) SubCutaneous every 12 hours  melatonin 3 milliGRAM(s) Oral at bedtime  senna 2 Tablet(s) Oral at bedtime    MEDICATIONS  (PRN):  benzocaine 15 mG/menthol 3.6 mG (Sugar-Free) Lozenge 1 Lozenge Oral every 4 hours PRN cough  guaiFENesin    Syrup 200 milliGRAM(s) Oral every 6 hours PRN Cough  HYDROmorphone  Injectable 0.5 milliGRAM(s) IV Push every 3 hours PRN Severe Pain (7 - 10)  oxyCODONE    5 mG/acetaminophen 325 mG 1 Tablet(s) Oral every 4 hours PRN Moderate Pain (4 - 6)      Allergies    No Known Allergies    Intolerances    IV contrast (Rash)      Vital Signs Last 24 Hrs  T(C): 36.8 (25 Mar 2019 16:22), Max: 37.5 (25 Mar 2019 00:07)  T(F): 98.3 (25 Mar 2019 16:22), Max: 99.5 (25 Mar 2019 00:07)  HR: 110 (25 Mar 2019 16:22) (110 - 118)  BP: 96/55 (25 Mar 2019 16:22) (91/42 - 100/41)  BP(mean): --  RR: 18 (25 Mar 2019 16:22) (17 - 18)  SpO2: 95% (25 Mar 2019 16:22) (92% - 95%)    PHYSICAL EXAM  General: adult in NAD  HEENT: clear oropharynx, anicteric sclera, pink conjunctiva  Neck: supple  CV: normal S1/S2 with no murmur rubs or gallops  Lungs: positive air movement b/l ant lungs, clear to auscultation, no wheezes, no rales  Abdomen: soft non-tender non-distended, no hepatosplenomegaly  Ext: no clubbing cyanosis or edema  Skin: no rashes and no petechiae  Neuro: alert and oriented X 3, none focal    LABS:                          11.7   8.77  )-----------( 509      ( 25 Mar 2019 06:00 )             36.7         Mean Cell Volume : 85.2 fL  Mean Cell Hemoglobin : 27.1 pg  Mean Cell Hemoglobin Concentration : 31.9 %  Auto Neutrophil # : x  Auto Lymphocyte # : x  Auto Monocyte # : x  Auto Eosinophil # : x  Auto Basophil # : x  Auto Neutrophil % : x  Auto Lymphocyte % : x  Auto Monocyte % : x  Auto Eosinophil % : x  Auto Basophil % : x      Serial CBC's  03-25 @ 06:00  Hct-36.7 / Hgb-11.7 / Plat-509 / RBC-4.31 / WBC-8.77  Serial CBC's  03-24 @ 04:00  Hct-37.9 / Hgb-12.2 / Plat-528 / RBC-4.46 / WBC-8.94  Serial CBC's  03-23 @ 04:00  Hct-36.6 / Hgb-11.3 / Plat-524 / RBC-4.11 / WBC-10.01  Serial CBC's  03-22 @ 03:00  Hct-33.0 / Hgb-10.3 / Plat-565 / RBC-3.76 / WBC-10.30      03-25    136  |  102  |  3<L>  ----------------------------<  105<H>  4.1   |  25  |  0.56    Ca    7.8<L>      25 Mar 2019 06:00  Phos  2.9     03-25  Mg     1.8     03-25    TPro  4.9<L>  /  Alb  2.5<L>  /  TBili  0.3  /  DBili  x   /  AST  9   /  ALT  14  /  AlkPhos  98  03-24      PT/INR - ( 25 Mar 2019 06:00 )   PT: 12.3 SEC;   INR: 1.10          PTT - ( 25 Mar 2019 06:00 )  PTT:27.4 SEC                RADIOLOGY & ADDITIONAL STUDIES:  reviewed

## 2019-03-25 NOTE — PROGRESS NOTE ADULT - SUBJECTIVE AND OBJECTIVE BOX
Subjective: 55 y/o female presents sitting up in chair in NAD. Denies sob, cp, nvd    Vital Signs:  Vital Signs Last 24 Hrs  T(C): 36.9 (03-25-19 @ 12:56), Max: 37.5 (03-25-19 @ 00:07)  T(F): 98.5 (03-25-19 @ 12:56), Max: 99.5 (03-25-19 @ 00:07)  HR: 112 (03-25-19 @ 12:56) (110 - 122)  BP: 97/62 (03-25-19 @ 12:56) (91/42 - 105/68)  RR: 18 (03-25-19 @ 12:56) (15 - 28)  SpO2: 93% (03-25-19 @ 12:56) (92% - 99%) on (O2)    Pertinent Physical Exam:  Telemetry/Alarms: sinus rhyth,  General: WN/WD NAD  Neurology: Awake, nonfocal, PAZ x 4  Eyes: Scleras clear, PERRLA/ EOMI, Gross vision intact  ENT:Gross hearing intact, grossly patent pharynx, no stridor  Neck: Neck supple, trachea midline, No JVD,   Respiratory: CTA B/L, No wheezing, rales, rhonchi  CV: RRR, S1S2, no murmurs, rubs or gallops  Abdominal: Soft, NT, ND +BS,   Extremities: No edema, + peripheral pulses  Skin: No Rashes, Hematoma, Ecchymosis  Lymphatic: No Neck, axilla, groin LAD  Psych: Oriented x 3, normal affect  Incisions: c/d/i  Tubes: pericardial drain in place      I&O's Summary    24 Mar 2019 07:01  -  25 Mar 2019 07:00  --------------------------------------------------------  IN: 795 mL / OUT: 1041 mL / NET: -246 mL    25 Mar 2019 07:01  -  25 Mar 2019 13:04  --------------------------------------------------------  IN: 0 mL / OUT: 840 mL / NET: -840 mL        Relevant labs, radiology and Medications reviewed                        11.7   8.77  )-----------( 509      ( 25 Mar 2019 06:00 )             36.7     03-25    136  |  102  |  3<L>  ----------------------------<  105<H>  4.1   |  25  |  0.56    Ca    7.8<L>      25 Mar 2019 06:00  Phos  2.9     03-25  Mg     1.8     03-25    TPro  4.9<L>  /  Alb  2.5<L>  /  TBili  0.3  /  DBili  x   /  AST  9   /  ALT  14  /  AlkPhos  98  03-24    PT/INR - ( 25 Mar 2019 06:00 )   PT: 12.3 SEC;   INR: 1.10          PTT - ( 25 Mar 2019 06:00 )  PTT:27.4 SEC  MEDICATIONS  (STANDING):  docusate sodium 100 milliGRAM(s) Oral three times a day  heparin  Injectable 5000 Unit(s) SubCutaneous every 12 hours  melatonin 3 milliGRAM(s) Oral at bedtime  senna 2 Tablet(s) Oral at bedtime    MEDICATIONS  (PRN):  benzocaine 15 mG/menthol 3.6 mG (Sugar-Free) Lozenge 1 Lozenge Oral every 4 hours PRN cough  guaiFENesin    Syrup 200 milliGRAM(s) Oral every 6 hours PRN Cough  HYDROmorphone  Injectable 0.5 milliGRAM(s) IV Push every 3 hours PRN Severe Pain (7 - 10)  oxyCODONE    5 mG/acetaminophen 325 mG 1 Tablet(s) Oral every 4 hours PRN Moderate Pain (4 - 6)      Assessment  54y Female  w/ PAST MEDICAL & SURGICAL HISTORY:  Lung cancer  History of lung surgery  admitted with complaints of Patient is a 54y old  Female who presents with a chief complaint of Pericardial effusion (24 Mar 2019 06:15)    55 yo F w recent dx of R lung adenocarcinoma with mediastinal and hilar lymphadenopathy c/b R pleural effusion s/p pleurx and VATS, PE (3/4) on lovenox s/p IVC filter presenting with acutely worse shortness of breath and substernal chest pain today. Found to have large pleural effusion, 600cc of fluid drained from pleurx cath. Ultrasound done by ER showing pericardial effusion. Patient underwent subxiphoid pericardial window on 3/20/19. Today patient to undergo an Echo.       PLAN  Neuro: Pain management with oral meds as needed  Pulm: Encourage coughing, deep breathing and use of incentive spirometry. Daily CXR.   Cardio: Monitor telemetry/alarms  GI: Tolerating diet. Continue stool softeners.  Renal: monitor urine output, supplement electrolytes as needed  Vasc: Heparin SC/SCDs for DVT prophylaxis  Heme: Stable H/H.  ID: Off antibiotics. Stable.  Therapy: OOB/ambulate  Tubes: Monitor Chest tube output  Disposition: will discuss with Onc if patient will undergo treatment as in patient.  Discussed with Cardiothoracic Team at AM rounds.

## 2019-03-25 NOTE — DISCHARGE NOTE NURSING/CASE MANAGEMENT/SOCIAL WORK - NSDCDPATPORTLINK_GEN_ALL_CORE
You can access the [facility], offered by [facility], by registering with the following website: [email or web address]

## 2019-03-25 NOTE — DISCHARGE NOTE NURSING/CASE MANAGEMENT/SOCIAL WORK - NSSCNAMETXT_GEN_ALL_CORE
[facility] at Home  initial visit will be day after discharge home. A nurse will call prior to the home visit.

## 2019-03-26 LAB
ANION GAP SERPL CALC-SCNC: 8 MMO/L — SIGNIFICANT CHANGE UP (ref 7–14)
BUN SERPL-MCNC: 5 MG/DL — LOW (ref 7–23)
CALCIUM SERPL-MCNC: 7.9 MG/DL — LOW (ref 8.4–10.5)
CHLORIDE SERPL-SCNC: 102 MMOL/L — SIGNIFICANT CHANGE UP (ref 98–107)
CO2 SERPL-SCNC: 24 MMOL/L — SIGNIFICANT CHANGE UP (ref 22–31)
CREAT SERPL-MCNC: 0.55 MG/DL — SIGNIFICANT CHANGE UP (ref 0.5–1.3)
GLUCOSE SERPL-MCNC: 107 MG/DL — HIGH (ref 70–99)
HCT VFR BLD CALC: 36.4 % — SIGNIFICANT CHANGE UP (ref 34.5–45)
HGB BLD-MCNC: 11.6 G/DL — SIGNIFICANT CHANGE UP (ref 11.5–15.5)
INR BLD: 1.03 — SIGNIFICANT CHANGE UP (ref 0.88–1.17)
MCHC RBC-ENTMCNC: 27.3 PG — SIGNIFICANT CHANGE UP (ref 27–34)
MCHC RBC-ENTMCNC: 31.9 % — LOW (ref 32–36)
MCV RBC AUTO: 85.6 FL — SIGNIFICANT CHANGE UP (ref 80–100)
NRBC # FLD: 0.03 K/UL — SIGNIFICANT CHANGE UP (ref 0–0)
PLATELET # BLD AUTO: 508 K/UL — HIGH (ref 150–400)
PMV BLD: 9.1 FL — SIGNIFICANT CHANGE UP (ref 7–13)
POTASSIUM SERPL-MCNC: 4.1 MMOL/L — SIGNIFICANT CHANGE UP (ref 3.5–5.3)
POTASSIUM SERPL-SCNC: 4.1 MMOL/L — SIGNIFICANT CHANGE UP (ref 3.5–5.3)
PROTHROM AB SERPL-ACNC: 11.8 SEC — SIGNIFICANT CHANGE UP (ref 9.8–13.1)
RBC # BLD: 4.25 M/UL — SIGNIFICANT CHANGE UP (ref 3.8–5.2)
RBC # FLD: 13.4 % — SIGNIFICANT CHANGE UP (ref 10.3–14.5)
SODIUM SERPL-SCNC: 134 MMOL/L — LOW (ref 135–145)
WBC # BLD: 11.04 K/UL — HIGH (ref 3.8–10.5)
WBC # FLD AUTO: 11.04 K/UL — HIGH (ref 3.8–10.5)

## 2019-03-26 RX ORDER — BLEOMYCIN SULFATE 30 UNIT
15 VIAL (EA) INJECTION ONCE
Qty: 0 | Refills: 0 | Status: COMPLETED | OUTPATIENT
Start: 2019-03-26 | End: 2019-03-26

## 2019-03-26 RX ADMIN — BENZOCAINE AND MENTHOL 1 LOZENGE: 5; 1 LIQUID ORAL at 01:06

## 2019-03-26 RX ADMIN — Medication 15 UNIT(S): at 12:10

## 2019-03-26 RX ADMIN — OXYCODONE AND ACETAMINOPHEN 1 TABLET(S): 5; 325 TABLET ORAL at 20:50

## 2019-03-26 RX ADMIN — OXYCODONE AND ACETAMINOPHEN 1 TABLET(S): 5; 325 TABLET ORAL at 19:57

## 2019-03-26 RX ADMIN — HEPARIN SODIUM 5000 UNIT(S): 5000 INJECTION INTRAVENOUS; SUBCUTANEOUS at 18:38

## 2019-03-26 RX ADMIN — HEPARIN SODIUM 5000 UNIT(S): 5000 INJECTION INTRAVENOUS; SUBCUTANEOUS at 05:24

## 2019-03-26 NOTE — PROCEDURE NOTE - ADDITIONAL PROCEDURE DETAILS
15units of Bleomycin in 50cc NS injected into pericardail drain followed by 10cc sterile saline to flush tube. Tube clamped for two hours 15units of Bleomycin in 50cc NS injected into pericardail drain followed by 10cc sterile saline to flush tube. Tube clamped for two hours.

## 2019-03-26 NOTE — PROGRESS NOTE ADULT - SUBJECTIVE AND OBJECTIVE BOX
Subjective: 53 y/o female presents sitting up in bed in NAD. States she "needs a better bed, this one is not comfortable". She currently denies sob, cp, n/v/d.     Vital Signs:  Vital Signs Last 24 Hrs  T(C): 37 (03-26-19 @ 05:06), Max: 37.5 (03-26-19 @ 00:23)  T(F): 98.6 (03-26-19 @ 05:06), Max: 99.5 (03-26-19 @ 00:23)  HR: 98 (03-26-19 @ 08:38) (98 - 112)  BP: 98/56 (03-26-19 @ 08:38) (96/55 - 101/55)  RR: 16 (03-26-19 @ 08:38) (16 - 18)  SpO2: 94% (03-26-19 @ 08:38) (93% - 97%) on (O2)    Pertinent Physical Exam:  Telemetry/Alarms: Sinus tachy  General: WN/WD NAD  Neurology: Awake, nonfocal, PAZ x 4  Eyes: Scleras clear, PERRLA/ EOMI, Gross vision intact  ENT:Gross hearing intact, grossly patent pharynx, no stridor  Neck: Neck supple, trachea midline, No JVD.  Respiratory: CTA B/L, No wheezing, rales, rhonchi  CV: RRR, S1S2, no murmurs, rubs or gallops  Abdominal: Soft, NT, ND +BS  Extremities: No edema, + peripheral pulses  Skin: No Rashes, Hematoma, Ecchymosis  Lymphatic: No Neck, axilla, groin LAD  Psych: Oriented x 3, normal affect  Incisions: c/d/i  Tubes: Pericardial mark in place, Left pleurX to WS no AL    Chest Tube: Left Side    Air Leak: Yes[] / No[x]    Drainage: 77cc    I&O's Summary    25 Mar 2019 07:01  -  26 Mar 2019 07:00  --------------------------------------------------------  IN: 0 mL / OUT: 2270 mL / NET: -2270 mL        Relevant labs, radiology and Medications reviewed                        11.6   11.04 )-----------( 508      ( 26 Mar 2019 07:20 )             36.4     03-26    134<L>  |  102  |  5<L>  ----------------------------<  107<H>  4.1   |  24  |  0.55    Ca    7.9<L>      26 Mar 2019 07:20  Phos  2.9     03-25  Mg     1.8     03-25      PT/INR - ( 26 Mar 2019 07:20 )   PT: 11.8 SEC;   INR: 1.03          PTT - ( 25 Mar 2019 06:00 )  PTT:27.4 SEC  MEDICATIONS  (STANDING):  bleomycin PF IntraPleural (Non - oncologic) 15 Unit(s) IntraPleural. once  docusate sodium 100 milliGRAM(s) Oral three times a day  heparin  Injectable 5000 Unit(s) SubCutaneous every 12 hours  melatonin 3 milliGRAM(s) Oral at bedtime  senna 2 Tablet(s) Oral at bedtime    MEDICATIONS  (PRN):  benzocaine 15 mG/menthol 3.6 mG (Sugar-Free) Lozenge 1 Lozenge Oral every 4 hours PRN cough  guaiFENesin    Syrup 200 milliGRAM(s) Oral every 6 hours PRN Cough  HYDROmorphone  Injectable 0.5 milliGRAM(s) IV Push every 3 hours PRN Severe Pain (7 - 10)  oxyCODONE    5 mG/acetaminophen 325 mG 1 Tablet(s) Oral every 4 hours PRN Moderate Pain (4 - 6)      Assessment  54y Female  w/ PAST MEDICAL & SURGICAL HISTORY:  Lung cancer  History of lung surgery  admitted with complaints of Patient is a 54y old  Female who presents with a chief complaint of Pericardial effusion (25 Mar 2019 17:17)    53 yo F w recent dx of R lung adenocarcinoma with mediastinal and hilar lymphadenopathy c/b R pleural effusion s/p pleurx and VATS, PE (3/4) on lovenox s/p IVC filter presenting with acutely worse shortness of breath and substernal chest pain today. Found to have large pleural effusion, 600cc of fluid drained from pleurx cath. Ultrasound done by ER showing pericardial effusion. Patient underwent subxiphoid pericardial window on 3/20/19. 3/25 Echo with trace pericardial effusion. Heme/onc saw patient and she is to f/u as outpatient for therapy. 3/26 Bedside bleomycin today per Dr. Morris; will clamp for 2hours and leave to suction.         PLAN  Neuro: Pain management  Pulm: Encourage coughing, deep breathing and use of incentive spirometry. Daily CXR.   Cardio: Monitor telemetry/alarms. Borderline bp and tachy. Will undergo bleomycin through pericardial mark today.  GI: Tolerating diet. Continue stool softeners.  Renal: monitor urine output, supplement electrolytes as needed  Vasc: Heparin SC/SCDs for DVT prophylaxis  Heme: Stable H/H.  ID: Off antibiotics. Stable.  Therapy: OOB/ambulate  Tubes: Monitor Chest tube output.   Disposition: Aim to D/C to home once pericardial mark removed.  Discussed with Cardiothoracic Team at AM rounds.

## 2019-03-27 ENCOUNTER — APPOINTMENT (OUTPATIENT)
Dept: HEMATOLOGY ONCOLOGY | Facility: CLINIC | Age: 54
End: 2019-03-27

## 2019-03-27 LAB
ANION GAP SERPL CALC-SCNC: 10 MMO/L — SIGNIFICANT CHANGE UP (ref 7–14)
BUN SERPL-MCNC: 6 MG/DL — LOW (ref 7–23)
CALCIUM SERPL-MCNC: 8 MG/DL — LOW (ref 8.4–10.5)
CHLORIDE SERPL-SCNC: 103 MMOL/L — SIGNIFICANT CHANGE UP (ref 98–107)
CO2 SERPL-SCNC: 23 MMOL/L — SIGNIFICANT CHANGE UP (ref 22–31)
CREAT SERPL-MCNC: 0.55 MG/DL — SIGNIFICANT CHANGE UP (ref 0.5–1.3)
GLUCOSE SERPL-MCNC: 119 MG/DL — HIGH (ref 70–99)
HCT VFR BLD CALC: 39.1 % — SIGNIFICANT CHANGE UP (ref 34.5–45)
HGB BLD-MCNC: 11.7 G/DL — SIGNIFICANT CHANGE UP (ref 11.5–15.5)
MCHC RBC-ENTMCNC: 27 PG — SIGNIFICANT CHANGE UP (ref 27–34)
MCHC RBC-ENTMCNC: 29.9 % — LOW (ref 32–36)
MCV RBC AUTO: 90.3 FL — SIGNIFICANT CHANGE UP (ref 80–100)
NRBC # FLD: 0 K/UL — SIGNIFICANT CHANGE UP (ref 0–0)
PLATELET # BLD AUTO: 454 K/UL — HIGH (ref 150–400)
PMV BLD: 9 FL — SIGNIFICANT CHANGE UP (ref 7–13)
POTASSIUM SERPL-MCNC: 4.1 MMOL/L — SIGNIFICANT CHANGE UP (ref 3.5–5.3)
POTASSIUM SERPL-SCNC: 4.1 MMOL/L — SIGNIFICANT CHANGE UP (ref 3.5–5.3)
RBC # BLD: 4.33 M/UL — SIGNIFICANT CHANGE UP (ref 3.8–5.2)
RBC # FLD: 13.4 % — SIGNIFICANT CHANGE UP (ref 10.3–14.5)
SODIUM SERPL-SCNC: 136 MMOL/L — SIGNIFICANT CHANGE UP (ref 135–145)
SPECIMEN SOURCE: SIGNIFICANT CHANGE UP
WBC # BLD: 10.86 K/UL — HIGH (ref 3.8–10.5)
WBC # FLD AUTO: 10.86 K/UL — HIGH (ref 3.8–10.5)

## 2019-03-27 PROCEDURE — 71045 X-RAY EXAM CHEST 1 VIEW: CPT | Mod: 26

## 2019-03-27 RX ORDER — PANTOPRAZOLE SODIUM 20 MG/1
40 TABLET, DELAYED RELEASE ORAL
Qty: 0 | Refills: 0 | Status: DISCONTINUED | OUTPATIENT
Start: 2019-03-27 | End: 2019-04-02

## 2019-03-27 RX ORDER — ONDANSETRON 8 MG/1
4 TABLET, FILM COATED ORAL EVERY 6 HOURS
Qty: 0 | Refills: 0 | Status: DISCONTINUED | OUTPATIENT
Start: 2019-03-27 | End: 2019-04-02

## 2019-03-27 RX ADMIN — HEPARIN SODIUM 5000 UNIT(S): 5000 INJECTION INTRAVENOUS; SUBCUTANEOUS at 05:20

## 2019-03-27 RX ADMIN — HEPARIN SODIUM 5000 UNIT(S): 5000 INJECTION INTRAVENOUS; SUBCUTANEOUS at 17:28

## 2019-03-27 RX ADMIN — ONDANSETRON 4 MILLIGRAM(S): 8 TABLET, FILM COATED ORAL at 17:28

## 2019-03-27 NOTE — PROGRESS NOTE ADULT - SUBJECTIVE AND OBJECTIVE BOX
Subjective: "I feel ok, just some pain at the tube site" No CP or SOB. S/p Bleo pleurodesis yesterday.     Vital Signs:  Vital Signs Last 24 Hrs  T(C): 36.9 (03-27-19 @ 08:37), Max: 37.5 (03-26-19 @ 19:53)  T(F): 98.4 (03-27-19 @ 08:37), Max: 99.5 (03-26-19 @ 19:53)  HR: 97 (03-27-19 @ 08:37) (89 - 110)  BP: 117/46 (03-27-19 @ 08:37) (83/33 - 117/46)  RR: 16 (03-27-19 @ 08:37) (16 - 18)  SpO2: 97% (03-27-19 @ 08:37) (96% - 100%) on (O2)    Telemetry/Alarms:  General: WN/WD NAD  Neurology: Awake, nonfocal, PAZ x 4  Eyes: Scleras clear, PERRLA/ EOMI, Gross vision intact  ENT:Gross hearing intact, grossly patent pharynx, no stridor  Neck: Neck supple, trachea midline, No JVD,   Respiratory: decreased throughout Rt. side  CV: RRR, S1S2, no murmurs, rubs or gallops  Abdominal: Soft, NT, ND +BS, no BM  Extremities: No edema, + peripheral pulses  Skin: No Rashes, Hematoma, Ecchymosis  Lymphatic: No Neck, axilla, groin LAD  Psych: Oriented x 3, normal affect  Incisions: Pleurx and subxyphoid site c/d/i  Tubes: Rt. Pleurx 360cc/24hrs on WS, Mark 120cc/24hrs on suction.   Relevant labs, radiology and Medications reviewed                        11.7   10.86 )-----------( [id number]      ( 27 Mar 2019 06:27 )             39.1     03-27    136  |  103  |  6<L>  ----------------------------<  119<H>  4.1   |  23  |  0.55    Ca    8.0<L>      27 Mar 2019 06:27      PT/INR - ( 26 Mar 2019 07:20 )   PT: 11.8 SEC;   INR: 1.03            MEDICATIONS  (STANDING):  docusate sodium 100 milliGRAM(s) Oral three times a day  heparin  Injectable 5000 Unit(s) SubCutaneous every 12 hours  melatonin 3 milliGRAM(s) Oral at bedtime  senna 2 Tablet(s) Oral at bedtime    MEDICATIONS  (PRN):  benzocaine 15 mG/menthol 3.6 mG (Sugar-Free) Lozenge 1 Lozenge Oral every 4 hours PRN cough  guaiFENesin    Syrup 200 milliGRAM(s) Oral every 6 hours PRN Cough  HYDROmorphone  Injectable 0.5 milliGRAM(s) IV Push every 3 hours PRN Severe Pain (7 - 10)  oxyCODONE    5 mG/acetaminophen 325 mG 1 Tablet(s) Oral every 4 hours PRN Moderate Pain (4 - 6)    Pertinent Physical Exam  I&O's Summary    26 Mar 2019 07:01  -  27 Mar 2019 07:00  --------------------------------------------------------  IN: 0 mL / OUT: 2380 mL / NET: -2380 mL        Assessment  54y Female  w/ PAST MEDICAL & SURGICAL HISTORY:  Lung cancer  History of lung surgery  admitted with complaints of Patient is a 54y old  Female who presents with a chief complaint of Pericardial effusion (26 Mar 2019 11:11)  53 yo F w recent dx of R lung adenocarcinoma with mediastinal and hilar lymphadenopathy c/b R pleural effusion s/p pleurx and VATS, PE (3/4) on lovenox s/p IVC filter presenting with acutely worse shortness of breath and substernal chest pain today. Found to have large pleural effusion, 600cc of fluid drained from pleurx cath. Ultrasound done by ER showing pericardial effusion. Patient underwent subxiphoid pericardial window on 3/20/19. 3/25 Echo with trace pericardial effusion. Heme/onc saw patient and she is to f/u as outpatient for therapy. 3/26 Bedside bleomycin today per Dr. Morris; will clamp for 2hours and leave to suction.  PLAN  Neuro: Pain management  Pulm: Encourage coughing, deep breathing and use of incentive spirometry. Wean off supplemental oxygen as able. Daily CXR. Will leave Pleurx to pleuravac  Cardio: Monitor telemetry/alarms  GI: Tolerating diet. Continue stool softeners.  Renal: monitor urine output, supplement electrolytes as needed  Vasc: Heparin SC/SCDs for DVT prophylaxis  Heme: Stable H/H. . Will need ONC FU as outpt  ID: Off antibiotics. Stable.  Therapy: OOB/ambulate  Tubes: Monitor Chest tube output and Mark output. Will d/c Mark when 30cc/24hrs. Keep to suction today.   Disposition: Aim to D/C to home once mark removed.   Discussed with Cardiothoracic Team at AM rounds.

## 2019-03-28 ENCOUNTER — OUTPATIENT (OUTPATIENT)
Dept: OUTPATIENT SERVICES | Facility: HOSPITAL | Age: 54
LOS: 1 days | Discharge: HOME | End: 2019-03-28

## 2019-03-28 DIAGNOSIS — C34.90 MALIGNANT NEOPLASM OF UNSPECIFIED PART OF UNSPECIFIED BRONCHUS OR LUNG: ICD-10-CM

## 2019-03-28 DIAGNOSIS — Z98.890 OTHER SPECIFIED POSTPROCEDURAL STATES: Chronic | ICD-10-CM

## 2019-03-28 RX ADMIN — HEPARIN SODIUM 5000 UNIT(S): 5000 INJECTION INTRAVENOUS; SUBCUTANEOUS at 04:52

## 2019-03-28 RX ADMIN — Medication 3 MILLIGRAM(S): at 23:03

## 2019-03-28 RX ADMIN — Medication 30 MILLILITER(S): at 17:50

## 2019-03-28 RX ADMIN — ONDANSETRON 4 MILLIGRAM(S): 8 TABLET, FILM COATED ORAL at 06:31

## 2019-03-28 RX ADMIN — ONDANSETRON 4 MILLIGRAM(S): 8 TABLET, FILM COATED ORAL at 23:06

## 2019-03-28 RX ADMIN — HEPARIN SODIUM 5000 UNIT(S): 5000 INJECTION INTRAVENOUS; SUBCUTANEOUS at 17:46

## 2019-03-28 NOTE — PROGRESS NOTE ADULT - SUBJECTIVE AND OBJECTIVE BOX
Subjective: "I feel ok but my stomach is still bothering sometimes" Denies CP or SOB. Some pain at tube sites.     Vital Signs:  Vital Signs Last 24 Hrs  T(C): 36.9 (03-28-19 @ 12:06), Max: 37.2 (03-27-19 @ 18:42)  T(F): 98.4 (03-28-19 @ 12:06), Max: 99 (03-27-19 @ 18:42)  HR: 59 (03-28-19 @ 12:06) (59 - 101)  BP: 91/55 (03-28-19 @ 12:06) (91/50 - 95/55)  RR: 16 (03-28-19 @ 12:06) (16 - 18)  SpO2: 90% (03-28-19 @ 12:06) (90% - 99%) on (O2)    Telemetry/Alarms:  General: WN/WD NAD  Neurology: Awake, nonfocal, PAZ x 4  Eyes: Scleras clear, PERRLA/ EOMI, Gross vision intact  ENT:Gross hearing intact, grossly patent pharynx, no stridor  Neck: Neck supple, trachea midline, No JVD,   Respiratory: decreased throughout left side  CV: RRR, S1S2, no murmurs, rubs or gallops  Abdominal: Soft, NT, ND +BS, no BM.   Extremities: No edema, + peripheral pulses  Skin: No Rashes, Hematoma, Ecchymosis  Lymphatic: No Neck, axilla, groin LAD  Psych: Oriented x 3, normal affect  Tubes:: Rt. Pleurx 160cc/24hrs on Ws, no AL. Midline Mark 50cc/24hrs on suction, placed to waterseal this am.   Incision: Midline SL w steris.   Relevant labs, radiology and Medications reviewed           CXR stable.              11.7   10.86 )-----------( 454      ( 27 Mar 2019 06:27 )             39.1     03-27    136  |  103  |  6<L>  ----------------------------<  119<H>  4.1   |  23  |  0.55    Ca    8.0<L>      27 Mar 2019 06:27        MEDICATIONS  (STANDING):  docusate sodium 100 milliGRAM(s) Oral three times a day  heparin  Injectable 5000 Unit(s) SubCutaneous every 12 hours  melatonin 3 milliGRAM(s) Oral at bedtime  pantoprazole    Tablet 40 milliGRAM(s) Oral before breakfast  senna 2 Tablet(s) Oral at bedtime    MEDICATIONS  (PRN):  aluminum hydroxide/magnesium hydroxide/simethicone Suspension 30 milliLiter(s) Oral every 6 hours PRN Dyspepsia  benzocaine 15 mG/menthol 3.6 mG (Sugar-Free) Lozenge 1 Lozenge Oral every 4 hours PRN cough  guaiFENesin    Syrup 200 milliGRAM(s) Oral every 6 hours PRN Cough  ondansetron Injectable 4 milliGRAM(s) IV Push every 6 hours PRN Nausea and/or Vomiting    Pertinent Physical Exam  I&O's Summary    27 Mar 2019 07:01  -  28 Mar 2019 07:00  --------------------------------------------------------  IN: 0 mL / OUT: 1647 mL / NET: -1647 mL    28 Mar 2019 07:01  -  28 Mar 2019 13:25  --------------------------------------------------------  IN: 0 mL / OUT: 280 mL / NET: -280 mL        Assessment  54y Female  w/ PAST MEDICAL & SURGICAL HISTORY:  Lung cancer  History of lung surgery  admitted with complaints of Patient is a 54y old  Female who presents with a chief complaint of Pericardial effusion (27 Mar 2019 09:29)  53 yo F w recent dx of R lung adenocarcinoma with mediastinal and hilar lymphadenopathy c/b R pleural effusion s/p pleurx and VATS, PE (3/4) on lovenox s/p IVC filter presenting with acutely worse shortness of breath and substernal chest pain today. Found to have large pleural effusion, 600cc of fluid drained from pleurx cath. Ultrasound done by ER showing pericardial effusion. Patient underwent subxiphoid pericardial window on 3/20/19. 3/25 Echo with trace pericardial effusion. Heme/onc saw patient and she is to f/u as outpatient for therapy. 3/26 Bedside bleomycin today per Dr. Morris; will clamp for 2hours and leave to suction.      PLAN  Neuro: Pain management  Pulm: Encourage coughing, deep breathing and use of incentive spirometry. Wean off supplemental oxygen as able. Daily CXR.   Cardio: Monitor telemetry/alarms  GI: Tolerating diet. Continue stool softeners.  Renal: monitor urine output, supplement electrolytes as needed  Vasc: Heparin SC/SCDs for DVT prophylaxis  Heme: Stable H/H. .   ID: Off antibiotics. Stable.  Therapy: OOB/ambulate  Tubes: Monitor Chest tube output and mark output. Will d/c Mark when output < 30cc/24hrs.   Disposition: Aim to D/C to home once mark removed. will need VNS for Pleurx.   Discussed with Cardiothoracic Team at AM rounds.

## 2019-03-29 PROCEDURE — 99232 SBSQ HOSP IP/OBS MODERATE 35: CPT

## 2019-03-29 RX ADMIN — ONDANSETRON 4 MILLIGRAM(S): 8 TABLET, FILM COATED ORAL at 11:24

## 2019-03-29 RX ADMIN — Medication 200 MILLIGRAM(S): at 21:13

## 2019-03-29 RX ADMIN — HEPARIN SODIUM 5000 UNIT(S): 5000 INJECTION INTRAVENOUS; SUBCUTANEOUS at 17:28

## 2019-03-29 RX ADMIN — ONDANSETRON 4 MILLIGRAM(S): 8 TABLET, FILM COATED ORAL at 18:19

## 2019-03-29 RX ADMIN — Medication 100 MILLIGRAM(S): at 21:13

## 2019-03-29 RX ADMIN — PANTOPRAZOLE SODIUM 40 MILLIGRAM(S): 20 TABLET, DELAYED RELEASE ORAL at 07:04

## 2019-03-29 RX ADMIN — SENNA PLUS 2 TABLET(S): 8.6 TABLET ORAL at 21:13

## 2019-03-29 RX ADMIN — Medication 100 MILLIGRAM(S): at 12:43

## 2019-03-29 RX ADMIN — HEPARIN SODIUM 5000 UNIT(S): 5000 INJECTION INTRAVENOUS; SUBCUTANEOUS at 07:04

## 2019-03-29 NOTE — CHART NOTE - NSCHARTNOTEFT_GEN_A_CORE
"Subjective:       Patient ID: Indira Walker is a 73 y.o. female.    Chief Complaint: Follow-up (ER F/U Fall)    She was seen in the emergency department 02/11/2019 with a history of falling off a chair onto her bottom 3 days prior.  It has been about 10 days since her injury.  She had back pain and coccyx pain.  X-rays of the lumbar spine, sacrum and coccyx, left femur or all negative for any acute injury.  There was no change in her lumbar spine films.  She was given Rx for 12 tramadol at the emergency department.  She is here for follow-up.    She sts she has 6 tramadol left - has also taken some tylenol nothing last couple days. She does see improvement occurring. States both buttocks are sore. Has to sit on one side or the other - lies on her side. The left thigh was burning after a ladder hit it during her fall. (she was trying to sit on a rolling stool and it rolled out behind constance - a ladder leaning against the wall fell on her thigh.    She was seen here a month ago for her hypertension and diabetes and was asked to continue checking her blood pressures.   She sts this AM her BS was 64 but she had no symptoms.      Review of Systems   Musculoskeletal: Positive for back pain and myalgias. Negative for neck pain.   Neurological: Positive for numbness (left foot upper "dead" since).       Objective:      Physical Exam   Constitutional: She is oriented to person, place, and time. She appears well-developed.   HENT:   Head: Normocephalic and atraumatic.   Cardiovascular: Normal rate, regular rhythm and normal heart sounds.   Pulmonary/Chest: Effort normal and breath sounds normal.   Musculoskeletal:   Midline TS/LS NTTP. B TS/LS paraspinals  NTTP.  B buttocks NTTP  Hip rotation painless/full B  TTP to bilateral ischial tuberosities and to coccyx.  NTTP to L thigh anterior   Neurological: She is alert and oriented to person, place, and time.   MS 5/5 B knees/ankles/ toes F/E  MS B hip AB/AD intact and " NUTRITION SERVICES                                                                                  MALNUTRITION ALERT     Attention Health Care Provider: Upon nutritional assessment by the Registered Dietitian your patient was determined to meet criteria / has evidence of the following diagnosis/diagnoses:    [ ] Mild Protein Calorie Malnutrition   [ ] Moderate Protein Calorie Malnutrition   [x ] Severe Protein Calorie Malnutrition   [ ] Unspecified Protein Calorie Malnutrition   [ ] Underweight / BMI <19  [ ] Morbid Obesity / BMI >40          By signing this assessment you are acknowledging the diagnosis/diagnoses.       PLAN OF CARE: Refer to Initial Dietitian Evaluation or Nutrition Follow-Up Documentation for Nutritional Recommendations. painless  Negative SLR B     Skin: Skin is warm and dry.   Psychiatric: She has a normal mood and affect. Her behavior is normal.         Assessment/Plan:     1. Fall, initial encounter     2. Contusion of left thigh, initial encounter     3. Coccyx pain     4. Traumatic buttock pain     5. Uncontrolled type 2 diabetes mellitus without complication, with long-term current use of insulin     6. Hypertension, essential      Symptomatic care for her musculoskeletal complaints from recent fall.  Decrease insulin to 20 U and continue checks. Continue on 1000 mg metformin and 1/2 tab at night (500mg).  Will see her as planned next month with labs.

## 2019-03-29 NOTE — DIETITIAN INITIAL EVALUATION ADULT. - SIGNS/SYMPTOMS
evidenced by Pt with significant decrease in po intake anevere muscle wasting and significant weight evidenced by Pt with significant decrease in po intake and severe muscle wasting

## 2019-03-29 NOTE — DIETITIAN INITIAL EVALUATION ADULT. - OTHER INFO
Pt states that she has had no appetite and since admission and she has been eating less than 50% of her meals. Her usual weight is 110 lbs. and her admission weight was 105 lbs. Pt lost 4.54 % of her weight. Pt has had significant muscle wasting as seen by her protruding clavicle. Pt states that she has already tried Ensure Enlive but could not drink more that 2 ounces of it. She is wiling to try Ensure Clear. Pt had no comp;aint of GI distress nor of difficulty chewing or swallowing.

## 2019-03-29 NOTE — PROGRESS NOTE ADULT - SUBJECTIVE AND OBJECTIVE BOX
Subjective:    Vital Signs:  Vital Signs Last 24 Hrs  T(C): 36.8 (03-29-19 @ 05:47), Max: 37.1 (03-28-19 @ 20:52)  T(F): 98.3 (03-29-19 @ 05:47), Max: 98.7 (03-28-19 @ 20:52)  HR: 86 (03-29-19 @ 05:47) (59 - 99)  BP: 86/48 (03-29-19 @ 05:47) (82/52 - 94/42)  RR: 18 (03-29-19 @ 05:47) (16 - 18)  SpO2: 97% (03-29-19 @ 05:47) (90% - 97%) on (O2)    Pertinent Physical Exam:  Telemetry/Alarms:  General: WN/WD NAD  Neurology: Awake, nonfocal, PAZ x 4  Eyes: Scleras clear, PERRLA/ EOMI, Gross vision intact  ENT:Gross hearing intact, grossly patent pharynx, no stridor  Neck: Neck supple, trachea midline, No JVD,   Respiratory: CTA B/L, No wheezing, rales, rhonchi  CV: RRR, S1S2, no murmurs, rubs or gallops  Abdominal: Soft, NT, ND +BS,   Extremities: No edema, + peripheral pulses  Skin: No Rashes, Hematoma, Ecchymosis  Lymphatic: No Neck, axilla, groin LAD  Psych: Oriented x 3, normal affect  Incisions:   Tubes:    Chest Tube: Right Side/Left Side    Air Leak: Yes[] / No[]    Drainage:     I&O's Summary    28 Mar 2019 07:01  -  29 Mar 2019 07:00  --------------------------------------------------------  IN: 650 mL / OUT: 1285 mL / NET: -635 mL        Relevant labs, radiology and Medications reviewed            MEDICATIONS  (STANDING):  docusate sodium 100 milliGRAM(s) Oral three times a day  heparin  Injectable 5000 Unit(s) SubCutaneous every 12 hours  melatonin 3 milliGRAM(s) Oral at bedtime  pantoprazole    Tablet 40 milliGRAM(s) Oral before breakfast  senna 2 Tablet(s) Oral at bedtime    MEDICATIONS  (PRN):  aluminum hydroxide/magnesium hydroxide/simethicone Suspension 30 milliLiter(s) Oral every 6 hours PRN Dyspepsia  benzocaine 15 mG/menthol 3.6 mG (Sugar-Free) Lozenge 1 Lozenge Oral every 4 hours PRN cough  guaiFENesin    Syrup 200 milliGRAM(s) Oral every 6 hours PRN Cough  ondansetron Injectable 4 milliGRAM(s) IV Push every 6 hours PRN Nausea and/or Vomiting      Assessment  54y Female  w/ PAST MEDICAL & SURGICAL HISTORY:  Lung cancer  History of lung surgery  admitted with complaints of Patient is a 54y old  Female who presents with a chief complaint of Pericardial effusion (28 Mar 2019 13:24)  .  On (Date), patient underwent Pericardiolysis  Creation, pericardial window, subxiphoid approach  Flexible bronchoscopy by cardiothoracic surgery  .   Postoperative course/issues:    PLAN  Neuro: Pain management  Pulm: Encourage coughing, deep breathing and use of incentive spirometry. Wean off supplemental oxygen as able. Daily CXR.   Cardio: Monitor telemetry/alarms  GI: Tolerating diet. Continue stool softeners.  Renal: monitor urine output, supplement electrolytes as needed  Vasc: Heparin SC/SCDs for DVT prophylaxis  Heme: Stable H/H. .   ID: Off antibiotics. Stable.  Therapy: OOB/ambulate  Tubes: Monitor Chest tube output  Disposition: Aim to D/C to home on  Discussed with Cardiothoracic Team at AM rounds. Subjective: 55 y/o female present sitting up in chair in NAD. Patient does not c/o chest pain or SOB at this time. Pericardial mark in place, PleurX in place to WS.    Vital Signs:  Vital Signs Last 24 Hrs  T(C): 36.8 (03-29-19 @ 05:47), Max: 37.1 (03-28-19 @ 20:52)  T(F): 98.3 (03-29-19 @ 05:47), Max: 98.7 (03-28-19 @ 20:52)  HR: 86 (03-29-19 @ 05:47) (59 - 99)  BP: 86/48 (03-29-19 @ 05:47) (82/52 - 94/42)  RR: 18 (03-29-19 @ 05:47) (16 - 18)  SpO2: 97% (03-29-19 @ 05:47) (90% - 97%) on (O2)    Pertinent Physical Exam:  Telemetry/Alarms: Sinus rhythm  General: WN/WD NAD  Neurology: Awake, nonfocal, PAZ x 4  Eyes: Scleras clear, PERRLA/ EOMI, Gross vision intact  ENT:Gross hearing intact, grossly patent pharynx, no stridor  Neck: Neck supple, trachea midline, No JVD,   Respiratory: CTA B/L, No wheezing, rales, rhonchi  CV: RRR, S1S2, no murmurs, rubs or gallops  Abdominal: Soft, NT, ND +BS,   Extremities: No edema, + peripheral pulses  Skin: No Rashes, Hematoma, Ecchymosis  Lymphatic: No Neck, axilla, groin LAD  Psych: Oriented x 3, normal affect  Incisions: c/d/i  Tubes: Pericardial mark in place, PleurX in place    Chest Tube: Right Side   Air Leak: Yes[] / No[x]    Drainage: 352cc    I&O's Summary    28 Mar 2019 07:01  -  29 Mar 2019 07:00  --------------------------------------------------------  IN: 650 mL / OUT: 1285 mL / NET: -635 mL        Relevant labs, radiology and Medications reviewed      MEDICATIONS  (STANDING):  docusate sodium 100 milliGRAM(s) Oral three times a day  heparin  Injectable 5000 Unit(s) SubCutaneous every 12 hours  melatonin 3 milliGRAM(s) Oral at bedtime  pantoprazole    Tablet 40 milliGRAM(s) Oral before breakfast  senna 2 Tablet(s) Oral at bedtime    MEDICATIONS  (PRN):  aluminum hydroxide/magnesium hydroxide/simethicone Suspension 30 milliLiter(s) Oral every 6 hours PRN Dyspepsia  benzocaine 15 mG/menthol 3.6 mG (Sugar-Free) Lozenge 1 Lozenge Oral every 4 hours PRN cough  guaiFENesin    Syrup 200 milliGRAM(s) Oral every 6 hours PRN Cough  ondansetron Injectable 4 milliGRAM(s) IV Push every 6 hours PRN Nausea and/or Vomiting      Assessment  54y Female  w/ PAST MEDICAL & SURGICAL HISTORY:  Lung cancer  History of lung surgery  admitted with complaints of Patient is a 54y old  Female who presents with a chief complaint of Pericardial effusion (28 Mar 2019 13:24)    55 yo F w recent dx of R lung adenocarcinoma with mediastinal and hilar lymphadenopathy c/b R pleural effusion s/p pleurx and VATS, PE (3/4) on lovenox s/p IVC filter presenting with acutely worse shortness of breath and substernal chest pain today. Found to have large pleural effusion, 600cc of fluid drained from pleurx cath. Ultrasound done by ER showing pericardial effusion. Patient underwent subxiphoid pericardial window on 3/20/19. 3/25 Echo with trace pericardial effusion. Heme/onc saw patient and she is to f/u as outpatient for therapy. 3/26 Bedside bleomycin today per Dr. Morris; will clamp for 2hours and leave to suction. 3/29 Maintain mark in place for high output.           PLAN  Neuro: Pain management  Pulm: Encourage coughing, deep breathing and use of incentive spirometry.  Cardio: Monitor telemetry/alarms  GI: Tolerating diet. Continue stool softeners.  Renal: monitor urine output, supplement electrolytes as needed  Vasc: Heparin SC/SCDs for DVT prophylaxis  Heme: Stable H/H.  ID: Off antibiotics. Stable.  Therapy: OOB/ambulate  Tubes: Monitor Chest tube output  Disposition: Aim to D/C to home once pericardial mark output is minimal and can be removed/  Discussed with Cardiothoracic Team at AM rounds.

## 2019-03-29 NOTE — PROGRESS NOTE ADULT - SUBJECTIVE AND OBJECTIVE BOX
INTERVAL HPI/OVERNIGHT EVENTS:  Patient seen at bedside. Feels nauseated and has a bad appetite.      VITAL SIGNS:  T(F): 97.9 (03-29-19 @ 12:20)  HR: 90 (03-29-19 @ 12:20)  BP: 84/39 (03-29-19 @ 12:20)  RR: 18 (03-29-19 @ 12:20)  SpO2: 97% (03-29-19 @ 12:20)  Wt(kg): --    PHYSICAL EXAM:    Constitutional: NAD, resting in bed comfortably  Eyes: EOMI, sclera non-icteric  Neck: supple, no LAP  Respiratory: CTA b/l, good air entry b/l, no wheezing, rhonchi or crackels  Cardiovascular: RRR, normal S1S2, no M/R/G  Gastrointestinal: soft, NTND  Extremities: no edema  Neurological: AAOx3, non focal  Skin: Normal temperature    MEDICATIONS  (STANDING):  docusate sodium 100 milliGRAM(s) Oral three times a day  heparin  Injectable 5000 Unit(s) SubCutaneous every 12 hours  melatonin 3 milliGRAM(s) Oral at bedtime  pantoprazole    Tablet 40 milliGRAM(s) Oral before breakfast  senna 2 Tablet(s) Oral at bedtime    MEDICATIONS  (PRN):  aluminum hydroxide/magnesium hydroxide/simethicone Suspension 30 milliLiter(s) Oral every 6 hours PRN Dyspepsia  benzocaine 15 mG/menthol 3.6 mG (Sugar-Free) Lozenge 1 Lozenge Oral every 4 hours PRN cough  guaiFENesin    Syrup 200 milliGRAM(s) Oral every 6 hours PRN Cough  ondansetron Injectable 4 milliGRAM(s) IV Push every 6 hours PRN Nausea and/or Vomiting      Allergies    No Known Allergies    Intolerances    IV contrast (Rash)      LABS:                RADIOLOGY & ADDITIONAL TESTS:  Studies reviewed.

## 2019-03-29 NOTE — PROGRESS NOTE ADULT - ASSESSMENT
54f with advanced adenocarcinoma of the lung, with PDL1 > 90%, foundation one pending, presenting with pericardial effusion.    -Palliative care consult for symptom management  -Outpatient oncology follow up when acute issues resolve to start immunotherapy, plan is for keytruda [id number], as outpatient.   -Supportive care, pain control, nutrition, hsq (consider lovenox)      Will follow. Please do not hesitate to call back with questions.     Thuy Harper MD  Medical Oncology Attending

## 2019-03-30 RX ORDER — OXYCODONE HYDROCHLORIDE 5 MG/1
10 TABLET ORAL EVERY 4 HOURS
Qty: 0 | Refills: 0 | Status: DISCONTINUED | OUTPATIENT
Start: 2019-03-30 | End: 2019-04-01

## 2019-03-30 RX ORDER — OXYCODONE HYDROCHLORIDE 5 MG/1
5 TABLET ORAL EVERY 4 HOURS
Qty: 0 | Refills: 0 | Status: DISCONTINUED | OUTPATIENT
Start: 2019-03-30 | End: 2019-04-01

## 2019-03-30 RX ORDER — ACETAMINOPHEN 500 MG
650 TABLET ORAL EVERY 4 HOURS
Qty: 0 | Refills: 0 | Status: DISCONTINUED | OUTPATIENT
Start: 2019-03-30 | End: 2019-04-02

## 2019-03-30 RX ADMIN — OXYCODONE HYDROCHLORIDE 5 MILLIGRAM(S): 5 TABLET ORAL at 21:00

## 2019-03-30 RX ADMIN — HEPARIN SODIUM 5000 UNIT(S): 5000 INJECTION INTRAVENOUS; SUBCUTANEOUS at 05:06

## 2019-03-30 RX ADMIN — OXYCODONE HYDROCHLORIDE 5 MILLIGRAM(S): 5 TABLET ORAL at 22:00

## 2019-03-30 RX ADMIN — HEPARIN SODIUM 5000 UNIT(S): 5000 INJECTION INTRAVENOUS; SUBCUTANEOUS at 17:06

## 2019-03-30 NOTE — PROGRESS NOTE ADULT - SUBJECTIVE AND OBJECTIVE BOX
Subjective: "Will this drain ever stop? I want to go home" Pt denies CP or SOB. Amb w min assist.     Vital Signs:  Vital Signs Last 24 Hrs  T(C): 36.8 (03-30-19 @ 08:49), Max: 36.9 (03-29-19 @ 16:14)  T(F): 98.3 (03-30-19 @ 08:49), Max: 98.5 (03-29-19 @ 16:14)  HR: 96 (03-30-19 @ 08:49) (90 - 98)  BP: 99/57 (03-30-19 @ 08:49) (86/46 - 99/57)  RR: 16 (03-30-19 @ 08:49) (16 - 19)  SpO2: 96% (03-30-19 @ 08:49) (91% - 96%) on (O2)    Telemetry/Alarms:  General: WN/WD NAD  Neurology: Awake, nonfocal, PAZ x 4  Eyes: Scleras clear, PERRLA/ EOMI, Gross vision intact  ENT:Gross hearing intact, grossly patent pharynx, no stridor  Neck: Neck supple, trachea midline, No JVD,   Respiratory: decreased throughout Rt. side.   CV: RRR, S1S2, no murmurs, rubs or gallops  Abdominal: Soft, NT, ND +BS, +BM  Extremities: No edema, + peripheral pulses  Skin: No Rashes, Hematoma, Ecchymosis  Lymphatic: No Neck, axilla, groin LAD  Psych: Oriented x 3, normal affect  Incisions: Subxyphoid c/d/i  Tubes: Rt. Pleurx- 120cc/24hrs on WS, no AL. Mark -88cc/24hrs on WS.   Relevant labs, radiology and Medications reviewed            MEDICATIONS  (STANDING):  docusate sodium 100 milliGRAM(s) Oral three times a day  heparin  Injectable 5000 Unit(s) SubCutaneous every 12 hours  melatonin 3 milliGRAM(s) Oral at bedtime  pantoprazole    Tablet 40 milliGRAM(s) Oral before breakfast  senna 2 Tablet(s) Oral at bedtime    MEDICATIONS  (PRN):  aluminum hydroxide/magnesium hydroxide/simethicone Suspension 30 milliLiter(s) Oral every 6 hours PRN Dyspepsia  benzocaine 15 mG/menthol 3.6 mG (Sugar-Free) Lozenge 1 Lozenge Oral every 4 hours PRN cough  guaiFENesin    Syrup 200 milliGRAM(s) Oral every 6 hours PRN Cough  ondansetron Injectable 4 milliGRAM(s) IV Push every 6 hours PRN Nausea and/or Vomiting    Pertinent Physical Exam  I&O's Summary    29 Mar 2019 07:01  -  30 Mar 2019 07:00  --------------------------------------------------------  IN: 0 mL / OUT: 1108 mL / NET: -1108 mL        Assessment  54y Female  w/ PAST MEDICAL & SURGICAL HISTORY:  Lung cancer  History of lung surgery  admitted with complaints of Patient is a 54y old  Female who presents with a chief complaint of Pericardial effusion (29 Mar 2019 13:41)  53 yo F w recent dx of R lung adenocarcinoma with mediastinal and hilar lymphadenopathy c/b R pleural effusion s/p pleurx and VATS, PE (3/4) on lovenox s/p IVC filter presenting with acutely worse shortness of breath and substernal chest pain today. Found to have large pleural effusion, 600cc of fluid drained from pleurx cath. Ultrasound done by ER showing pericardial effusion. Patient underwent subxiphoid pericardial window on 3/20/19. 3/25 Echo with trace pericardial effusion. Heme/onc saw patient and she is to f/u as outpatient for therapy. 3/26 Bedside bleomycin today per Dr. Morris; will clamp for 2hours and leave to suction. 3/28-Mark to waterseal. Kept for high output      PLAN  Neuro: Pain management  Pulm: Encourage coughing, deep breathing and use of incentive spirometry. . Daily CXR.   Cardio: Monitor telemetry/alarms  GI: Tolerating diet. Continue stool softeners.  Renal: monitor urine output, supplement electrolytes as needed  Vasc: Heparin SC/SCDs for DVT prophylaxis  Heme: Stable H/H. FU Oncology as outpt  ID: Off antibiotics. Stable.  Therapy: OOB/ambulate  Tubes: Monitor Chest tube output and mark. Will d/c Mark when < 30cc/24hrs  Disposition: Aim to D/C to home once mark remvoed.   Discussed with Cardiothoracic Team at AM rounds.

## 2019-03-31 PROCEDURE — 71045 X-RAY EXAM CHEST 1 VIEW: CPT | Mod: 26

## 2019-03-31 RX ADMIN — PANTOPRAZOLE SODIUM 40 MILLIGRAM(S): 20 TABLET, DELAYED RELEASE ORAL at 06:21

## 2019-03-31 RX ADMIN — HEPARIN SODIUM 5000 UNIT(S): 5000 INJECTION INTRAVENOUS; SUBCUTANEOUS at 06:21

## 2019-03-31 RX ADMIN — HEPARIN SODIUM 5000 UNIT(S): 5000 INJECTION INTRAVENOUS; SUBCUTANEOUS at 17:34

## 2019-03-31 RX ADMIN — Medication 200 MILLIGRAM(S): at 21:30

## 2019-03-31 NOTE — PROGRESS NOTE ADULT - SUBJECTIVE AND OBJECTIVE BOX
Subjective: "im doing ok" I don't want to leave until all the drainage stops"  No events overnight    Vital Signs:  Vital Signs Last 24 Hrs  T(C): 36.8 (03-30-19 @ 08:49), Max: 36.9 (03-29-19 @ 16:14)  T(F): 98.3 (03-30-19 @ 08:49), Max: 98.5 (03-29-19 @ 16:14)  HR: 96 (03-30-19 @ 08:49) (90 - 98)  BP: 99/57 (03-30-19 @ 08:49) (86/46 - 99/57)  RR: 16 (03-30-19 @ 08:49) (16 - 19)  SpO2: 96% (03-30-19 @ 08:49) (91% - 96%) on (O2)    Telemetry/Alarms:  General: WN/WD NAD  Neurology: Awake, nonfocal, PAZ x 4  Eyes: Scleras clear, PERRLA/ EOMI, Gross vision intact  ENT:Gross hearing intact, grossly patent pharynx, no stridor  Neck: Neck supple, trachea midline, No JVD,   Respiratory: decreased throughout Rt. side.   CV: RRR, S1S2, no murmurs, rubs or gallops  Abdominal: Soft, NT, ND +BS, +BM  Extremities: No edema, + peripheral pulses  Skin: No Rashes, Hematoma, Ecchymosis  Lymphatic: No Neck, axilla, groin LAD  Psych: Oriented x 3, normal affect  Incisions: Subxyphoid c/d/i  Tubes: Rt. Pleurx- 120cc/24hrs on WS, no AL. Mark -88cc/24hrs on WS.   Relevant labs, radiology and Medications reviewed    < from: Xray Chest 1 View- PORTABLE-Routine (03.31.19 @ 08:12) >  EXAM:  XR CHEST PORTABLE ROUTINE 1V        PROCEDURE DATE:  Mar 31 2019     INTERPRETATION:  Clinical indications: Status post pericardial window.    Frontal view of the chest is obtained.    Comparison is made with March 27, 2019.    IMPRESSION: Drainage catheters are noted as on the prior study. There is   a small to moderate-sized loculated right pleural effusion which may have   slightly increased in size since the prior study. There are associated   right lower lung areas of airspace disease as on the prior study. There   is no pneumothorax.    FLORI ARRON M.D. ATTENDING RADIOLOGIST  This document has been electronically signed. Mar 31 2019 12:29PM    < end of copied text >      MEDICATIONS  (STANDING):  docusate sodium 100 milliGRAM(s) Oral three times a day  heparin  Injectable 5000 Unit(s) SubCutaneous every 12 hours  melatonin 3 milliGRAM(s) Oral at bedtime  pantoprazole    Tablet 40 milliGRAM(s) Oral before breakfast  senna 2 Tablet(s) Oral at bedtime    MEDICATIONS  (PRN):  aluminum hydroxide/magnesium hydroxide/simethicone Suspension 30 milliLiter(s) Oral every 6 hours PRN Dyspepsia  benzocaine 15 mG/menthol 3.6 mG (Sugar-Free) Lozenge 1 Lozenge Oral every 4 hours PRN cough  guaiFENesin    Syrup 200 milliGRAM(s) Oral every 6 hours PRN Cough  ondansetron Injectable 4 milliGRAM(s) IV Push every 6 hours PRN Nausea and/or Vomiting    Pertinent Physical Exam  I&O's Summary    29 Mar 2019 07:01  -  30 Mar 2019 07:00  --------------------------------------------------------  IN: 0 mL / OUT: 1108 mL / NET: -1108 mL    Assessment  54y Female  w/ PAST MEDICAL & SURGICAL HISTORY:  Lung cancer  History of lung surgery  admitted with complaints of Patient is a 54y old  Female who presents with a chief complaint of Pericardial effusion (29 Mar 2019 13:41)  55 yo F w recent dx of R lung adenocarcinoma with mediastinal and hilar lymphadenopathy c/b R pleural effusion s/p pleurx and VATS, PE (3/4) on lovenox s/p IVC filter presenting with acutely worse shortness of breath and substernal chest pain today. Found to have large pleural effusion, 600cc of fluid drained from pleurx cath. Ultrasound done by ER showing pericardial effusion. Patient underwent subxiphoid pericardial window on 3/20/19. 3/25 Echo with trace pericardial effusion. Heme/onc saw patient and she is to f/u as outpatient for therapy. 3/26 Bedside bleomycin today per Dr. Morris; will clamp for 2hours and leave to suction. 3/28-Mark to waterseal. Kept for high output      PLAN  Neuro: Pain management  Pulm: Encourage coughing, deep breathing and use of incentive spirometry. . Daily CXR.   Cardio: Monitor telemetry/alarms  GI: Tolerating diet. Continue stool softeners.  Renal: monitor urine output, supplement electrolytes as needed  Vasc: Heparin SC/SCDs for DVT prophylaxis  Heme: Stable H/H. FU Oncology as outpt  ID: Off antibiotics. Stable.  Therapy: OOB/ambulate  Tubes: Monitor Chest tube output and mark. Will d/c Mark when < 30cc/24hrs. Will place mark to bulb drain to self suction today per Dr Zee. Will keep pleureX to pleuravac while inpatient.  Disposition: Aim to D/C to home once mark removed   Discussed with Cardiothoracic Team at AM rounds.

## 2019-04-01 LAB
HCT VFR BLD CALC: 45.2 % — HIGH (ref 34.5–45)
HGB BLD-MCNC: 13.9 G/DL — SIGNIFICANT CHANGE UP (ref 11.5–15.5)
MCHC RBC-ENTMCNC: 27 PG — SIGNIFICANT CHANGE UP (ref 27–34)
MCHC RBC-ENTMCNC: 30.8 % — LOW (ref 32–36)
MCV RBC AUTO: 87.9 FL — SIGNIFICANT CHANGE UP (ref 80–100)
NRBC # FLD: 0 K/UL — SIGNIFICANT CHANGE UP (ref 0–0)
PLATELET # BLD AUTO: 587 K/UL — HIGH (ref 150–400)
PMV BLD: 8.6 FL — SIGNIFICANT CHANGE UP (ref 7–13)
RBC # BLD: 5.14 M/UL — SIGNIFICANT CHANGE UP (ref 3.8–5.2)
RBC # FLD: 13.4 % — SIGNIFICANT CHANGE UP (ref 10.3–14.5)
WBC # BLD: 10.03 K/UL — SIGNIFICANT CHANGE UP (ref 3.8–10.5)
WBC # FLD AUTO: 10.03 K/UL — SIGNIFICANT CHANGE UP (ref 3.8–10.5)

## 2019-04-01 PROCEDURE — 99223 1ST HOSP IP/OBS HIGH 75: CPT

## 2019-04-01 PROCEDURE — 71045 X-RAY EXAM CHEST 1 VIEW: CPT | Mod: 26

## 2019-04-01 RX ORDER — OXYCODONE HYDROCHLORIDE 5 MG/1
5 TABLET ORAL EVERY 4 HOURS
Qty: 0 | Refills: 0 | Status: DISCONTINUED | OUTPATIENT
Start: 2019-04-01 | End: 2019-04-02

## 2019-04-01 RX ORDER — GABAPENTIN 400 MG/1
100 CAPSULE ORAL EVERY 12 HOURS
Qty: 0 | Refills: 0 | Status: DISCONTINUED | OUTPATIENT
Start: 2019-04-01 | End: 2019-04-02

## 2019-04-01 RX ADMIN — GABAPENTIN 100 MILLIGRAM(S): 400 CAPSULE ORAL at 21:00

## 2019-04-01 RX ADMIN — HEPARIN SODIUM 5000 UNIT(S): 5000 INJECTION INTRAVENOUS; SUBCUTANEOUS at 05:18

## 2019-04-01 RX ADMIN — Medication 100 MILLIGRAM(S): at 00:26

## 2019-04-01 RX ADMIN — HEPARIN SODIUM 5000 UNIT(S): 5000 INJECTION INTRAVENOUS; SUBCUTANEOUS at 17:15

## 2019-04-01 NOTE — CONSULT NOTE ADULT - PROBLEM SELECTOR RECOMMENDATION 3
- Clinically much improved with drainage of effusions.  - Continue to wean oxygen.  - Low dose opiates if needed.

## 2019-04-01 NOTE — CONSULT NOTE ADULT - ASSESSMENT
53 yo F w recent dx of R lung adenocarcinoma with mediastinal and hilar lymphadenopathy c/b R pleural effusion s/p pleurx and VATS, PE (3/4) on lovenox s/p IVC filter presenting with acutely worse shortness of breath and substernal chest pain concerning for pericardial effusion, ?tamponade physiology    #Pericardial Effusion  - Pt p/w worsening SOB and CP with bedside POCUS in ED concerning for large loculated pericardial effusion with bowing of right ventricle. Etiology is most likely a malignant pericardial effusion given active lung adenocarcinoma; definitive treatment of which would ostensibly be a pericardial window. Recommend further workup by interventional cardiology vs CT surgery vs interventional radiology for further management of pericardial effusion.  - Pt tachycardic and normotensive at time of examination; not displaying signs of tamponade currently. However, she is at high risk of decompensation given anticoagulation for PE and underlying malignancy. Anticoagulation makes pt too high risk for emergent bedside pericardiocentesis by ED staff.    Pt not a candidate for MICU, pending possible transfer vs CCU evaluation. Please call with questions or concerns. Please reconsult if pt's status changes.     Sherri Nava MD  PGY2 Internal Medicine  Pager [phone] [facility] [phone]
54 F with malignant pericardial and pleural effusions, cough, shortness of breath, stage IV lung adenocarcinoma, encounter for palliative care.
54f with advanced adenocarcinoma of the lung, with PDL1 > 90%, foundation one pending, presenting with pericardial effusion.  Patient is well known to me from outpatient clinic visits.     -Palliative care consult for symptom management  -Outpatient oncology follow up when acute issues resolve to start immunotherapy, plan is for keytruda [id number], as outpatient.   -Supportive care, pain control, nutrition, hsq (consider lovenox)      Will follow. Please do not hesitate to call back with questions.     Thuy Harper MD  Medical Oncology Attending
55 yo F w recent dx of R lung adenocarcinoma with mediastinal and hilar lymphadenopathy c/b R pleural effusion s/p pleurx and VATS, PE (3/4) on lovenox s/p IVC filter presenting with acutely worse shortness of breath and substernal chest pain today.  CXR with pleural effusion and ultrasound finding of pericardial effusion.      Plan:  Above discussed with Dr. Morris and Dr. Walker   CT Chest with IV contrast  Echo  Hold lovenox   Will drain effusion   Medical workup
54y F with recent diagnosis of 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 CT scan done result pending. S/p pleurX catheter drainage

## 2019-04-01 NOTE — PROVIDER CONTACT NOTE (OTHER) - ASSESSMENT
pt A&Ox4, VSS with no s/s of distress
Denies chest pain, SOB, palpitations. Complains of pain 6/10.
Pt denies chest pain, SOB, dizziness, palpitations. No acute distress noted.
pt /39

## 2019-04-01 NOTE — CONSULT NOTE ADULT - PROBLEM SELECTOR RECOMMENDATION 6
- Patient remaining positive.   - Interested in receiving disease modifying interventions as an outpatient.   - Outpatient follow up with Dr. Juanpablo Her on April 8th.

## 2019-04-01 NOTE — PROVIDER CONTACT NOTE (OTHER) - SITUATION
pt IV is 4 days old, per hospital policy needs to be changed. pt refusing new IV. does not receive any medications via IV

## 2019-04-01 NOTE — CONSULT NOTE ADULT - PROBLEM SELECTOR RECOMMENDATION 4
- Continue with Tessalon, Robitussin, opiates.  - Neurontin added to help with neuropathic component of cough.

## 2019-04-01 NOTE — CONSULT NOTE ADULT - SUBJECTIVE AND OBJECTIVE BOX
HPI:  HPI- 53 yo F w recent dx of R lung adenocarcinoma with mediastinal and hilar lymphadenopathy c/b R pleural effusion s/p pleurx and VATS, PE (3/4) on lovenox s/p IVC filter presenting with acutely worse shortness of breath and substernal chest pain today. Patient reports pain is worse with deep breaths. Patient has a home aid who drains the R lung every 3 days - last was yesterday. Patient has a chronic cough from several months unchanged from before. No fevers. + chills. No nausea, vomiting.  Patient has been bed bound for several weeks due to generalized weakness and dyspnea on exertion.  Patient seen in thoracic office by Dr. Morris today, she was advised the increase the amount of drainage.  Chest x-ray with large pleural effusion, 600cc of fluid drained from pleurx cath.  Ultrasound done by ER showing pericardial effusion.    Hospitalization with placement of chest tube and pericardial drain.    PERTINENT PM/SXH:   Lung cancer    History of lung surgery  No significant past surgical history    FAMILY HISTORY:  Family history of coronary artery disease (Sibling): Brother  Family history of hypertension: Mother  Family history of diabetes mellitus (DM): Mother    ITEMS NOT CHECKED ARE NOT PRESENT    SOCIAL HISTORY:   Significant other/partner:  [ ]  Children:  [ x]  Jehovah's witness/Spirituality:  Substance hx:  [ ]   Tobacco hx:  [ ]   Alcohol hx: [ ]   Home Opioid hx:  [ ] I-Stop Reference No:  Living Situation: [ x]Home  [ ]Long term care  [ ]Rehab [ ]Other    ADVANCE DIRECTIVES:    DNR  MOLST  [ ]  Living Will  [ ]   DECISION MAKER(s):  [x ] Health Care Proxy(s)  [ ] Surrogate(s)  [ ] Guardian           Name(s): Phone Number(s): Grace Bliss (son)     BASELINE (I)ADL(s) (prior to admission):  [place]: [ ]Total  [ ] Moderate [ ]Dependent    Allergies    No Known Allergies    Intolerances    IV contrast (Rash)  MEDICATIONS  (STANDING):  docusate sodium 100 milliGRAM(s) Oral three times a day  gabapentin 100 milliGRAM(s) Oral every 12 hours  heparin  Injectable 5000 Unit(s) SubCutaneous every 12 hours  melatonin 3 milliGRAM(s) Oral at bedtime  pantoprazole    Tablet 40 milliGRAM(s) Oral before breakfast  senna 2 Tablet(s) Oral at bedtime    MEDICATIONS  (PRN):  acetaminophen   Tablet .. 650 milliGRAM(s) Oral every 4 hours PRN Mild Pain (1 - 3)  aluminum hydroxide/magnesium hydroxide/simethicone Suspension 30 milliLiter(s) Oral every 6 hours PRN Dyspepsia  benzocaine 15 mG/menthol 3.6 mG (Sugar-Free) Lozenge 1 Lozenge Oral every 4 hours PRN cough  benzonatate 100 milliGRAM(s) Oral three times a day PRN Cough  guaiFENesin    Syrup 200 milliGRAM(s) Oral every 6 hours PRN Cough  ondansetron Injectable 4 milliGRAM(s) IV Push every 6 hours PRN Nausea and/or Vomiting  oxyCODONE    IR 5 milliGRAM(s) Oral every 4 hours PRN moderate and severe pain    PRESENT SYMPTOMS: [ ]Unable to obtain due to poor mentation   Source if other than patient:  [ ]Family   [ ]Team     Pain (Impact on QOL):  None at this time  Location -         Minimal acceptable level (0-10 scale):   Aggravating factors -  Quality -  Radiation -  Severity (0-10 scale) -    Timing -    PAIN AD Score:     http://geriatrictoolkit.[facility]/cog/painad.pdf (press ctrl +  left click to view)    Dyspnea:                           [x ]Mild [ ]Moderate [ ]Severe  Anxiety:                             [x ]Mild [ ]Moderate [ ]Severe  Fatigue:                             [ ]Mild [x ]Moderate [ ]Severe  Nausea:                             [ ]Mild [ ]Moderate [ ]Severe  Loss of appetite:              [x ]Mild [ ]Moderate [ ]Severe  Constipation:                    [ ]Mild [ ]Moderate [ ]Severe  Grief Present                    [ ] Yes   [x ] No   Other Symptoms:  [ x]All other review of systems negative     Karnofsky Performance Score/Palliative Performance Status Version 2:  50       %    http://palliative.info/resource_material/PPSv2.pdf  PHYSICAL EXAM:  Vital Signs Last 24 Hrs  T(C): 36.7 (02 Apr 2019 11:17), Max: 37 (02 Apr 2019 05:19)  T(F): 98 (02 Apr 2019 11:17), Max: 98.6 (02 Apr 2019 05:19)  HR: 103 (02 Apr 2019 11:17) (95 - 108)  BP: 97/46 (02 Apr 2019 11:17) (92/50 - 114/57)  BP(mean): --  RR: 17 (02 Apr 2019 11:17) (16 - 18)  SpO2: 97% (02 Apr 2019 11:17) (91% - 100%) I&O's Summary    01 Apr 2019 07:01  -  02 Apr 2019 07:00  --------------------------------------------------------  IN: 0 mL / OUT: 1255 mL / NET: -1255 mL    GENERAL:  [x ]Alert  [x ]Oriented x  3 [ ]Lethargic  [ ]Cachexia  [ ]Unarousable  [ ]Verbal  [ ]Non-Verbal  Behavioral:   [ ] Anxiety  [ ] Delirium [ ] Agitation [x ] Other  HEENT:  [ ]Normal   [x ]Dry mouth   [ ]ET Tube/Trach  [ ]Oral lesions  PULMONARY:   [ ]Clear [ ]Tachypnea  [ ]Audible excessive secretions   [x ]Rhonchi        [ ]Right [ ]Left [x ]Bilateral  [ ]Crackles        [ ]Right [ ]Left [ ]Bilateral  [ ]Wheezing     [ ]Right [ ]Left [ ]Bilateral  CARDIOVASCULAR:    [x ]Regular [ ]Irregular [ ]Tachy  [ ]Ford [ ]Murmur [ ]Other  GASTROINTESTINAL:  [x ]Soft  [ ]Distended   [x ]+BS  [ x]Non tender [ ]Tender  [ ]PEG [ ]OGT/ NGT  Last BM:   GENITOURINARY:  [ x]Normal [ ] Incontinent   [ ]Oliguria/Anuria   [ ]Sanchez  MUSCULOSKELETAL:   [ ]Normal   x[ ]Weakness  [ ]Bed/Wheelchair bound [ ]Edema  NEUROLOGIC:   [ x]No focal deficits  [ ] Cognitive impairment  [ ] Dysphagia [ ]Dysarthria [ ] Paresis [ ]Other   SKIN:   [x ]Normal   [ ]Pressure ulcer(s)  [ ]Rash    CRITICAL CARE:  [ ] Shock Present  [ ]Septic [ ]Cardiogenic [ ]Neurologic [ ]Hypovolemic  [ ]  Vasopressors [ ]  Inotropes   [ ] Respiratory failure present  [ ] Acute  [ ] Chronic [ ] Hypoxic  [ ] Hypercarbic [ ] Other  [ ] Other organ failure     LABS:                        13.9   10.03 )-----------( 587      ( 01 Apr 2019 10:18 )             45.2             RADIOLOGY & ADDITIONAL STUDIES:    PROTEIN CALORIE MALNUTRITION PRESENT: [ ] Yes [ ] No  [ ] PPSV2 < or = to 30% [ ] significant weight loss  [ ] poor nutritional intake [ ] catabolic state [ ] anasarca     Albumin, Serum: 2.5 g/dL (03-24-19 @ 04:00)  Artificial Nutrition [ ]     REFERRALS:   [ ]Chaplaincy  [ ] Hospice  [ ]Child Life  [ ]Social Work  [ ]Case management [ ]Holistic Therapy   Goals of Care Discussion Document:

## 2019-04-01 NOTE — PROVIDER CONTACT NOTE (OTHER) - ACTION/TREATMENT ORDERED:
CT PA aware
No action required at this time. Continue to monitor.
PA made aware
PA made aware, okay to give oxycodone 5mg.

## 2019-04-01 NOTE — CONSULT NOTE ADULT - PROBLEM SELECTOR RECOMMENDATION 9
- Patient is currently with Tera drain.   - Malignant in etiology.   - Supportive care.  - Symptoms are improved with drainage.

## 2019-04-01 NOTE — PROGRESS NOTE ADULT - SUBJECTIVE AND OBJECTIVE BOX
Subjective: "I've been coughing so much the past day or so" Pt states occiasionally productive cough. NO CP or SOB. No fever or chills. Amb w min assist in room.     Vital Signs:  Vital Signs Last 24 Hrs  T(C): 36.8 (04-01-19 @ 05:14), Max: 37 (03-31-19 @ 12:33)  T(F): 98.2 (04-01-19 @ 05:14), Max: 98.6 (03-31-19 @ 12:33)  HR: 86 (04-01-19 @ 05:14) (86 - 103)  BP: 111/68 (04-01-19 @ 05:14) (90/43 - 111/68)  RR: 18 (04-01-19 @ 05:14) (16 - 18)  SpO2: 95% (04-01-19 @ 05:14) (95% - 99%) on (O2)    Telemetry/Alarms:  General: WN/WD NAD  Neurology: Awake, nonfocal, PAZ x 4  Eyes: Scleras clear, PERRLA/ EOMI, Gross vision intact  ENT:Gross hearing intact, grossly patent pharynx, no stridor  Neck: Neck supple, trachea midline, No JVD,   Respiratory: dec BS rt. mid to base.   CV: RRR, S1S2, no murmurs, rubs or gallops  Abdominal: Soft, NT, ND +BS, + bm No n/v  Extremities: No edema, + peripheral pulses  Skin: No Rashes, Hematoma, Ecchymosis  Lymphatic: No Neck, axilla, groin LAD  Psych: Oriented x 3, normal affect  Incisions: subxyphoid c/d/i  Tubes: Rt. Pleurx 60cc/24hrs on WS, Pericadial mark 60cc/24hrs.   Relevant labs, radiology and Medications reviewed           cxr w some inc in rt. loculated effusion             13.9   10.03 )-----------( 587      ( 01 Apr 2019 10:18 )             45.2             MEDICATIONS  (STANDING):  docusate sodium 100 milliGRAM(s) Oral three times a day  heparin  Injectable 5000 Unit(s) SubCutaneous every 12 hours  melatonin 3 milliGRAM(s) Oral at bedtime  pantoprazole    Tablet 40 milliGRAM(s) Oral before breakfast  senna 2 Tablet(s) Oral at bedtime    MEDICATIONS  (PRN):  acetaminophen   Tablet .. 650 milliGRAM(s) Oral every 4 hours PRN Mild Pain (1 - 3)  aluminum hydroxide/magnesium hydroxide/simethicone Suspension 30 milliLiter(s) Oral every 6 hours PRN Dyspepsia  benzocaine 15 mG/menthol 3.6 mG (Sugar-Free) Lozenge 1 Lozenge Oral every 4 hours PRN cough  benzonatate 100 milliGRAM(s) Oral three times a day PRN Cough  guaiFENesin    Syrup 200 milliGRAM(s) Oral every 6 hours PRN Cough  ondansetron Injectable 4 milliGRAM(s) IV Push every 6 hours PRN Nausea and/or Vomiting  oxyCODONE    IR 5 milliGRAM(s) Oral every 4 hours PRN Moderate Pain (4 - 6)  oxyCODONE    IR 10 milliGRAM(s) Oral every 4 hours PRN Severe Pain (7 - 10)    Pertinent Physical Exam  I&O's Summary    31 Mar 2019 07:01  -  01 Apr 2019 07:00  --------------------------------------------------------  IN: 0 mL / OUT: 1920 mL / NET: -1920 mL    01 Apr 2019 07:01  -  01 Apr 2019 11:08  --------------------------------------------------------  IN: 0 mL / OUT: 40 mL / NET: -40 mL        Assessment  54y Female  w/ PAST MEDICAL & SURGICAL HISTORY:  Lung cancer  History of lung surgery  admitted with complaints of Patient is a 54y old  Female who presents with a chief complaint of Pericardial effusion (31 Mar 2019 13:15)  .  On (Date), patient underwent Pericardiolysis  Creation, pericardial window, subxiphoid approach  Flexible bronchoscopy by cardiothoracic surgery  . Postoperative course/issues:  55 yo F w recent dx of R lung adenocarcinoma with mediastinal and hilar lymphadenopathy c/b R pleural effusion s/p pleurx and VATS, PE (3/4) on lovenox s/p IVC filter presenting with acutely worse shortness of breath and substernal chest pain today. Found to have large pleural effusion, 600cc of fluid drained from pleurx cath. Ultrasound done by ER showing pericardial effusion. Patient underwent subxiphoid pericardial window on 3/20/19. 3/25 Echo with trace pericardial effusion. Heme/onc saw patient and she is to f/u as outpatient for therapy. 3/26 Bedside bleomycin today per Dr. Morris; will clamp for 2hours and leave to suction. 3/28-Mark to waterseal. Kept for high output 4/1- today w inc c/o cough w inc pleural effusion. Pleurx stripped, clogging noted in tubing. Pleurx flushed w new tubing placed.     PLAN  Neuro: Pain management  Pulm: Encourage coughing, deep breathing and use of incentive spirometry. Wean off supplemental oxygen as able. Daily CXR. Cont cough suppressant. Will monitor pleurx output, cough could be related to inc effusion. Not seeming infectious in nature. No leukocytosis  Cardio: Monitor telemetry/alarms  GI: Tolerating diet. Continue stool softeners.  Renal: monitor urine output, supplement electrolytes as needed  Vasc: Heparin SC/SCDs for DVT prophylaxis  Heme: Stable H/H. .   ID: Off antibiotics. Stable.  Therapy: OOB/ambulate  Tubes: Monitor Chest tube output and Mark output. Long discussion had w pt. Refusing to go home w pericardial drain to bulb at this time.   Disposition: Aim to D/C to home once Mark removed.   Discussed with Cardiothoracic Team at AM rounds.

## 2019-04-02 ENCOUNTER — TRANSCRIPTION ENCOUNTER (OUTPATIENT)
Age: 54
End: 2019-04-02

## 2019-04-02 VITALS
HEART RATE: 103 BPM | SYSTOLIC BLOOD PRESSURE: 97 MMHG | DIASTOLIC BLOOD PRESSURE: 46 MMHG | TEMPERATURE: 98 F | RESPIRATION RATE: 17 BRPM | OXYGEN SATURATION: 97 %

## 2019-04-02 DIAGNOSIS — J90 PLEURAL EFFUSION, NOT ELSEWHERE CLASSIFIED: ICD-10-CM

## 2019-04-02 DIAGNOSIS — C34.90 MALIGNANT NEOPLASM OF UNSPECIFIED PART OF UNSPECIFIED BRONCHUS OR LUNG: ICD-10-CM

## 2019-04-02 DIAGNOSIS — Z51.5 ENCOUNTER FOR PALLIATIVE CARE: ICD-10-CM

## 2019-04-02 DIAGNOSIS — R05 COUGH: ICD-10-CM

## 2019-04-02 DIAGNOSIS — R06.02 SHORTNESS OF BREATH: ICD-10-CM

## 2019-04-02 PROCEDURE — 99238 HOSP IP/OBS DSCHRG MGMT 30/<: CPT

## 2019-04-02 PROCEDURE — 99233 SBSQ HOSP IP/OBS HIGH 50: CPT

## 2019-04-02 PROCEDURE — 71045 X-RAY EXAM CHEST 1 VIEW: CPT | Mod: 26

## 2019-04-02 RX ORDER — GABAPENTIN 400 MG/1
1 CAPSULE ORAL
Qty: 28 | Refills: 0
Start: 2019-04-02 | End: 2019-04-15

## 2019-04-02 RX ORDER — ENOXAPARIN SODIUM 100 MG/ML
50 INJECTION SUBCUTANEOUS
Qty: 3000 | Refills: 0 | OUTPATIENT
Start: 2019-04-02 | End: 2019-05-01

## 2019-04-02 RX ORDER — SENNA PLUS 8.6 MG/1
2 TABLET ORAL
Qty: 0 | Refills: 0 | COMMUNITY
Start: 2019-04-02

## 2019-04-02 RX ORDER — OXYCODONE HYDROCHLORIDE 5 MG/1
1 TABLET ORAL
Qty: 30 | Refills: 0
Start: 2019-04-02 | End: 2019-04-06

## 2019-04-02 RX ORDER — DOCUSATE SODIUM 100 MG
1 CAPSULE ORAL
Qty: 0 | Refills: 0 | COMMUNITY
Start: 2019-04-02

## 2019-04-02 RX ORDER — GABAPENTIN 400 MG/1
1 CAPSULE ORAL
Qty: 0 | Refills: 0 | DISCHARGE
Start: 2019-04-02 | End: 2019-04-15

## 2019-04-02 RX ORDER — ACETAMINOPHEN 500 MG
2 TABLET ORAL
Qty: 0 | Refills: 0 | COMMUNITY
Start: 2019-04-02

## 2019-04-02 RX ADMIN — HEPARIN SODIUM 5000 UNIT(S): 5000 INJECTION INTRAVENOUS; SUBCUTANEOUS at 05:21

## 2019-04-02 RX ADMIN — GABAPENTIN 100 MILLIGRAM(S): 400 CAPSULE ORAL at 09:03

## 2019-04-02 NOTE — PROGRESS NOTE ADULT - PROBLEM SELECTOR PLAN 6
- Patient remains full code.  - Understands the severity of disease process and that currently disease modifying interventions are palliative in nature.

## 2019-04-02 NOTE — PROGRESS NOTE ADULT - SUBJECTIVE AND OBJECTIVE BOX
SUBJECTIVE AND OBJECTIVE:  Cough and shortness of breath improved  INTERVAL HPI/OVERNIGHT EVENTS:  Chest and pericardial drains removed.   DNR on chart:   Allergies    No Known Allergies    Intolerances    IV contrast (Rash)  MEDICATIONS  (STANDING):    MEDICATIONS  (PRN):      ITEMS UNCHECKED ARE NOT PRESENT    PRESENT SYMPTOMS: [ ]Unable to obtain due to poor mentation   Source if other than patient:  [ ]Family   [ ]Team     Pain (Impact on QOL):  None  Location:  Minimal acceptable level (0-10 scale):            Aggravating factors:  Quality:  Radiation:  Severity (0-10 scale):    Timing:    Dyspnea:                           [x ]Mild [ ]Moderate [ ]Severe  Anxiety:                             [x ]Mild [ ]Moderate [ ]Severe  Fatigue:                             [ ]Mild [x ]Moderate [ ]Severe  Nausea:                             [ ]Mild [ ]Moderate [ ]Severe  Loss of appetite:              [ ]Mild [ x]Moderate [ ]Severe  Constipation:                    [ ]Mild [ ]Moderate [ ]Severe  Grief Present                    [ ] Yes  [ x] No   PAIN AD Score:	  [email or web address] (Ctrl + left click to view)    Other Symptoms:  [ ]All other review of systems negative     Karnofsky Performance Score/Palliative Performance Status Version 2:   50      %    http://palliative.info/resource_material/[email or web address].pdf  PHYSICAL EXAM:  Vital Signs Last 24 Hrs  T(C): --  T(F): --  HR: --  BP: --  BP(mean): --  RR: --  SpO2: -- I&O's Summary   GENERAL:  [ x]Alert  [x ]Oriented x 3  [ ]Lethargic  [ ]Cachexia  [ ]Unarousable  [ ]Verbal  [ ]Non-Verbal  Behavioral:   [ ] Anxiety  [ ] Delirium [ ] Agitation [x ] Other  HEENT:  [ ]Normal   [x ]Dry mouth   [ ]ET Tube/Trach  [ ]Oral lesions  PULMONARY:   [ ]Clear [ ]Tachypnea  [ ]Audible excessive secretions   [x ]Rhonchi        [ ]Right [ ]Left [ x]Bilateral  [ ]Crackles        [ ]Right [ ]Left [ ]Bilateral  [ ]Wheezing     [ ]Right [ ]Left [ ]Bilateral  CARDIOVASCULAR:    [x]Regular [ ]Irregular [ ]Tachy  [ ]Ford [ ]Murmur [ ]Other  GASTROINTESTINAL:  [x ]Soft  [ ]Distended   [x ]+BS  [x ]Non tender [ ]Tender  [ ]PEG [ ]OGT/ NGT   Last BM:    GENITOURINARY:  [x ]Normal [ ] Incontinent   [ ]Oliguria/Anuria   [ ]Sanchez  MUSCULOSKELETAL:   [ ]Normal   [ x]Weakness  [ ]Bed/Wheelchair bound [ ]Edema  NEUROLOGIC:   [x ]No focal deficits  [ ] Cognitive impairment  [ ] Dysphagia [ ]Dysarthria [ ] Paresis [ ]Other   SKIN:   [x ]Normal   [ ]Pressure ulcer(s)  [ ]Rash    CRITICAL CARE:  [ ] Shock Present  [ ]Septic [ ]Cardiogenic [ ]Neurologic [ ]Hypovolemic  [ ]  Vasopressors [ ]  Inotropes   [ ] Respiratory failure present  [ ] Acute  [ ] Chronic [ ] Hypoxic  [ ] Hypercarbic [ ] Other  [ ] Other organ failure     LABS:            RADIOLOGY & ADDITIONAL STUDIES:    Protein Calorie Malnutrition Present: [ ] yes [ ] no  [ ] PPSV2 < or = 30%  [ ] significant weight loss [ ] poor nutritional intake [ ] anasarca [ ] catabolic state Albumin, Serum: 2.5 g/dL (03-24-19 @ 04:00)  Artificial Nutrition [ ]     REFERRALS:   [ ]Chaplaincy  [ ] Hospice  [ ]Child Life  [ ]Social Work  [ ]Case management [ ]Holistic Therapy   Goals of Care Document:

## 2019-04-02 NOTE — PROGRESS NOTE ADULT - REASON FOR ADMISSION
Pericardial effusion
Cardiac Tamponade
Pericardial Tamponade
Pericardial Tamponade
Pericardial effusion

## 2019-04-02 NOTE — PROGRESS NOTE ADULT - ASSESSMENT
54 F with malignant pericardial and pleural effusions, cough, shortness of breath, stage IV lung adenocarcinoma, encounter for palliative care.

## 2019-04-02 NOTE — PROGRESS NOTE ADULT - PROVIDER SPECIALTY LIST ADULT
Anesthesia
CT Surgery
Critical Care
Heme/Onc
Palliative Care
Thoracic Surgery
CT Surgery

## 2019-04-02 NOTE — DISCHARGE NOTE PROVIDER - NSDCCPTREATMENT_GEN_ALL_CORE_FT
PRINCIPAL PROCEDURE  Procedure: Creation, pericardial window, subxiphoid approach  Findings and Treatment:       SECONDARY PROCEDURE  Procedure: Pericardiolysis  Findings and Treatment: pericardiosclerosis with bleomycin

## 2019-04-02 NOTE — DISCHARGE NOTE PROVIDER - NSDCFUADDINST_GEN_ALL_CORE_FT
Walk frequently. Continue to use incentive spirometer.   Visiting nurse to continue to drain Right Pleurx 3 x per week up to 1Liter at a time. Keep Pleurx site c/d/i. You may remove dressing from pericardial drain site in 2 days and leave uncovered.   See Dr. Morris in 1-2 weeks. Call for an apt. [phone]  See your regular medical doctor in 1 week and have a repeat Echocardiogram

## 2019-04-02 NOTE — DISCHARGE NOTE PROVIDER - CARE PROVIDER_API CALL
Asif Morris (MD)  Surgery; Thoracic Surgery  [address] [address] [place] [address] [place] [address]  Phone: [phone]  Fax: [phone]  Follow Up Time:

## 2019-04-02 NOTE — DISCHARGE NOTE PROVIDER - NSDCCPCAREPLAN_GEN_ALL_CORE_FT
PRINCIPAL DISCHARGE DIAGNOSIS  Diagnosis: Pericardial effusion  Assessment and Plan of Treatment:       SECONDARY DISCHARGE DIAGNOSES  Diagnosis: Lung cancer  Assessment and Plan of Treatment:     Diagnosis: Pleural effusion  Assessment and Plan of Treatment:

## 2019-04-02 NOTE — DISCHARGE NOTE PROVIDER - HOSPITAL COURSE
53 yo F w recent dx of R lung adenocarcinoma with mediastinal and hilar lymphadenopathy c/b R pleural effusion s/p pleurx and VATS, PE (3/4) on lovenox s/p IVC filter presenting with acutely worse shortness of breath and substernal chest pain today. Found to have large pleural effusion, 600cc of fluid drained from pleurx cath. Ultrasound done by ER showing pericardial effusion. Patient underwent subxiphoid pericardial window on 3/20/19. 3/25 Echo with trace pericardial effusion. Heme/onc saw patient and she is to f/u as outpatient for therapy. 3/26 Bedside bleomycin today per Dr. Morris; will clamp for 2hours and leave to suction. 3/28-Tera to waterseal. Kept for high output 4/1- today w inc c/o cough w inc pleural effusion. Pleurx stripped, clogging noted in tubing. Pleurx flushed w new tubing placed.     Today pericardial drain output 50cc/24hrs. Cough improved, 53 yo F w recent dx of R lung adenocarcinoma with mediastinal and hilar lymphadenopathy c/b R pleural effusion s/p pleurx and VATS, PE (3/4) on lovenox s/p IVC filter presenting with acutely worse shortness of breath and substernal chest pain today. Found to have large pleural effusion, 600cc of fluid drained from pleurx cath. Ultrasound done by ER showing pericardial effusion. Patient underwent subxiphoid pericardial window on 3/20/19. 3/25 Echo with trace pericardial effusion. Heme/onc saw patient and she is to f/u as outpatient for therapy. 3/26 Bedside bleomycin today per Dr. Morris; will clamp for 2hours and leave to suction. 3/28-Tera to waterseal. Kept for high output 4/1- today w inc c/o cough w inc pleural effusion. Pleurx stripped, clogging noted in tubing. Pleurx flushed w new tubing placed.     Today pericardial drain output 50cc/24hrs. Cough improved, as per Dr. Zee no need for repeat Echo. Ok to d/c pericardial drain and cap Pleurx catheter    Subxyphoid incision c/d/i. Pt ambulating frequently. VNS set up for home. Cleared for d/c to home by Yayo.     Vital Signs Last 24 Hrs    T(C): 36.7 (02 Apr 2019 11:17), Max: 37 (02 Apr 2019 05:19)    T(F): 98 (02 Apr 2019 11:17), Max: 98.6 (02 Apr 2019 05:19)    HR: 103 (02 Apr 2019 11:17) (95 - 108)    BP: 97/46 (02 Apr 2019 11:17) (92/50 - 114/57)    BP(mean): --    RR: 17 (02 Apr 2019 11:17) (16 - 18)    SpO2: 97% (02 Apr 2019 11:17) (91% - 100%)

## 2019-04-02 NOTE — PROGRESS NOTE ADULT - PROBLEM SELECTOR PLAN 4
- Improved with Neurontin, Tessalon, Robitussin.   - At this point, opiates are being sparsely used.

## 2019-04-03 ENCOUNTER — APPOINTMENT (OUTPATIENT)
Dept: HEMATOLOGY ONCOLOGY | Facility: CLINIC | Age: 54
End: 2019-04-03
Payer: MEDICAID

## 2019-04-03 ENCOUNTER — RESULT REVIEW (OUTPATIENT)
Age: 54
End: 2019-04-03

## 2019-04-03 VITALS
BODY MASS INDEX: 19.3 KG/M2 | DIASTOLIC BLOOD PRESSURE: 59 MMHG | OXYGEN SATURATION: 99 % | TEMPERATURE: 98.3 F | SYSTOLIC BLOOD PRESSURE: 91 MMHG | HEART RATE: 97 BPM | RESPIRATION RATE: 22 BRPM | WEIGHT: 98.33 LBS

## 2019-04-03 PROCEDURE — 99215 OFFICE O/P EST HI 40 MIN: CPT

## 2019-04-07 ENCOUNTER — FORM ENCOUNTER (OUTPATIENT)
Age: 54
End: 2019-04-07

## 2019-04-08 ENCOUNTER — OUTPATIENT (OUTPATIENT)
Dept: OUTPATIENT SERVICES | Facility: HOSPITAL | Age: 54
LOS: 1 days | End: 2019-04-08
Payer: MEDICAID

## 2019-04-08 ENCOUNTER — APPOINTMENT (OUTPATIENT)
Dept: HEMATOLOGY ONCOLOGY | Facility: CLINIC | Age: 54
End: 2019-04-08
Payer: MEDICAID

## 2019-04-08 ENCOUNTER — APPOINTMENT (OUTPATIENT)
Dept: RADIOLOGY | Facility: IMAGING CENTER | Age: 54
End: 2019-04-08
Payer: MEDICAID

## 2019-04-08 ENCOUNTER — INPATIENT (INPATIENT)
Facility: HOSPITAL | Age: 54
LOS: 1 days | Discharge: HOME HEALTH | End: 2019-04-10
Attending: HOSPITALIST | Admitting: HOSPITALIST
Payer: MEDICAID

## 2019-04-08 VITALS
OXYGEN SATURATION: 100 % | RESPIRATION RATE: 16 BRPM | TEMPERATURE: 99 F | DIASTOLIC BLOOD PRESSURE: 54 MMHG | SYSTOLIC BLOOD PRESSURE: 93 MMHG | HEART RATE: 102 BPM

## 2019-04-08 VITALS
TEMPERATURE: 99.8 F | BODY MASS INDEX: 19.3 KG/M2 | RESPIRATION RATE: 20 BRPM | HEART RATE: 108 BPM | OXYGEN SATURATION: 95 % | WEIGHT: 98.33 LBS | SYSTOLIC BLOOD PRESSURE: 89 MMHG | DIASTOLIC BLOOD PRESSURE: 58 MMHG

## 2019-04-08 DIAGNOSIS — Z98.890 OTHER SPECIFIED POSTPROCEDURAL STATES: Chronic | ICD-10-CM

## 2019-04-08 DIAGNOSIS — R05 COUGH: ICD-10-CM

## 2019-04-08 DIAGNOSIS — J18.9 PNEUMONIA, UNSPECIFIED ORGANISM: ICD-10-CM

## 2019-04-08 DIAGNOSIS — C34.90 MALIGNANT NEOPLASM OF UNSPECIFIED PART OF UNSPECIFIED BRONCHUS OR LUNG: ICD-10-CM

## 2019-04-08 DIAGNOSIS — J91.0 MALIGNANT PLEURAL EFFUSION: ICD-10-CM

## 2019-04-08 LAB
ALBUMIN SERPL ELPH-MCNC: 2.9 G/DL — LOW (ref 3.3–5)
ALP SERPL-CCNC: 86 U/L — SIGNIFICANT CHANGE UP (ref 40–120)
ALT FLD-CCNC: 18 U/L — SIGNIFICANT CHANGE UP (ref 4–33)
ANION GAP SERPL CALC-SCNC: 14 MMO/L — SIGNIFICANT CHANGE UP (ref 7–14)
ANION GAP SERPL CALC-SCNC: 15 MMO/L — HIGH (ref 7–14)
APPEARANCE UR: CLEAR — SIGNIFICANT CHANGE UP
APTT BLD: 23.8 SEC — LOW (ref 27.5–36.3)
AST SERPL-CCNC: 43 U/L — HIGH (ref 4–32)
BACTERIA # UR AUTO: NEGATIVE — SIGNIFICANT CHANGE UP
BASE EXCESS BLDV CALC-SCNC: -0.9 MMOL/L — SIGNIFICANT CHANGE UP
BASE EXCESS BLDV CALC-SCNC: 1.4 MMOL/L — SIGNIFICANT CHANGE UP
BASOPHILS # BLD AUTO: 0.05 K/UL — SIGNIFICANT CHANGE UP (ref 0–0.2)
BASOPHILS NFR BLD AUTO: 0.8 % — SIGNIFICANT CHANGE UP (ref 0–2)
BILIRUB SERPL-MCNC: 0.3 MG/DL — SIGNIFICANT CHANGE UP (ref 0.2–1.2)
BILIRUB UR-MCNC: NEGATIVE — SIGNIFICANT CHANGE UP
BLOOD GAS VENOUS - CREATININE: 0.46 MG/DL — LOW (ref 0.5–1.3)
BLOOD GAS VENOUS - CREATININE: 0.57 MG/DL — SIGNIFICANT CHANGE UP (ref 0.5–1.3)
BLOOD UR QL VISUAL: NEGATIVE — SIGNIFICANT CHANGE UP
BUN SERPL-MCNC: 6 MG/DL — LOW (ref 7–23)
BUN SERPL-MCNC: 6 MG/DL — LOW (ref 7–23)
CALCIUM SERPL-MCNC: 8.5 MG/DL — SIGNIFICANT CHANGE UP (ref 8.4–10.5)
CALCIUM SERPL-MCNC: 8.9 MG/DL — SIGNIFICANT CHANGE UP (ref 8.4–10.5)
CHLORIDE BLDV-SCNC: 103 MMOL/L — SIGNIFICANT CHANGE UP (ref 96–108)
CHLORIDE BLDV-SCNC: 97 MMOL/L — SIGNIFICANT CHANGE UP (ref 96–108)
CHLORIDE SERPL-SCNC: 100 MMOL/L — SIGNIFICANT CHANGE UP (ref 98–107)
CHLORIDE SERPL-SCNC: 93 MMOL/L — LOW (ref 98–107)
CO2 SERPL-SCNC: 16 MMOL/L — LOW (ref 22–31)
CO2 SERPL-SCNC: 23 MMOL/L — SIGNIFICANT CHANGE UP (ref 22–31)
COLOR SPEC: YELLOW — SIGNIFICANT CHANGE UP
CREAT SERPL-MCNC: 0.53 MG/DL — SIGNIFICANT CHANGE UP (ref 0.5–1.3)
CREAT SERPL-MCNC: 0.6 MG/DL — SIGNIFICANT CHANGE UP (ref 0.5–1.3)
EOSINOPHIL # BLD AUTO: 0.11 K/UL — SIGNIFICANT CHANGE UP (ref 0–0.5)
EOSINOPHIL NFR BLD AUTO: 1.8 % — SIGNIFICANT CHANGE UP (ref 0–6)
GAS PNL BLDV: 127 MMOL/L — LOW (ref 136–146)
GAS PNL BLDV: 129 MMOL/L — LOW (ref 136–146)
GLUCOSE BLDV-MCNC: 128 — HIGH (ref 70–99)
GLUCOSE BLDV-MCNC: 130 — HIGH (ref 70–99)
GLUCOSE SERPL-MCNC: 117 MG/DL — HIGH (ref 70–99)
GLUCOSE SERPL-MCNC: 125 MG/DL — HIGH (ref 70–99)
GLUCOSE UR-MCNC: NEGATIVE — SIGNIFICANT CHANGE UP
HCO3 BLDV-SCNC: 24 MMOL/L — SIGNIFICANT CHANGE UP (ref 20–27)
HCO3 BLDV-SCNC: 24 MMOL/L — SIGNIFICANT CHANGE UP (ref 20–27)
HCT VFR BLD CALC: 39.7 % — SIGNIFICANT CHANGE UP (ref 34.5–45)
HCT VFR BLDV CALC: 35.6 % — SIGNIFICANT CHANGE UP (ref 34.5–45)
HCT VFR BLDV CALC: 39.3 % — SIGNIFICANT CHANGE UP (ref 34.5–45)
HGB BLD-MCNC: 12.5 G/DL — SIGNIFICANT CHANGE UP (ref 11.5–15.5)
HGB BLDV-MCNC: 11.5 G/DL — SIGNIFICANT CHANGE UP (ref 11.5–15.5)
HGB BLDV-MCNC: 12.8 G/DL — SIGNIFICANT CHANGE UP (ref 11.5–15.5)
HYALINE CASTS # UR AUTO: NEGATIVE — SIGNIFICANT CHANGE UP
IMM GRANULOCYTES NFR BLD AUTO: 0.3 % — SIGNIFICANT CHANGE UP (ref 0–1.5)
INR BLD: 1.14 — SIGNIFICANT CHANGE UP (ref 0.88–1.17)
KETONES UR-MCNC: NEGATIVE — SIGNIFICANT CHANGE UP
LACTATE BLDV-MCNC: 1.3 MMOL/L — SIGNIFICANT CHANGE UP (ref 0.5–2)
LACTATE BLDV-MCNC: 3.7 MMOL/L — HIGH (ref 0.5–2)
LEUKOCYTE ESTERASE UR-ACNC: NEGATIVE — SIGNIFICANT CHANGE UP
LYMPHOCYTES # BLD AUTO: 0.85 K/UL — LOW (ref 1–3.3)
LYMPHOCYTES # BLD AUTO: 13.6 % — SIGNIFICANT CHANGE UP (ref 13–44)
MCHC RBC-ENTMCNC: 27.3 PG — SIGNIFICANT CHANGE UP (ref 27–34)
MCHC RBC-ENTMCNC: 31.5 % — LOW (ref 32–36)
MCV RBC AUTO: 86.7 FL — SIGNIFICANT CHANGE UP (ref 80–100)
MONOCYTES # BLD AUTO: 0.63 K/UL — SIGNIFICANT CHANGE UP (ref 0–0.9)
MONOCYTES NFR BLD AUTO: 10.1 % — SIGNIFICANT CHANGE UP (ref 2–14)
NEUTROPHILS # BLD AUTO: 4.57 K/UL — SIGNIFICANT CHANGE UP (ref 1.8–7.4)
NEUTROPHILS NFR BLD AUTO: 73.4 % — SIGNIFICANT CHANGE UP (ref 43–77)
NITRITE UR-MCNC: NEGATIVE — SIGNIFICANT CHANGE UP
NRBC # FLD: 0 K/UL — SIGNIFICANT CHANGE UP (ref 0–0)
PCO2 BLDV: 38 MMHG — LOW (ref 41–51)
PCO2 BLDV: 57 MMHG — HIGH (ref 41–51)
PH BLDV: 7.3 PH — LOW (ref 7.32–7.43)
PH BLDV: 7.41 PH — SIGNIFICANT CHANGE UP (ref 7.32–7.43)
PH UR: 7 — SIGNIFICANT CHANGE UP (ref 5–8)
PLATELET # BLD AUTO: 580 K/UL — HIGH (ref 150–400)
PMV BLD: 9 FL — SIGNIFICANT CHANGE UP (ref 7–13)
PO2 BLDV: 36 MMHG — SIGNIFICANT CHANGE UP (ref 35–40)
PO2 BLDV: 87 MMHG — HIGH (ref 35–40)
POTASSIUM BLDV-SCNC: 4.9 MMOL/L — HIGH (ref 3.4–4.5)
POTASSIUM BLDV-SCNC: SIGNIFICANT CHANGE UP MMOL/L (ref 3.4–4.5)
POTASSIUM SERPL-MCNC: 4.8 MMOL/L — SIGNIFICANT CHANGE UP (ref 3.5–5.3)
POTASSIUM SERPL-MCNC: SIGNIFICANT CHANGE UP MMOL/L (ref 3.5–5.3)
POTASSIUM SERPL-SCNC: 4.8 MMOL/L — SIGNIFICANT CHANGE UP (ref 3.5–5.3)
POTASSIUM SERPL-SCNC: SIGNIFICANT CHANGE UP MMOL/L (ref 3.5–5.3)
PROT SERPL-MCNC: 7 G/DL — SIGNIFICANT CHANGE UP (ref 6–8.3)
PROT UR-MCNC: 30 — SIGNIFICANT CHANGE UP
PROTHROM AB SERPL-ACNC: 13 SEC — SIGNIFICANT CHANGE UP (ref 9.8–13.1)
RBC # BLD: 4.58 M/UL — SIGNIFICANT CHANGE UP (ref 3.8–5.2)
RBC # FLD: 13.5 % — SIGNIFICANT CHANGE UP (ref 10.3–14.5)
RBC CASTS # UR COMP ASSIST: SIGNIFICANT CHANGE UP (ref 0–?)
SAO2 % BLDV: 56.7 % — LOW (ref 60–85)
SAO2 % BLDV: 96.6 % — HIGH (ref 60–85)
SODIUM SERPL-SCNC: 130 MMOL/L — LOW (ref 135–145)
SODIUM SERPL-SCNC: 131 MMOL/L — LOW (ref 135–145)
SP GR SPEC: 1.02 — SIGNIFICANT CHANGE UP (ref 1–1.04)
SQUAMOUS # UR AUTO: SIGNIFICANT CHANGE UP
TROPONIN T, HIGH SENSITIVITY: 11 NG/L — SIGNIFICANT CHANGE UP (ref ?–14)
UROBILINOGEN FLD QL: NORMAL — SIGNIFICANT CHANGE UP
WBC # BLD: 6.23 K/UL — SIGNIFICANT CHANGE UP (ref 3.8–10.5)
WBC # FLD AUTO: 6.23 K/UL — SIGNIFICANT CHANGE UP (ref 3.8–10.5)
WBC UR QL: SIGNIFICANT CHANGE UP (ref 0–?)

## 2019-04-08 PROCEDURE — 99205 OFFICE O/P NEW HI 60 MIN: CPT

## 2019-04-08 PROCEDURE — 71046 X-RAY EXAM CHEST 2 VIEWS: CPT

## 2019-04-08 PROCEDURE — 93010 ELECTROCARDIOGRAM REPORT: CPT

## 2019-04-08 PROCEDURE — 71250 CT THORAX DX C-: CPT | Mod: 26

## 2019-04-08 PROCEDURE — 71046 X-RAY EXAM CHEST 2 VIEWS: CPT | Mod: 26

## 2019-04-08 RX ORDER — BENZONATATE 100 MG/1
100 CAPSULE ORAL
Qty: 21 | Refills: 0 | Status: ACTIVE | COMMUNITY
Start: 2019-01-17

## 2019-04-08 RX ORDER — FLUTICASONE PROPIONATE 50 UG/1
50 SPRAY, METERED NASAL
Qty: 10 | Refills: 0 | Status: ACTIVE | COMMUNITY
Start: 2019-02-13

## 2019-04-08 RX ORDER — VANCOMYCIN HCL 1 G
1000 VIAL (EA) INTRAVENOUS ONCE
Qty: 0 | Refills: 0 | Status: COMPLETED | OUTPATIENT
Start: 2019-04-08 | End: 2019-04-08

## 2019-04-08 RX ORDER — BROMPHENIRAMINE MALEATE, PSEUDOEPHEDRINE HYDROCHLORIDE, 2; 30; 10 MG/5ML; MG/5ML; MG/5ML
30-2-10 SYRUP ORAL
Qty: 200 | Refills: 0 | Status: ACTIVE | COMMUNITY
Start: 2019-02-02

## 2019-04-08 RX ORDER — ACETAMINOPHEN 325 MG/1
325 TABLET ORAL
Qty: 20 | Refills: 0 | Status: ACTIVE | COMMUNITY
Start: 2019-01-17

## 2019-04-08 RX ORDER — SODIUM CHLORIDE 9 MG/ML
1000 INJECTION INTRAMUSCULAR; INTRAVENOUS; SUBCUTANEOUS ONCE
Qty: 0 | Refills: 0 | Status: COMPLETED | OUTPATIENT
Start: 2019-04-08 | End: 2019-04-08

## 2019-04-08 RX ORDER — ACETAMINOPHEN 500 MG
650 TABLET ORAL ONCE
Qty: 0 | Refills: 0 | Status: DISCONTINUED | OUTPATIENT
Start: 2019-04-08 | End: 2019-04-10

## 2019-04-08 RX ORDER — PIPERACILLIN AND TAZOBACTAM 4; .5 G/20ML; G/20ML
3.38 INJECTION, POWDER, LYOPHILIZED, FOR SOLUTION INTRAVENOUS ONCE
Qty: 0 | Refills: 0 | Status: COMPLETED | OUTPATIENT
Start: 2019-04-08 | End: 2019-04-08

## 2019-04-08 RX ORDER — HYDROMORPHONE HYDROCHLORIDE 2 MG/1
2 TABLET ORAL
Refills: 0 | Status: DISCONTINUED | COMMUNITY
End: 2019-04-08

## 2019-04-08 RX ORDER — SODIUM CHLORIDE 9 MG/ML
1000 INJECTION, SOLUTION INTRAVENOUS ONCE
Qty: 0 | Refills: 0 | Status: COMPLETED | OUTPATIENT
Start: 2019-04-08 | End: 2019-04-08

## 2019-04-08 RX ADMIN — SODIUM CHLORIDE 1000 MILLILITER(S): 9 INJECTION, SOLUTION INTRAVENOUS at 23:04

## 2019-04-08 RX ADMIN — Medication 125 MILLIGRAM(S): at 19:45

## 2019-04-08 RX ADMIN — PIPERACILLIN AND TAZOBACTAM 200 GRAM(S): 4; .5 INJECTION, POWDER, LYOPHILIZED, FOR SOLUTION INTRAVENOUS at 19:45

## 2019-04-08 RX ADMIN — SODIUM CHLORIDE 2000 MILLILITER(S): 9 INJECTION INTRAMUSCULAR; INTRAVENOUS; SUBCUTANEOUS at 19:45

## 2019-04-08 RX ADMIN — Medication 250 MILLIGRAM(S): at 21:51

## 2019-04-08 NOTE — ED ADULT NURSE REASSESSMENT NOTE - NS ED NURSE REASSESS COMMENT FT1
Received report from ANTONIO Hugo. Pt Aox4, at current baseline. Pt nonambulatory at this time. Pt vitals as charted, breathing even and unlabored at this time. Pt medicated as ordered, pt denies any pain, repositioned on to right side, fluids running as ordered, pt next to nurses station, will continue to monitor.

## 2019-04-08 NOTE — ED PROVIDER NOTE - OBJECTIVE STATEMENT
54 yr old female sent in due to fever, cough for 2 days, recent dx of metastatic Lung CA, to bone, pleura, pericardium, S/p pericardial window placed. Had right leg DVT, on lovenox and had IVC filter placed. Also had pleural drain placed with drainage at clinic of 50 cc of blood tinged frothy fluid today. Folowed by [facility].  Denies chest pain, spitting up some saliva, mild nausea no vomiting. Patient has had weightloss.

## 2019-04-08 NOTE — ED PROVIDER NOTE - PROGRESS NOTE DETAILS
Patient seen originally by Dr Davis- blood work, antibiotics and steroids ordered by him. Seen by DR Bloch and US pericardium by Dr Ramirez with no significaant pericardial effusion. Spoke with Fellow on call for Hem/onc to notify him of admission. Spoke with Dr Dupree for admission with CT noncon pending.

## 2019-04-08 NOTE — ED PROVIDER NOTE - CLINICAL SUMMARY MEDICAL DECISION MAKING FREE TEXT BOX
Patient with metastatic Lung CA, with fever and worsened opacification- labs, CT chest, antibiotics admit.

## 2019-04-08 NOTE — ED ADULT NURSE NOTE - OBJECTIVE STATEMENT
Pt awake and alert x 3 . Pt with  right pleural drain, area dry and intact . Pt reports sob and cough . Pt noted to become tachypneic when ambulating to bathroom. iv placed labs drawn, medicated as ordered. Pt at this time is afebrile placed on nasal 02 . Pt at this time transferred to  3 in critcal care area. Pt endorsed over to area RN .

## 2019-04-08 NOTE — ED ADULT NURSE NOTE - ED STAT RN HANDOFF DETAILS
handoff report given to ANTONIO Kuhn pt in NAD, awaiting transportation.  Will continue to monitor patient closely.

## 2019-04-08 NOTE — ED ADULT NURSE REASSESSMENT NOTE - NS ED NURSE REASSESS COMMENT FT1
Received report from ANTONIO Palomino. Pt Aox4, ambulatory, currently on 2L O2 for comfort O2 sat at 100% Pt breathing even and unlabored, vitals a charted, appears in NAD. Pt admitted to medicine, family at bedside, will continue to monitor.

## 2019-04-08 NOTE — ED ADULT NURSE NOTE - NSIMPLEMENTINTERV_GEN_ALL_ED
Implemented All Universal Safety Interventions:  [place] to call system. Call bell, personal items and telephone within reach. Instruct patient to call for assistance. Room bathroom lighting operational. Non-slip footwear when patient is off stretcher. Physically safe environment: no spills, clutter or unnecessary equipment. Stretcher in lowest position, wheels locked, appropriate side rails in place.

## 2019-04-09 DIAGNOSIS — R05 COUGH: ICD-10-CM

## 2019-04-09 DIAGNOSIS — R65.10 SYSTEMIC INFLAMMATORY RESPONSE SYNDROME (SIRS) OF NON-INFECTIOUS ORIGIN WITHOUT ACUTE ORGAN DYSFUNCTION: ICD-10-CM

## 2019-04-09 DIAGNOSIS — Z29.9 ENCOUNTER FOR PROPHYLACTIC MEASURES, UNSPECIFIED: ICD-10-CM

## 2019-04-09 DIAGNOSIS — I31.3 PERICARDIAL EFFUSION (NONINFLAMMATORY): ICD-10-CM

## 2019-04-09 DIAGNOSIS — C34.90 MALIGNANT NEOPLASM OF UNSPECIFIED PART OF UNSPECIFIED BRONCHUS OR LUNG: ICD-10-CM

## 2019-04-09 DIAGNOSIS — I26.99 OTHER PULMONARY EMBOLISM WITHOUT ACUTE COR PULMONALE: ICD-10-CM

## 2019-04-09 LAB
ANION GAP SERPL CALC-SCNC: 11 MMO/L — SIGNIFICANT CHANGE UP (ref 7–14)
B PERT DNA SPEC QL NAA+PROBE: NOT DETECTED — SIGNIFICANT CHANGE UP
BASOPHILS # BLD AUTO: 0.01 K/UL — SIGNIFICANT CHANGE UP (ref 0–0.2)
BASOPHILS NFR BLD AUTO: 0.4 % — SIGNIFICANT CHANGE UP (ref 0–2)
BUN SERPL-MCNC: 6 MG/DL — LOW (ref 7–23)
C PNEUM DNA SPEC QL NAA+PROBE: NOT DETECTED — SIGNIFICANT CHANGE UP
CALCIUM SERPL-MCNC: 8.5 MG/DL — SIGNIFICANT CHANGE UP (ref 8.4–10.5)
CHLORIDE SERPL-SCNC: 102 MMOL/L — SIGNIFICANT CHANGE UP (ref 98–107)
CO2 SERPL-SCNC: 22 MMOL/L — SIGNIFICANT CHANGE UP (ref 22–31)
CREAT SERPL-MCNC: 0.49 MG/DL — LOW (ref 0.5–1.3)
EOSINOPHIL # BLD AUTO: 0 K/UL — SIGNIFICANT CHANGE UP (ref 0–0.5)
EOSINOPHIL NFR BLD AUTO: 0 % — SIGNIFICANT CHANGE UP (ref 0–6)
FLUAV H1 2009 PAND RNA SPEC QL NAA+PROBE: NOT DETECTED — SIGNIFICANT CHANGE UP
FLUAV H1 RNA SPEC QL NAA+PROBE: NOT DETECTED — SIGNIFICANT CHANGE UP
FLUAV H3 RNA SPEC QL NAA+PROBE: NOT DETECTED — SIGNIFICANT CHANGE UP
FLUAV SUBTYP SPEC NAA+PROBE: NOT DETECTED — SIGNIFICANT CHANGE UP
FLUBV RNA SPEC QL NAA+PROBE: NOT DETECTED — SIGNIFICANT CHANGE UP
GLUCOSE SERPL-MCNC: 203 MG/DL — HIGH (ref 70–99)
HADV DNA SPEC QL NAA+PROBE: NOT DETECTED — SIGNIFICANT CHANGE UP
HCOV PNL SPEC NAA+PROBE: SIGNIFICANT CHANGE UP
HCT VFR BLD CALC: 36.7 % — SIGNIFICANT CHANGE UP (ref 34.5–45)
HGB BLD-MCNC: 11.3 G/DL — LOW (ref 11.5–15.5)
HMPV RNA SPEC QL NAA+PROBE: NOT DETECTED — SIGNIFICANT CHANGE UP
HPIV1 RNA SPEC QL NAA+PROBE: NOT DETECTED — SIGNIFICANT CHANGE UP
HPIV2 RNA SPEC QL NAA+PROBE: NOT DETECTED — SIGNIFICANT CHANGE UP
HPIV3 RNA SPEC QL NAA+PROBE: NOT DETECTED — SIGNIFICANT CHANGE UP
HPIV4 RNA SPEC QL NAA+PROBE: NOT DETECTED — SIGNIFICANT CHANGE UP
IMM GRANULOCYTES NFR BLD AUTO: 0.4 % — SIGNIFICANT CHANGE UP (ref 0–1.5)
LYMPHOCYTES # BLD AUTO: 0.59 K/UL — LOW (ref 1–3.3)
LYMPHOCYTES # BLD AUTO: 24 % — SIGNIFICANT CHANGE UP (ref 13–44)
MAGNESIUM SERPL-MCNC: 1.8 MG/DL — SIGNIFICANT CHANGE UP (ref 1.6–2.6)
MCHC RBC-ENTMCNC: 27 PG — SIGNIFICANT CHANGE UP (ref 27–34)
MCHC RBC-ENTMCNC: 30.8 % — LOW (ref 32–36)
MCV RBC AUTO: 87.8 FL — SIGNIFICANT CHANGE UP (ref 80–100)
MONOCYTES # BLD AUTO: 0.09 K/UL — SIGNIFICANT CHANGE UP (ref 0–0.9)
MONOCYTES NFR BLD AUTO: 3.7 % — SIGNIFICANT CHANGE UP (ref 2–14)
NEUTROPHILS # BLD AUTO: 1.76 K/UL — LOW (ref 1.8–7.4)
NEUTROPHILS NFR BLD AUTO: 71.5 % — SIGNIFICANT CHANGE UP (ref 43–77)
NRBC # FLD: 0 K/UL — SIGNIFICANT CHANGE UP (ref 0–0)
PHOSPHATE SERPL-MCNC: 3.4 MG/DL — SIGNIFICANT CHANGE UP (ref 2.5–4.5)
PLATELET # BLD AUTO: 477 K/UL — HIGH (ref 150–400)
PMV BLD: 9.2 FL — SIGNIFICANT CHANGE UP (ref 7–13)
POTASSIUM SERPL-MCNC: 3.8 MMOL/L — SIGNIFICANT CHANGE UP (ref 3.5–5.3)
POTASSIUM SERPL-SCNC: 3.8 MMOL/L — SIGNIFICANT CHANGE UP (ref 3.5–5.3)
RBC # BLD: 4.18 M/UL — SIGNIFICANT CHANGE UP (ref 3.8–5.2)
RBC # FLD: 13.3 % — SIGNIFICANT CHANGE UP (ref 10.3–14.5)
RSV RNA SPEC QL NAA+PROBE: NOT DETECTED — SIGNIFICANT CHANGE UP
RV+EV RNA SPEC QL NAA+PROBE: NOT DETECTED — SIGNIFICANT CHANGE UP
SODIUM SERPL-SCNC: 135 MMOL/L — SIGNIFICANT CHANGE UP (ref 135–145)
SPECIMEN SOURCE: SIGNIFICANT CHANGE UP
SPECIMEN SOURCE: SIGNIFICANT CHANGE UP
WBC # BLD: 2.46 K/UL — LOW (ref 3.8–10.5)
WBC # FLD AUTO: 2.46 K/UL — LOW (ref 3.8–10.5)

## 2019-04-09 PROCEDURE — 12345: CPT | Mod: NC

## 2019-04-09 PROCEDURE — 99223 1ST HOSP IP/OBS HIGH 75: CPT | Mod: GC

## 2019-04-09 PROCEDURE — 99222 1ST HOSP IP/OBS MODERATE 55: CPT

## 2019-04-09 RX ORDER — DOCUSATE SODIUM 100 MG
100 CAPSULE ORAL THREE TIMES A DAY
Qty: 0 | Refills: 0 | Status: DISCONTINUED | OUTPATIENT
Start: 2019-04-09 | End: 2019-04-10

## 2019-04-09 RX ORDER — GABAPENTIN 400 MG/1
100 CAPSULE ORAL EVERY 12 HOURS
Qty: 0 | Refills: 0 | Status: DISCONTINUED | OUTPATIENT
Start: 2019-04-09 | End: 2019-04-10

## 2019-04-09 RX ORDER — ENOXAPARIN SODIUM 100 MG/ML
50 INJECTION SUBCUTANEOUS EVERY 12 HOURS
Qty: 0 | Refills: 0 | Status: DISCONTINUED | OUTPATIENT
Start: 2019-04-09 | End: 2019-04-10

## 2019-04-09 RX ORDER — OXYCODONE HYDROCHLORIDE 5 MG/1
5 TABLET ORAL EVERY 4 HOURS
Qty: 0 | Refills: 0 | Status: DISCONTINUED | OUTPATIENT
Start: 2019-04-09 | End: 2019-04-10

## 2019-04-09 RX ORDER — SENNA PLUS 8.6 MG/1
2 TABLET ORAL AT BEDTIME
Qty: 0 | Refills: 0 | Status: DISCONTINUED | OUTPATIENT
Start: 2019-04-09 | End: 2019-04-10

## 2019-04-09 RX ADMIN — Medication 100 MILLIGRAM(S): at 14:11

## 2019-04-09 RX ADMIN — ENOXAPARIN SODIUM 50 MILLIGRAM(S): 100 INJECTION SUBCUTANEOUS at 05:45

## 2019-04-09 RX ADMIN — ENOXAPARIN SODIUM 50 MILLIGRAM(S): 100 INJECTION SUBCUTANEOUS at 18:29

## 2019-04-09 RX ADMIN — GABAPENTIN 100 MILLIGRAM(S): 400 CAPSULE ORAL at 18:29

## 2019-04-09 NOTE — H&P ADULT - PROBLEM SELECTOR PLAN 2
p/w reported fever at home to 100.4, tachycardia w/o clear source. U/A, RVP negative. CT chest reported w/ improvement of decreased mod loculated Rt pleural effusion, rt lung opacities resolving.   Lactate 3.7 improved to 1.3 w/ IVF, poss 2/2 malignancy?   Possibly fever of malignancy   s/p Vanc/Zosyn, f/u BCx p/w reported fever at home to 100.4, tachycardia w/o clear source. U/A, RVP negative. CT chest reported w/ improvement of decreased mod loculated Rt pleural effusion, rt lung opacities resolving.   Lactate 3.7 improved to 1.3 w/ IVF, poss 2/2 malignancy?   Possibly fever of malignancy   s/p Vanc/Zosyn, f/u BCx  will observe off abx at this time

## 2019-04-09 NOTE — H&P ADULT - ASSESSMENT
53 y/o F w/ recent dx of R lung adenocarcinoma with mediastinal and hilar lymphadenopathy c/b R pleural effusion s/p pleurx and VATS, PE (3/4) on lovenox s/p IVC filter presenting with fevers, sent in for evaluation for possible PNA, CT chest showing improving improved of loculated pleural effusion.

## 2019-04-09 NOTE — H&P ADULT - HISTORY OF PRESENT ILLNESS
53 y/o F w/ recent dx of R lung adenocarcinoma with mediastinal and hilar lymphadenopathy c/b R pleural effusion s/p pleurx and VATS, PE (3/4) on lovenox s/p IVC filter presenting with fevers and cough. 55 y/o F w/ recent dx of R lung adenocarcinoma with mediastinal and hilar lymphadenopathy c/b R pleural effusion s/p pleurx and VATS, PE (3/4) on lovenox s/p IVC filter presenting with fevers and cough.     Regarding her cancer history, pt follows with Dr. Harper, recently diagnosed 3/1/19. Pathology came back as metastatic adenocarcinoma of lung, pleural effusion + for malignant cells. PDL1>90%. Pt was to start pembrolizumab next week.    In the ED, vitals: T: 99.3, HR: 102-118, BP: /51-57, RR: 16-20, SpO2: 100%   Pt got vanco 53 y/o F w/ recent dx of R lung adenocarcinoma with mediastinal and hilar lymphadenopathy c/b R pleural effusion s/p pleurx and VATS, PE (3/4) on lovenox s/p IVC filter presenting with fevers, sent in for evaluation for possible PNA. Pt states her visiting nurse came yesterday to drain her pleurx, about 50 mL of blood tinged fluid, when she check her vitals, was reported to be 100.4F. Pt went to [facility] for pain management visit, CXR was done there, reported as PNA and pt was sent to the ER for further evaluation. Pt states her cough is at baseline, w/ whitish sputum, not increased at this time. She has intermittent SOB when she coughs, but none when she doesn't cough. Denies: CP, SOB, N/V/D, abdominal pain.     Regarding her cancer history, pt follows with Dr. Harper, recently diagnosed 3/1/19. Pathology came back as metastatic adenocarcinoma of lung, pleural effusion + for malignant cells. PDL1>90%. Pt was to start pembrolizumab next week.    In the ED, vitals: T: 99.3, HR: 102-118, BP: /51-57, RR: 16-20, SpO2: 100%   Pt got vanco/zosyn, 2L methylprednisolone 55 y/o F w/ recent dx of R lung adenocarcinoma with mediastinal and hilar lymphadenopathy c/b R pleural effusion s/p pleurx and VATS, subxiphoid pericardial window on 3/20/19, PE (3/4) on lovenox s/p IVC filter presenting with fevers, sent in for evaluation for possible PNA. Pt states her visiting nurse came yesterday to drain her pleurx, about 50 mL of blood tinged fluid, when she check her vitals, was reported to be 100.4F. Pt went to [facility] for pain management visit, CXR was done there, reported as PNA and pt was sent to the ER for further evaluation. Pt states her cough is at baseline, w/ whitish sputum, not increased at this time. She has intermittent SOB when she coughs, but none when she doesn't cough. Denies: CP, SOB, N/V/D, abdominal pain.     Regarding her cancer history, pt follows with Dr. Harper, recently diagnosed 3/1/19. Pathology came back as metastatic adenocarcinoma of lung, pleural effusion + for malignant cells. PDL1>90%. Pt was to start pembrolizumab next week.    In the ED, vitals: T: 99.3, HR: 102-118, BP: /51-57, RR: 16-20, SpO2: 100%   Pt got vanco/zosyn, 2L methylprednisolone 53 y/o F w/ recent dx of R lung adenocarcinoma with mediastinal and hilar lymphadenopathy c/b R pleural effusion s/p pleurx and VATS, subxiphoid pericardial window on 3/20/19, PE (3/4) on lovenox s/p IVC filter presenting with fevers, sent in for evaluation for possible PNA. Pt states her visiting nurse came yesterday to drain her pleurx, about 50 mL of blood tinged fluid, when she check her vitals, was reported to be febrile to 100.4F. Pt went to [facility] for pain management visit, CXR was done there, reported as PNA and pt was sent to the ER for further evaluation. Pt states her cough is at baseline, w/ whitish sputum, not increased/changed at this time. She has intermittent SOB when she coughs, but none when she doesn't cough. Denies: CP, SOB, N/V/D, abdominal pain.     Regarding her cancer history, pt follows with Dr. Harper, recently diagnosed 3/1/19. Pathology came back as metastatic adenocarcinoma of lung, pleural effusion + for malignant cells. PDL1>90%. Pt was to start pembrolizumab next week.    In the ED, vitals: T: 99.3, HR: 102-118, BP: /51-57, RR: 16-20, SpO2: 100%   Pt got vanco/zosyn, 2L IVF, 125mg IVP methylprednisolone.

## 2019-04-09 NOTE — PROGRESS NOTE ADULT - ASSESSMENT
55 y/o F w/ recent dx of R lung adenocarcinoma with mediastinal and hilar lymphadenopathy c/b R pleural effusion s/p pleurx and VATS, PE (3/4) on lovenox s/p IVC filter presenting with fevers, sent in for evaluation for possible PNA, CT chest showing improving improved of loculated pleural effusion.

## 2019-04-09 NOTE — CONSULT NOTE ADULT - REASON FOR ADMISSION
Patient is a 54y old  Female who presents with a chief complaint of fevers. cough
Patient is a 54y old  Female who presents with a chief complaint of fevers. cough

## 2019-04-09 NOTE — H&P ADULT - PROBLEM SELECTOR PLAN 3
likely 2/2 pleural mets, Pleurx catheter  was on tessalon Robitussin at home; pt states she doesn't want here as it drys out her mouth   will order hycodan prn

## 2019-04-09 NOTE — H&P ADULT - NSHPLABSRESULTS_GEN_ALL_CORE
12.5   6.23  )-----------( 580      ( 2019 19:48 )             39.7     2019 21:46    131    |  100    |  6      ----------------------------<  125    4.8     |  16     |  0.60     Ca    8.5        2019 21:46    TPro  7.0    /  Alb  2.9    /  TBili  0.3    /  DBili  x      /  AST  43     /  ALT  18     /  AlkPhos  86     2019 19:48    PT/INR - ( 2019 21:46 )   PT: 13.0 SEC;   INR: 1.14          PTT - ( 2019 21:46 )  PTT:23.8 SEC  CAPILLARY BLOOD GLUCOSE        LIVER FUNCTIONS - ( 2019 19:48 )  Alb: 2.9 g/dL / Pro: 7.0 g/dL / ALK PHOS: 86 u/L / ALT: 18 u/L / AST: 43 u/L / GGT: x           Urinalysis Basic - ( 2019 19:48 )    Color: YELLOW / Appearance: CLEAR / S.024 / pH: 7.0  Gluc: NEGATIVE / Ketone: NEGATIVE  / Bili: NEGATIVE / Urobili: NORMAL   Blood: NEGATIVE / Protein: 30 / Nitrite: NEGATIVE   Leuk Esterase: NEGATIVE / RBC: 3-5 / WBC 0-2   Sq Epi: FEW / Non Sq Epi: x / Bacteria: NEGATIVE    < from: CT Chest No Cont (19 @ 22:02) >      EXAM:  CT CHEST        PROCEDURE DATE:  2019         INTERPRETATION:  CLINICAL INFORMATION: Recently diagnosed metastatic lung   cancer. Fever and cough.    COMPARISON: CTA chest performed 3/20/2019 and 3/2/2019.    PROCEDURE:   CT of the Chest was performed without intravenous contrast.  Sagittal and coronal reformats were performed.      FINDINGS:    CHEST:     LUNGS AND LARGE AIRWAYS: Near completely resolved airspace opacities in   the right lung compared to 3/20/2019. Residual interlobular septal wall   thickening in the aerated right lung. Areas of compressive atelectasis in   the right lung grossly unchanged. The left lung is unremarkable. The   trachea and main bronchi are clear. PLEURA: Interval mild decrease size   of loculated right pleural effusion, now moderate in size. Right Pleurx   catheter in stable position. No left pleural inferior.  VESSELS: Within normal limits.  HEART: Heart size is normal. Trace pericardial effusion, decreased since   3/20/2019.  MEDIASTINUM AND DIANA: No lymphadenopathy.  CHEST WALL AND LOWER NECK: Right chest wall lymphadenopathy. A reference   1.2 x 0.8 cm right subpectoral node (series 3, image 124) previously   measured 0.8 x 0.9 cm on 3/20/2019. Asymmetric breast tissue in the outer   right breast suggested.  VISUALIZED UPPER ABDOMEN: Within normal limits.  BONES: Degenerative changes.    IMPRESSION:     Decreased now moderate loculated right pleural effusion. Grossly stable   compressive atelectasis.    Near completely resolved right lung opacities as compared to 3/20/2019.   Right chest tube in place. Residual stable interlobular septal thickening   in the right lung.    Small pericardial effusion, decreased since 3/20/2019.    ? Asymmetric right breast tissue. Correlation with dedicated breast   imaging is recommended. LABS and ADDITIONAL STUDIES:                12.5   6.23  )-----------( 580      ( 2019 19:48 )              39.7     2019 21:46    131    |  100    |  6      ----------------------------<  125    4.8     |  16     |  0.60     Ca    8.5        2019 21:46    TPro  7.0    /  Alb  2.9    /  TBili  0.3    /  DBili  x      /  AST  43     /  ALT  18     /  AlkPhos  86     2019 19:48    PT/INR - ( 2019 21:46 )   PT: 13.0 SEC;   INR: 1.14     PTT - ( 2019 21:46 )  PTT:23.8 SEC    LIVER FUNCTIONS - ( 2019 19:48 )  Alb: 2.9 g/dL / Pro: 7.0 g/dL / ALK PHOS: 86 u/L / ALT: 18 u/L / AST: 43 u/L / GGT: x           Urinalysis Basic - ( 2019 19:48 )  Color: YELLOW / Appearance: CLEAR / S.024 / pH: 7.0  Gluc: NEGATIVE / Ketone: NEGATIVE  / Bili: NEGATIVE / Urobili: NORMAL   Blood: NEGATIVE / Protein: 30 / Nitrite: NEGATIVE   Leuk Esterase: NEGATIVE / RBC: 3-5 / WBC 0-2   Sq Epi: FEW / Non Sq Epi: x / Bacteria: NEGATIVE    < from: CT Chest No Cont (19 @ 22:02) >  EXAM:  CT CHEST    PROCEDURE DATE:  2019   INTERPRETATION:  CLINICAL INFORMATION: Recently diagnosed metastatic lung   cancer. Fever and cough.    COMPARISON: CTA chest performed 3/20/2019 and 3/2/2019.    PROCEDURE:   CT of the Chest was performed without intravenous contrast.  Sagittal and coronal reformats were performed.      FINDINGS:    CHEST:     LUNGS AND LARGE AIRWAYS: Near completely resolved airspace opacities in   the right lung compared to 3/20/2019. Residual interlobular septal wall   thickening in the aerated right lung. Areas of compressive atelectasis in   the right lung grossly unchanged. The left lung is unremarkable. The   trachea and main bronchi are clear. PLEURA: Interval mild decrease size   of loculated right pleural effusion, now moderate in size. Right Pleurx   catheter in stable position. No left pleural inferior.  VESSELS: Within normal limits.  HEART: Heart size is normal. Trace pericardial effusion, decreased since   3/20/2019.  MEDIASTINUM AND DIANA: No lymphadenopathy.  CHEST WALL AND LOWER NECK: Right chest wall lymphadenopathy. A reference   1.2 x 0.8 cm right subpectoral node (series 3, image 124) previously   measured 0.8 x 0.9 cm on 3/20/2019. Asymmetric breast tissue in the outer   right breast suggested.  VISUALIZED UPPER ABDOMEN: Within normal limits.  BONES: Degenerative changes.    IMPRESSION:     Decreased now moderate loculated right pleural effusion. Grossly stable   compressive atelectasis.    Near completely resolved right lung opacities as compared to 3/20/2019.   Right chest tube in place. Residual stable interlobular septal thickening   in the right lung.    Small pericardial effusion, decreased since 3/20/2019.    ? Asymmetric right breast tissue. Correlation with dedicated breast   imaging is recommended.    EKG - NSR at 98, nl axis, QTc 459, non-specific ST-T wave changes

## 2019-04-09 NOTE — CONSULT NOTE ADULT - ASSESSMENT
-Please inform thoracic surgery of patient's admission  -Agree with treatment of HCAP  -C/w lovenox  -Monitor CBC, ?neutropenia  -Outpatient oncology follow up      Will follow. Please do not hesitate to call back with questions.     Thuy Harper MD  Medical Oncology Attending 54f with advanced lung cancer, with pdl1>90%, plan to start immunotherapy with pembrolizumab 4/10, admitted with fever and ?PNA.   Will postpone immunotherapy until patient completes antibiotic course. Would like to start treatment as soon as possible, when patient medically ready.    -Please inform thoracic surgery of patient's admission  -Agree with treatment of HCAP with levoquin  -C/w lovenox  -Monitor CBC, ?neutropenia  -Outpatient oncology follow up      Will follow. Please do not hesitate to call back with questions.     Thuy Harper MD  Medical Oncology Attending

## 2019-04-09 NOTE — PROGRESS NOTE ADULT - PROBLEM SELECTOR PLAN 3
likely 2/2 pleural mets, Pleurx catheter, on pleurx drain (MWF)   CTS consult Dr. Morris  was on tessalon, Robitussin at home; pt states she doesn't want here as it drys out her mouth   will order hycodan prn

## 2019-04-09 NOTE — ASSESSMENT
[FreeTextEntry1] : 54f with newly diagnosed advanced adenocarcinoma of the lung, with pdl1>90%, and EGFR exon 20 insetion, presenting after hospital discharge \par Will start single agent pembrolizumab given high PDL1. Exon 20 insertion is resistant to currently available EGFR TKIs. \par \par -blood work today including TSH\par -To start pembrolizumab next week\par -imaging after 3-4 cycles\par -PET/CT not done as of yet, however will not delay treatment to obtain PET/CT, CT chest and MRI brain have been done, CT a/p done at [facility], no evidence of disease per reports, will get CD, \par -palliative care consult\par -RTC before C2

## 2019-04-09 NOTE — PROGRESS NOTE ADULT - PROBLEM SELECTOR PLAN 2
p/w reported fever at home to 100.4, tachycardia w/o clear source. U/A, RVP negative. CT chest reported w/ improvement of decreased mod loculated Rt pleural effusion, rt lung opacities resolving.   will treat for possible HCAP with levaquin x5 days  Lactate 3.7 improved to 1.3 w/ IVF, poss 2/2 malignancy?   Possibly fever of malignancy , f/u cultures  s/p Vanc/Zosyn, f/u BCx, will start levaquin

## 2019-04-09 NOTE — H&P ADULT - PROBLEM SELECTOR PLAN 1
R lung adenocarcinoma with mediastinal and hilar lymphadenopathy c/b R pleural effusion s/p pleurx and VATS  per last oncology note, plans to start pembrolizumab next week  oncology c/s in the AM

## 2019-04-09 NOTE — H&P ADULT - PROBLEM SELECTOR PLAN 5
DVT: on Lovenox for PE   Diet: regular  Dispo: pending medical w/u CT chest showing improvement  c/t monitor

## 2019-04-09 NOTE — PROGRESS NOTE ADULT - SUBJECTIVE AND OBJECTIVE BOX
Kourtney Conway MD  Pager [phone]    CHIEF COMPLAINT: Patient is a 54y old  female who presents with a chief complaint of Patient is a 54y old  Female who presents with a chief complaint of fevers. cough (2019 11:48)      SUBJECTIVE / OVERNIGHT EVENTS:  pt reports some cough with white sputum, no sick contact, no abd pain/dysuria/diarrhea    MEDICATIONS  (STANDING):  docusate sodium 100 milliGRAM(s) Oral three times a day  enoxaparin Injectable 50 milliGRAM(s) SubCutaneous every 12 hours  gabapentin 100 milliGRAM(s) Oral every 12 hours  senna 2 Tablet(s) Oral at bedtime    MEDICATIONS  (PRN):  acetaminophen   Tablet .. 650 milliGRAM(s) Oral once PRN Temp greater or equal to 38C (100.4F)  HYDROcodone/homatropine Syrup 5 milliLiter(s) Oral every 6 hours PRN Cough  oxyCODONE    IR 5 milliGRAM(s) Oral every 4 hours PRN moderate and severe pain      VITALS:  T(F): 98.3 (19 @ 07:36), Max: 99.3 (19 @ 21:00)  HR: 89 (19 @ 07:36) (89 - 118)  BP: 104/60 (19 @ 07:50) (93/54 - 120/51)  RR: 18 (19 @ 07:36) (16 - 20)  SpO2: 100% (19 @ 07:36)  Height (cm): 157.48 (03:08)  Weight (kg): 44.459682545784430 (03:08)  BMI (kg/m2): 17.9 (03:08)    CAPILLARY BLOOD GLUCOSE    Output   Height (cm): 157.48 (03:08)  Weight (kg): 44.160683195472105 (03:08)  BMI (kg/m2): 17.9 (03:08)  I&O's Summary  T(F): 98.3 (19 @ 07:36), Max: 99.3 (19 @ 21:00)  HR: 89 (19 @ 07:36) (89 - 118)  BP: 104/60 (19 @ 07:50) (93/54 - 120/51)  RR: 18 (19 @ 07:36) (16 - 20)  SpO2: 100% (19 @ 07:36)    PHYSICAL EXAM:  GENERAL: NAD, thin woman  HEAD:  Atraumatic, Normocephalic  EYES: EOMI, PERRLA, conjunctiva and sclera clear  NECK: Supple, No JVD  CHEST/LUNG: decreased BS right lung base, right pleurx cathter in place   No wheeze  HEART: Regular rate and rhythm; No murmurs, rubs, or gallops  ABDOMEN: Soft, Nontender, Nondistended; Bowel sounds present  EXTREMITIES:  2+ Peripheral Pulses, No clubbing, cyanosis, or edema  PSYCH: AAOx3  NEUROLOGY: non-focal  SKIN: No rashes or lesions    LABS:              11.3                 135  | 22   | 6            2.46  >-----------< 477     ------------------------< 203                   36.7                 3.8  | 102  | 0.49                                         Ca 8.5   Mg 1.8   Ph 3.4         TPro  7.0  /  Alb  2.9      TBili  0.3  /  DBili  x         AST  43  /  ALT  18            AlkPhos  86      INR: 1.14 ;    PT: 13.0 SEC;    PTT: 23.8 SEC<L>        Urinalysis Basic - ( 2019 19:48 )    Color: YELLOW / Appearance: CLEAR / S.024 / pH: 7.0  Gluc: NEGATIVE / Ketone: NEGATIVE  / Bili: NEGATIVE / Urobili: NORMAL   Blood: NEGATIVE / Protein: 30 / Nitrite: NEGATIVE   Leuk Esterase: NEGATIVE / RBC: 3-5 / WBC 0-2   Sq Epi: FEW / Non Sq Epi: x / Bacteria: NEGATIVE        VB-08 @ 21:46  7.41/38/87/24/96.6/-0.9    VB-08 @ 19:48  7.30/57/36/24/56.7/1.4    MICROBIOLOGY:    RADIOLOGY & ADDITIONAL TESTS:    Imaging Personally Reviewed:  < from: CT Chest No Cont (19 @ 22:02) >  LUNGS AND LARGE AIRWAYS: Near completely resolved airspace opacities in   the right lung compared to 3/20/2019. Residual interlobular septal wall   thickening in the aerated right lung. Areas of compressive atelectasis in   the right lung grossly unchanged. The left lung is unremarkable. The   trachea and main bronchi are clear. PLEURA: Interval mild decrease size   of loculated right pleural effusion, now moderate in size. Right Pleurx   catheter in stable position. No left pleural inferior.  VESSELS: Within normal limits.  HEART: Heart size is normal. Trace pericardial effusion, decreased since   3/20/2019.  MEDIASTINUM AND DIANA: No lymphadenopathy.  CHEST WALL AND LOWER NECK: Right chest wall lymphadenopathy. A reference   1.2 x 0.8 cm right subpectoral node (series 3, image 124) previously   measured 0.8 x 0.9 cm on 3/20/2019. Asymmetric breast tissue in the outer   right breast suggested.  VISUALIZED UPPER ABDOMEN: Within normal limits.  BONES: Degenerative changes.    IMPRESSION:     Decreased now moderate loculated right pleural effusion. Grossly stable   compressive atelectasis.    Near completely resolved right lung opacities as compared to 3/20/2019.   Right chest tube in place. Residual stable interlobular septal thickening   in the right lung.    Small pericardial effusion, decreased since 3/20/2019.    ? Asymmetric right breast tissue. Correlation with dedicated breast   imaging is recommended.    [ ] Consultant(s) Notes Reviewed:  [ ] Care Discussed with Consultants/Other Providers: Kourtney Conway MD  Pager [phone]    CHIEF COMPLAINT: Patient is a 54y old  female who presents with a chief complaint of Patient is a 54y old  Female who presents with a chief complaint of fevers. cough (2019 11:48)      SUBJECTIVE / OVERNIGHT EVENTS:  pt reports some cough with white sputum, no sick contact, no abd pain/dysuria/diarrhea    MEDICATIONS  (STANDING):  docusate sodium 100 milliGRAM(s) Oral three times a day  enoxaparin Injectable 50 milliGRAM(s) SubCutaneous every 12 hours  gabapentin 100 milliGRAM(s) Oral every 12 hours  senna 2 Tablet(s) Oral at bedtime    MEDICATIONS  (PRN):  acetaminophen   Tablet .. 650 milliGRAM(s) Oral once PRN Temp greater or equal to 38C (100.4F)  HYDROcodone/homatropine Syrup 5 milliLiter(s) Oral every 6 hours PRN Cough  oxyCODONE    IR 5 milliGRAM(s) Oral every 4 hours PRN moderate and severe pain      VITALS:  T(F): 98.3 (19 @ 07:36), Max: 99.3 (19 @ 21:00)  HR: 89 (19 @ 07:36) (89 - 118)  BP: 104/60 (19 @ 07:50) (93/54 - 120/51)  RR: 18 (19 @ 07:36) (16 - 20)  SpO2: 100% (19 @ 07:36)  Height (cm): 157.48 (03:08)  Weight (kg): 44.251930797418468 (03:08)  BMI (kg/m2): 17.9 (03:08)    CAPILLARY BLOOD GLUCOSE    Output   Height (cm): 157.48 (03:08)  Weight (kg): 44.895397345361770 (03:08)  BMI (kg/m2): 17.9 (03:08)  I&O's Summary  T(F): 98.3 (19 @ 07:36), Max: 99.3 (19 @ 21:00)  HR: 89 (19 @ 07:36) (89 - 118)  BP: 104/60 (19 @ 07:50) (93/54 - 120/51)  RR: 18 (19 @ 07:36) (16 - 20)  SpO2: 100% (19 @ 07:36)    PHYSICAL EXAM:  GENERAL: NAD, thin woman  HEAD:  Atraumatic, Normocephalic  EYES: EOMI, PERRLA, conjunctiva and sclera clear  NECK: Supple, No JVD  CHEST/LUNG: decreased BS right lung base, right pleurx cathter in place   No wheeze  HEART: Regular rate and rhythm; No murmurs, rubs, or gallops  ABDOMEN: Soft, Nontender, Nondistended; Bowel sounds present  EXTREMITIES:  2+ Peripheral Pulses, No clubbing, cyanosis, or edema  PSYCH: AAOx3  NEUROLOGY: non-focal  SKIN: No rashes or lesions    LABS:              11.3                 135  | 22   | 6            2.46  >-----------< 477     ------------------------< 203                   36.7                 3.8  | 102  | 0.49                                         Ca 8.5   Mg 1.8   Ph 3.4         TPro  7.0  /  Alb  2.9      TBili  0.3  /  DBili  x         AST  43  /  ALT  18            AlkPhos  86      INR: 1.14 ;    PT: 13.0 SEC;    PTT: 23.8 SEC<L>        Urinalysis Basic - ( 2019 19:48 )    Color: YELLOW / Appearance: CLEAR / S.024 / pH: 7.0  Gluc: NEGATIVE / Ketone: NEGATIVE  / Bili: NEGATIVE / Urobili: NORMAL   Blood: NEGATIVE / Protein: 30 / Nitrite: NEGATIVE   Leuk Esterase: NEGATIVE / RBC: 3-5 / WBC 0-2   Sq Epi: FEW / Non Sq Epi: x / Bacteria: NEGATIVE        VB-08 @ 21:46  7.41/38/87/24/96.6/-0.9    VB-08 @ 19:48  7.30/57/36/24/56.7/1.4    MICROBIOLOGY:    RADIOLOGY & ADDITIONAL TESTS:    Imaging Personally Reviewed:  < from: CT Chest No Cont (19 @ 22:02) >  LUNGS AND LARGE AIRWAYS: Near completely resolved airspace opacities in   the right lung compared to 3/20/2019. Residual interlobular septal wall   thickening in the aerated right lung. Areas of compressive atelectasis in   the right lung grossly unchanged. The left lung is unremarkable. The   trachea and main bronchi are clear. PLEURA: Interval mild decrease size   of loculated right pleural effusion, now moderate in size. Right Pleurx   catheter in stable position. No left pleural inferior.  VESSELS: Within normal limits.  HEART: Heart size is normal. Trace pericardial effusion, decreased since   3/20/2019.  MEDIASTINUM AND DIANA: No lymphadenopathy.  CHEST WALL AND LOWER NECK: Right chest wall lymphadenopathy. A reference   1.2 x 0.8 cm right subpectoral node (series 3, image 124) previously   measured 0.8 x 0.9 cm on 3/20/2019. Asymmetric breast tissue in the outer   right breast suggested.  VISUALIZED UPPER ABDOMEN: Within normal limits.  BONES: Degenerative changes.    IMPRESSION:     Decreased now moderate loculated right pleural effusion. Grossly stable   compressive atelectasis.    Near completely resolved right lung opacities as compared to 3/20/2019.   Right chest tube in place. Residual stable interlobular septal thickening   in the right lung.    Small pericardial effusion, decreased since 3/20/2019.    ? Asymmetric right breast tissue. Correlation with dedicated breast   imaging is recommended.    [ ] Consultant(s) Notes Reviewed:  [x ] Care Discussed with Consultants/Other Providers: ads zackary Curran, hemonc and CTS consult

## 2019-04-09 NOTE — CONSULT NOTE ADULT - SUBJECTIVE AND OBJECTIVE BOX
HPI:  53 y/o F w/ recent dx of R lung adenocarcinoma with mediastinal and hilar lymphadenopathy c/b R pleural effusion s/p pleurx and VATS, subxiphoid pericardial window on 3/20/19, PE (3/4) on lovenox s/p IVC filter presenting with fevers, sent in for evaluation for possible PNA. Pt states her visiting nurse came yesterday to drain her pleurx, about 50 mL of blood tinged fluid, when she check her vitals, was reported to be febrile to 100.4F. Pt went to [facility] for pain management visit, CXR was done there, reported as PNA and pt was sent to the ER for further evaluation. Pt states her cough is at baseline, w/ whitish sputum, not increased/changed at this time. She has intermittent SOB when she coughs, but none when she doesn't cough. Denies: CP, SOB, N/V/D, abdominal pain.     Regarding her cancer history, pt follows with Dr. Harper, recently diagnosed 3/1/19. Pathology came back as metastatic adenocarcinoma of lung, pleural effusion + for malignant cells. PDL1>90%. Pt was to start pembrolizumab next week.  CT Chest performed showing improving effusion w/ RLL PNA and worsening consolidation  Pt in bed feeling better then the day before      PAST MEDICAL & SURGICAL HISTORY:  Pulmonary embolus  Lung cancer  History of lung surgery      REVIEW OF SYSTEMS      General:  + Weight change/ Fatigue/	    Skin/Breast: No Rashes/ Lesions/ Masses  	  Ophthalmologic: No Blurry vision/ Glaucoma/ Blindness  	  ENMT: No Hearing loss/ Drainage/ Lesions	    Respiratory and Thorax: + Cough/ SOB/ Sputum production  	  Cardiovascular: No Chest pain/ Palpitations/ Diaphoresis	    Gastrointestinal: No Nausea/ Vomiting/ Constipation/ Appetite Change	    Genitourinary: No Heamturia/ Dysuria/ Frequency change/ Impotence	    Musculoskeletal: No Pain/ Weakness/ Claudication	    Neurological: No Seizures/ TIA/CVA/ Parastesias	    Psychiatric: No Dementia/ Depression/ SI/HI	    Hematology/Lymphatics: No hx of bleeding/ Edema	    Endocrine:	No Hyperglycemia/ Hypoglycemia    Allergic/Immunologic:	 No Anaphylaxis/ Intolerance/ Recent illnesses    MEDICATIONS  (STANDING):  docusate sodium 100 milliGRAM(s) Oral three times a day  enoxaparin Injectable 50 milliGRAM(s) SubCutaneous every 12 hours  gabapentin 100 milliGRAM(s) Oral every 12 hours  levoFLOXacin  Tablet 500 milliGRAM(s) Oral every 24 hours  senna 2 Tablet(s) Oral at bedtime    MEDICATIONS  (PRN):  acetaminophen   Tablet .. 650 milliGRAM(s) Oral once PRN Temp greater or equal to 38C (100.4F)  HYDROcodone/homatropine Syrup 5 milliLiter(s) Oral every 6 hours PRN Cough  oxyCODONE    IR 5 milliGRAM(s) Oral every 4 hours PRN moderate and severe pain      Allergies    No Known Allergies    Intolerances    IV contrast (Rash)      SOCIAL HISTORY:  Occupation:  Smoking Hx: denies  Etoh Hx: denies  IVDA Hx: denies    FAMILY HISTORY:  Family history of coronary artery disease (Sibling): Brother  Family history of hypertension: Mother  Family history of diabetes mellitus (DM): Mother    unless noted, no significant family hx with Mother, Father, Siblings    Vital Signs Last 24 Hrs  T(C): 37.1 (2019 14:38), Max: 37.4 (2019 21:00)  T(F): 98.8 (2019 14:38), Max: 99.3 (2019 21:00)  HR: 91 (2019 14:38) (89 - 118)  BP: 99/63 (2019 14:38) (98/57 - 120/51)  BP(mean): --  RR: 18 (2019 14:38) (18 - 20)  SpO2: 100% (2019 14:38) (99% - 100%)    General: WN/WD NAD  Neurology: Awake, nonfocal, PAZ x 4  Eyes: Scleras clear, PERRLA/ EOMI, Gross vision intact  ENT:Gross hearing intact, grossly patent pharynx, no stridor  Neck: Neck supple, trachea midline, No JVD,   Respiratory: CTA B/L, No wheezing, rales, rhonchi  CV: RRR, S1S2, no murmurs, rubs or gallops  Abdominal: Soft, NT, ND +BS,   Extremities: No edema, + peripheral pulses  Skin: No Rashes, Hematoma, Ecchymosis  Lymphatic: No Neck, axilla, groin LAD  Psych: Oriented x 3, normal affect  Incisions:Midline chest incsion c/d/i   Tubes:Pleurex intact    LABS:                        11.3   2.46  )-----------( 477      ( 2019 06:50 )             36.7     04-    135  |  102  |  6<L>  ----------------------------<  203<H>  3.8   |  22  |  0.49<L>    Ca    8.5      2019 06:50  Phos  3.4     04-  Mg     1.8     -    TPro  7.0  /  Alb  2.9<L>  /  TBili  0.3  /  DBili  x   /  AST  43<H>  /  ALT  18  /  AlkPhos  86  -08    PT/INR - ( 2019 21:46 )   PT: 13.0 SEC;   INR: 1.14          PTT - ( 2019 21:46 )  PTT:23.8 SEC  Urinalysis Basic - ( 2019 19:48 )    Color: YELLOW / Appearance: CLEAR / S.024 / pH: 7.0  Gluc: NEGATIVE / Ketone: NEGATIVE  / Bili: NEGATIVE / Urobili: NORMAL   Blood: NEGATIVE / Protein: 30 / Nitrite: NEGATIVE   Leuk Esterase: NEGATIVE / RBC: 3-5 / WBC 0-2   Sq Epi: FEW / Non Sq Epi: x / Bacteria: NEGATIVE        RADIOLOGY & ADDITIONAL STUDIES:    ASSESSMENT:   54yFemalePAST MEDICAL & SURGICAL HISTORY:  Pulmonary embolus  Lung cancer  History of lung surgery  HEALTH ISSUES - PROBLEM Dx:  Pericardial effusion: Pericardial effusion  Need for prophylactic measure: Need for prophylactic measure  Pulmonary embolus: Pulmonary embolus  Cough: Cough  SIRS (systemic inflammatory response syndrome): SIRS (systemic inflammatory response syndrome)  Lung cancer: Lung cancer    53 y/o F w/ recent dx of R lung adenocarcinoma with mediastinal and hilar lymphadenopathy c/b R pleural effusion s/p pleurx and VATS, subxiphoid pericardial window on 3/20/19, PE (3/4) on lovenox s/p IVC filter presents with Right LL pneumonia with improved pleural effusion      PLAN: Place PLeurX to Pleuravac on suction while hospitalized            Pt may go off suction for tests/ ambulation            Continue  antibiotics as per primary team            Plan d/w Dr Morris

## 2019-04-09 NOTE — H&P ADULT - NSHPPHYSICALEXAM_GEN_ALL_CORE
PHYSICAL EXAM:  GENERAL: NAD, thin  HEAD:  Atraumatic, Normocephalic  EYES: EOMI, PERRLA, conjunctiva and sclera clear  NECK: Supple, No JVD  CHEST/LUNG: Clear to auscultation bilaterally, decreased breath sounds on Rt; No wheeze. Rt pleurx, dressing c/d/i  HEART: Regular rate and rhythm; No murmurs, rubs, or gallops  ABDOMEN: Soft, Nontender, Nondistended; Bowel sounds present  EXTREMITIES:  2+ Peripheral Pulses, No clubbing, cyanosis, or edema  PSYCH: AAOx3  NEUROLOGY: non-focal  SKIN: No rashes or lesions Vital Signs Last 24 Hrs  T(C): 36.8 (09 Apr 2019 07:36), Max: 37.4 (08 Apr 2019 21:00)  T(F): 98.3 (09 Apr 2019 07:36), Max: 99.3 (08 Apr 2019 21:00)  HR: 89 (09 Apr 2019 07:36) (89 - 118)  BP: 98/57 (09 Apr 2019 07:36) (93/54 - 120/51)  BP(mean): --  RR: 18 (09 Apr 2019 07:36) (16 - 20)  SpO2: 100% (09 Apr 2019 07:36) (99% - 100%)    PHYSICAL EXAM:  GENERAL: NAD, thin  EYES: EOMI, PERRL, conjunctiva and sclera clear  NECK: Supple, No JVD  CHEST/LUNG: Clear to auscultation bilaterally, decreased breath sounds on Rt; No wheeze. Rt pleurx, dressing c/d/i, no TTP on palpation of plurex site  HEART: Regular rate and rhythm; No murmurs, rubs, or gallops  ABDOMEN: Soft, Nontender, Nondistended; Bowel sounds present  EXTREMITIES:  2+ Peripheral Pulses, No clubbing, cyanosis, or edema  PSYCH: AAOx3, nl behavior, flat affect  NEUROLOGY: non-focal  SKIN: No rashes or lesions

## 2019-04-09 NOTE — H&P ADULT - NSHPREVIEWOFSYSTEMS_GEN_ALL_CORE
REVIEW OF SYSTEMS:    CONSTITUTIONAL: No weakness, fevers or chills  EYES/ENT: No visual changes;  No vertigo or throat pain   NECK: No pain or stiffness  RESPIRATORY: +cough, no wheezing, hemoptysis; No shortness of breath  CARDIOVASCULAR: No chest pain or palpitations  GASTROINTESTINAL: No abdominal or epigastric pain. No nausea, vomiting, or hematemesis; No diarrhea or constipation. No melena or hematochezia.  GENITOURINARY: No dysuria, frequency or hematuria  NEUROLOGICAL: No numbness or weakness  SKIN: No itching, burning, rashes, or lesions   All other review of systems is negative unless indicated above. REVIEW OF SYSTEMS:    CONSTITUTIONAL: No weakness, fevers or chills  EYES: No visual changes or eye discharge  ENT: No rhinorrhea or sore throat  NECK: No pain or stiffness  RESPIRATORY: + chronic cough with whitish sputum production, no wheezing, hemoptysis; No shortness of breath  CARDIOVASCULAR: No chest pain or palpitations  GASTROINTESTINAL: No abdominal or epigastric pain. No nausea, vomiting, or hematemesis; No diarrhea or constipation. No melena or hematochezia.  GENITOURINARY: No dysuria, frequency or hematuria  NEUROLOGICAL: No numbness or weakness  SKIN: No itching, burning, rashes, or lesions

## 2019-04-09 NOTE — CONSULT NOTE ADULT - SUBJECTIVE AND OBJECTIVE BOX
Patient is a 54y old  Female who presents with a chief complaint of Patient is a 54y old  Female who presents with a chief complaint of fevers. cough (09 Apr 2019 01:16)      HPI:  55 y/o F w/ recent dx of R lung adenocarcinoma with mediastinal and hilar lymphadenopathy c/b R pleural effusion s/p pleurx and VATS, subxiphoid pericardial window on 3/20/19, PE (3/4) on lovenox s/p IVC filter presenting with fevers, sent in for evaluation for possible PNA. Pt states her visiting nurse came yesterday to drain her pleurx, about 50 mL of blood tinged fluid, when she check her vitals, was reported to be febrile to 100.4F. Pt went to [facility] for pain management visit, CXR was done there, reported as PNA and pt was sent to the ER for further evaluation. Pt states her cough is at baseline, w/ whitish sputum, not increased/changed at this time. She has intermittent SOB when she coughs, but none when she doesn't cough. Denies: CP, SOB, N/V/D, abdominal pain.     Regarding her cancer history, pt follows with Dr. Harper, recently diagnosed 3/1/19. Pathology came back as metastatic adenocarcinoma of lung, pleural effusion + for malignant cells. PDL1>90%. Pt was to start pembrolizumab next week.    In the ED, vitals: T: 99.3, HR: 102-118, BP: /51-57, RR: 16-20, SpO2: 100%   Pt got vanco/zosyn, 2L IVF, 125mg IVP methylprednisolone. (09 Apr 2019 01:16)       Oncologic History:      ROS: as above     PAST MEDICAL & SURGICAL HISTORY:  Pulmonary embolus  Lung cancer  History of lung surgery      SOCIAL HISTORY:    FAMILY HISTORY:  Family history of coronary artery disease (Sibling): Brother  Family history of hypertension: Mother  Family history of diabetes mellitus (DM): Mother      MEDICATIONS  (STANDING):  docusate sodium 100 milliGRAM(s) Oral three times a day  enoxaparin Injectable 50 milliGRAM(s) SubCutaneous every 12 hours  gabapentin 100 milliGRAM(s) Oral every 12 hours  senna 2 Tablet(s) Oral at bedtime    MEDICATIONS  (PRN):  acetaminophen   Tablet .. 650 milliGRAM(s) Oral once PRN Temp greater or equal to 38C (100.4F)  HYDROcodone/homatropine Syrup 5 milliLiter(s) Oral every 6 hours PRN Cough  oxyCODONE    IR 5 milliGRAM(s) Oral every 4 hours PRN moderate and severe pain      Allergies    No Known Allergies    Intolerances    IV contrast (Rash)      Vital Signs Last 24 Hrs  T(C): 36.8 (09 Apr 2019 07:36), Max: 37.4 (08 Apr 2019 21:00)  T(F): 98.3 (09 Apr 2019 07:36), Max: 99.3 (08 Apr 2019 21:00)  HR: 89 (09 Apr 2019 07:36) (89 - 118)  BP: 104/60 (09 Apr 2019 07:50) (93/54 - 120/51)  BP(mean): --  RR: 18 (09 Apr 2019 07:36) (16 - 20)  SpO2: 100% (09 Apr 2019 07:36) (99% - 100%)    PHYSICAL EXAM  General: adult in NAD  HEENT: clear oropharynx, anicteric sclera, pink conjunctiva  Neck: supple  CV: normal S1/S2 with no murmur rubs or gallops  Lungs: positive air movement b/l ant lungs, clear to auscultation, no wheezes, no rales  Abdomen: soft non-tender non-distended, no hepatosplenomegaly  Ext: no clubbing cyanosis or edema  Skin: no rashes and no petechiae  Neuro: alert and oriented X 3, none focal    LABS:                          11.3   2.46  )-----------( 477      ( 09 Apr 2019 06:50 )             36.7         Mean Cell Volume : 87.8 fL  Mean Cell Hemoglobin : 27.0 pg  Mean Cell Hemoglobin Concentration : 30.8 %  Auto Neutrophil # : 1.76 K/uL  Auto Lymphocyte # : 0.59 K/uL  Auto Monocyte # : 0.09 K/uL  Auto Eosinophil # : 0.00 K/uL  Auto Basophil # : 0.01 K/uL  Auto Neutrophil % : 71.5 %  Auto Lymphocyte % : 24.0 %  Auto Monocyte % : 3.7 %  Auto Eosinophil % : 0.0 %  Auto Basophil % : 0.4 %      Serial CBC's  04-09 @ 06:50  Hct-36.7 / Hgb-11.3 / Plat-477 / RBC-4.18 / WBC-2.46  Serial CBC's  04-08 @ 19:48  Hct-39.7 / Hgb-12.5 / Plat-580 / RBC-4.58 / WBC-6.23      04-09    135  |  102  |  6<L>  ----------------------------<  203<H>  3.8   |  22  |  0.49<L>    Ca    8.5      09 Apr 2019 06:50  Phos  3.4     04-09  Mg     1.8     04-09    TPro  7.0  /  Alb  2.9<L>  /  TBili  0.3  /  DBili  x   /  AST  43<H>  /  ALT  18  /  AlkPhos  86  04-08      PT/INR - ( 08 Apr 2019 21:46 )   PT: 13.0 SEC;   INR: 1.14          PTT - ( 08 Apr 2019 21:46 )  PTT:23.8 SEC                RADIOLOGY & ADDITIONAL STUDIES: Patient is a 54y old  Female who presents with a chief complaint of Patient is a 54y old  Female who presents with a chief complaint of fevers. cough (09 Apr 2019 01:16)      HPI:  54f with advanced lung adenocarcinoma s/p pleurx and VATS, subxiphoid pericardial window on 3/20/19, PE (3/4) on lovenox s/p IVC filter presenting with fevers and possible PNA.  Pt states her visiting nurse came yesterday to drain her pleurx, about 50 mL of blood tinged fluid, when she check her vitals, was reported to be febrile to 100.4F. Pt went to [facility] for pain management visit, CXR was done there, reported as PNA and pt was sent to the ER for further evaluation. Pt states her cough is at baseline, w/ whitish sputum, not increased/changed at this time. She has intermittent SOB when she coughs, but none when she doesn't cough. Denies: CP, SOB, N/V/D, abdominal pain.     ROS: as above     PAST MEDICAL & SURGICAL HISTORY:  Pulmonary embolus  Lung cancer  History of lung surgery      SOCIAL HISTORY:    FAMILY HISTORY:  Family history of coronary artery disease (Sibling): Brother  Family history of hypertension: Mother  Family history of diabetes mellitus (DM): Mother      MEDICATIONS  (STANDING):  docusate sodium 100 milliGRAM(s) Oral three times a day  enoxaparin Injectable 50 milliGRAM(s) SubCutaneous every 12 hours  gabapentin 100 milliGRAM(s) Oral every 12 hours  senna 2 Tablet(s) Oral at bedtime    MEDICATIONS  (PRN):  acetaminophen   Tablet .. 650 milliGRAM(s) Oral once PRN Temp greater or equal to 38C (100.4F)  HYDROcodone/homatropine Syrup 5 milliLiter(s) Oral every 6 hours PRN Cough  oxyCODONE    IR 5 milliGRAM(s) Oral every 4 hours PRN moderate and severe pain      Allergies    No Known Allergies    Intolerances    IV contrast (Rash)      Vital Signs Last 24 Hrs  T(C): 36.8 (09 Apr 2019 07:36), Max: 37.4 (08 Apr 2019 21:00)  T(F): 98.3 (09 Apr 2019 07:36), Max: 99.3 (08 Apr 2019 21:00)  HR: 89 (09 Apr 2019 07:36) (89 - 118)  BP: 104/60 (09 Apr 2019 07:50) (93/54 - 120/51)  BP(mean): --  RR: 18 (09 Apr 2019 07:36) (16 - 20)  SpO2: 100% (09 Apr 2019 07:36) (99% - 100%)    PHYSICAL EXAM  General: adult in NAD  HEENT: clear oropharynx, anicteric sclera, pink conjunctiva  Neck: supple  CV: normal S1/S2 with no murmur rubs or gallops  Lungs: positive air movement b/l ant lungs, clear to auscultation, no wheezes, no rales  Abdomen: soft non-tender non-distended, no hepatosplenomegaly  Ext: no clubbing cyanosis or edema  Skin: no rashes and no petechiae  Neuro: alert and oriented X 3, none focal    LABS:                          11.3   2.46  )-----------( 477      ( 09 Apr 2019 06:50 )             36.7         Mean Cell Volume : 87.8 fL  Mean Cell Hemoglobin : 27.0 pg  Mean Cell Hemoglobin Concentration : 30.8 %  Auto Neutrophil # : 1.76 K/uL  Auto Lymphocyte # : 0.59 K/uL  Auto Monocyte # : 0.09 K/uL  Auto Eosinophil # : 0.00 K/uL  Auto Basophil # : 0.01 K/uL  Auto Neutrophil % : 71.5 %  Auto Lymphocyte % : 24.0 %  Auto Monocyte % : 3.7 %  Auto Eosinophil % : 0.0 %  Auto Basophil % : 0.4 %      Serial CBC's  04-09 @ 06:50  Hct-36.7 / Hgb-11.3 / Plat-477 / RBC-4.18 / WBC-2.46  Serial CBC's  04-08 @ 19:48  Hct-39.7 / Hgb-12.5 / Plat-580 / RBC-4.58 / WBC-6.23      04-09    135  |  102  |  6<L>  ----------------------------<  203<H>  3.8   |  22  |  0.49<L>    Ca    8.5      09 Apr 2019 06:50  Phos  3.4     04-09  Mg     1.8     04-09    TPro  7.0  /  Alb  2.9<L>  /  TBili  0.3  /  DBili  x   /  AST  43<H>  /  ALT  18  /  AlkPhos  86  04-08      PT/INR - ( 08 Apr 2019 21:46 )   PT: 13.0 SEC;   INR: 1.14          PTT - ( 08 Apr 2019 21:46 )  PTT:23.8 SEC                RADIOLOGY & ADDITIONAL STUDIES:

## 2019-04-09 NOTE — CHART NOTE - NSCHARTNOTEFT_GEN_A_CORE
Reference #: [id number]    Others' Prescriptions  Patient Name:	Bernardo Kennedy	YOB: 1965  Address:	[address] [place] [address]	Sex:	Female  Rx Written	Rx Dispensed	Drug	Quantity	Days Supply	Prescriber Name  04/02/2019	04/02/2019	oxycodone hcl 5 mg tablet	30	5	Katheryn Sin  03/15/2019	03/15/2019	hydrocodone-homatropine syrup	300ml	30	Paloma Villagran  03/07/2019	03/07/2019	hydromorphone 2 mg tablet	14	7	[facility]

## 2019-04-09 NOTE — HISTORY OF PRESENT ILLNESS
[Disease: _____________________] : Disease: [unfilled] [T: ___] : T[unfilled] [M: ___] : M[unfilled] [AJCC Stage: ____] : AJCC Stage: [unfilled] [de-identified] : 54f, never smoker, with newly diagnosed advanced NSCLC, presenting to [facility]. \par Ms Kennedy presented to [facility] with shortness of breath, imaging showed a lung mass, she was told she has lung cancer based on a positive pleural effusion. She experienced worsening SOB and presented to [facility] on 3/1/2019. She was found to have multiple PEs in the left upper and lower lobes and a large right-sided pleural effusion with associated partial collapse of R lung and obstruction of the right lower lobe bronchus. She was evaluated by thoracic surgery and underwent a Right VATS, pleural biopsy, drainage of pleural effusion, insertion of pleurX catheter. She also had Placement of retrievable IVC Filter. I met Ms. Kennedy while she was admitted at [facility].  \par Pathology came back as metastatic adenocarcinoma of lung, pleural effusion + for malignant cells. PDL1>90%. Foundation testing is still pending. \par During hospitalization, an MRI brain was obtained, showing no evidence of metastatic disease.\par \par She was hospitalized again 3/2019 due to SOB and was found to have pericardial effusion, s/p window.\par \par She lives with her family and has good support.\par She is a  at [employer]. \par She has no f/h of cancer. \par  [de-identified] : Adenocarcinoma [de-identified] : PDL1 >90%\par EGFR exon 20 insertion [Treatment Protocol] : Treatment Protocol [FreeTextEntry1] : Pembrolizumab q 3 weeks [de-identified] : Today, patient presents for follow up after hospital discharge. She is motivated to start treatment. She reports poor appetite and pain around the pleurex catheter.

## 2019-04-09 NOTE — H&P ADULT - ATTENDING COMMENTS
Patient seen and examined on 4/9/19, case discussed with Dr. Alek Saha, agree with assessment and plan as above.

## 2019-04-10 ENCOUNTER — APPOINTMENT (OUTPATIENT)
Dept: INFUSION THERAPY | Facility: HOSPITAL | Age: 54
End: 2019-04-10

## 2019-04-10 ENCOUNTER — TRANSCRIPTION ENCOUNTER (OUTPATIENT)
Age: 54
End: 2019-04-10

## 2019-04-10 VITALS
TEMPERATURE: 98 F | HEART RATE: 85 BPM | RESPIRATION RATE: 18 BRPM | SYSTOLIC BLOOD PRESSURE: 104 MMHG | DIASTOLIC BLOOD PRESSURE: 58 MMHG | OXYGEN SATURATION: 100 %

## 2019-04-10 DIAGNOSIS — I31.3 PERICARDIAL EFFUSION (NONINFLAMMATORY): ICD-10-CM

## 2019-04-10 LAB
ANION GAP SERPL CALC-SCNC: 12 MMO/L — SIGNIFICANT CHANGE UP (ref 7–14)
BASOPHILS # BLD AUTO: 0.04 K/UL — SIGNIFICANT CHANGE UP (ref 0–0.2)
BASOPHILS NFR BLD AUTO: 0.5 % — SIGNIFICANT CHANGE UP (ref 0–2)
BUN SERPL-MCNC: 8 MG/DL — SIGNIFICANT CHANGE UP (ref 7–23)
CALCIUM SERPL-MCNC: 8.5 MG/DL — SIGNIFICANT CHANGE UP (ref 8.4–10.5)
CHLORIDE SERPL-SCNC: 106 MMOL/L — SIGNIFICANT CHANGE UP (ref 98–107)
CO2 SERPL-SCNC: 24 MMOL/L — SIGNIFICANT CHANGE UP (ref 22–31)
CREAT SERPL-MCNC: 0.61 MG/DL — SIGNIFICANT CHANGE UP (ref 0.5–1.3)
EOSINOPHIL # BLD AUTO: 0.37 K/UL — SIGNIFICANT CHANGE UP (ref 0–0.5)
EOSINOPHIL NFR BLD AUTO: 4.2 % — SIGNIFICANT CHANGE UP (ref 0–6)
GLUCOSE SERPL-MCNC: 110 MG/DL — HIGH (ref 70–99)
HCT VFR BLD CALC: 37.3 % — SIGNIFICANT CHANGE UP (ref 34.5–45)
HGB BLD-MCNC: 11.3 G/DL — LOW (ref 11.5–15.5)
IMM GRANULOCYTES NFR BLD AUTO: 0.5 % — SIGNIFICANT CHANGE UP (ref 0–1.5)
LYMPHOCYTES # BLD AUTO: 1.4 K/UL — SIGNIFICANT CHANGE UP (ref 1–3.3)
LYMPHOCYTES # BLD AUTO: 16 % — SIGNIFICANT CHANGE UP (ref 13–44)
MAGNESIUM SERPL-MCNC: 1.8 MG/DL — SIGNIFICANT CHANGE UP (ref 1.6–2.6)
MCHC RBC-ENTMCNC: 26.3 PG — LOW (ref 27–34)
MCHC RBC-ENTMCNC: 30.3 % — LOW (ref 32–36)
MCV RBC AUTO: 86.9 FL — SIGNIFICANT CHANGE UP (ref 80–100)
MONOCYTES # BLD AUTO: 0.91 K/UL — HIGH (ref 0–0.9)
MONOCYTES NFR BLD AUTO: 10.4 % — SIGNIFICANT CHANGE UP (ref 2–14)
NEUTROPHILS # BLD AUTO: 5.98 K/UL — SIGNIFICANT CHANGE UP (ref 1.8–7.4)
NEUTROPHILS NFR BLD AUTO: 68.4 % — SIGNIFICANT CHANGE UP (ref 43–77)
NRBC # FLD: 0 K/UL — SIGNIFICANT CHANGE UP (ref 0–0)
PHOSPHATE SERPL-MCNC: 3.2 MG/DL — SIGNIFICANT CHANGE UP (ref 2.5–4.5)
PLATELET # BLD AUTO: 517 K/UL — HIGH (ref 150–400)
PMV BLD: 9.3 FL — SIGNIFICANT CHANGE UP (ref 7–13)
POTASSIUM SERPL-MCNC: 3.9 MMOL/L — SIGNIFICANT CHANGE UP (ref 3.5–5.3)
POTASSIUM SERPL-SCNC: 3.9 MMOL/L — SIGNIFICANT CHANGE UP (ref 3.5–5.3)
RBC # BLD: 4.29 M/UL — SIGNIFICANT CHANGE UP (ref 3.8–5.2)
RBC # FLD: 13.6 % — SIGNIFICANT CHANGE UP (ref 10.3–14.5)
SODIUM SERPL-SCNC: 142 MMOL/L — SIGNIFICANT CHANGE UP (ref 135–145)
WBC # BLD: 8.74 K/UL — SIGNIFICANT CHANGE UP (ref 3.8–10.5)
WBC # FLD AUTO: 8.74 K/UL — SIGNIFICANT CHANGE UP (ref 3.8–10.5)

## 2019-04-10 PROCEDURE — 99239 HOSP IP/OBS DSCHRG MGMT >30: CPT

## 2019-04-10 RX ADMIN — GABAPENTIN 100 MILLIGRAM(S): 400 CAPSULE ORAL at 05:10

## 2019-04-10 RX ADMIN — ENOXAPARIN SODIUM 50 MILLIGRAM(S): 100 INJECTION SUBCUTANEOUS at 16:26

## 2019-04-10 RX ADMIN — ENOXAPARIN SODIUM 50 MILLIGRAM(S): 100 INJECTION SUBCUTANEOUS at 05:09

## 2019-04-10 RX ADMIN — GABAPENTIN 100 MILLIGRAM(S): 400 CAPSULE ORAL at 16:26

## 2019-04-10 NOTE — DISCHARGE NOTE PROVIDER - NSDCFUADDAPPT_GEN_ALL_CORE_FT
Please follow up with Dr. Morris (thoracic surgery team) within 1 week of discharge from the hospital    Please follow up with Dr. Harper (Oncologist) within 1 week of discharge from the hospital    Continue your medications as directed and please follow-up as an outpatient with your primary care provider for further care and recommendations.

## 2019-04-10 NOTE — DISCHARGE NOTE PROVIDER - NSDCCPCAREPLAN_GEN_ALL_CORE_FT
PRINCIPAL DISCHARGE DIAGNOSIS  Diagnosis: Pneumonia  Assessment and Plan of Treatment: You presented to the emergency department with fevers. A CT of the chest was obtained which showed improvement in your right pleural effusion and improved right lung opacities. Blood cultures have been negative to date. You were started on levaquin for pneumonia treatment. Please complete antibiotic course as recommended. Follow up with your primary care physician within 1 week of discharge from the hospital.      SECONDARY DISCHARGE DIAGNOSES  Diagnosis: Lung cancer  Assessment and Plan of Treatment: You will likely need a PET scan as outpatient. Please follow up with Dr. Crespo as outpatient for further recommendations and treatment plan.    Diagnosis: Pericardial effusion  Assessment and Plan of Treatment: A CT of the chest was obtained which showed improvement in your right pleural effusion and improved right lung opacities. You were seen by the thoracici surgery team during your hospitalization. Visting nurse has been set up by the  to resume pleurx drain 3 times per week as outpatient. Please follow up with the thoracic surgery team within 1 week of discharge from the hospital.    Diagnosis: Pulmonary embolus  Assessment and Plan of Treatment: Continue Lovenox as prescribed. Continue your medications as directed and please follow-up as an outpatient with your primary care provider for further care and recommendations.

## 2019-04-10 NOTE — PROGRESS NOTE ADULT - PROBLEM SELECTOR PLAN 3
likely 2/2 pleural mets, Pleurx catheter, on pleurx drain (MWF)   CTS consult appreciated, currently on pleuravac in the hospital  was on tesDustin lausin at home; pt states she doesn't want here as it drys out her mouth   will order hycodan prn

## 2019-04-10 NOTE — PROGRESS NOTE ADULT - PROBLEM SELECTOR PLAN 2
p/w reported fever at home to 100.4, tachycardia w/o clear source. U/A, RVP negative. CT chest reported w/ improvement of decreased mod loculated Rt pleural effusion, rt lung opacities resolving.   will treat for possible HCP with levaquin x5 day course (day#2)  Lactate 3.7 improved to 1.3, tumor fever is in the differential   blood cx neg x24h   s/p Vanc/Zosyn, f/u BCx, will start levaquin

## 2019-04-10 NOTE — PROGRESS NOTE ADULT - PROBLEM SELECTOR PLAN 6
DVT: on Lovenox for PE   Diet: regular  Dispo: d.c home today  Time spent on discharge 35 minutes coordinating discharge plan and discussing with patient and family.

## 2019-04-10 NOTE — DISCHARGE NOTE PROVIDER - HOSPITAL COURSE
53 y/o F w/ recent dx of R lung adenocarcinoma with mediastinal and hilar lymphadenopathy c/b R pleural effusion s/p pleurx and VATS, PE (3/4) on lovenox s/p IVC filter presenting with fevers, sent in for evaluation for possible PNA, CT chest showing improving improved of loculated pleural effusion.            Lung cancer    - R lung adenocarcinoma with mediastinal and hilar lymphadenopathy c/b R pleural effusion s/p pleurx and VATS    - Oncology consutled, Dr. Harper, plans to start pembrolizumab after completing course of abx    outpt PET scan.             SIRS (systemic inflammatory response syndrome)    - p/w reported fever at home to 100.4, tachycardia w/o clear source. U/A, RVP negative. CT chest reported w/ improvement of decreased mod loculated Rt pleural effusion, rt lung opacities resolving.     will treat for possible HCP with levaquin x5 day course (day#2)    Lactate 3.7 improved to 1.3, tumor fever is in the differential     blood cx NGTD         Cough    - likely 2/2 pleural mets, Pleurx catheter, on pleurx drain (MWF)     CTS consult appreciated, currently on pleuravac in the hospital             Problem/Plan - 4:    ·  Problem: Pulmonary embolus.  Plan: saturating well on RA     c/w lovenox 50 mg BID.          Problem/Plan - 5:    ·  Problem: Pericardial effusion.  Plan: CT chest showing improvement    c/t monitor.          Problem/Plan - 6:    Problem: Need for prophylactic measure. Plan: DVT: on Lovenox for PE     Diet: regular    Dispo: d.c home today    Time spent on discharge 35 minutes coordinating discharge plan and discussing with patient and family. 55 y/o F w/ recent dx of R lung adenocarcinoma with mediastinal and hilar lymphadenopathy c/b R pleural effusion s/p pleurx and VATS, PE (3/4) on lovenox s/p IVC filter presented to the ED with fevers, sent in for evaluation for possible PNA, CT chest showing improving improved of loculated pleural effusion.            Lung cancer    - R lung adenocarcinoma with mediastinal and hilar lymphadenopathy c/b R pleural effusion s/p pleurx and VATS    - Oncology consutled, Dr. Harper, plans to start pembrolizumab after completing course of abx    outpt PET scan.             SIRS (systemic inflammatory response syndrome)    - p/w reported fever at home to 100.4, tachycardia w/o clear source. U/A, RVP negative. CT chest reported w/ improvement of decreased mod loculated Rt pleural effusion, rt lung opacities resolving.     will treat for possible HCP with levaquin x5 day course (day#2)    Lactate 3.7 improved to 1.3, tumor fever is in the differential     blood cx NGTD             Cough    - likely 2/2 pleural mets, Pleurx catheter, on pleurx drain (MWF)     - CTS consult appreciated, currently on pleuravac in the hospital    - To resume drain with visiting nurse 3 times per week as outpatient    - Follow up with CT surgery team within one week of discharge from the hospital.              Pulmonary embolus    - saturating well on RA     - c/w lovenox 50 mg BID            Pericardial effusion    - CT chest showing improvement         As per medical attending patient is medically stable for discharge to home    Dispo: home with home services

## 2019-04-10 NOTE — CHART NOTE - NSCHARTNOTEFT_GEN_A_CORE
HPI: 55 y/o F w/ recent dx of R lung adenocarcinoma with mediastinal and hilar lymphadenopathy c/b R pleural effusion s/p pleurx and VATS, subxiphoid pericardial window on 3/20/19, PE (3/4) on lovenox s/p IVC filter presenting with fevers, sent in for evaluation for possible PNA. Pt states her visiting nurse came yesterday to drain her pleurx, about 50 mL of blood tinged fluid, when she check her vitals, was reported to be febrile to 100.4F. Pt went to [facility] for pain management visit, CXR was done there, reported as PNA and pt was sent to the ER for further evaluation. Pt states her cough is at baseline, w/ whitish sputum, not increased/changed at this time. She has intermittent SOB when she coughs, but none when she doesn't cough. Denies: CP, SOB, N/V/D, abdominal pain.     Regarding her cancer history, pt follows with Dr. Harper, recently diagnosed 3/1/19. Pathology came back as metastatic adenocarcinoma of lung, pleural effusion + for malignant cells. PDL1>90%. Pt was to start pembrolizumab next week.  CT Chest performed showing improving effusion w/ RLL PNA and worsening consolidation  Pt in bed feeling better then the day before    24hr Events: Patient is being discharged today as per primary team. Seen at bedside with Dr. Morris      Plan  Discharge per primary team > being d/c home today   Team to make sure VNS is arrange for at home drainage   To be drained 3x week   will cap pleurX today prior to discharge  Pt should f/u with Dr. Morris in the office in 1-2 weeks; call office at [phone] to make appt  Discussed with patient and Dr. Morris at bedside

## 2019-04-10 NOTE — DISCHARGE NOTE NURSING/CASE MANAGEMENT/SOCIAL WORK - NSDCDPATPORTLINK_GEN_ALL_CORE
You can access the [facility] Patient Portal, offered by [facility], by registering with the following website: [email or web address]

## 2019-04-10 NOTE — DISCHARGE NOTE PROVIDER - CARE PROVIDER_API CALL
Thuy Harper)  Internal Medicine  [address] [place] [address]  Phone: [phone]  Fax: [phone]  Follow Up Time:     Asif Morris)  Surgery; Thoracic Surgery  [address] [place] [address]  Phone: [phone]  Fax: [phone]  Follow Up Time:

## 2019-04-10 NOTE — DISCHARGE NOTE NURSING/CASE MANAGEMENT/SOCIAL WORK - NSSCTYPOFSERV_GEN_ALL_CORE
Visiting Nurse For PleuX Drain Three times a week / [facility] to order PleuX Supply.

## 2019-04-10 NOTE — DISCHARGE NOTE PROVIDER - REASON FOR ADMISSION
Patient is a 54y old  Female who presents with a chief complaint of fevers. cough Patient is a 54y old  Female who presents with a chief complaint of fevers

## 2019-04-10 NOTE — PROGRESS NOTE ADULT - SUBJECTIVE AND OBJECTIVE BOX
Kourtney Conway MD  Pager [phone]    CHIEF COMPLAINT: Patient is a 54y old  female who presents with a chief complaint of Patient is a 54y old  Female who presents with a chief complaint of fevers. cough (2019 16:22)      SUBJECTIVE / OVERNIGHT EVENTS:  pt remains afebrile overnight, feels well, and ready to go home today  denies chest pain/sob/dizziness, seen by CTS, right pleurx to pleuravac    MEDICATIONS  (STANDING):  docusate sodium 100 milliGRAM(s) Oral three times a day  enoxaparin Injectable 50 milliGRAM(s) SubCutaneous every 12 hours  gabapentin 100 milliGRAM(s) Oral every 12 hours  levoFLOXacin  Tablet 500 milliGRAM(s) Oral every 24 hours  senna 2 Tablet(s) Oral at bedtime    MEDICATIONS  (PRN):  acetaminophen   Tablet .. 650 milliGRAM(s) Oral once PRN Temp greater or equal to 38C (100.4F)  HYDROcodone/homatropine Syrup 5 milliLiter(s) Oral every 6 hours PRN Cough  oxyCODONE    IR 5 milliGRAM(s) Oral every 4 hours PRN moderate and severe pain      VITALS:  T(F): 97.3 (04-10-19 @ 09:05), Max: 98.8 (19 @ 14:38)  HR: 85 (04-10-19 @ 09:05) (85 - 95)  BP: 105/65 (04-10-19 @ 09:05) (99/63 - 128/75)  RR: 18 (04-10-19 @ 09:05) (18 - 18)  SpO2: 98% (04-10-19 @ 09:05)      CAPILLARY BLOOD GLUCOSE    Output     I&O's Summary  T(F): 97.3 (04-10-19 @ 09:05), Max: 98.8 (19 @ 14:38)  HR: 85 (04-10-19 @ 09:05) (85 - 95)  BP: 105/65 (04-10-19 @ 09:05) (99/63 - 128/75)  RR: 18 (04-10-19 @ 09:05) (18 - 18)  SpO2: 98% (04-10-19 @ 09:05)    PHYSICAL EXAM:  GENERAL: NAD, thin woman  HEAD:  Atraumatic, Normocephalic  EYES: EOMI, PERRLA, conjunctiva and sclera clear  NECK: Supple, No JVD  CHEST/LUNG: decreased BS right lung base, right pleurx cathter drain to pleuravac  HEART: Regular rate and rhythm; No murmurs, rubs, or gallops  ABDOMEN: Soft, Nontender, Nondistended; Bowel sounds present  EXTREMITIES:  2+ Peripheral Pulses, No clubbing, cyanosis, or edema  PSYCH: AAOx3  NEUROLOGY: non-focal  SKIN: No rashes or lesions    LABS:              11.3                 142  | 24   | 8            8.74  >-----------< 517     ------------------------< 110                   37.3                 3.9  | 106  | 0.61                                         Ca 8.5   Mg 1.8   Ph 3.2         TPro  7.0  /  Alb  2.9      TBili  0.3  /  DBili  x         AST  43  /  ALT  18            AlkPhos  86      INR: 1.14 ;    PT: 13.0 SEC;    PTT: 23.8 SEC<L>        Urinalysis Basic - ( 2019 19:48 )    Color: YELLOW / Appearance: CLEAR / S.024 / pH: 7.0  Gluc: NEGATIVE / Ketone: NEGATIVE  / Bili: NEGATIVE / Urobili: NORMAL   Blood: NEGATIVE / Protein: 30 / Nitrite: NEGATIVE   Leuk Esterase: NEGATIVE / RBC: 3-5 / WBC 0-2   Sq Epi: FEW / Non Sq Epi: x / Bacteria: NEGATIVE        VB-08 @ 21:46  7.41/38/87/24/96.6/-0.9    VB-08 @ 19:48  7.30/57/36/24/56.7/1.4        MICROBIOLOGY:    Culture - Blood (collected 2019 23:35)  Source: BLOOD VENOUS  Preliminary Report (2019 23:32):    NO ORGANISMS ISOLATED    NO ORGANISMS ISOLATED AT 24 HOURS    Culture - Blood (collected 2019 23:35)  Source: BLOOD PERIPHERAL  Preliminary Report (2019 23:32):    NO ORGANISMS ISOLATED    NO ORGANISMS ISOLATED AT 24 HOURS      RADIOLOGY & ADDITIONAL TESTS:    Imaging Personally Reviewed:  < from: CT Chest No Cont (19 @ 22:02) >  IMPRESSION:     Decreased now moderate loculated right pleural effusion. Grossly stable   compressive atelectasis.    Near completely resolved right lung opacities as compared to 3/20/2019.   Right chest tube in place. Residual stable interlobular septal thickening   in the right lung.    Small pericardial effusion, decreased since 3/20/2019.    ? Asymmetric right breast tissue. Correlation with dedicated breast   imaging is recommended.    [ ] Consultant(s) Notes Reviewed:  [x ] Care Discussed with Consultants/Other Providers: Dr. Harper, blood cx neg x24h, discharge today with outpt f/u; ads NP Bina, d/c on levaquin x5 day course

## 2019-04-10 NOTE — PROGRESS NOTE ADULT - PROBLEM SELECTOR PLAN 1
R lung adenocarcinoma with mediastinal and hilar lymphadenopathy c/b R pleural effusion s/p pleurx and VATS  case d/w oncology  [facility], plans to start pembrolizumab after completing course of abx  outpt PET scan

## 2019-04-11 ENCOUNTER — FORM ENCOUNTER (OUTPATIENT)
Age: 54
End: 2019-04-11

## 2019-04-11 PROBLEM — I26.99 OTHER PULMONARY EMBOLISM WITHOUT ACUTE COR PULMONALE: Chronic | Status: ACTIVE | Noted: 2019-04-09

## 2019-04-12 ENCOUNTER — OUTPATIENT (OUTPATIENT)
Dept: OUTPATIENT SERVICES | Facility: HOSPITAL | Age: 54
LOS: 1 days | End: 2019-04-12
Payer: MEDICAID

## 2019-04-12 ENCOUNTER — APPOINTMENT (OUTPATIENT)
Dept: NUCLEAR MEDICINE | Facility: IMAGING CENTER | Age: 54
End: 2019-04-12
Payer: MEDICAID

## 2019-04-12 DIAGNOSIS — Z98.890 OTHER SPECIFIED POSTPROCEDURAL STATES: Chronic | ICD-10-CM

## 2019-04-12 DIAGNOSIS — C34.90 MALIGNANT NEOPLASM OF UNSPECIFIED PART OF UNSPECIFIED BRONCHUS OR LUNG: ICD-10-CM

## 2019-04-12 LAB — ACID FAST STN FLD: SIGNIFICANT CHANGE UP

## 2019-04-12 PROCEDURE — A9552: CPT

## 2019-04-12 PROCEDURE — 78815 PET IMAGE W/CT SKULL-THIGH: CPT | Mod: 26,PI

## 2019-04-12 PROCEDURE — 78815 PET IMAGE W/CT SKULL-THIGH: CPT

## 2019-04-13 LAB
BACTERIA BLD CULT: SIGNIFICANT CHANGE UP
BACTERIA BLD CULT: SIGNIFICANT CHANGE UP

## 2019-04-15 ENCOUNTER — APPOINTMENT (OUTPATIENT)
Dept: INFUSION THERAPY | Facility: HOSPITAL | Age: 54
End: 2019-04-15

## 2019-04-15 DIAGNOSIS — Z76.89 PERSONS ENCOUNTERING HEALTH SERVICES IN OTHER SPECIFIED CIRCUMSTANCES: ICD-10-CM

## 2019-04-17 DIAGNOSIS — Z51.11 ENCOUNTER FOR ANTINEOPLASTIC CHEMOTHERAPY: ICD-10-CM

## 2019-04-17 LAB — FUNGUS SPEC QL CULT: SIGNIFICANT CHANGE UP

## 2019-04-18 ENCOUNTER — APPOINTMENT (OUTPATIENT)
Dept: HEMATOLOGY ONCOLOGY | Facility: CLINIC | Age: 54
End: 2019-04-18

## 2019-04-18 PROBLEM — R05 COUGH: Status: ACTIVE | Noted: 2019-04-18

## 2019-04-21 NOTE — DATA REVIEWED
[FreeTextEntry1] : CTA Chest (3/20/19) -CHEST: \par \par LUNGS AND LARGE AIRWAYS: Patent central airways.  Compressive atelectasis and partial collapse of the right lung with residual aeration of the right middle and upper lobes. There are diffuse groundglass opacities with interlobular septal thickening  throughout the aerated portions of the right lung.\par \par Interval placement of a right-sided Pleurx catheter since prior CT.\par \par Obstruction of the right lower lobe bronchus, possibly due to secondary compression. Ill-defined low attenuated mass centered in the right lower lobe.\par \par PLEURA: Large right pleural effusion, with loculation decreased in size from prior examination.\par VESSELS: Previously visualized pulmonary emboli are no longer seen in the present study.\par HEART: Heart size is normal. Moderate pericardial effusion.\par MEDIASTINUM AND DIANA: Enlarged right axillary lymph nodes.\par CHEST WALL AND LOWER NECK: Within normal limits.\par VISUALIZED UPPER ABDOMEN: Within normal limits. Superior portion of an IVC filter is present.\par BONES: Degenerative changes of spine.\par \par IMPRESSION: \par Interval development of a moderate to large high density pericardial effusion, concerning for tamponade physiology.\par \par No pulmonary embolism, as clinically questioned.\par \par Interval placement of a right-sided Pleurx catheter since prior CT. Large right pleural effusion, decreased compared to prior CT.\par \par Ill-defined low-attenuation right lower lobe mass.

## 2019-04-21 NOTE — HISTORY OF PRESENT ILLNESS
[FreeTextEntry1] : 54yoF with recently diagnosed metastatic NSCLC presents for initial palliative care visit, referred by Dr. Harper.\par No significant PMH prior to the cancer diagnosis.  PE on Lovenox, s/p IVC filter\par \par Patient developed a cough just before Christmastime and was initially treated as pneumonia with antibiotics. Patient was admitted to [facility] in February and during that admission she was found to have a lung mass and pleural effusion. Subsequently had two hospitalizations at [facility], most recently had a pericardial drain placement for malignant effusion. Has R sided PleurX drain in place. \par \par \par ROS:\par +productive cough, white phlegm.  Cough is worse at night. Was started on Gabapentin 100mg BID for cough and she finds this to be helping. Stopped using hycodan which was previously prescribed. \par +SOB with minimal exertion\par +low appetite, 12lb weight loss in last 3 months\par +Anterior chest and back pain, 7-8/10 at the worst.  Uses PRN Oxycodone 5mg about 1-2 times daily as needed for the pain, which makes her sleepy.\par +nausea/vomiting  on and off. \par +trouble sleeping last night - had fever and chills last night, along with pain in her chest. \par Denies constipation\par \par Patient is , lives with her , son, niece and her mother.  She has two sons. Was working as a  at [employer], is on FMLA, applying for disability insurance. \par \par PMD: Dr. Dina Sarkar ([phone])\par \par I-Stop Ref#: [id number]

## 2019-04-21 NOTE — PHYSICAL EXAM
[General Appearance - Alert] : alert [General Appearance - In No Acute Distress] : in no acute distress [Sclera] : the sclera and conjunctiva were normal [Normal Oral Mucosa] : normal oral mucosa [] : no respiratory distress [Auscultation Breath Sounds / Voice Sounds] : lungs were clear to auscultation bilaterally [FreeTextEntry1] : PAULETTE Agudelo [Heart Rate And Rhythm] : heart rate was normal and rhythm regular [Heart Sounds] : normal S1 and S2 [Bowel Sounds] : normal bowel sounds [Edema] : there was no peripheral edema [Abdomen Soft] : soft [Abdomen Tenderness] : non-tender [Skin Color & Pigmentation] : normal skin color and pigmentation [No Focal Deficits] : no focal deficits [Oriented To Time, Place, And Person] : oriented to person, place, and time [Affect] : the affect was normal

## 2019-04-21 NOTE — ASSESSMENT
[______] : HCP: [unfilled] [FreeTextEntry1] : 54yoF with:\par \par 1. Stage IV NSCLC - On Pembrolizumab. Med Onc follow up.\par \par 2. Anorexia/cachexia of malignancy - Medical cannabis certification completed today.  Provided cannabis education, overview of state program, and discussed adverse effects in detail. Recommend starting with 1:1 THC:CBD formulation at low dose of THC (~2mg/dose).\par \par 3. Nausea/vomiting - Pt has Metoclopramide at home, instructed pt to use PRN. Medical cannabis mabyb be of benefit.\par \par 4. Cough - R PleurX being drained three times weekly with less volume being taken off as of late.  C/w Gabapentin 100mg BID.  \par \par 5. Encounter for Palliative Care - HCP form completed in office today assigning patient's son and daughter in law as primary and alternate proxies, respectively. \par \par RTO 1 month, call sooner with questions/issues

## 2019-04-22 ENCOUNTER — FORM ENCOUNTER (OUTPATIENT)
Age: 54
End: 2019-04-22

## 2019-04-23 ENCOUNTER — OUTPATIENT (OUTPATIENT)
Dept: OUTPATIENT SERVICES | Facility: HOSPITAL | Age: 54
LOS: 1 days | End: 2019-04-23
Payer: MEDICAID

## 2019-04-23 ENCOUNTER — APPOINTMENT (OUTPATIENT)
Dept: RADIOLOGY | Facility: HOSPITAL | Age: 54
End: 2019-04-23

## 2019-04-23 ENCOUNTER — APPOINTMENT (OUTPATIENT)
Dept: THORACIC SURGERY | Facility: CLINIC | Age: 54
End: 2019-04-23
Payer: MEDICAID

## 2019-04-23 ENCOUNTER — APPOINTMENT (OUTPATIENT)
Dept: HEMATOLOGY ONCOLOGY | Facility: CLINIC | Age: 54
End: 2019-04-23
Payer: MEDICAID

## 2019-04-23 VITALS
WEIGHT: 98 LBS | RESPIRATION RATE: 18 BRPM | BODY MASS INDEX: 19.79 KG/M2 | DIASTOLIC BLOOD PRESSURE: 75 MMHG | TEMPERATURE: 98.6 F | HEART RATE: 101 BPM | SYSTOLIC BLOOD PRESSURE: 110 MMHG | OXYGEN SATURATION: 97 %

## 2019-04-23 VITALS
HEART RATE: 103 BPM | RESPIRATION RATE: 18 BRPM | BODY MASS INDEX: 19.66 KG/M2 | DIASTOLIC BLOOD PRESSURE: 63 MMHG | TEMPERATURE: 99.1 F | HEIGHT: 59 IN | OXYGEN SATURATION: 98 % | SYSTOLIC BLOOD PRESSURE: 97 MMHG | WEIGHT: 97.5 LBS

## 2019-04-23 DIAGNOSIS — Z98.890 OTHER SPECIFIED POSTPROCEDURAL STATES: Chronic | ICD-10-CM

## 2019-04-23 DIAGNOSIS — J91.0 MALIGNANT PLEURAL EFFUSION: ICD-10-CM

## 2019-04-23 DIAGNOSIS — C34.90 MALIGNANT NEOPLASM OF UNSPECIFIED PART OF UNSPECIFIED BRONCHUS OR LUNG: ICD-10-CM

## 2019-04-23 PROCEDURE — 71046 X-RAY EXAM CHEST 2 VIEWS: CPT | Mod: 26

## 2019-04-23 PROCEDURE — 99214 OFFICE O/P EST MOD 30 MIN: CPT

## 2019-04-23 PROCEDURE — 99024 POSTOP FOLLOW-UP VISIT: CPT

## 2019-04-24 ENCOUNTER — OUTPATIENT (OUTPATIENT)
Dept: OUTPATIENT SERVICES | Facility: HOSPITAL | Age: 54
LOS: 1 days | Discharge: HOME | End: 2019-04-24

## 2019-04-24 DIAGNOSIS — C34.90 MALIGNANT NEOPLASM OF UNSPECIFIED PART OF UNSPECIFIED BRONCHUS OR LUNG: ICD-10-CM

## 2019-04-24 DIAGNOSIS — Z98.890 OTHER SPECIFIED POSTPROCEDURAL STATES: Chronic | ICD-10-CM

## 2019-04-24 RX ORDER — GUAIFENESIN AND DEXTROMETHORPHAN HBR 20; 400 MG/1; MG/1
20-400 TABLET ORAL EVERY 4 HOURS
Qty: 180 | Refills: 0 | Status: ACTIVE | COMMUNITY
Start: 2019-04-24 | End: 1900-01-01

## 2019-04-25 RX ORDER — AZITHROMYCIN 250 MG/1
250 TABLET, FILM COATED ORAL
Qty: 4 | Refills: 0 | Status: COMPLETED | COMMUNITY
Start: 2019-01-11 | End: 2019-04-25

## 2019-04-25 RX ORDER — AMOXICILLIN AND CLAVULANATE POTASSIUM 875; 125 MG/1; MG/1
875-125 TABLET, COATED ORAL
Qty: 6 | Refills: 0 | Status: COMPLETED | COMMUNITY
Start: 2019-02-21 | End: 2019-04-25

## 2019-04-25 RX ORDER — METHYLPREDNISOLONE 4 MG/1
4 TABLET ORAL
Qty: 21 | Refills: 0 | Status: COMPLETED | COMMUNITY
Start: 2019-02-02 | End: 2019-04-25

## 2019-04-25 NOTE — ASSESSMENT
[FreeTextEntry1] : 54f with newly diagnosed advanced adenocarcinoma of the lung, with pdl1>90%, and EGFR exon 20 insetion, started on pembrolizumab on 4/15. Exon 20 insertion is resistant to currently available EGFR TKIs. \par Presenting for evaluation of axillay LAP.\par \par LAP in the right axilla was visible in the PET/CT done prior to starting therapy, it may have increased in size s/p immunotherapy 2/2 inflammation. Breast exam is normal. Last mammogram was last year. \par Will monitor axillary LAP. Patient to call office in case of worsening LAP or increase in tenderness. \par \par -C/w Pembrolizumab as planned\par -Patient to have pleurex removed by Dr Morris\par -imaging after 3-4 cycles\par -palliative care consult\par -RTC day before C2

## 2019-04-25 NOTE — HISTORY OF PRESENT ILLNESS
[Disease: _____________________] : Disease: [unfilled] [T: ___] : T[unfilled] [AJCC Stage: ____] : AJCC Stage: [unfilled] [M: ___] : M[unfilled] [Treatment Protocol] : Treatment Protocol [de-identified] : 54f, never smoker, with newly diagnosed advanced NSCLC, presenting to [facility]. \par Ms Kennedy presented to [facility] [place] with shortness of breath, imaging showed a lung mass, she was told she has lung cancer based on a positive pleural effusion. She experienced worsening SOB and presented to [facility] on 3/1/2019. She was found to have multiple PEs in the left upper and lower lobes and a large right-sided pleural effusion with associated partial collapse of R lung and obstruction of the right lower lobe bronchus. She was evaluated by thoracic surgery and underwent a Right VATS, pleural biopsy, drainage of pleural effusion, insertion of pleurX catheter. She also had Placement of retrievable IVC Filter. I met Ms. Kennedy while she was admitted at [facility].  \par Pathology came back as metastatic adenocarcinoma of lung, pleural effusion + for malignant cells. PDL1>90%. Foundation testing is still pending. \par During hospitalization, an MRI brain was obtained, showing no evidence of metastatic disease.\par \par She was hospitalized again 3/2019 due to SOB and was found to have pericardial effusion, s/p window.\par \par PET/CT done 4/12/2019 showed RUL mass with Hypermetabolic mediastinal, bilateral hilar, and right axillary and right retropectoral nodes, likely metastatic.   \par \par Started Pembrolizumab on 4/15/2019.\par \par She lives with her family and has good support.\par She is a  at [employer]. \par She has no f/h of cancer. \par  [de-identified] : Adenocarcinoma [de-identified] : PDL1 >90%\par EGFR exon 20 insertion [FreeTextEntry1] : Pembrolizumab q3w [de-identified] : Patient presenting for evaluation of a lump under her arm, she noticed this yesterday. \par Ms Kennedy is s/p C1 Pembrolizumab on 4/1.\par Lump t is mildly tender. \par She denies fever, chills. SOB and cough are at baseline.

## 2019-04-25 NOTE — PHYSICAL EXAM
[Restricted in physically strenuous activity but ambulatory and able to carry out work of a light or sedentary nature] : Status 1- Restricted in physically strenuous activity but ambulatory and able to carry out work of a light or sedentary nature, e.g., light house work, office work [Thin] : thin [Normal] : normal appearance, no rash, nodules, vesicles, ulcers, erythema [de-identified] : pleurex catheter in place [de-identified] : 2x2cm lymphadenopathy in the right axilla, ruberry, mobile, mildly tender.

## 2019-04-30 ENCOUNTER — APPOINTMENT (OUTPATIENT)
Age: 54
End: 2019-04-30
Payer: MEDICAID

## 2019-04-30 VITALS
SYSTOLIC BLOOD PRESSURE: 108 MMHG | DIASTOLIC BLOOD PRESSURE: 75 MMHG | HEART RATE: 103 BPM | OXYGEN SATURATION: 95 % | TEMPERATURE: 98.3 F | BODY MASS INDEX: 19.59 KG/M2 | WEIGHT: 96.98 LBS

## 2019-04-30 DIAGNOSIS — I26.99 OTHER PULMONARY EMBOLISM W/OUT ACUTE COR PULMONALE: ICD-10-CM

## 2019-04-30 PROCEDURE — 99213 OFFICE O/P EST LOW 20 MIN: CPT

## 2019-04-30 RX ORDER — OXYCODONE 5 MG/1
5 TABLET ORAL
Qty: 60 | Refills: 0 | Status: ACTIVE | COMMUNITY
Start: 2019-04-30 | End: 1900-01-01

## 2019-04-30 RX ORDER — ENOXAPARIN SODIUM 100 MG/ML
80 INJECTION SUBCUTANEOUS
Qty: 60 | Refills: 0 | Status: ACTIVE | COMMUNITY
Start: 2019-04-30 | End: 1900-01-01

## 2019-04-30 NOTE — ASSESSMENT
[FreeTextEntry1] : 54f with newly diagnosed advanced adenocarcinoma of the lung, with pdl1>90%, and EGFR exon 20 insetion, started on pembrolizumab on 4/15. Exon 20 insertion is resistant to currently available EGFR TKIs. \par Presenting for f/u. Due for pembrolizuman on 5/7. \par \par LAP in the right axilla was visible in the PET/CT done prior to starting therapy, will monitor axillary LAP. Patient to call office in case of worsening LAP or increase in tenderness. \par \par Contacted palliative care for refill of oxycidone and follow up visit for symptom control. \par \par -C/w Pembrolizumab as planned\par -F/u with palliative care for symptom management\par -refill for lovenox sent to [facility]. 1.5mg/kg/day\par -imaging after 3-4 cycles\par -RTC day before C3

## 2019-04-30 NOTE — HISTORY OF PRESENT ILLNESS
[T: ___] : T[unfilled] [Disease: _____________________] : Disease: [unfilled] [AJCC Stage: ____] : AJCC Stage: [unfilled] [M: ___] : M[unfilled] [de-identified] : 54f, never smoker, with newly diagnosed advanced NSCLC, presenting to [facility]. \par Ms Kennedy presented to [facility] with shortness of breath, imaging showed a lung mass, she was told she has lung cancer based on a positive pleural effusion. She experienced worsening SOB and presented to [facility] on 3/1/2019. She was found to have multiple PEs in the left upper and lower lobes and a large right-sided pleural effusion with associated partial collapse of R lung and obstruction of the right lower lobe bronchus. She was evaluated by thoracic surgery and underwent a Right VATS, pleural biopsy, drainage of pleural effusion, insertion of pleurX catheter. She also had Placement of retrievable IVC Filter. I met Ms. Kennedy while she was admitted at [facility].  \par Pathology came back as metastatic adenocarcinoma of lung, pleural effusion + for malignant cells. PDL1>90%. Foundation testing is still pending. \par During hospitalization, an MRI brain was obtained, showing no evidence of metastatic disease.\par \par She was hospitalized again 3/2019 due to SOB and was found to have pericardial effusion, s/p window.\par \par PET/CT done 4/12/2019 showed RUL mass with Hypermetabolic mediastinal, bilateral hilar, and right axillary and right retropectoral nodes, likely metastatic.   \par \par Started Pembrolizumab on 4/15/2019.\par \par She lives with her family and has good support.\par She is a  at [employer]. \par She has no f/h of cancer. \par  [de-identified] : Adenocarcinoma [de-identified] : PDL1 >90%\par EGFR exon 20 insertion [de-identified] : Presents for follow up, reports pain in underarms, left ribs and spine. \par LAP under the right arm seems smaller but remains tender. Denies fever, chills.\par Continues to have cough. \par Is not taking pain meds and medical marijuana as prescribed by [facility] care, concerned about addiction.

## 2019-04-30 NOTE — PHYSICAL EXAM
[Restricted in physically strenuous activity but ambulatory and able to carry out work of a light or sedentary nature] : Status 1- Restricted in physically strenuous activity but ambulatory and able to carry out work of a light or sedentary nature, e.g., light house work, office work [Thin] : thin [Normal] : affect appropriate [de-identified] : few crackles at the right base [de-identified] : 1.5cm lymph node palpable under the right arm, rubbery and mobile. No LAP on the left side.

## 2019-05-01 ENCOUNTER — APPOINTMENT (OUTPATIENT)
Dept: INFUSION THERAPY | Facility: HOSPITAL | Age: 54
End: 2019-05-01

## 2019-05-01 LAB — ACID FAST STN SPEC: SIGNIFICANT CHANGE UP

## 2019-05-02 ENCOUNTER — INPATIENT (INPATIENT)
Facility: HOSPITAL | Age: 54
LOS: 13 days | Discharge: HOME HEALTH | End: 2019-05-16
Attending: INTERNAL MEDICINE | Admitting: INTERNAL MEDICINE
Payer: MEDICAID

## 2019-05-02 VITALS
SYSTOLIC BLOOD PRESSURE: 93 MMHG | DIASTOLIC BLOOD PRESSURE: 62 MMHG | OXYGEN SATURATION: 99 % | TEMPERATURE: 98 F | RESPIRATION RATE: 18 BRPM | HEART RATE: 122 BPM

## 2019-05-02 DIAGNOSIS — J90 PLEURAL EFFUSION, NOT ELSEWHERE CLASSIFIED: ICD-10-CM

## 2019-05-02 DIAGNOSIS — R06.02 SHORTNESS OF BREATH: ICD-10-CM

## 2019-05-02 DIAGNOSIS — I26.99 OTHER PULMONARY EMBOLISM WITHOUT ACUTE COR PULMONALE: ICD-10-CM

## 2019-05-02 DIAGNOSIS — A41.9 SEPSIS, UNSPECIFIED ORGANISM: ICD-10-CM

## 2019-05-02 DIAGNOSIS — Z98.890 OTHER SPECIFIED POSTPROCEDURAL STATES: Chronic | ICD-10-CM

## 2019-05-02 DIAGNOSIS — Z29.9 ENCOUNTER FOR PROPHYLACTIC MEASURES, UNSPECIFIED: ICD-10-CM

## 2019-05-02 DIAGNOSIS — R05 COUGH: ICD-10-CM

## 2019-05-02 DIAGNOSIS — I27.82 CHRONIC PULMONARY EMBOLISM: ICD-10-CM

## 2019-05-02 DIAGNOSIS — C34.91 MALIGNANT NEOPLASM OF UNSPECIFIED PART OF RIGHT BRONCHUS OR LUNG: ICD-10-CM

## 2019-05-02 DIAGNOSIS — R09.89 OTHER SPECIFIED SYMPTOMS AND SIGNS INVOLVING THE CIRCULATORY AND RESPIRATORY SYSTEMS: ICD-10-CM

## 2019-05-02 LAB
ALBUMIN SERPL ELPH-MCNC: 2.9 G/DL
ALBUMIN SERPL ELPH-MCNC: 3.3 G/DL — SIGNIFICANT CHANGE UP (ref 3.3–5)
ALP BLD-CCNC: 75 U/L
ALP SERPL-CCNC: 114 U/L — SIGNIFICANT CHANGE UP (ref 40–120)
ALT FLD-CCNC: 15 U/L — SIGNIFICANT CHANGE UP (ref 4–33)
ALT SERPL-CCNC: 7 U/L
ANION GAP SERPL CALC-SCNC: 10 MMO/L — SIGNIFICANT CHANGE UP (ref 7–14)
ANION GAP SERPL CALC-SCNC: 10 MMOL/L
APPEARANCE UR: CLEAR — SIGNIFICANT CHANGE UP
APTT BLD: 36.8 SEC — HIGH (ref 27.5–36.3)
AST SERPL-CCNC: 12 U/L
AST SERPL-CCNC: 15 U/L — SIGNIFICANT CHANGE UP (ref 4–32)
B PERT DNA SPEC QL NAA+PROBE: NOT DETECTED — SIGNIFICANT CHANGE UP
BACTERIA # UR AUTO: NEGATIVE — SIGNIFICANT CHANGE UP
BASE EXCESS BLDV CALC-SCNC: 3.4 MMOL/L — SIGNIFICANT CHANGE UP
BASOPHILS # BLD AUTO: 0.07 K/UL — SIGNIFICANT CHANGE UP (ref 0–0.2)
BASOPHILS NFR BLD AUTO: 0.7 % — SIGNIFICANT CHANGE UP (ref 0–2)
BILIRUB SERPL-MCNC: 0.2 MG/DL
BILIRUB SERPL-MCNC: 0.3 MG/DL — SIGNIFICANT CHANGE UP (ref 0.2–1.2)
BILIRUB UR-MCNC: NEGATIVE — SIGNIFICANT CHANGE UP
BLOOD GAS VENOUS - CREATININE: 0.67 MG/DL — SIGNIFICANT CHANGE UP (ref 0.5–1.3)
BLOOD GAS VENOUS - FIO2: 100 — SIGNIFICANT CHANGE UP
BLOOD UR QL VISUAL: NEGATIVE — SIGNIFICANT CHANGE UP
BUN SERPL-MCNC: 8 MG/DL — SIGNIFICANT CHANGE UP (ref 7–23)
BUN SERPL-MCNC: 9 MG/DL
C PNEUM DNA SPEC QL NAA+PROBE: NOT DETECTED — SIGNIFICANT CHANGE UP
CALCIUM SERPL-MCNC: 8.2 MG/DL
CALCIUM SERPL-MCNC: 9.1 MG/DL — SIGNIFICANT CHANGE UP (ref 8.4–10.5)
CHLORIDE BLDV-SCNC: 99 MMOL/L — SIGNIFICANT CHANGE UP (ref 96–108)
CHLORIDE SERPL-SCNC: 100 MMOL/L
CHLORIDE SERPL-SCNC: 97 MMOL/L — LOW (ref 98–107)
CO2 SERPL-SCNC: 25 MMOL/L — SIGNIFICANT CHANGE UP (ref 22–31)
CO2 SERPL-SCNC: 26 MMOL/L
COLOR SPEC: YELLOW — SIGNIFICANT CHANGE UP
CREAT SERPL-MCNC: 0.64 MG/DL
CREAT SERPL-MCNC: 0.65 MG/DL — SIGNIFICANT CHANGE UP (ref 0.5–1.3)
EOSINOPHIL # BLD AUTO: 0.79 K/UL — HIGH (ref 0–0.5)
EOSINOPHIL NFR BLD AUTO: 7.8 % — HIGH (ref 0–6)
FLUAV H1 2009 PAND RNA SPEC QL NAA+PROBE: NOT DETECTED — SIGNIFICANT CHANGE UP
FLUAV H1 RNA SPEC QL NAA+PROBE: NOT DETECTED — SIGNIFICANT CHANGE UP
FLUAV H3 RNA SPEC QL NAA+PROBE: NOT DETECTED — SIGNIFICANT CHANGE UP
FLUAV SUBTYP SPEC NAA+PROBE: NOT DETECTED — SIGNIFICANT CHANGE UP
FLUBV RNA SPEC QL NAA+PROBE: NOT DETECTED — SIGNIFICANT CHANGE UP
GAS PNL BLDV: 129 MMOL/L — LOW (ref 136–146)
GLUCOSE BLDV-MCNC: 119 MG/DL — HIGH (ref 70–99)
GLUCOSE SERPL-MCNC: 104 MG/DL
GLUCOSE SERPL-MCNC: 121 MG/DL — HIGH (ref 70–99)
GLUCOSE UR-MCNC: NEGATIVE — SIGNIFICANT CHANGE UP
HADV DNA SPEC QL NAA+PROBE: NOT DETECTED — SIGNIFICANT CHANGE UP
HCO3 BLDV-SCNC: 25 MMOL/L — SIGNIFICANT CHANGE UP (ref 20–27)
HCOV PNL SPEC NAA+PROBE: SIGNIFICANT CHANGE UP
HCT VFR BLD CALC: 37.3 % — SIGNIFICANT CHANGE UP (ref 34.5–45)
HCT VFR BLDV CALC: 37.1 % — SIGNIFICANT CHANGE UP (ref 34.5–45)
HGB BLD-MCNC: 11.7 G/DL — SIGNIFICANT CHANGE UP (ref 11.5–15.5)
HGB BLDV-MCNC: 12.1 G/DL — SIGNIFICANT CHANGE UP (ref 11.5–15.5)
HMPV RNA SPEC QL NAA+PROBE: NOT DETECTED — SIGNIFICANT CHANGE UP
HPIV1 RNA SPEC QL NAA+PROBE: NOT DETECTED — SIGNIFICANT CHANGE UP
HPIV2 RNA SPEC QL NAA+PROBE: NOT DETECTED — SIGNIFICANT CHANGE UP
HPIV3 RNA SPEC QL NAA+PROBE: NOT DETECTED — SIGNIFICANT CHANGE UP
HPIV4 RNA SPEC QL NAA+PROBE: NOT DETECTED — SIGNIFICANT CHANGE UP
HYALINE CASTS # UR AUTO: SIGNIFICANT CHANGE UP
IMM GRANULOCYTES NFR BLD AUTO: 0.5 % — SIGNIFICANT CHANGE UP (ref 0–1.5)
INR BLD: 1.14 — SIGNIFICANT CHANGE UP (ref 0.88–1.17)
KETONES UR-MCNC: SIGNIFICANT CHANGE UP
LACTATE BLDV-MCNC: 1.4 MMOL/L — SIGNIFICANT CHANGE UP (ref 0.5–2)
LEUKOCYTE ESTERASE UR-ACNC: NEGATIVE — SIGNIFICANT CHANGE UP
LYMPHOCYTES # BLD AUTO: 1.23 K/UL — SIGNIFICANT CHANGE UP (ref 1–3.3)
LYMPHOCYTES # BLD AUTO: 12.2 % — LOW (ref 13–44)
MCHC RBC-ENTMCNC: 26.6 PG — LOW (ref 27–34)
MCHC RBC-ENTMCNC: 31.4 % — LOW (ref 32–36)
MCV RBC AUTO: 84.8 FL — SIGNIFICANT CHANGE UP (ref 80–100)
MONOCYTES # BLD AUTO: 0.82 K/UL — SIGNIFICANT CHANGE UP (ref 0–0.9)
MONOCYTES NFR BLD AUTO: 8.1 % — SIGNIFICANT CHANGE UP (ref 2–14)
NEUTROPHILS # BLD AUTO: 7.11 K/UL — SIGNIFICANT CHANGE UP (ref 1.8–7.4)
NEUTROPHILS NFR BLD AUTO: 70.7 % — SIGNIFICANT CHANGE UP (ref 43–77)
NITRITE UR-MCNC: NEGATIVE — SIGNIFICANT CHANGE UP
NRBC # FLD: 0 K/UL — SIGNIFICANT CHANGE UP (ref 0–0)
NT-PROBNP SERPL-SCNC: 1215 PG/ML — SIGNIFICANT CHANGE UP
PCO2 BLDV: 47 MMHG — SIGNIFICANT CHANGE UP (ref 41–51)
PH BLDV: 7.39 PH — SIGNIFICANT CHANGE UP (ref 7.32–7.43)
PH UR: 6 — SIGNIFICANT CHANGE UP (ref 5–8)
PLATELET # BLD AUTO: 607 K/UL — HIGH (ref 150–400)
PMV BLD: 9.2 FL — SIGNIFICANT CHANGE UP (ref 7–13)
PO2 BLDV: < 24 MMHG — LOW (ref 35–40)
POTASSIUM BLDV-SCNC: 4 MMOL/L — SIGNIFICANT CHANGE UP (ref 3.4–4.5)
POTASSIUM SERPL-MCNC: 4.3 MMOL/L — SIGNIFICANT CHANGE UP (ref 3.5–5.3)
POTASSIUM SERPL-SCNC: 4.3 MMOL/L — SIGNIFICANT CHANGE UP (ref 3.5–5.3)
POTASSIUM SERPL-SCNC: 4.5 MMOL/L
PROT SERPL-MCNC: 5.6 G/DL
PROT SERPL-MCNC: 6.8 G/DL — SIGNIFICANT CHANGE UP (ref 6–8.3)
PROT UR-MCNC: 30 — SIGNIFICANT CHANGE UP
PROTHROM AB SERPL-ACNC: 13 SEC — SIGNIFICANT CHANGE UP (ref 9.8–13.1)
RBC # BLD: 4.4 M/UL — SIGNIFICANT CHANGE UP (ref 3.8–5.2)
RBC # FLD: 13.6 % — SIGNIFICANT CHANGE UP (ref 10.3–14.5)
RBC CASTS # UR COMP ASSIST: SIGNIFICANT CHANGE UP (ref 0–?)
RSV RNA SPEC QL NAA+PROBE: NOT DETECTED — SIGNIFICANT CHANGE UP
RV+EV RNA SPEC QL NAA+PROBE: NOT DETECTED — SIGNIFICANT CHANGE UP
SAO2 % BLDV: 20.2 % — LOW (ref 60–85)
SODIUM SERPL-SCNC: 132 MMOL/L — LOW (ref 135–145)
SODIUM SERPL-SCNC: 136 MMOL/L
SP GR SPEC: 1.02 — SIGNIFICANT CHANGE UP (ref 1–1.04)
SQUAMOUS # UR AUTO: SIGNIFICANT CHANGE UP
TSH SERPL-ACNC: 1.6 UIU/ML
UROBILINOGEN FLD QL: NORMAL — SIGNIFICANT CHANGE UP
WBC # BLD: 10.07 K/UL — SIGNIFICANT CHANGE UP (ref 3.8–10.5)
WBC # FLD AUTO: 10.07 K/UL — SIGNIFICANT CHANGE UP (ref 3.8–10.5)
WBC UR QL: SIGNIFICANT CHANGE UP (ref 0–?)

## 2019-05-02 PROCEDURE — 71250 CT THORAX DX C-: CPT | Mod: 26

## 2019-05-02 PROCEDURE — 71046 X-RAY EXAM CHEST 2 VIEWS: CPT | Mod: 26

## 2019-05-02 PROCEDURE — 99223 1ST HOSP IP/OBS HIGH 75: CPT | Mod: GC

## 2019-05-02 PROCEDURE — 99222 1ST HOSP IP/OBS MODERATE 55: CPT

## 2019-05-02 RX ORDER — SODIUM CHLORIDE 9 MG/ML
1000 INJECTION INTRAMUSCULAR; INTRAVENOUS; SUBCUTANEOUS ONCE
Qty: 0 | Refills: 0 | Status: COMPLETED | OUTPATIENT
Start: 2019-05-02 | End: 2019-05-02

## 2019-05-02 RX ORDER — IPRATROPIUM/ALBUTEROL SULFATE 18-103MCG
3 AEROSOL WITH ADAPTER (GRAM) INHALATION EVERY 6 HOURS
Qty: 0 | Refills: 0 | Status: DISCONTINUED | OUTPATIENT
Start: 2019-05-02 | End: 2019-05-10

## 2019-05-02 RX ORDER — GABAPENTIN 400 MG/1
100 CAPSULE ORAL EVERY 12 HOURS
Qty: 0 | Refills: 0 | Status: DISCONTINUED | OUTPATIENT
Start: 2019-05-02 | End: 2019-05-16

## 2019-05-02 RX ORDER — PIPERACILLIN AND TAZOBACTAM 4; .5 G/20ML; G/20ML
3.38 INJECTION, POWDER, LYOPHILIZED, FOR SOLUTION INTRAVENOUS EVERY 8 HOURS
Qty: 0 | Refills: 0 | Status: DISCONTINUED | OUTPATIENT
Start: 2019-05-02 | End: 2019-05-14

## 2019-05-02 RX ORDER — PIPERACILLIN AND TAZOBACTAM 4; .5 G/20ML; G/20ML
3.38 INJECTION, POWDER, LYOPHILIZED, FOR SOLUTION INTRAVENOUS ONCE
Qty: 0 | Refills: 0 | Status: COMPLETED | OUTPATIENT
Start: 2019-05-02 | End: 2019-05-02

## 2019-05-02 RX ORDER — VANCOMYCIN HCL 1 G
1000 VIAL (EA) INTRAVENOUS ONCE
Qty: 0 | Refills: 0 | Status: COMPLETED | OUTPATIENT
Start: 2019-05-02 | End: 2019-05-02

## 2019-05-02 RX ORDER — IPRATROPIUM/ALBUTEROL SULFATE 18-103MCG
3 AEROSOL WITH ADAPTER (GRAM) INHALATION ONCE
Qty: 0 | Refills: 0 | Status: COMPLETED | OUTPATIENT
Start: 2019-05-02 | End: 2019-05-02

## 2019-05-02 RX ORDER — OXYCODONE HYDROCHLORIDE 5 MG/1
5 TABLET ORAL EVERY 4 HOURS
Qty: 0 | Refills: 0 | Status: DISCONTINUED | OUTPATIENT
Start: 2019-05-02 | End: 2019-05-06

## 2019-05-02 RX ORDER — ENOXAPARIN SODIUM 100 MG/ML
50 INJECTION SUBCUTANEOUS
Qty: 0 | Refills: 0 | Status: DISCONTINUED | OUTPATIENT
Start: 2019-05-02 | End: 2019-05-06

## 2019-05-02 RX ORDER — ENOXAPARIN SODIUM 100 MG/ML
60 INJECTION SUBCUTANEOUS
Qty: 0 | Refills: 0 | Status: DISCONTINUED | OUTPATIENT
Start: 2019-05-02 | End: 2019-05-02

## 2019-05-02 RX ADMIN — PIPERACILLIN AND TAZOBACTAM 3.38 GRAM(S): 4; .5 INJECTION, POWDER, LYOPHILIZED, FOR SOLUTION INTRAVENOUS at 15:54

## 2019-05-02 RX ADMIN — Medication 3 MILLILITER(S): at 13:19

## 2019-05-02 RX ADMIN — Medication 3 MILLILITER(S): at 22:01

## 2019-05-02 RX ADMIN — Medication 3 MILLILITER(S): at 17:23

## 2019-05-02 RX ADMIN — PIPERACILLIN AND TAZOBACTAM 25 GRAM(S): 4; .5 INJECTION, POWDER, LYOPHILIZED, FOR SOLUTION INTRAVENOUS at 20:01

## 2019-05-02 RX ADMIN — SODIUM CHLORIDE 1000 MILLILITER(S): 9 INJECTION INTRAMUSCULAR; INTRAVENOUS; SUBCUTANEOUS at 15:54

## 2019-05-02 RX ADMIN — Medication 1000 MILLIGRAM(S): at 15:54

## 2019-05-02 RX ADMIN — Medication 3 MILLILITER(S): at 10:56

## 2019-05-02 RX ADMIN — Medication 250 MILLIGRAM(S): at 13:22

## 2019-05-02 RX ADMIN — PIPERACILLIN AND TAZOBACTAM 200 GRAM(S): 4; .5 INJECTION, POWDER, LYOPHILIZED, FOR SOLUTION INTRAVENOUS at 12:39

## 2019-05-02 RX ADMIN — ENOXAPARIN SODIUM 50 MILLIGRAM(S): 100 INJECTION SUBCUTANEOUS at 17:23

## 2019-05-02 RX ADMIN — GABAPENTIN 100 MILLIGRAM(S): 400 CAPSULE ORAL at 17:23

## 2019-05-02 RX ADMIN — SODIUM CHLORIDE 1000 MILLILITER(S): 9 INJECTION INTRAMUSCULAR; INTRAVENOUS; SUBCUTANEOUS at 10:55

## 2019-05-02 NOTE — PATIENT PROFILE ADULT - NSPROMUTINFOINDIVIDFT_GEN_A_NUR
----- Message from Nash Fraser sent at 5/9/2018 11:32 AM CDT -----  PLEASE CALL PT  IS REFERRING HER TO ANOTHER DOC SHE HAS SOME QUESTION [phone]   None

## 2019-05-02 NOTE — ED PROVIDER NOTE - OBJECTIVE STATEMENT
55 yo F w/ metastatic R lung adenocarcinoma on chemo, right pleural effusion s/p pleurx and VATS, subxiphoid pericardial window on 3/20/19, left PE (3/4) on Lovenox s/p IVC filter placement presents to ER c/o worsening sob yesterday a/w fever, productive cough. Pt states she has cough with white sputum daily and sob since she was diagnosed with lung cancer. Reports sob is worse yesterday, worse with lying down. Admits dyspnea on exertion. Admits fever 101 yesterday. Reports loss of appetite for the past two days. Denies any chills, dizziness, chest pain, back pain, abdominal pain, dysuria, leg swelling, calf pain, recent travel, or any other complaints. 55 yo F w/ metastatic R lung adenocarcinoma on chemo, right pleural effusion s/p pleurx and VATS, subxiphoid pericardial window on 3/20/19, left PE (3/4) on Lovenox s/p IVC filter placement presents to ER c/o worsening sob yesterday a/w fever, productive cough. Pt states she has cough with white sputum daily and sob since she was diagnosed with lung cancer. Reports sob is worse yesterday, worse with lying down. Admits dyspnea on exertion. Admits fever 101 yesterday. Reports loss of appetite for the past two days. Denies any chills, dizziness, chest pain, back pain, abdominal pain, dysuria, leg swelling, calf pain, recent travel, or any other complaints. Ill appearing but NAD.      CT Surg: Dr Morris

## 2019-05-02 NOTE — H&P ADULT - NSICDXPASTSURGICALHX_GEN_ALL_CORE_FT
PAST SURGICAL HISTORY:  History of lung surgery PAST SURGICAL HISTORY:  History of lung surgery VATS    S/P pericardiocentesis     S/P thoracentesis

## 2019-05-02 NOTE — ED ADULT NURSE REASSESSMENT NOTE - NS ED NURSE REASSESS COMMENT FT1
Pt at baseline mental status, verbalizing slight improvement in breathing since receiving DuoNeb treatment. Pt remains c/o cough. Pt satting well on RA, does not appear to be in any acute distress at the moment. Pt remains c/o chest pain with coughing. Pt denies N/V, lightheadedness, dizziness. Vitals as noted, will continue to monitor.

## 2019-05-02 NOTE — ED PROVIDER NOTE - PROGRESS NOTE DETAILS
PA EDUAR: Patient reassessed, sitting comfortably in bed in NAD, denies any complaints. States feeling better, symptoms improved. CT chest reviewed w/ attending, slightly worse compare to before. CT surgery paged for consult. Abx ordered to cover HCAP. ADDY WITT: Spoke with CT surgery, states no intervention, recommends admit to med for treatement for pneumonia. Spoke with hospitalist, accepted pt for admission. MAR text paged.

## 2019-05-02 NOTE — H&P ADULT - ASSESSMENT
54F with PMHx of metastatic R lung adenocarcinoma on chemo, chronic right pleural effusion s/p pleurx and VATS, subxiphoid pericardial window on 3/20/19, PE on Lovenox s/p IVC filter placement presenting with shortness of breath and cough x 2 days

## 2019-05-02 NOTE — ED ADULT NURSE REASSESSMENT NOTE - NS ED NURSE REASSESS COMMENT FT1
Pt at baseline mental status, resting comfortably in bed. Pt still c/o cough, appears to remain slightly SOB with RR 20. Pt provided 2L NC for comfort, but remains to be satting well on RA. MD Beverly made aware of pt's low BP and elevated HR- no further treatment to be made at this time. Pt waiting for bed. Will continue to monitor. Pt at baseline mental status, resting comfortably in bed. Pt still c/o cough, appears to remain slightly SOB with RR 20. Pt provided 2L NC for comfort, but remains to be satting well on RA. MD Beverly made aware of pt's low BP and elevated HR- no further treatment to be made at this time. Chest tube connected to drainage system- as per MD Beverly chest tube to be draining by gravity. Site is dry and intact. Minimal drainage noted at this time. Pt waiting for bed. Will continue to monitor.

## 2019-05-02 NOTE — CONSULT NOTE ADULT - ASSESSMENT
55 y/o F w/ recent dx of R lung adenocarcinoma with mediastinal and hilar lymphadenopathy undergoing treatment with Keytruda, R pleural effusion s/p RVATS,  pleurx placement, subxiphoid pericardial window on 3/20/19, PE (3/4) p/w post obstructive RLL PNA. Likely case of obstruction is an increased tumor burden. Would admit to medicine. No immediate thoracic surgical indicated at this time.    - Admit to medicine  - Would cont antibiotics  - Small pericadial effusion not amenable to drainage at this time.  - PleurX to Pleuro-vac   - Case d/w Dr. Arturo Flores PAC   Thoracic Spectra [id number] 55 y/o F w/ recent dx of R lung adenocarcinoma with mediastinal and hilar lymphadenopathy undergoing treatment with Keytruda, R pleural effusion s/p RVATS,  pleurx placement, subxiphoid pericardial window on 3/20/19, PE (3/4) p/w post obstructive RLL PNA. Likely case of obstruction is an increased tumor burden. Would admit to medicine. No immediate thoracic surgical indicated at this time.    - Admit to medicine  - Would cont antibiotics  - Small pericadial effusion not amenable to drainage at this time.  - If pt is to be admitted would connect PleurX to Pleuro-vac   - Case d/w Dr. Arturo Flores PAC   Thoracic Spectra [id number]

## 2019-05-02 NOTE — H&P ADULT - NSICDXPASTMEDICALHX_GEN_ALL_CORE_FT
PAST MEDICAL HISTORY:  Lung cancer     Pulmonary embolus PAST MEDICAL HISTORY:  Lung cancer with mets, adenocardinoma    Pulmonary embolus

## 2019-05-02 NOTE — H&P ADULT - HISTORY OF PRESENT ILLNESS
55 yo F w/ metastatic R lung adenocarcinoma on chemo, right pleural effusion s/p pleurx and VATS, subxiphoid pericardial window on 3/20/19, left PE (3/4) on Lovenox s/p IVC filter placement presents to ER c/o worsening sob yesterday a/w fever, productive cough. Pt states she has cough with white sputum daily and sob since she was diagnosed with lung cancer. Reports sob is worse yesterday, worse with lying down. Admits dyspnea on exertion. Admits fever 101 yesterday. Reports loss of appetite for the past two days. Denies any chills, dizziness, chest pain, back pain, abdominal pain, dysuria, leg swelling, calf pain, recent travel, or any other complaints. Ill appearing but NAD.      In ED: BP93/62  RR18 T98 99%RA  Patient received Vancomycin 1 g IV x 1, Zosyn 3.375 mg IV x 1, NS 1 L IV x 1, duonebs x 2 54F with PMHx of metastatic R lung adenocarcinoma on chemo, chronic right pleural effusion s/p pleurx and VATS, subxiphoid pericardial window on 3/20/19, PE on Lovenox s/p IVC filter placement presenting with shortness of breath and cough x 2 days. History obtained by patient and son at bedside. Per patient, she has baseline chronic cough, however noticed her breathing was more heavy and painful. She reports she is only able to take shallow breaths and has pain on deep inspiration. She also endorses worsening productive cough with productive sputum, white "foamy". She endorses subjective fevers, objective T at home 100.1. She has a baseline exercise tolerance being able to walk 20-40 steps without TAMAYO, however no endorses unable to walk 10 feet without feeling TAMAYO. She does not use home O2 at home. She has been adherent with her medications. She denies sick contacts or travel history. Her pleurex catheter gets drained by an RN M/W/F, and was decreased to 2x/week 2-3 weeks ago. Last drainage they noticed was less than 20 cc. Denies leg swelling, abdominal pain, nausea, vomiting chest pain, palpitation change in BMs. Of note, patient's CTA on 3/2/19 showed multiple acute P.E. in the MUMTAZ and LLL and in the lingular artery, a large Rt sided pleural effusion w/ partial collapse of the Rt lung and obstruction of the RLL bronchus. Patient's subxiphoid pericardial window was done by Dr. Catie Rob on 3/20/19. Path revealed metastatic adenoCA to the pericardium and pericardial fluid, compatible w/ known adenoCA. Patient is currently on Keytruda.    In ED: BP93/62  RR18 T98 99%RA  Patient received Vancomycin 1 g IV x 1, Zosyn 3.375 mg IV x 1, NS 1 L IV x 1, duonebs x 2

## 2019-05-02 NOTE — H&P ADULT - NSHPLABSRESULTS_GEN_ALL_CORE
11.7   10.07 )-----------( 607      ( 02 May 2019 10:30 )             37.3       05-02    132<L>  |  97<L>  |  8   ----------------------------<  121<H>  4.3   |  25  |  0.65    Ca    9.1      02 May 2019 10:30    TPro  6.8  /  Alb  3.3  /  TBili  0.3  /  DBili  x   /  AST  15  /  ALT  15  /  AlkPhos  114  05-02      Urinalysis (05.02.19 @ 12:55)    Color: YELLOW    Urine Appearance: CLEAR    Glucose: NEGATIVE    Bilirubin: NEGATIVE    Ketone - Urine: SMALL    Specific Gravity: 1.024    Blood: NEGATIVE    pH - Urine: 6.0    Protein, Urine: 30    Urobilinogen: NORMAL    Nitrite: NEGATIVE    Leukocyte Esterase Concentration: NEGATIVE    Red Blood Cell - Urine: 3-5    White Blood Cell - Urine: 3-5    Hyaline Casts: 1+    Bacteria: NEGATIVE    Squamous Epithelial: OCC      < from: Xray Chest 2 Views PA/Lat (05.02.19 @ 11:36) >    INTERPRETATION:  TIME OF EXAM: May 2, 2019 at 12:15 PM    CLINICAL INFORMATION: Right lung cancer with worsening dyspnea.    TECHNIQUE:   PA and lateral chest    INTERPRETATION:     Large right effusion with underlying atelectasis and chest tube is   present unchanged from the last radiograph of April 23. The left lung is   clear with a trace effusion. Heart size is stable. No pneumothorax. No   vascular congestion to indicate CHF.      COMPARISON:  April 23      IMPRESSION:  Large right effusion with chest tube.            < end of copied text > 11.7   10.07 )-----------( 607      ( 02 May 2019 10:30 )             37.3       05-02    132<L>  |  97<L>  |  8   ----------------------------<  121<H>  4.3   |  25  |  0.65    Ca    9.1      02 May 2019 10:30    TPro  6.8  /  Alb  3.3  /  TBili  0.3  /  DBili  x   /  AST  15  /  ALT  15  /  AlkPhos  114  05-02      Urinalysis (05.02.19 @ 12:55)    Color: YELLOW    Urine Appearance: CLEAR    Glucose: NEGATIVE    Bilirubin: NEGATIVE    Ketone - Urine: SMALL    Specific Gravity: 1.024    Blood: NEGATIVE    pH - Urine: 6.0    Protein, Urine: 30    Urobilinogen: NORMAL    Nitrite: NEGATIVE    Leukocyte Esterase Concentration: NEGATIVE    Red Blood Cell - Urine: 3-5    White Blood Cell - Urine: 3-5    Hyaline Casts: 1+    Bacteria: NEGATIVE    Squamous Epithelial: OCC      < from: Xray Chest 2 Views PA/Lat (05.02.19 @ 11:36) >    INTERPRETATION:  TIME OF EXAM: May 2, 2019 at 12:15 PM    CLINICAL INFORMATION: Right lung cancer with worsening dyspnea.    TECHNIQUE:   PA and lateral chest    INTERPRETATION:     Large right effusion with underlying atelectasis and chest tube is   present unchanged from the last radiograph of April 23. The left lung is   clear with a trace effusion. Heart size is stable. No pneumothorax. No   vascular congestion to indicate CHF.      COMPARISON:  April 23      IMPRESSION:  Large right effusion with chest tube.            < end of copied text >    < from: Transthoracic Echocardiogram (03.20.19 @ 06:29) >    PROCEDURE: Transthoracic echocardiogram with 2-D, M-Mode  and complete spectral and color flow Doppler.  INDICATION: Cardiac tamponade (I31.4)  ------------------------------------------------------------------------  OBSERVATIONS:  ------------------------------------------------------------------------  CONCLUSIONS:  Limited study done for evaluation of pericardial fluid and  tamponade. Patient is tachycardic throughout the study.  Large pericardial effusion with echocardiographic signs of  tamponade. Majority of the fluid is clear. Maximum fluid  pocket 3.5cm located lateral to RV freewall and apically  seen in subcostal view.  Findings discussed with cardiology fellow at 6:45 AM.  ------------------------------------------------------------------------  Confirmed on  3/20/2019 - 06:50:46 by Selina Flores M.D.  ------------------------------------------------------------------------    < end of copied text >    < from: Transthoracic Echocardiogram (03.25.19 @ 09:45) >    CONCLUSIONS:  1. Aortic valve not well visualized; probably normal.  2. Overall preserved left ventricular systolic function.  Limited endocardial visualization to evaluate for small  segmental wall motion abnormality.  3. Normal pericardium with trace pericardial effusion.  4. Left pleural effusion.  ------------------------------------------------------------------------  Confirmed on  3/25/2019 - 14:14:46 by Shelby Delgado M.D. [facility]  ------------------------------------------------------------------------    < end of copied text >

## 2019-05-02 NOTE — ED ADULT NURSE NOTE - NSIMPLEMENTINTERV_GEN_ALL_ED
Implemented All Universal Safety Interventions:  [employer] to call system. Call bell, personal items and telephone within reach. Instruct patient to call for assistance. Room bathroom lighting operational. Non-slip footwear when patient is off stretcher. Physically safe environment: no spills, clutter or unnecessary equipment. Stretcher in lowest position, wheels locked, appropriate side rails in place.

## 2019-05-02 NOTE — ED PROVIDER NOTE - CLINICAL SUMMARY MEDICAL DECISION MAKING FREE TEXT BOX
53 yo F w/ metastatic R lung adenocarcinoma on chemo, right pleural effusion s/p pleurx and VATS, subxiphoid pericardial window on 3/20/19, left PE (3/4) on Lovenox s/p IVC filter placement presents to ER c/o worsening sob yesterday a/w fever, productive cough. Pt appears in mild respiratory distress, tachycardiac, hypotensive, stated fever 101 at home yesterday, 99.7 rectal in ED, concern for sepsis, PNE, pleural effusion. EKG nonischemic. Plan: CT chest, CXR, labs, VBG, blood culture, RVP, Ua,  give IVF, duoneb, and reassess, likely admit. 53 yo F w/ metastatic R lung adenocarcinoma on chemo, right pleural effusion s/p pleurx and VATS, subxiphoid pericardial window on 3/20/19, left PE (3/4) on Lovenox s/p IVC filter placement presents to ER c/o worsening sob yesterday a/w fever, productive cough. Pt appears in mild respiratory distress, tachycardiac, hypotensive, stated fever 101 at home yesterday, 99.7 rectal in ED, concern for sepsis, PNA, pleural effusion. EKG nonischemic. Plan: CT chest, CXR, labs, VBG, blood culture, RVP, Ua,  give IVF, duoneb, and reassess, likely admit.  Will c/s CTsx

## 2019-05-02 NOTE — H&P ADULT - ATTENDING COMMENTS
Home Patient was seen and examined personally by me on 5/2/19. I have discussed the plan and reviewed the Resident's note and agree with the above physical exam findings including assessment and plan except as indicated below. Labs and imagining reviewed.     54F with adenocarcinoma of R lung with recurrent malignant pleural/pericardial effusion and prior PE presented with SOB and Exertional tolerance. CT showing loculated R pleural effusion and decreased aeration of the R lung. also reporting increase productive cough and subjective fever at home. no leukocytosis or fever documented here, but currently on Keytruda. clinical presentation relation to progression of malignancy vs underline infection. CT surgery eval for further need of chest tube placement or VATS. Onc consult for need for change in oncologic treatment. c/w Zosyn for possible underline pneumonia.

## 2019-05-02 NOTE — ED ADULT NURSE REASSESSMENT NOTE - NS ED NURSE REASSESS COMMENT FT1
Pt at baseline mental status, remains c/o cough. Pt satting well on RA, breathing is slightly more labored, MD notified. Pt to receive second DuoNeb treatment. Will continue to monitor.

## 2019-05-02 NOTE — ED ADULT NURSE NOTE - OBJECTIVE STATEMENT
Pt presenting to room 13 AxOx4 ambulatory at baseline with c/o SOB, productive cough x2 days. PMH of lung cancer- on immunotherapy- last treatment two weeks ago. Pt also c/o fevers at home, pt rectally 99.7 on arrival to ED. Pt appears SOB with RR 20-22, breathing slightly labored, no wheezing present at this time. Pt satting well on RA, provided 2L NC as per request for comfort. Pt denies CP, N/V, dizziness, lightheadedness, urinary symptoms, palpitations. Pt sinus tachycardia on cardiac monitor- @122. IV established in LAC with 20g, labs drawn and sent, MD at bedside, will continue to monitor.

## 2019-05-02 NOTE — ED PROVIDER NOTE - CARE PLAN
Principal Discharge DX:	SOB (shortness of breath)  Secondary Diagnosis:	Fever Principal Discharge DX:	Pneumonia  Secondary Diagnosis:	Fever

## 2019-05-02 NOTE — H&P ADULT - PROBLEM SELECTOR PLAN 4
Malignant pleural effusion based on previous thoracentesis  Likely loculated or drain not in place  F/u CTX   Given size and symptoms will need to be drained

## 2019-05-02 NOTE — CONSULT NOTE ADULT - SUBJECTIVE AND OBJECTIVE BOX
HPI: 55 y/o F w/ recent dx of R lung adenocarcinoma with mediastinal and hilar lymphadenopathy undergoing treatment with Keytruda, R pleural effusion s/p RVATS,  pleurx placement, subxiphoid pericardial window on 3/20/19, PE (3/4) on lovenox presents with 2 days of fevers, worsening cough, SOB. Pt stated she her symptoms started since her dx of lung ca but much worse in the last two days. Her pleurX was drained on Tuesday with minimal discharge. Pleural fluid was described as clear not cloudy or purulent. No hemoptysis, chest pain, pain at PluerX site, warm, redness noted at the site.       In ED CT of chest showed Interval obstruction of the bronchus intermedius, slightly worsening effusion.    < end of copied text >    PAST MEDICAL & SURGICAL HISTORY:  Pulmonary embolus  Lung cancer  History of lung surgery      REVIEW OF SYSTEMS      General: Fatigue. No Weight change/ HA/Dizzy	  Skin/Breast: No Rashes/ Lesions/ Masses	  Ophthalmologic: No Blurry vision/ Glaucoma/ Blindness	  ENMT: No Hearing loss/ Drainage/ Lesions	  Respiratory and Thorax: Cough/ SOB. No Hemoptysis	  Cardiovascular: No Chest pain/ Palpitations/ Diaphoresis	  Gastrointestinal: No Nausea/ Vomiting/ Constipation/ Appetite Change	  Genitourinary: No Heamturia/ Dysuria/ Frequency change/ Impotence	  Musculoskeletal: No Pain/ Weakness/ Claudication	  Neurological: No Seizures/ TIA/CVA/ Parastesias	  Psychiatric: No Dementia/ Depression/ SI/HI	  Hematology/Lymphatics: No hx of bleeding/ Edema	  Endocrine:	No Hyperglycemia/ Hypoglycemia    Allergic/Immunologic:	 No Anaphylaxis/ Intolerance/ Recent illnesses    MEDICATIONS  (STANDING):  vancomycin  IVPB 1000 milliGRAM(s) IV Intermittent once    MEDICATIONS  (PRN):      Allergies    No Known Allergies    Intolerances    IV contrast (Rash)      SOCIAL HISTORY:  Occupation:  Smoking Hx: denies  Etoh Hx: denies  IVDA Hx: denies    FAMILY HISTORY:  Family history of coronary artery disease (Sibling): Brother  Family history of hypertension: Mother  Family history of diabetes mellitus (DM): Mother    unless noted, no significant family hx with Mother, Father, Siblings    Vital Signs Last 24 Hrs  T(C): 37.6 (02 May 2019 10:20), Max: 37.6 (02 May 2019 10:20)  T(F): 99.7 (02 May 2019 10:20), Max: 99.7 (02 May 2019 10:20)  HR: 122 (02 May 2019 10:20) (122 - 122)  BP: 95/77 (02 May 2019 10:20) (93/62 - 95/77)  BP(mean): --  RR: 22 (02 May 2019 10:20) (18 - 22)  SpO2: 100% (02 May 2019 10:20) (99% - 100%)    General: WN/WD NAD  Neurology: Awake, nonfocal, PAZ x 4  Eyes: Scleras clear, PERRLA/ EOMI, Gross vision intact  ENT:Gross hearing intact, grossly patent pharynx, no stridor  Neck: Neck supple, trachea midline, No JVD,   Respiratory: decreased BS in R side. No wheezing, rales, rhonchi  CV: RRR, S1S2, no murmurs, rubs or gallops  Abdominal: Soft, NT, ND +BS,   Extremities: No edema, + peripheral pulses  Skin: No Rashes, Hematoma, Ecchymosis  Lymphatic: No Neck, axilla, groin LAD  Psych: Oriented x 3, normal affect  Incisions: c,d,i  Tubes: R pleurX     LABS:                        11.7   10.07 )-----------( 607      ( 02 May 2019 10:30 )             37.3     05-02    132<L>  |  97<L>  |  8   ----------------------------<  121<H>  4.3   |  25  |  0.65    Ca    9.1      02 May 2019 10:30    TPro  6.8  /  Alb  3.3  /  TBili  0.3  /  DBili  x   /  AST  15  /  ALT  15  /  AlkPhos  114  05-02    PT/INR - ( 02 May 2019 10:45 )   PT: 13.0 SEC;   INR: 1.14          PTT - ( 02 May 2019 10:45 )  PTT:36.8 SEC      RADIOLOGY & ADDITIONAL STUDIES:  < from: CT Chest No Cont (05.02.19 @ 11:46) >  Interval obstruction of the bronchus intermedius with associated patchy   areas of atelectasis. This is likely due to worsening tumor burden along   the bronchovascular bundle and likely within theparenchyma. Unchanged   septal thickening in the right upper lobe consistent with lymphangitic   spread.     Interval development of left pleural effusion.    Enlarged heart size with small pericardial effusion, increased since   4/12/2019.    < end of copied text >      ASSESSMENT:   54yFemalePAST MEDICAL & SURGICAL HISTORY:  Pulmonary embolus  Lung cancer  History of lung surgery  HEALTH ISSUES - PROBLEM Dx:      HEALTH ISSUES - R/O PROBLEM Dx:      PLAN:

## 2019-05-02 NOTE — H&P ADULT - PROBLEM SELECTOR PLAN 1
Patient with shortness of breath with large R sided pleural effusion, with limited drainage out of pleurex  Patient adherent with Lovenox, less likely progressive PE  CT evidence of right chest tube terminates in the upper pleural space with a loculated right pleural effusion however majority of the fluid is in the inferior thorax and the chest tube terminates in the right upper thorax.  Discuss with CTX - regarding adjustment of chest tube  Difficult to appreciate consolidation given large amount of pleural effusion will treat for PNA - continue Zosyn   Sputum cx  Less likely cardiac in etiology given no intersitial pulmonary edema, trace pericardial fluid seen, unlikely to cause tamponade

## 2019-05-02 NOTE — H&P ADULT - PROBLEM SELECTOR PLAN 3
+tachycardia, +tachypnea  Patient's SBP 90s-100s based on outpatient VS  UA negative, RVP negative  Lactate wnl  Will treat for PNA with Zosyn  However, patient's outpatietn VS SBP 90s-100s, HR 100s-120s +tachycardia, +tachypnea +fever  UA negative, RVP negative  Lactate wnl  Will treat for PNA with Zosyn  However, patient's outpatient VS SBP 90s-100s, HR 100s-120s  Fever can also be a result of tumor fever

## 2019-05-02 NOTE — H&P ADULT - NSHPPHYSICALEXAM_GEN_ALL_CORE
Vital Signs Last 24 Hrs  T(C): 37.6 (02 May 2019 10:20), Max: 37.6 (02 May 2019 10:20)  T(F): 99.7 (02 May 2019 10:20), Max: 99.7 (02 May 2019 10:20)  HR: 122 (02 May 2019 10:20) (122 - 122)  BP: 95/77 (02 May 2019 10:20) (93/62 - 95/77)  BP(mean): --  RR: 22 (02 May 2019 10:20) (18 - 22)  SpO2: 100% (02 May 2019 10:20) (99% - 100%)  · Constitutional        Lying in bed comfortably. No acute distress  · HEENT	               PERRL; EOMI; conjunctiva clear  · Neck	               Supple; no JVD  · Back	                ROM intact  · Respiratory             good air movement; no rales; no rhonchi; no wheezes  · Cardiovascular       S1 & S2 with RRR; no murmurs, rales or JVD  · Gastrointestinal     soft; no distention; bowel sounds normal; no rigidity  · Extremities             no pedal edema  · Vascular	               Radial Pulse: right normal; left normal  · Neurological           alert and oriented x 3; responds to verbal commands; sensation intact; cranial nerves intact; strength normal  · Skin	               warm and dry  · Musculoskeletal    no calf tenderness; diminished strength  · Psychiatric              normal mood and affect Vital Signs Last 24 Hrs  T(C): 37.6 (02 May 2019 10:20), Max: 37.6 (02 May 2019 10:20)  T(F): 99.7 (02 May 2019 10:20), Max: 99.7 (02 May 2019 10:20)  HR: 122 (02 May 2019 10:20) (122 - 122)  BP: 95/77 (02 May 2019 10:20) (93/62 - 95/77)  BP(mean): --  RR: 22 (02 May 2019 10:20) (18 - 22)  SpO2: 100% (02 May 2019 10:20) (99% - 100%)    · Constitutional        thin middle aged female, tachypneic   · HEENT	               PERRL; EOMI; conjunctiva clear  · Neck	               Supple; no JVD  · Respiratory        left CTA, right decreased breath sounds to apex, R base no breath sounds appreciated, no crackles, rales, rhonchi, pleurex catheter drain currently empty, patient tachypneic to low 30s   · Cardiovascular       S1 & S2, regular rhythm, tachycardic, difficult to appreciate m/g/r  · Gastrointestinal     soft; no distention; bowel sounds normal; no rigidity  · Extremities             no pedal edema  · Vascular	               Radial Pulse: right normal; left normal  · Neurological           alert and oriented x 3; responds to verbal commands, grossly nonfocal   · Skin	               warm and dry

## 2019-05-02 NOTE — ED PROVIDER NOTE - ATTENDING CONTRIBUTION TO CARE
ED Attending (Dr Gomez): I have personally performed a face to face bedside history and physical examination of this patient.  I have discussed the case with the PA and  I have personally authored the following components: HPI, ROS, Physical Exam and MDM in addition to reviewing and revising the rest of the Provider Note. 53 yo F w/ metastatic R lung adenocarcinoma on chemo, right pleural effusion s/p pleurx and VATS, subxiphoid pericardial window on 3/20/19, left PE (3/4) on Lovenox s/p IVC filter placement presents to ER c/o worsening sob yesterday a/w fever, productive cough. Pt appears in mild respiratory distress, tachycardiac, hypotensive, stated fever 101 at home yesterday, 99.7 rectal in ED, concern for sepsis, PNA, pleural effusion. EKG nonischemic. Plan: CT chest, CXR, labs, VBG, blood culture, RVP, Ua,  give IVF, duoneb, and reassess, likely admit.  Will c/s CTsx

## 2019-05-03 ENCOUNTER — TRANSCRIPTION ENCOUNTER (OUTPATIENT)
Age: 54
End: 2019-05-03

## 2019-05-03 DIAGNOSIS — C34.90 MALIGNANT NEOPLASM OF UNSPECIFIED PART OF UNSPECIFIED BRONCHUS OR LUNG: ICD-10-CM

## 2019-05-03 DIAGNOSIS — R53.81 OTHER MALAISE: ICD-10-CM

## 2019-05-03 DIAGNOSIS — R06.00 DYSPNEA, UNSPECIFIED: ICD-10-CM

## 2019-05-03 DIAGNOSIS — Z51.5 ENCOUNTER FOR PALLIATIVE CARE: ICD-10-CM

## 2019-05-03 LAB
ALBUMIN SERPL ELPH-MCNC: 2.8 G/DL — LOW (ref 3.3–5)
ALP SERPL-CCNC: 111 U/L — SIGNIFICANT CHANGE UP (ref 40–120)
ALT FLD-CCNC: 18 U/L — SIGNIFICANT CHANGE UP (ref 4–33)
ANION GAP SERPL CALC-SCNC: 16 MMO/L — HIGH (ref 7–14)
AST SERPL-CCNC: 18 U/L — SIGNIFICANT CHANGE UP (ref 4–32)
BACTERIA UR CULT: SIGNIFICANT CHANGE UP
BASOPHILS # BLD AUTO: 0.05 K/UL — SIGNIFICANT CHANGE UP (ref 0–0.2)
BASOPHILS NFR BLD AUTO: 0.5 % — SIGNIFICANT CHANGE UP (ref 0–2)
BILIRUB SERPL-MCNC: 0.4 MG/DL — SIGNIFICANT CHANGE UP (ref 0.2–1.2)
BUN SERPL-MCNC: 7 MG/DL — SIGNIFICANT CHANGE UP (ref 7–23)
CALCIUM SERPL-MCNC: 8.5 MG/DL — SIGNIFICANT CHANGE UP (ref 8.4–10.5)
CHLORIDE SERPL-SCNC: 96 MMOL/L — LOW (ref 98–107)
CO2 SERPL-SCNC: 19 MMOL/L — LOW (ref 22–31)
CREAT SERPL-MCNC: 0.63 MG/DL — SIGNIFICANT CHANGE UP (ref 0.5–1.3)
EOSINOPHIL # BLD AUTO: 0.55 K/UL — HIGH (ref 0–0.5)
EOSINOPHIL NFR BLD AUTO: 5.6 % — SIGNIFICANT CHANGE UP (ref 0–6)
GLUCOSE SERPL-MCNC: 154 MG/DL — HIGH (ref 70–99)
HCT VFR BLD CALC: 34.4 % — LOW (ref 34.5–45)
HGB BLD-MCNC: 10.5 G/DL — LOW (ref 11.5–15.5)
IMM GRANULOCYTES NFR BLD AUTO: 0.5 % — SIGNIFICANT CHANGE UP (ref 0–1.5)
LYMPHOCYTES # BLD AUTO: 1.02 K/UL — SIGNIFICANT CHANGE UP (ref 1–3.3)
LYMPHOCYTES # BLD AUTO: 10.5 % — LOW (ref 13–44)
MCHC RBC-ENTMCNC: 26.6 PG — LOW (ref 27–34)
MCHC RBC-ENTMCNC: 30.5 % — LOW (ref 32–36)
MCV RBC AUTO: 87.3 FL — SIGNIFICANT CHANGE UP (ref 80–100)
MONOCYTES # BLD AUTO: 0.93 K/UL — HIGH (ref 0–0.9)
MONOCYTES NFR BLD AUTO: 9.5 % — SIGNIFICANT CHANGE UP (ref 2–14)
NEUTROPHILS # BLD AUTO: 7.14 K/UL — SIGNIFICANT CHANGE UP (ref 1.8–7.4)
NEUTROPHILS NFR BLD AUTO: 73.4 % — SIGNIFICANT CHANGE UP (ref 43–77)
NRBC # FLD: 0 K/UL — SIGNIFICANT CHANGE UP (ref 0–0)
PLATELET # BLD AUTO: 541 K/UL — HIGH (ref 150–400)
PMV BLD: 9.4 FL — SIGNIFICANT CHANGE UP (ref 7–13)
POTASSIUM SERPL-MCNC: 3.8 MMOL/L — SIGNIFICANT CHANGE UP (ref 3.5–5.3)
POTASSIUM SERPL-SCNC: 3.8 MMOL/L — SIGNIFICANT CHANGE UP (ref 3.5–5.3)
PROT SERPL-MCNC: 6.3 G/DL — SIGNIFICANT CHANGE UP (ref 6–8.3)
RBC # BLD: 3.94 M/UL — SIGNIFICANT CHANGE UP (ref 3.8–5.2)
RBC # FLD: 13.5 % — SIGNIFICANT CHANGE UP (ref 10.3–14.5)
SODIUM SERPL-SCNC: 131 MMOL/L — LOW (ref 135–145)
SPECIMEN SOURCE: SIGNIFICANT CHANGE UP
WBC # BLD: 9.74 K/UL — SIGNIFICANT CHANGE UP (ref 3.8–10.5)
WBC # FLD AUTO: 9.74 K/UL — SIGNIFICANT CHANGE UP (ref 3.8–10.5)

## 2019-05-03 PROCEDURE — 99223 1ST HOSP IP/OBS HIGH 75: CPT

## 2019-05-03 PROCEDURE — 99233 SBSQ HOSP IP/OBS HIGH 50: CPT

## 2019-05-03 RX ORDER — ONDANSETRON 8 MG/1
4 TABLET, FILM COATED ORAL ONCE
Qty: 0 | Refills: 0 | Status: COMPLETED | OUTPATIENT
Start: 2019-05-03 | End: 2019-05-03

## 2019-05-03 RX ORDER — SODIUM CHLORIDE 9 MG/ML
250 INJECTION INTRAMUSCULAR; INTRAVENOUS; SUBCUTANEOUS ONCE
Qty: 0 | Refills: 0 | Status: COMPLETED | OUTPATIENT
Start: 2019-05-03 | End: 2019-05-03

## 2019-05-03 RX ORDER — MORPHINE SULFATE 50 MG/1
2.5 CAPSULE, EXTENDED RELEASE ORAL EVERY 4 HOURS
Qty: 0 | Refills: 0 | Status: DISCONTINUED | OUTPATIENT
Start: 2019-05-03 | End: 2019-05-03

## 2019-05-03 RX ADMIN — PIPERACILLIN AND TAZOBACTAM 25 GRAM(S): 4; .5 INJECTION, POWDER, LYOPHILIZED, FOR SOLUTION INTRAVENOUS at 21:03

## 2019-05-03 RX ADMIN — ONDANSETRON 4 MILLIGRAM(S): 8 TABLET, FILM COATED ORAL at 05:06

## 2019-05-03 RX ADMIN — Medication 3 MILLILITER(S): at 15:14

## 2019-05-03 RX ADMIN — ENOXAPARIN SODIUM 50 MILLIGRAM(S): 100 INJECTION SUBCUTANEOUS at 05:06

## 2019-05-03 RX ADMIN — PIPERACILLIN AND TAZOBACTAM 25 GRAM(S): 4; .5 INJECTION, POWDER, LYOPHILIZED, FOR SOLUTION INTRAVENOUS at 05:04

## 2019-05-03 RX ADMIN — ENOXAPARIN SODIUM 50 MILLIGRAM(S): 100 INJECTION SUBCUTANEOUS at 18:50

## 2019-05-03 RX ADMIN — OXYCODONE HYDROCHLORIDE 5 MILLIGRAM(S): 5 TABLET ORAL at 18:48

## 2019-05-03 RX ADMIN — Medication 3 MILLILITER(S): at 09:07

## 2019-05-03 RX ADMIN — GABAPENTIN 100 MILLIGRAM(S): 400 CAPSULE ORAL at 18:48

## 2019-05-03 RX ADMIN — SODIUM CHLORIDE 250 MILLILITER(S): 9 INJECTION INTRAMUSCULAR; INTRAVENOUS; SUBCUTANEOUS at 05:41

## 2019-05-03 RX ADMIN — Medication 3 MILLILITER(S): at 04:35

## 2019-05-03 RX ADMIN — Medication 3 MILLILITER(S): at 21:26

## 2019-05-03 RX ADMIN — PIPERACILLIN AND TAZOBACTAM 25 GRAM(S): 4; .5 INJECTION, POWDER, LYOPHILIZED, FOR SOLUTION INTRAVENOUS at 12:45

## 2019-05-03 RX ADMIN — GABAPENTIN 100 MILLIGRAM(S): 400 CAPSULE ORAL at 05:06

## 2019-05-03 NOTE — CONSULT NOTE ADULT - SUBJECTIVE AND OBJECTIVE BOX
Patient is a 54y old  Female who presents with a chief complaint of shortness of breath, cough (02 May 2019 14:46)      HPI:  54F with PMHx of metastatic R lung adenocarcinoma on chemo, chronic right pleural effusion s/p pleurx and VATS, subxiphoid pericardial window on 3/20/19, PE on Lovenox s/p IVC filter placement presenting with shortness of breath and cough x 2 days. History obtained by patient and son at bedside. Per patient, she has baseline chronic cough, however noticed her breathing was more heavy and painful. She reports she is only able to take shallow breaths and has pain on deep inspiration. She also endorses worsening productive cough with productive sputum, white "foamy". She endorses subjective fevers, objective T at home 100.1. She has a baseline exercise tolerance being able to walk 20-40 steps without TAMAYO, however no endorses unable to walk 10 feet without feeling TAMAYO. She does not use home O2 at home. She has been adherent with her medications. She denies sick contacts or travel history. Her pleurex catheter gets drained by an RN M/W/F, and was decreased to 2x/week 2-3 weeks ago. Last drainage they noticed was less than 20 cc. Denies leg swelling, abdominal pain, nausea, vomiting chest pain, palpitation change in BMs. Of note, patient's CTA on 3/2/19 showed multiple acute P.E. in the MUMTAZ and LLL and in the lingular artery, a large Rt sided pleural effusion w/ partial collapse of the Rt lung and obstruction of the RLL bronchus. Patient's subxiphoid pericardial window was done by Dr. Catie Rob on 3/20/19. Path revealed metastatic adenoCA to the pericardium and pericardial fluid, compatible w/ known adenoCA. Patient is currently on Keytruda.    In ED: BP93/62  RR18 T98 99%RA  Patient received Vancomycin 1 g IV x 1, Zosyn 3.375 mg IV x 1, NS 1 L IV x 1, duonebs x 2 (02 May 2019 14:46)       Oncologic History:      ROS: as above     PAST MEDICAL & SURGICAL HISTORY:  Pulmonary embolus  Lung cancer: with mets, adenocardinoma  S/P thoracentesis  S/P pericardiocentesis  History of lung surgery: VATS      SOCIAL HISTORY:    FAMILY HISTORY:  Family history of coronary artery disease (Sibling): Brother  Family history of hypertension: Mother  Family history of diabetes mellitus (DM): Mother      MEDICATIONS  (STANDING):  ALBUTerol/ipratropium for Nebulization 3 milliLiter(s) Nebulizer every 6 hours  enoxaparin Injectable 50 milliGRAM(s) SubCutaneous two times a day  gabapentin 100 milliGRAM(s) Oral every 12 hours  piperacillin/tazobactam IVPB. 3.375 Gram(s) IV Intermittent every 8 hours    MEDICATIONS  (PRN):  oxyCODONE    IR 5 milliGRAM(s) Oral every 4 hours PRN moderate and severe pain/shortness of breath      Allergies    No Known Allergies    Intolerances    IV contrast (Rash)      Vital Signs Last 24 Hrs  T(C): 36.8 (03 May 2019 05:21), Max: 37.6 (02 May 2019 17:48)  T(F): 98.3 (03 May 2019 05:21), Max: 99.6 (02 May 2019 17:48)  HR: 118 (03 May 2019 09:07) (92 - 126)  BP: 90/60 (03 May 2019 05:21) (90/60 - 122/78)  BP(mean): --  RR: 20 (03 May 2019 05:21) (19 - 22)  SpO2: 94% (03 May 2019 09:07) (94% - 100%)    PHYSICAL EXAM  General: adult in NAD  HEENT: clear oropharynx, anicteric sclera, pink conjunctiva  Neck: supple  CV: normal S1/S2 with no murmur rubs or gallops  Lungs: positive air movement b/l ant lungs, clear to auscultation, no wheezes, no rales  Abdomen: soft non-tender non-distended, no hepatosplenomegaly  Ext: no clubbing cyanosis or edema  Skin: no rashes and no petechiae  Neuro: alert and oriented X 3, none focal    LABS:                          10.5   9.74  )-----------( 541      ( 03 May 2019 05:40 )             34.4         Mean Cell Volume : 87.3 fL  Mean Cell Hemoglobin : 26.6 pg  Mean Cell Hemoglobin Concentration : 30.5 %  Auto Neutrophil # : 7.14 K/uL  Auto Lymphocyte # : 1.02 K/uL  Auto Monocyte # : 0.93 K/uL  Auto Eosinophil # : 0.55 K/uL  Auto Basophil # : 0.05 K/uL  Auto Neutrophil % : 73.4 %  Auto Lymphocyte % : 10.5 %  Auto Monocyte % : 9.5 %  Auto Eosinophil % : 5.6 %  Auto Basophil % : 0.5 %      Serial CBC's  05-03 @ 05:40  Hct-34.4 / Hgb-10.5 / Plat-541 / RBC-3.94 / WBC-9.74  Serial CBC's  05-02 @ 10:30  Hct-37.3 / Hgb-11.7 / Plat-607 / RBC-4.40 / WBC-10.07      05-03    131<L>  |  96<L>  |  7   ----------------------------<  154<H>  3.8   |  19<L>  |  0.63    Ca    8.5      03 May 2019 05:40    TPro  6.3  /  Alb  2.8<L>  /  TBili  0.4  /  DBili  x   /  AST  18  /  ALT  18  /  AlkPhos  111  05-03      PT/INR - ( 02 May 2019 10:45 )   PT: 13.0 SEC;   INR: 1.14          PTT - ( 02 May 2019 10:45 )  PTT:36.8 SEC                RADIOLOGY & ADDITIONAL STUDIES:  IMPRESSION:     Interval obstruction of the bronchus intermedius with associated patchy   areas of atelectasis. This is likely due to worsening tumor burden along   the bronchovascular bundle and likely within the parenchyma. Unchanged   septal thickening in the right upper lobe consistent with lymphangitic   spread.     Interval development of left pleural effusion.    Enlarged heart size with small pericardial effusion, increased since   4/12/2019.    Right chest tube terminates in the upper pleural space. Loculated right   pleural effusion and associated pleural thickening is stable, however   note that the majority of the fluid is in the inferior thorax and the   chest tube terminates in the right upper thorax. Interval development of   small left pleural effusion    Unchanged conglomerate lymphadenopathy in the mediastinum Patient is a 54y old  Female who presents with a chief complaint of shortness of breath, cough (02 May 2019 14:46)      HPI:  54F with R lung adenocarcinoma s/p 1 dose of immunotherapy with keytruda on 4/15, chronic right pleural effusion s/p pleurx, subxiphoid pericardial window on 3/20/19, PE on Lovenox s/p IVC filter placement presenting with shortness of breath and cough x 2 days.   History obtained by patient and son at bedside. Per patient, she has baseline chronic cough, however noticed her breathing was more heavy and painful. She reports she is only able to take shallow breaths and has pain on deep inspiration. She also endorses worsening productive cough with productive sputum, white "foamy". She endorses subjective fevers, objective T at home 100.1. She has a baseline exercise tolerance being able to walk 20-40 steps without TAMAYO, however no endorses unable to walk 10 feet without feeling TAMAYO. She does not use home O2 at home. She has been adherent with her medications. She denies sick contacts or travel history. Her pleurex catheter gets drained by an RN M/W/F, and was decreased to 2x/week 2-3 weeks ago. Last drainage they noticed was less than 20 cc. Denies leg swelling, abdominal pain, nausea, vomiting chest pain, palpitation change in BMs. Of note, patient's CTA on 3/2/19 showed multiple acute P.E. in the MUMTAZ and LLL and in the lingular artery, a large Rt sided pleural effusion w/ partial collapse of the Rt lung and obstruction of the RLL bronchus. Patient's subxiphoid pericardial window was done by Dr. Catie Rob on 3/20/19. Path revealed metastatic adenoCA to the pericardium and pericardial fluid, compatible w/ known adenoCA. Patient is currently on Keytruda.    In ED: BP93/62  RR18 T98 99%RA  Patient received Vancomycin 1 g IV x 1, Zosyn 3.375 mg IV x 1, NS 1 L IV x 1, duonebs x 2 (02 May 2019 14:46)       Oncologic History:      ROS: as above     PAST MEDICAL & SURGICAL HISTORY:  Pulmonary embolus  Lung cancer: with mets, adenocardinoma  S/P thoracentesis  S/P pericardiocentesis  History of lung surgery: VATS      SOCIAL HISTORY:    FAMILY HISTORY:  Family history of coronary artery disease (Sibling): Brother  Family history of hypertension: Mother  Family history of diabetes mellitus (DM): Mother      MEDICATIONS  (STANDING):  ALBUTerol/ipratropium for Nebulization 3 milliLiter(s) Nebulizer every 6 hours  enoxaparin Injectable 50 milliGRAM(s) SubCutaneous two times a day  gabapentin 100 milliGRAM(s) Oral every 12 hours  piperacillin/tazobactam IVPB. 3.375 Gram(s) IV Intermittent every 8 hours    MEDICATIONS  (PRN):  oxyCODONE    IR 5 milliGRAM(s) Oral every 4 hours PRN moderate and severe pain/shortness of breath      Allergies    No Known Allergies    Intolerances    IV contrast (Rash)      Vital Signs Last 24 Hrs  T(C): 36.8 (03 May 2019 05:21), Max: 37.6 (02 May 2019 17:48)  T(F): 98.3 (03 May 2019 05:21), Max: 99.6 (02 May 2019 17:48)  HR: 118 (03 May 2019 09:07) (92 - 126)  BP: 90/60 (03 May 2019 05:21) (90/60 - 122/78)  BP(mean): --  RR: 20 (03 May 2019 05:21) (19 - 22)  SpO2: 94% (03 May 2019 09:07) (94% - 100%)    PHYSICAL EXAM  General: adult in NAD, cachectic  HEENT: clear oropharynx, anicteric sclera, pink conjunctiva  Neck: supple  CV: normal S1/S2 with no murmur rubs or gallops  Lungs: positive air movement b/l ant lungs, crackles in the right lung  Abdomen: soft non-tender non-distended, no hepatosplenomegaly  Ext: no clubbing cyanosis or edema  Skin: no rashes and no petechiae  Neuro: alert and oriented X 3, none focal    LABS:                          10.5   9.74  )-----------( 541      ( 03 May 2019 05:40 )             34.4         Mean Cell Volume : 87.3 fL  Mean Cell Hemoglobin : 26.6 pg  Mean Cell Hemoglobin Concentration : 30.5 %  Auto Neutrophil # : 7.14 K/uL  Auto Lymphocyte # : 1.02 K/uL  Auto Monocyte # : 0.93 K/uL  Auto Eosinophil # : 0.55 K/uL  Auto Basophil # : 0.05 K/uL  Auto Neutrophil % : 73.4 %  Auto Lymphocyte % : 10.5 %  Auto Monocyte % : 9.5 %  Auto Eosinophil % : 5.6 %  Auto Basophil % : 0.5 %      Serial CBC's  05-03 @ 05:40  Hct-34.4 / Hgb-10.5 / Plat-541 / RBC-3.94 / WBC-9.74  Serial CBC's  05-02 @ 10:30  Hct-37.3 / Hgb-11.7 / Plat-607 / RBC-4.40 / WBC-10.07      05-03    131<L>  |  96<L>  |  7   ----------------------------<  154<H>  3.8   |  19<L>  |  0.63    Ca    8.5      03 May 2019 05:40    TPro  6.3  /  Alb  2.8<L>  /  TBili  0.4  /  DBili  x   /  AST  18  /  ALT  18  /  AlkPhos  111  05-03      PT/INR - ( 02 May 2019 10:45 )   PT: 13.0 SEC;   INR: 1.14          PTT - ( 02 May 2019 10:45 )  PTT:36.8 SEC                RADIOLOGY & ADDITIONAL STUDIES:  IMPRESSION:     Interval obstruction of the bronchus intermedius with associated patchy   areas of atelectasis. This is likely due to worsening tumor burden along   the bronchovascular bundle and likely within the parenchyma. Unchanged   septal thickening in the right upper lobe consistent with lymphangitic   spread.     Interval development of left pleural effusion.    Enlarged heart size with small pericardial effusion, increased since   4/12/2019.    Right chest tube terminates in the upper pleural space. Loculated right   pleural effusion and associated pleural thickening is stable, however   note that the majority of the fluid is in the inferior thorax and the   chest tube terminates in the right upper thorax. Interval development of   small left pleural effusion    Unchanged conglomerate lymphadenopathy in the mediastinum

## 2019-05-03 NOTE — CONSULT NOTE ADULT - ASSESSMENT
Patient s/p C1 Pembrolizumab on 4/15, it is too early to  response to treatment, would continue with pembrolizumab outpatient.  No concerning findings for pneumonitis on CT scan.     -Appreciate CT surgery input, any role for stenting?  -Pulmonary consult  -Palliative care consult for symptom control. She follows with [facility] care at [facility]. 54F with R lung adenocarcinoma s/p 1 dose of immunotherapy with keytruda on 4/15, chronic right pleural effusion s/p pleurx, subxiphoid pericardial window on 3/20/19, PE on Lovenox s/p IVC filter placement presenting with shortness of breath and cough x 2 days.   Patient s/p C1 Pembrolizumab on 4/15, it is too early to  response to treatment, would continue with pembrolizumab outpatient. Patient has a very high pdl-1 TPS that can correlate with a high response to treatment.  No concerning findings for pneumonitis on CT scan.   Pt tachypneic and uncomfortable.     -Appreciate CT surgery input, any role for stenting?  -Pulmonary consult  -C/w AC  -Palliative care consult for symptom control. She follows with pal care at [facility]. Patient reports poor appetite, she has medical marijuana but not taking.   -Supportive care, pain control, nutrition, PT  -Outpatient oncology f/u      Will follow. Please do not hesitate to call back with questions.     Thuy Harper MD  Medical Oncology Attending

## 2019-05-03 NOTE — CONSULT NOTE ADULT - ASSESSMENT
54F with PMHx of metastatic R lung adenocarcinoma on chemo, chronic right pleural effusion s/p pleurx and VATS, subxiphoid pericardial window on 3/20/19, PE on Lovenox s/p IVC filter placement presenting with shortness of breath and cough x 2 days.  Palliative consulted for symptom management.

## 2019-05-03 NOTE — PROGRESS NOTE ADULT - SUBJECTIVE AND OBJECTIVE BOX
Patient is a 54y old  Female who presents with a chief complaint of shortness of breath, cough (03 May 2019 12:31)      SUBJECTIVE / OVERNIGHT EVENTS:    MEDICATIONS  (STANDING):  ALBUTerol/ipratropium for Nebulization 3 milliLiter(s) Nebulizer every 6 hours  enoxaparin Injectable 50 milliGRAM(s) SubCutaneous two times a day  gabapentin 100 milliGRAM(s) Oral every 12 hours  piperacillin/tazobactam IVPB. 3.375 Gram(s) IV Intermittent every 8 hours    MEDICATIONS  (PRN):  oxyCODONE    IR 5 milliGRAM(s) Oral every 4 hours PRN moderate and severe pain/shortness of breath        CAPILLARY BLOOD GLUCOSE        I&O's Summary      PHYSICAL EXAM:  GENERAL: NAD, well-developed  HEAD:  Atraumatic, Normocephalic  EYES: EOMI, PERRLA, conjunctiva and sclera clear  NECK: Supple, No JVD  CHEST/LUNG: Clear to auscultation bilaterally; No wheeze  HEART: Regular rate and rhythm; No murmurs, rubs, or gallops  ABDOMEN: Soft, Nontender, Nondistended; Bowel sounds present  EXTREMITIES:  2+ Peripheral Pulses, No clubbing, cyanosis, or edema  PSYCH: AAOx3  NEUROLOGY: non-focal  SKIN: No rashes or lesions    LABS:                        10.5   9.74  )-----------( 541      ( 03 May 2019 05:40 )             34.4     05-03    131<L>  |  96<L>  |  7   ----------------------------<  154<H>  3.8   |  19<L>  |  0.63    Ca    8.5      03 May 2019 05:40    TPro  6.3  /  Alb  2.8<L>  /  TBili  0.4  /  DBili  x   /  AST  18  /  ALT  18  /  AlkPhos  111  05-03    PT/INR - ( 02 May 2019 10:45 )   PT: 13.0 SEC;   INR: 1.14          PTT - ( 02 May 2019 10:45 )  PTT:36.8 SEC      Urinalysis Basic - ( 02 May 2019 12:55 )    Color: YELLOW / Appearance: CLEAR / S.024 / pH: 6.0  Gluc: NEGATIVE / Ketone: SMALL  / Bili: NEGATIVE / Urobili: NORMAL   Blood: NEGATIVE / Protein: 30 / Nitrite: NEGATIVE   Leuk Esterase: NEGATIVE / RBC: 3-5 / WBC 3-5   Sq Epi: OCC / Non Sq Epi: x / Bacteria: NEGATIVE        RADIOLOGY & ADDITIONAL TESTS:    Imaging Personally Reviewed:    Consultant(s) Notes Reviewed:      Care Discussed with Consultants/Other Providers:    Assessment and Plan: Patient is a 54y old  Female who presents with a chief complaint of shortness of breath, cough (03 May 2019 12:31)      SUBJECTIVE / OVERNIGHT EVENTS:    MEDICATIONS  (STANDING):  ALBUTerol/ipratropium for Nebulization 3 milliLiter(s) Nebulizer every 6 hours  enoxaparin Injectable 50 milliGRAM(s) SubCutaneous two times a day  gabapentin 100 milliGRAM(s) Oral every 12 hours  piperacillin/tazobactam IVPB. 3.375 Gram(s) IV Intermittent every 8 hours    MEDICATIONS  (PRN):  oxyCODONE    IR 5 milliGRAM(s) Oral every 4 hours PRN moderate and severe pain/shortness of breath      Vital Signs Last 24 Hrs  T(C): 36.8 (03 May 2019 12:41), Max: 37.6 (02 May 2019 17:48)  T(F): 98.3 (03 May 2019 12:41), Max: 99.6 (02 May 2019 17:48)  HR: 125 (03 May 2019 12:41) (92 - 126)  BP: 144/105 (03 May 2019 12:41) (90/60 - 144/105)  BP(mean): --  RR: 22 (03 May 2019 12:41) (19 - 22)  SpO2: 95% (03 May 2019 12:41) (94% - 100%)    PHYSICAL EXAM:  GENERAL: NAD, cachectic   HEAD:  Atraumatic, Normocephalic  EYES: EOMI, PERRLA, conjunctiva and sclera clear  NECK: Supple  CHEST/LUNG: decreased BS on right, chest tube in place   HEART: Regular rate and rhythm;   ABDOMEN: Soft, Nontender, Nondistended; Bowel sounds present  EXTREMITIES:  2+ Peripheral Pulses, No clubbing, cyanosis, or edema  PSYCH: AAOx3  NEUROLOGY: non-focal  SKIN: No rashes or lesions    LABS:                        10.5   9.74  )-----------( 541      ( 03 May 2019 05:40 )             34.4     05-    131<L>  |  96<L>  |  7   ----------------------------<  154<H>  3.8   |  19<L>  |  0.63    Ca    8.5      03 May 2019 05:40    TPro  6.3  /  Alb  2.8<L>  /  TBili  0.4  /  DBili  x   /  AST  18  /  ALT  18  /  AlkPhos  111  05-03    PT/INR - ( 02 May 2019 10:45 )   PT: 13.0 SEC;   INR: 1.14          PTT - ( 02 May 2019 10:45 )  PTT:36.8 SEC      Urinalysis Basic - ( 02 May 2019 12:55 )    Color: YELLOW / Appearance: CLEAR / S.024 / pH: 6.0  Gluc: NEGATIVE / Ketone: SMALL  / Bili: NEGATIVE / Urobili: NORMAL   Blood: NEGATIVE / Protein: 30 / Nitrite: NEGATIVE   Leuk Esterase: NEGATIVE / RBC: 3-5 / WBC 3-5   Sq Epi: OCC / Non Sq Epi: x / Bacteria: NEGATIVE        RADIOLOGY & ADDITIONAL TESTS:    Imaging Personally Reviewed:    Consultant(s) Notes Reviewed:      Care Discussed with Consultants/Other Providers:    Assessment and Plan:

## 2019-05-03 NOTE — PROGRESS NOTE ADULT - PROBLEM SELECTOR PLAN 5
-Case discussed with oncologist Dr. Harper  -Outpt follow up for next session of immunotherapy  -Not a hospice candidate yet per onc  -Pall care for symptoms management. Dr. Burgos notified.

## 2019-05-03 NOTE — CONSULT NOTE ADULT - SUBJECTIVE AND OBJECTIVE BOX
HPI:  Obtained from H&P   54F with PMHx of metastatic R lung adenocarcinoma on chemo, chronic right pleural effusion s/p pleurx and VATS, subxiphoid pericardial window on 3/20/19, PE on Lovenox s/p IVC filter placement presenting with shortness of breath and cough x 2 days. History obtained by patient and son at bedside. Per patient, she has baseline chronic cough, however noticed her breathing was more heavy and painful. She reports she is only able to take shallow breaths and has pain on deep inspiration. She also endorses worsening productive cough with productive sputum, white "foamy". She endorses subjective fevers, objective T at home 100.1. She has a baseline exercise tolerance being able to walk 20-40 steps without TAMAYO, however no endorses unable to walk 10 feet without feeling TAMAYO. She does not use home O2 at home. She has been adherent with her medications. She denies sick contacts or travel history. Her pleurex catheter gets drained by an RN M/W/F, and was decreased to 2x/week 2-3 weeks ago. Last drainage they noticed was less than 20 cc. Denies leg swelling, abdominal pain, nausea, vomiting chest pain, palpitation change in BMs. Of note, patient's CTA on 3/2/19 showed multiple acute P.E. in the MUMTAZ and LLL and in the lingular artery, a large Rt sided pleural effusion w/ partial collapse of the Rt lung and obstruction of the RLL bronchus. Patient's subxiphoid pericardial window was done by Dr. Catie Rob on 3/20/19. Path revealed metastatic adenoCA to the pericardium and pericardial fluid, compatible w/ known adenoCA. Patient is currently on Keytruda.      PERTINENT PM/SXH:   Pulmonary embolus  Lung cancer    S/P thoracentesis  S/P pericardiocentesis  History of lung surgery  No significant past surgical history    FAMILY HISTORY:  Family history of coronary artery disease (Sibling): Brother  Family history of hypertension: Mother  Family history of diabetes mellitus (DM): Mother      SOCIAL HISTORY:   Significant other/partner: Yes [x ]  No [ ] Children:  Yes [x ]  No [ ] Christian/Spirituality:  Buddhism   Substance hx: Yes[ ]  No [x ]   Tobacco hx:  Yes [ ] No [x ]   Alcohol hx: Yes [ ] No [x ]   Home Opioid hx:  Yes [x ]   Living Situation: [x ]Home  [ ]Long term care  [ ]Rehab [ ]Other    ADVANCE DIRECTIVES:  Full Code     [ ] Health Care Proxy(s)  [x ] Surrogate(s)  [ ] Guardian           Name(s): Phone Number(s):  Layton Bliss [phone]    BASELINE (I)ADL(s) (prior to admission):  [place]: [x ]Total  [ ] Moderate [ ]Dependent    Allergies    No Known Allergies    Intolerances    IV contrast (Rash)  MEDICATIONS  (STANDING):  ALBUTerol/ipratropium for Nebulization 3 milliLiter(s) Nebulizer every 6 hours  enoxaparin Injectable 50 milliGRAM(s) SubCutaneous two times a day  gabapentin 100 milliGRAM(s) Oral every 12 hours  piperacillin/tazobactam IVPB. 3.375 Gram(s) IV Intermittent every 8 hours    MEDICATIONS  (PRN):  morphine   Solution 2.5 milliGRAM(s) Oral every 4 hours PRN dyspnea  oxyCODONE    IR 5 milliGRAM(s) Oral every 4 hours PRN moderate and severe pain/shortness of breath    PRESENT SYMPTOMS: [ ]Unable to obtain due to poor mentation   Source if other than patient:  [ ]Family   [ ]Team     Pain (Impact on QOL):  Denies    PAIN AD Score:     [email or web address] (press ctrl +  left click to view)    Dyspnea:  Yes [x ] No [ ] - [ ]Mild [x ]Moderate [ ]Severe  Anxiety:    Yes [x ] No [ ] - [x ]Mild [ ]Moderate [ ]Severe  Fatigue:    Yes [x ] No [ ] - [ ]Mild [x ]Moderate [ ]Severe  Nausea:    Yes [ ] No [ x] - [ ]Mild [ ]Moderate [ ]Severe                         Loss of appetite: Yes [x ] No [ ] - [ ]Mild [x ]Moderate [ ]Severe             Constipation:  Yes [ ] No [x ] - [ ]Mild [ ]Moderate [ ]Severe  Grief: Yes [x ] No [ ]     Other Symptoms:  [x ]All other review of systems negative     Karnofsky Performance Score/Palliative Performance Status Version 2:   50%    http://palliative.info/resource_material/[email or web address].pdf    PHYSICAL EXAM:  Vital Signs Last 24 Hrs  T(C): 36.8 (03 May 2019 12:41), Max: 37.6 (02 May 2019 17:48)  T(F): 98.3 (03 May 2019 12:41), Max: 99.6 (02 May 2019 17:48)  HR: 124 (03 May 2019 15:14) (92 - 128)  BP: 144/105 (03 May 2019 12:41) (90/60 - 144/105)  BP(mean): --  RR: 22 (03 May 2019 12:41) (19 - 22)  SpO2: 93% (03 May 2019 15:14) (93% - 100%) I&O's Summary      GENERAL:  [ ]Alert  [ ]Oriented x   [ ]Lethargic  [ ]Cachexia  [ ]Unarousable  [ ]Verbal  [ ]Non-Verbal  Behavioral:   [ ] Anxiety  [ ] Delirium [ ] Agitation [ ] Other  HEENT:  [ ]Normal   [ ]Dry mouth   [ ]ET Tube/Trach  [ ]Oral lesions  PULMONARY:   [ ]Clear  [ ]Tachypnea  [ ]Audible excessive secretions   [ ]Rhonchi        [ ]Right [ ]Left [ ]Bilateral  [ ]Crackles        [ ]Right [ ]Left [ ]Bilateral  [ ]Wheezing     [ ]Right [ ]Left [ ]Bilateral  CARDIOVASCULAR:    [ ]Regular [ ]Irregular [ ]Tachy  [ ]Ford [ ]Murmur [ ]Other  GASTROINTESTINAL:  [ ]Soft  [ ]Distended   [ ]+BS  [ ]Non tender [ ]Tender  [ ]PEG [ ]OGT/ NGT  Last BM:   GENITOURINARY:  [ ]Normal [ ] Incontinent   [ ]Oliguria/Anuria   [ ]Sanchez  MUSCULOSKELETAL:   [ ]Normal   [ ]Weakness  [ ]Bed/Wheelchair bound [ ]Edema  NEUROLOGIC:   [ ]No focal deficits  [ ] Cognitive impairment  [ ] Dysphagia [ ]Dysarthria [ ] Paresis [ ]Other   SKIN:   [ ]Normal   [ ]Pressure ulcer(s)  [ ]Rash    LABS:                        10.5   9.74  )-----------( 541      ( 03 May 2019 05:40 )             34.4   05-03    131<L>  |  96<L>  |  7   ----------------------------<  154<H>  3.8   |  19<L>  |  0.63    Ca    8.5      03 May 2019 05:40    TPro  6.3  /  Alb  2.8<L>  /  TBili  0.4  /  DBili  x   /  AST  18  /  ALT  18  /  AlkPhos  111  05-03  PT/INR - ( 02 May 2019 10:45 )   PT: 13.0 SEC;   INR: 1.14          PTT - ( 02 May 2019 10:45 )  PTT:36.8 SEC    Urinalysis Basic - ( 02 May 2019 12:55 )    Color: YELLOW / Appearance: CLEAR / S.024 / pH: 6.0  Gluc: NEGATIVE / Ketone: SMALL  / Bili: NEGATIVE / Urobili: NORMAL   Blood: NEGATIVE / Protein: 30 / Nitrite: NEGATIVE   Leuk Esterase: NEGATIVE / RBC: 3-5 / WBC 3-5   Sq Epi: OCC / Non Sq Epi: x / Bacteria: NEGATIVE      RADIOLOGY & ADDITIONAL STUDIES: Reviewed  CT chest  IMPRESSION:     Interval obstruction of the bronchus intermedius with associated patchy   areas of atelectasis. This is likely due to worsening tumor burden along   the bronchovascular bundle and likely within the parenchyma. Unchanged   septal thickening in the right upper lobe consistent with lymphangitic   spread.     Interval development of left pleural effusion.    Enlarged heart size with small pericardial effusion, increased since   2019.    Right chest tube terminates in the upper pleural space. Loculated right   pleural effusion and associated pleural thickening is stable, however   note that the majority of the fluid is in the inferior thorax and the   chest tube terminates in the right upper thorax. Interval development of   small left pleural effusion    Unchanged conglomerate lymphadenopathy in the mediastinum    PROTEIN CALORIE MALNUTRITION PRESENT: [ ] Yes [ ] No  [ ] PPSV2 < or = to 30% [ ] significant weight loss  [ ] poor nutritional intake [ ] catabolic state [ ] anasarca     Albumin, Serum: 2.8 g/dL (19 @ 05:40)      REFERRALS:   [ ]Chaplaincy  [ ] Hospice  [ ]Child Life  [ ]Social Work  [ ]Case management [ ]Holistic Therapy   Goals of Care Discussion Document:

## 2019-05-03 NOTE — PROGRESS NOTE ADULT - PROBLEM SELECTOR PLAN 1
-Patient with shortness of breath with  R sided pleural effusion, with limited drainage out of pleurex. Per CT collection is in inferior thorax while chest tube terminates in upper thorax. I personally discussed case with CT surgery team and at this time no further intervention is being offered. They see increased malignancy burden on CT.  -Patient adherent with Lovenox, less likely progressive PE  -For now c/w zosyn for potential post obstructive PNA - will assess for improvement with antibiotics. Suspicion however is low.   -Less likely cardiac in etiology given no intersitial pulmonary edema, trace pericardial fluid seen, unlikely to cause tamponade. Patient is hemodynamically stable.   -Will discuss case with pulm to see if any further intervention can be offered for symptom management.   -Further disease modifying therapy per oncologist Dr. Harper.

## 2019-05-03 NOTE — CONSULT NOTE ADULT - PROBLEM SELECTOR RECOMMENDATION 3
Patient on Keytruda and Pembrolizumab as an outpatient.  As per oncology too early to assess response.  Plan for follow-up as an outpatient.

## 2019-05-03 NOTE — CONSULT NOTE ADULT - PROBLEM SELECTOR RECOMMENDATION 9
Patient reports dyspnea since her diagnosis, but acutely worsened over the past few days - inhibiting her from ambulating and eating.  Patient currently receiving Duonebs and O2 (not on home O2).  No intervention as per CT Surgery.  Pulmonary consult pending. Patient hesitant to try Morphine PRN - utilizes Oxycodone 5mg PO PRN at home.  Educated on the use of opioids with dyspnea and encouraged to take Oxycodone PRN.  Bowel regimen. Patient reports dyspnea since her diagnosis, but acutely worsened over the past few days - inhibiting her from ambulating and eating.  Patient currently receiving Duonebs, zosyn, and O2 (not on home O2).  No intervention as per CT Surgery.  Pulmonary consult pending. Patient hesitant to try Morphine PRN - utilizes Oxycodone 5mg PO PRN at home.  Educated on the use of opioids with dyspnea and encouraged to take Oxycodone PRN. Bowel regimen.

## 2019-05-04 DIAGNOSIS — R11.2 NAUSEA WITH VOMITING, UNSPECIFIED: ICD-10-CM

## 2019-05-04 LAB
ALBUMIN SERPL ELPH-MCNC: 3 G/DL — LOW (ref 3.3–5)
ALP SERPL-CCNC: 147 U/L — HIGH (ref 40–120)
ALT FLD-CCNC: 27 U/L — SIGNIFICANT CHANGE UP (ref 4–33)
AMYLASE P1 CFR SERPL: 64 U/L — SIGNIFICANT CHANGE UP (ref 25–125)
ANION GAP SERPL CALC-SCNC: 13 MMO/L — SIGNIFICANT CHANGE UP (ref 7–14)
AST SERPL-CCNC: 24 U/L — SIGNIFICANT CHANGE UP (ref 4–32)
BASOPHILS # BLD AUTO: 0.06 K/UL — SIGNIFICANT CHANGE UP (ref 0–0.2)
BASOPHILS NFR BLD AUTO: 0.6 % — SIGNIFICANT CHANGE UP (ref 0–2)
BILIRUB SERPL-MCNC: 0.4 MG/DL — SIGNIFICANT CHANGE UP (ref 0.2–1.2)
BUN SERPL-MCNC: 9 MG/DL — SIGNIFICANT CHANGE UP (ref 7–23)
CALCIUM SERPL-MCNC: 8.9 MG/DL — SIGNIFICANT CHANGE UP (ref 8.4–10.5)
CHLORIDE SERPL-SCNC: 98 MMOL/L — SIGNIFICANT CHANGE UP (ref 98–107)
CO2 SERPL-SCNC: 20 MMOL/L — LOW (ref 22–31)
CREAT SERPL-MCNC: 0.65 MG/DL — SIGNIFICANT CHANGE UP (ref 0.5–1.3)
EOSINOPHIL # BLD AUTO: 0.37 K/UL — SIGNIFICANT CHANGE UP (ref 0–0.5)
EOSINOPHIL NFR BLD AUTO: 3.8 % — SIGNIFICANT CHANGE UP (ref 0–6)
GLUCOSE SERPL-MCNC: 132 MG/DL — HIGH (ref 70–99)
HCT VFR BLD CALC: 33.1 % — LOW (ref 34.5–45)
HGB BLD-MCNC: 10.5 G/DL — LOW (ref 11.5–15.5)
IMM GRANULOCYTES NFR BLD AUTO: 0.6 % — SIGNIFICANT CHANGE UP (ref 0–1.5)
LIDOCAIN IGE QN: 6.4 U/L — LOW (ref 7–60)
LYMPHOCYTES # BLD AUTO: 1.02 K/UL — SIGNIFICANT CHANGE UP (ref 1–3.3)
LYMPHOCYTES # BLD AUTO: 10.5 % — LOW (ref 13–44)
MCHC RBC-ENTMCNC: 26.1 PG — LOW (ref 27–34)
MCHC RBC-ENTMCNC: 31.7 % — LOW (ref 32–36)
MCV RBC AUTO: 82.3 FL — SIGNIFICANT CHANGE UP (ref 80–100)
MONOCYTES # BLD AUTO: 1.17 K/UL — HIGH (ref 0–0.9)
MONOCYTES NFR BLD AUTO: 12.1 % — SIGNIFICANT CHANGE UP (ref 2–14)
NEUTROPHILS # BLD AUTO: 7 K/UL — SIGNIFICANT CHANGE UP (ref 1.8–7.4)
NEUTROPHILS NFR BLD AUTO: 72.4 % — SIGNIFICANT CHANGE UP (ref 43–77)
NRBC # FLD: 0 K/UL — SIGNIFICANT CHANGE UP (ref 0–0)
PLATELET # BLD AUTO: 642 K/UL — HIGH (ref 150–400)
PMV BLD: 9.6 FL — SIGNIFICANT CHANGE UP (ref 7–13)
POTASSIUM SERPL-MCNC: 4.2 MMOL/L — SIGNIFICANT CHANGE UP (ref 3.5–5.3)
POTASSIUM SERPL-SCNC: 4.2 MMOL/L — SIGNIFICANT CHANGE UP (ref 3.5–5.3)
PROT SERPL-MCNC: 6.3 G/DL — SIGNIFICANT CHANGE UP (ref 6–8.3)
RBC # BLD: 4.02 M/UL — SIGNIFICANT CHANGE UP (ref 3.8–5.2)
RBC # FLD: 13.6 % — SIGNIFICANT CHANGE UP (ref 10.3–14.5)
SODIUM SERPL-SCNC: 131 MMOL/L — LOW (ref 135–145)
WBC # BLD: 9.68 K/UL — SIGNIFICANT CHANGE UP (ref 3.8–10.5)
WBC # FLD AUTO: 9.68 K/UL — SIGNIFICANT CHANGE UP (ref 3.8–10.5)

## 2019-05-04 PROCEDURE — 99232 SBSQ HOSP IP/OBS MODERATE 35: CPT | Mod: GC

## 2019-05-04 PROCEDURE — 99233 SBSQ HOSP IP/OBS HIGH 50: CPT

## 2019-05-04 RX ORDER — SODIUM CHLORIDE 9 MG/ML
1000 INJECTION INTRAMUSCULAR; INTRAVENOUS; SUBCUTANEOUS
Qty: 0 | Refills: 0 | Status: DISCONTINUED | OUTPATIENT
Start: 2019-05-04 | End: 2019-05-07

## 2019-05-04 RX ORDER — METOCLOPRAMIDE HCL 10 MG
5 TABLET ORAL
Qty: 0 | Refills: 0 | Status: DISCONTINUED | OUTPATIENT
Start: 2019-05-04 | End: 2019-05-16

## 2019-05-04 RX ORDER — ONDANSETRON 8 MG/1
4 TABLET, FILM COATED ORAL ONCE
Qty: 0 | Refills: 0 | Status: COMPLETED | OUTPATIENT
Start: 2019-05-04 | End: 2019-05-04

## 2019-05-04 RX ORDER — PANTOPRAZOLE SODIUM 20 MG/1
40 TABLET, DELAYED RELEASE ORAL
Qty: 0 | Refills: 0 | Status: DISCONTINUED | OUTPATIENT
Start: 2019-05-04 | End: 2019-05-16

## 2019-05-04 RX ORDER — SODIUM CHLORIDE 9 MG/ML
1000 INJECTION, SOLUTION INTRAVENOUS
Qty: 0 | Refills: 0 | Status: DISCONTINUED | OUTPATIENT
Start: 2019-05-04 | End: 2019-05-04

## 2019-05-04 RX ADMIN — ONDANSETRON 4 MILLIGRAM(S): 8 TABLET, FILM COATED ORAL at 09:29

## 2019-05-04 RX ADMIN — Medication 50 MILLIGRAM(S): at 13:57

## 2019-05-04 RX ADMIN — GABAPENTIN 100 MILLIGRAM(S): 400 CAPSULE ORAL at 20:04

## 2019-05-04 RX ADMIN — Medication 3 MILLILITER(S): at 22:28

## 2019-05-04 RX ADMIN — Medication 5 MILLIGRAM(S): at 19:56

## 2019-05-04 RX ADMIN — ENOXAPARIN SODIUM 50 MILLIGRAM(S): 100 INJECTION SUBCUTANEOUS at 05:09

## 2019-05-04 RX ADMIN — Medication 3 MILLILITER(S): at 10:56

## 2019-05-04 RX ADMIN — PIPERACILLIN AND TAZOBACTAM 25 GRAM(S): 4; .5 INJECTION, POWDER, LYOPHILIZED, FOR SOLUTION INTRAVENOUS at 13:57

## 2019-05-04 RX ADMIN — OXYCODONE HYDROCHLORIDE 5 MILLIGRAM(S): 5 TABLET ORAL at 16:36

## 2019-05-04 RX ADMIN — ENOXAPARIN SODIUM 50 MILLIGRAM(S): 100 INJECTION SUBCUTANEOUS at 20:03

## 2019-05-04 RX ADMIN — Medication 5 MILLIGRAM(S): at 13:57

## 2019-05-04 RX ADMIN — GABAPENTIN 100 MILLIGRAM(S): 400 CAPSULE ORAL at 05:09

## 2019-05-04 RX ADMIN — OXYCODONE HYDROCHLORIDE 5 MILLIGRAM(S): 5 TABLET ORAL at 17:15

## 2019-05-04 RX ADMIN — Medication 3 MILLILITER(S): at 05:18

## 2019-05-04 RX ADMIN — Medication 3 MILLILITER(S): at 16:41

## 2019-05-04 RX ADMIN — SODIUM CHLORIDE 50 MILLILITER(S): 9 INJECTION INTRAMUSCULAR; INTRAVENOUS; SUBCUTANEOUS at 15:09

## 2019-05-04 RX ADMIN — PIPERACILLIN AND TAZOBACTAM 25 GRAM(S): 4; .5 INJECTION, POWDER, LYOPHILIZED, FOR SOLUTION INTRAVENOUS at 22:06

## 2019-05-04 RX ADMIN — PIPERACILLIN AND TAZOBACTAM 25 GRAM(S): 4; .5 INJECTION, POWDER, LYOPHILIZED, FOR SOLUTION INTRAVENOUS at 05:09

## 2019-05-04 NOTE — PROGRESS NOTE ADULT - PROBLEM SELECTOR PLAN 5
-- CT showing obstruction of bronchus intermedius with atelectasis 2/2 worsening tumor burden  -Case discussed with oncologist Dr. Harper  -Outpt follow up for next session of immunotherapy  -Not a hospice candidate yet per onc  -Pall care for symptoms management.

## 2019-05-04 NOTE — PROGRESS NOTE ADULT - PROBLEM SELECTOR PLAN 7
pt with intermittent N/V, anorexia, poor intake  pt with severe protein calorie malnutrition, nutrition eval  abdominal xray, though abdomen not distended  add reglan before meals pt with intermittent N/V, anorexia, poor intake  pt with severe protein calorie malnutrition, nutrition eval  abdominal xray, though abdomen not distended  started on prednisone for possible immunotherapy related pneumonitis/colitis per hemonc, if symptoms worse will get CT abd/pelvis  add reglan before meals

## 2019-05-04 NOTE — CHART NOTE - NSCHARTNOTEFT_GEN_A_CORE
Notified by RN about new tachycardia. Pt had been tachcycardic to 120s earlier in the day but no intervention was performed. EKG showing sinus tach at rate of 126 with no gross changes from EKG done on 5/2. Pt is asymptomatic. Would continue to monitor as this is likely related to sepsis.

## 2019-05-04 NOTE — CHART NOTE - NSCHARTNOTEFT_GEN_A_CORE
Pulm recommended solumedrol 40 mg IV qd rather then prednisone 5 mg qd as pt is N/V. Discussed with onc team, in agreement.

## 2019-05-04 NOTE — PROGRESS NOTE ADULT - SUBJECTIVE AND OBJECTIVE BOX
Kourtney Conway MD  Pager [phone]    CHIEF COMPLAINT: Patient is a 54y old  female who presents with a chief complaint of shortness of breath, cough (04 May 2019 11:34)      SUBJECTIVE / OVERNIGHT EVENTS:  pt c/o anorexia, nausea/vomiting, no abd pain, no appetite and poor intake, son at bedside, no chest pain or sob    MEDICATIONS  (STANDING):  ALBUTerol/ipratropium for Nebulization 3 milliLiter(s) Nebulizer every 6 hours  enoxaparin Injectable 50 milliGRAM(s) SubCutaneous two times a day  gabapentin 100 milliGRAM(s) Oral every 12 hours  lactated ringers. 1000 milliLiter(s) (50 mL/Hr) IV Continuous <Continuous>  metoclopramide 5 milliGRAM(s) Oral three times a day before meals  piperacillin/tazobactam IVPB. 3.375 Gram(s) IV Intermittent every 8 hours  predniSONE   Tablet 50 milliGRAM(s) Oral daily    MEDICATIONS  (PRN):  HYDROcodone/homatropine Syrup 5 milliLiter(s) Oral every 6 hours PRN Cough  oxyCODONE    IR 5 milliGRAM(s) Oral every 4 hours PRN moderate and severe pain/shortness of breath      VITALS:  T(F): 97.9 (05-04-19 @ 09:36), Max: 99.1 (05-03-19 @ 18:49)  HR: 121 (05-04-19 @ 09:36) (64 - 128)  BP: 99/59 (05-04-19 @ 09:36) (91/63 - 103/82)  RR: 22 (05-04-19 @ 09:36) (19 - 23)  SpO2: 98% (05-04-19 @ 09:36)  Height (cm): 157.48 (02:41)  Weight (kg): 47.7 (02:41)  BMI (kg/m2): 19.2 (02:41)    CAPILLARY BLOOD GLUCOSE    Output   Height (cm): 157.48 (02:41)  Weight (kg): 47.7 (02:41)  BMI (kg/m2): 19.2 (02:41)  I&O's Summary  T(F): 97.9 (05-04-19 @ 09:36), Max: 99.1 (05-03-19 @ 18:49)  HR: 121 (05-04-19 @ 09:36) (64 - 128)  BP: 99/59 (05-04-19 @ 09:36) (91/63 - 103/82)  RR: 22 (05-04-19 @ 09:36) (19 - 23)  SpO2: 98% (05-04-19 @ 09:36)    PHYSICAL EXAM:  GENERAL: NAD, cachectic   HEAD:  Atraumatic, Normocephalic  EYES: EOMI, PERRLA, conjunctiva and sclera clear  NECK: Supple  CHEST/LUNG: decreased BS on right, chest tube in place   HEART: Regular rate and rhythm;   ABDOMEN: Soft, Nontender, Nondistended; Bowel sounds present  EXTREMITIES:  2+ Peripheral Pulses, No clubbing, cyanosis, or edema  PSYCH: AAOx3  NEUROLOGY: non-focal  SKIN: No rashes or lesions    LABS:              10.5                 131  | 20   | 9            9.68  >-----------< 642     ------------------------< 132                   33.1                 4.2  | 98   | 0.65                                         Ca 8.9   Mg x     Ph x           TPro  6.3  /  Alb  3.0      TBili  0.4  /  DBili  x         AST  24  /  ALT  27            AlkPhos  147        MICROBIOLOGY:    Culture - Blood (collected 02 May 2019 13:43)  Source: BLOOD VENOUS  Preliminary Report (03 May 2019 13:45):    NO ORGANISMS ISOLATED    NO ORGANISMS ISOLATED AT 24 HOURS    Culture - Blood (collected 02 May 2019 13:43)  Source: BLOOD PERIPHERAL  Preliminary Report (03 May 2019 13:45):    NO ORGANISMS ISOLATED    NO ORGANISMS ISOLATED AT 24 HOURS    Culture - Urine (collected 02 May 2019 13:35)  Source: URINE MIDSTREAM  Final Report (03 May 2019 14:56):    NO GROWTH AT 24 HOURS    RADIOLOGY & ADDITIONAL TESTS:    Imaging Personally Reviewed:  < from: CT Chest No Cont (05.02.19 @ 11:46) >  IMPRESSION:     Interval obstruction of the bronchus intermedius with associated patchy   areas of atelectasis. This is likely due to worsening tumor burden along   the bronchovascular bundle and likely within theparenchyma. Unchanged   septal thickening in the right upper lobe consistent with lymphangitic   spread.     Interval development of left pleural effusion.    Enlarged heart size with small pericardial effusion, increased since   4/12/2019.    Right chest tube terminates in the upper pleural space. Loculated right   pleural effusion and associated pleural thickening is stable, however   note that the majority of the fluid is in the inferior thorax and the   chest tube terminates in the right upper thorax. Interval development of   small left pleural effusion    Unchanged conglomerate lymphadenopathy in the mediastinum      [ ] Consultant(s) Notes Reviewed:  [x ] Care Discussed with Consultants/Other Providers: ads np Sapna, OHR, started reglan, f/u onc, pulmonary consult

## 2019-05-04 NOTE — CHART NOTE - NSCHARTNOTEFT_GEN_A_CORE
Pt in need of CT C/A/P with IV contrast. Pt with hx of allergy to IV contrast (rash).  Discussed with radiology: premedicate pt with medrol 32 mg PO 12 hrs prior to imaging as well as 2 hrs prior to imaging. Will also require PO/IM or IV benadryl 1 hr prior to imaging.   Pt in agreement with above plan.   Will order according to time of planned imaging.

## 2019-05-04 NOTE — CONSULT NOTE ADULT - SUBJECTIVE AND OBJECTIVE BOX
CHIEF COMPLAINT:    HPI:    PAST MEDICAL & SURGICAL HISTORY:  Pulmonary embolus  Lung cancer: with mets, adenocardinoma  S/P thoracentesis  S/P pericardiocentesis  History of lung surgery: VATS      FAMILY HISTORY:  Family history of coronary artery disease (Sibling): Brother  Family history of hypertension: Mother  Family history of diabetes mellitus (DM): Mother      SOCIAL HISTORY:  Smoking:  Substance Use:   EtOH Use:  Marital Status: [ ] Single [ ]  [ ]  [ ]   Sexual History:   Occupation:  Recent Travel:  Country of Birth:  Advance Directives:    Allergies    No Known Allergies    Intolerances    IV contrast (Rash)      HOME MEDICATIONS:    REVIEW OF SYSTEMS:  Constitutional: [ ] negative [ ] fevers [ ] chills [ ] weight loss [ ] weight gain  HEENT: [ ] negative [ ] dry eyes [ ] eye irritation [ ] postnasal drip [ ] nasal congestion  CV: [ ] negative  [ ] chest pain [ ] orthopnea [ ] palpitations [ ] murmur  Resp: [ ] negative [ ] cough [ ] shortness of breath [ ] dyspnea [ ] wheezing [ ] sputum [ ] hemoptysis  GI: [ ] negative [ ] nausea [ ] vomiting [ ] diarrhea [ ] constipation [ ] abd pain [ ] dysphagia   : [ ] negative [ ] dysuria [ ] nocturia [ ] hematuria [ ] increased urinary frequency  Musculoskeletal: [ ] negative [ ] back pain [ ] myalgias [ ] arthralgias [ ] fracture  Skin: [ ] negative [ ] rash [ ] itch  Neurological: [ ] negative [ ] headache [ ] dizziness [ ] syncope [ ] weakness [ ] numbness  Psychiatric: [ ] negative [ ] anxiety [ ] depression  Endocrine: [ ] negative [ ] diabetes [ ] thyroid problem  Hematologic/Lymphatic: [ ] negative [ ] anemia [ ] bleeding problem  Allergic/Immunologic: [ ] negative [ ] itchy eyes [ ] nasal discharge [ ] hives [ ] angioedema  [ ] All other systems negative  [ ] Unable to assess ROS because ________    OBJECTIVE:  ICU Vital Signs Last 24 Hrs  T(C): 36.6 (04 May 2019 09:36), Max: 37.3 (03 May 2019 18:49)  T(F): 97.9 (04 May 2019 09:36), Max: 99.1 (03 May 2019 18:49)  HR: 122 (04 May 2019 10:56) (64 - 128)  BP: 99/59 (04 May 2019 09:36) (91/63 - 103/82)  BP(mean): --  ABP: --  ABP(mean): --  RR: 22 (04 May 2019 09:36) (19 - 23)  SpO2: 94% (04 May 2019 10:56) (94% - 98%)        CAPILLARY BLOOD GLUCOSE          PHYSICAL EXAM:  General:   HEENT:   Respiratory:   Cardiovascular:   Abdomen:   Extremities:   Skin:   Neurological:    HOSPITAL MEDICATIONS:  enoxaparin Injectable 50 milliGRAM(s) SubCutaneous two times a day    piperacillin/tazobactam IVPB. 3.375 Gram(s) IV Intermittent every 8 hours      predniSONE   Tablet 50 milliGRAM(s) Oral daily    ALBUTerol/ipratropium for Nebulization 3 milliLiter(s) Nebulizer every 6 hours  HYDROcodone/homatropine Syrup 5 milliLiter(s) Oral every 6 hours PRN    gabapentin 100 milliGRAM(s) Oral every 12 hours  oxyCODONE    IR 5 milliGRAM(s) Oral every 4 hours PRN    metoclopramide 5 milliGRAM(s) Oral three times a day before meals        sodium chloride 0.9%. 1000 milliLiter(s) IV Continuous <Continuous>            LABS:                        10.5   9.68  )-----------( 642      ( 04 May 2019 06:30 )             33.1     Hgb Trend: 10.5<--, 10.5<--, 11.7<--  05-04    131<L>  |  98  |  9   ----------------------------<  132<H>  4.2   |  20<L>  |  0.65    Ca    8.9      04 May 2019 06:30    TPro  6.3  /  Alb  3.0<L>  /  TBili  0.4  /  DBili  x   /  AST  24  /  ALT  27  /  AlkPhos  147<H>  05-04    Creatinine Trend: 0.65<--, 0.63<--, 0.65<--, 0.61<--, 0.49<--, 0.60<--            MICROBIOLOGY:     RADIOLOGY:  [ ] Reviewed and interpreted by me    PULMONARY FUNCTION TESTS:    EKG: CHIEF COMPLAINT: SOB     HPI:  54F hx recently diagnosed Stage 4 AdenoCA, malignant pleural effusion s/p R pleurx (3/4/2019), pericardial effusion s/p pericardial window 3/20/2019, PE s/p IVC on lovenox, who p/w SOB and cough productive of whitish sputum. Pt did not want to speak in depth about her symptoms. Expresses frustration with having to repeat her answers multiple times a day. States that she has baseline chronic cough, has been having chest and abdominal pain over the last few days, worsened nausea/vomiting, subjective fevers. Also TAMAYO. Pt admitted to medicine for further workup, currently being treated for post-obstructive pneumonia. CTS has been consulted as well.     Of note, pt was initially presented to [facility] for SOB. Imaging showed that she had a lung mass and pleural effusion, which was tapped. Cyto had malignant cells. SOB worsened and she presented to [facility] on 3/1/2019. At that time, she was found to have multiple PEs as well as a large right pleff with collapse of R lung and obstruction of the right lower lobe bronchus. CTS evaluated the patient and she underwent R VATS and R pleurx on 3/4/2019. IVC filter was also placed on 3/4/2019. Pathology returned as metastatic adenoCA of lung with PDL1 > 90%. Pleural effusion was also found to have malignant cells. She returned to the ED on 3/19 with chest pain and SOB, found to have large pericardial effusion but no evidence of tamponade. Because of concern for decompensation, CTS performed pericardial window on 3/20, which also showed metastatic adenoCA. Since then she has followed up with Heme/Onc as an outpatient and most recently started on Keytruda (4/15/2019), which she tolerated well. Pleurx has been draining less fluid at home.     Pulmonary consulted for SOB.     PAST MEDICAL & SURGICAL HISTORY:  Pulmonary embolus s/p IVC  Lung cancer: with mets, adenocarcinoma  S/P thoracentesis  S/P pericardiocentesis  History of lung surgery: VATS    FAMILY HISTORY:  Family history of coronary artery disease (Sibling): Brother  Family history of hypertension: Mother  Family history of diabetes mellitus (DM): Mother    SOCIAL HISTORY:  Smoking: never  Substance Use: none   EtOH Use: none  Marital Status: [ ] Single [x]  [ ]  [ ]     Allergies  No Known Allergies    Intolerances  IV contrast (Rash)      HOME MEDICATIONS:  · 	gabapentin 100 mg oral capsule: 1 cap(s) orally every 12 hours, Last Dose Taken:    · 	oxyCODONE 5 mg oral tablet: 1 tab(s) orally every 4 hours, As needed (last filled 4/30/19 x #42 tabs) , Last Dose Taken:    · 	Lovenox 60 mg/0.6 mL injectable solution: 50 milligram(s) injectable 2 times a day, Last Dose Taken:      REVIEW OF SYSTEMS:  [x] Unable to assess ROS because pt declined    OBJECTIVE:  ICU Vital Signs Last 24 Hrs  T(C): 36.6 (04 May 2019 09:36), Max: 37.3 (03 May 2019 18:49)  T(F): 97.9 (04 May 2019 09:36), Max: 99.1 (03 May 2019 18:49)  HR: 122 (04 May 2019 10:56) (64 - 128)  BP: 99/59 (04 May 2019 09:36) (91/63 - 103/82)  BP(mean): --  ABP: --  ABP(mean): --  RR: 22 (04 May 2019 09:36) (19 - 23)  SpO2: 94% (04 May 2019 10:56) (94% - 98%)    PHYSICAL EXAM:  General: NAD, thin   HEENT: NCAT, moist mucosal membranes  Respiratory: Crackles and decreased breath sounds on right; no wheezing  Cardiovascular: S1/S2 normal, RRR, no murmurs/rubs/gallops appreciated  Abdomen: soft, non-distended, mildly tender, normal bowel sounds  Extremities: no b/l LE edema, no cyanosis/clubbing  Skin: warm/dry  Neurological: AAOx3, answering questions appropriately, no focal deficits    HOSPITAL MEDICATIONS:  enoxaparin Injectable 50 milliGRAM(s) SubCutaneous two times a day    piperacillin/tazobactam IVPB. 3.375 Gram(s) IV Intermittent every 8 hours      predniSONE   Tablet 50 milliGRAM(s) Oral daily    ALBUTerol/ipratropium for Nebulization 3 milliLiter(s) Nebulizer every 6 hours  HYDROcodone/homatropine Syrup 5 milliLiter(s) Oral every 6 hours PRN    gabapentin 100 milliGRAM(s) Oral every 12 hours  oxyCODONE    IR 5 milliGRAM(s) Oral every 4 hours PRN    metoclopramide 5 milliGRAM(s) Oral three times a day before meals        sodium chloride 0.9%. 1000 milliLiter(s) IV Continuous <Continuous>            LABS:                        10.5   9.68  )-----------( 642      ( 04 May 2019 06:30 )             33.1     Hgb Trend: 10.5<--, 10.5<--, 11.7<--  05-04    131<L>  |  98  |  9   ----------------------------<  132<H>  4.2   |  20<L>  |  0.65    Ca    8.9      04 May 2019 06:30    TPro  6.3  /  Alb  3.0<L>  /  TBili  0.4  /  DBili  x   /  AST  24  /  ALT  27  /  AlkPhos  147<H>  05-04    Creatinine Trend: 0.65<--, 0.63<--, 0.65<--, 0.61<--, 0.49<--, 0.60<--            MICROBIOLOGY:   RVP neg  BCx neg  UrCx neg    RADIOLOGY:  CT Chest 5/2:  FINDINGS:    LUNGS AND LARGE AIRWAYS: Mildly increased right lung groundglass and   consolidative opacities. Unchanged right upper lobe interlobular wall   thickening and bronchiolar wall thickening. Interval obstruction of the   bronchus intermedius with associated patchy areas of atelectasis. This is   likely due to worsening tumor burden along the bronchovascular bundle and   likely within the parenchyma.     PLEURA: Right chest tube terminates in the upper pleural space. Interval   development of small left pleural effusion. Loculated right pleural   effusion and associated pleural thickening is stable.    VESSELS: Within normal limits.    HEART: Enlarged heart size with small pericardial effusion, increased   since 4/12/2019.    MEDIASTINUM AND DIANA: Mildly increased size of mediastinal and hilar   lymph nodes.. For reference, right prevascular lymph node measures 1.8 x   1.3 cm, previously 1.3 x 0.9 (2:70.). Para-aortic node measures 1.7 x   0.6, previously 1.4 x 0.3 cm (2:38.).     CHEST WALL AND LOWER NECK: Within normal limits.    VISUALIZED UPPER ABDOMEN: Within normal limits.    BONES: Degenerative change.    IMPRESSION:     Interval obstruction of the bronchus intermedius with associated patchy   areas of atelectasis. This is likely due to worsening tumor burden along   the bronchovascular bundle and likely within the parenchyma. Unchanged   septal thickening in the right upper lobe consistent with lymphangitic   spread.     Interval development of left pleural effusion.    Enlarged heart size with small pericardial effusion, increased since   4/12/2019.    Right chest tube terminates in the upper pleural space. Loculated right   pleural effusion and associated pleural thickening is stable, however   note that the majority of the fluid is in the inferior thorax and the   chest tube terminates in the right upper thorax. Interval development of   small left pleural effusion    Unchanged conglomerate lymphadenopathy in the mediastinum

## 2019-05-04 NOTE — CONSULT NOTE ADULT - ATTENDING COMMENTS
Pt with stage 4 lung adenocarcinoma, pleural effusion s/p pleurx by thoracic surgery. Concern for lymphangitic spread on CT - suggest solumedrol 40 mg daily for relief of dyspnea. on keytruda.
Patient seen and examined.  Agree with NP note.

## 2019-05-04 NOTE — PROVIDER CONTACT NOTE (OTHER) - RECOMMENDATIONS
None provided. Prov notified that hr had been last elevated in the 100s around 12 pm according to prior documentation

## 2019-05-04 NOTE — PROGRESS NOTE ADULT - ASSESSMENT
54F with R lung adenocarcinoma s/p 1 dose of immunotherapy with keytruda on 4/15, chronic right pleural effusion s/p pleurx, subxiphoid pericardial window on 3/20/19, PE on Lovenox s/p IVC filter placement presenting with shortness of breath and cough x 2 days.     SOB 2/2 post obstructive pneumonia  - CT showing obstruction of bronchus intermedius with atelctasis 2/2 worsening tumor burden  - CT surgery on board, reported no acute intervention at this time.  If status does not improve, would discuss possible stent placement for symptom relief  - pulm consult pending  - C/W AC  - palliative care on board for symptom control  - please make cough medicine standing (not PRN) as patient has worsening cough  - Supportive care, pain control, nutrition, PT  - Outpatient oncology f/u    Yani Ferrara DO  Hematology/Oncology Fellow, PGY4  Pager: [phone] 54F with R lung adenocarcinoma s/p 1 dose of immunotherapy with keytruda on 4/15, chronic right pleural effusion s/p pleurx, subxiphoid pericardial window on 3/20/19, PE on Lovenox s/p IVC filter placement presenting with shortness of breath and cough x 2 days.     Abd pain w N/V  - viral vs immunotherapy induced colitis  - diarrhea x 2 episodes  - starting on prednisone  - if diarrhea does not improve, would check CT A/P    SOB 2/2 post obstructive pneumonia  - CT showing obstruction of bronchus intermedius with atelectasis 2/2 worsening tumor burden  - CT surgery on board, reported no acute intervention at this time.  If status does not improve, would discuss possible stent placement for symptom relief  - cannot rule out pneumonitis at this time.  Start prednisone 1 mg/kg   - pulm consult pending  - C/W AC  - palliative care on board for symptom control  - please make cough medicine standing (not PRN) as patient has worsening cough  - Supportive care, pain control, nutrition, PT  - Outpatient oncology f/u    Yani Ferrara DO  Hematology/Oncology Fellow, PGY4  Pager: [phone] with R lung adenocarcinoma s/p 1 dose of immunotherapy with keytruda on 4/15, chronic right pleural effusion s/p pleurx, subxiphoid pericardial window on 3/20/19, PE on Lovenox s/p IVC filter placement presenting with shortness of breath and cough x 2 days.     Abd pain w N/V  - viral vs immunotherapy induced colitis  - diarrhea x 2 episodes  - starting on prednisone  - if diarrhea does not improve, would check CT A/P and send for c. diff    SOB 2/2 post obstructive pneumonia  - CT showing obstruction of bronchus intermedius with atelectasis 2/2 worsening tumor burden  - CT surgery on board, reported no acute intervention at this time.  If status does not improve, would discuss possible stent placement for symptom relief  - cannot rule out pneumonitis at this time.  Start prednisone 1 mg/kg   - pulm consult pending  - C/W AC  - palliative care on board for symptom control  - please make cough medicine standing (not PRN) as patient has worsening cough  - Supportive care, pain control, nutrition, PT  - Outpatient oncology f/u    Yani Ferrara DO  Hematology/Oncology Fellow, PGY4  Pager: [phone] with R lung adenocarcinoma s/p 1 dose of immunotherapy with keytruda on 4/15, chronic right pleural effusion s/p pleurx, subxiphoid pericardial window on 3/20/19, PE on Lovenox s/p IVC filter placement presenting with shortness of breath and cough x 2 days.     Abd pain w N/V  - viral vs immunotherapy induced colitis  - diarrhea x 2 episodes  - starting on prednisone  - if diarrhea does not improve, would check CT A/P and send for c. diff and send for O and P, CMV  - if worsening diarrhea, would need to send for ESR, CRP.  - management of immunotherapy induced colitis ASCO Guidelines can be viewed here: https://ascopubs.org/doi/full/10.1200/[email or web address]    SOB 2/2 post obstructive pneumonia  - CT showing obstruction of bronchus intermedius with atelectasis 2/2 worsening tumor burden  - CT surgery on board, reported no acute intervention at this time.  If status does not improve, would discuss possible stent placement for symptom relief  - cannot rule out pneumonitis at this time.  Start prednisone 1 mg/kg   - pulm consult pending  - C/W AC  - palliative care on board for symptom control  - please make cough medicine standing (not PRN) as patient has worsening cough  - Supportive care, pain control, nutrition, PT  - Outpatient oncology f/u    Yani Ferrara DO  Hematology/Oncology Fellow, PGY4  Pager: [phone] 54F with R lung adenocarcinoma s/p 1 dose of immunotherapy with keytruda on 4/15, chronic right pleural effusion s/p pleurx, subxiphoid pericardial window on 3/20/19, PE on Lovenox s/p IVC filter placement presenting with shortness of breath and cough x 2 days.     Abd pain w N/V  - viral vs immunotherapy induced colitis  - diarrhea x 2 episodes  - starting on prednisone  - please check CT C/A/P IV  - please obtain swallow study to rule out fistula  - please start protonix  - if diarrhea does not improve, send for c. diff and send for O and P, CMV  - if worsening diarrhea, would need to send for ESR, CRP.  - management of immunotherapy induced colitis ASCO Guidelines can be viewed here: https://ascopubs.org/doi/full/10.1200/JCO.[email or web address]    SOB 2/2 post obstructive pneumonia  - CT showing obstruction of bronchus intermedius with atelectasis 2/2 worsening tumor burden  - CT surgery on board  - please obtain echo  - cannot rule out pneumonitis at this time.  Start prednisone 1 mg/kg   - pulm consult pending  - C/W AC  - palliative care on board for symptom control  - please make cough medicine standing (not PRN) as patient has worsening cough  - Supportive care, pain control, nutrition, PT  - Outpatient oncology f/u    Yani Ferrara DO  Hematology/Oncology Fellow, PGY4  Pager: [phone] 54F with R lung adenocarcinoma s/p 1 dose of immunotherapy with keytruda on 4/15, chronic right pleural effusion s/p pleurx, subxiphoid pericardial window on 3/20/19, PE on Lovenox s/p IVC filter placement presenting with shortness of breath and cough x 2 days.     Abd pain w N/V  - viral vs immunotherapy induced colitis  - diarrhea x 2 episodes  - starting on prednisone  - please check CT C/A/P IV  - please check amylase and lipase  - please obtain swallow study to rule out fistula  - please start protonix  - if diarrhea does not improve, send for c. diff and send for O and P, CMV  - if worsening diarrhea, would need to send for ESR, CRP.  - management of immunotherapy induced colitis ASCO Guidelines can be viewed here: https://ascopubs.org/doi/full/10.1200/JCO.[email or web address]    SOB 2/2 post obstructive pneumonia  - CT showing obstruction of bronchus intermedius with atelectasis 2/2 worsening tumor burden  - CT surgery on board  - please obtain echo  - cannot rule out pneumonitis at this time.  Start prednisone 1 mg/kg   - pulm consult pending  - C/W AC  - palliative care on board for symptom control  - please make cough medicine standing (not PRN) as patient has worsening cough  - Supportive care, pain control, nutrition, PT  - Outpatient oncology f/u    Yani Ferrara DO  Hematology/Oncology Fellow, PGY4  Pager: [phone]

## 2019-05-04 NOTE — PROGRESS NOTE ADULT - SUBJECTIVE AND OBJECTIVE BOX
Patient reports not feeling any better in terms of her breathing.  Also complaining of abdominal pain and nausea.  Has decreased appetite.  Increased fatigue.    	  Vital Signs Last 24 Hrs  T(C): 36.6 (04 May 2019 09:36), Max: 37.3 (03 May 2019 18:49)  T(F): 97.9 (04 May 2019 09:36), Max: 99.1 (03 May 2019 18:49)  HR: 121 (04 May 2019 09:36) (64 - 128)  BP: 99/59 (04 May 2019 09:36) (91/63 - 144/105)  BP(mean): --  RR: 22 (04 May 2019 09:36) (19 - 23)  SpO2: 98% (04 May 2019 09:36) (93% - 98%)  PHYSICAL EXAM:    GENERAL: mild distress, tachypneic  HEAD:  NC/AT  EYES: EOMI, PERRLA, no scleral icterus  HEENT: Moist mucous membranes  LUNG: dec breath sounds  HEART: tachycardic  ABDOMEN: soft, non tender  EXTREMITIES:  2+ Peripheral Pulses, No clubbing, cyanosis, or edema  LAD: no palpable adenopathy  05-04    131<L>  |  98  |  9   ----------------------------<  132<H>  4.2   |  20<L>  |  0.65    Ca    8.9      04 May 2019 06:30    TPro  6.3  /  Alb  3.0<L>  /  TBili  0.4  /  DBili  x   /  AST  24  /  ALT  27  /  AlkPhos  147<H>  05-04      CBC Full  -  ( 04 May 2019 06:30 )  WBC Count : 9.68 K/uL  RBC Count : 4.02 M/uL  Hemoglobin : 10.5 g/dL  Hematocrit : 33.1 %  Platelet Count - Automated : 642 K/uL  Mean Cell Volume : 82.3 fL  Mean Cell Hemoglobin : 26.1 pg  Mean Cell Hemoglobin Concentration : 31.7 %  Auto Neutrophil # : 7.00 K/uL  Auto Lymphocyte # : 1.02 K/uL  Auto Monocyte # : 1.17 K/uL  Auto Eosinophil # : 0.37 K/uL  Auto Basophil # : 0.06 K/uL  Auto Neutrophil % : 72.4 %  Auto Lymphocyte % : 10.5 %  Auto Monocyte % : 12.1 %  Auto Eosinophil % : 3.8 %  Auto Basophil % : 0.6 %      LIVER FUNCTIONS - ( 04 May 2019 06:30 )  Alb: 3.0 g/dL / Pro: 6.3 g/dL / ALK PHOS: 147 u/L / ALT: 27 u/L / AST: 24 u/L / GGT: x

## 2019-05-04 NOTE — PROGRESS NOTE ADULT - PROBLEM SELECTOR PLAN 1
-Patient with shortness of breath with  R sided pleural effusion, with limited drainage out of pleurex. Per CT collection is in inferior thorax while chest tube terminates in upper thorax. Dr. Ma discussed case with CT surgery team and at this time no further intervention is being offered. They see increased malignancy burden on CT.  -pulmonary consult   -Patient adherent with Lovenox, less likely progressive PE  -For now c/w zosyn for potential post obstructive PNA - will assess for improvement with antibiotics. Suspicion however is low.   -Less likely cardiac in etiology given no intersitial pulmonary edema, trace pericardial fluid seen, unlikely to cause tamponade. Patient is hemodynamically stable.   -Will discuss case with pulm to see if any further intervention can be offered for symptom management.   -Further disease modifying therapy per oncologist Dr. Harper.

## 2019-05-04 NOTE — CONSULT NOTE ADULT - ASSESSMENT
54F hx recently diagnosed Stage 4 AdenoCA (last keytruda 4/15), malignant pleural effusion s/p R pleurx (3/4/2019), pericardial effusion s/p pericardial window (3/20/2019), PE s/p IVC on lovenox, who p/w SOB and cough productive of whitish sputum. Also c/o chest and abdominal pain. CT Chest showing progressive disease, small pericardial effusion, loculated pleff, and lymphangitic spread. Likely symptoms from progressive disease and lymphangitic spread. No leukocytosis or significant cough to suggest infectious cause, though given tumor burden there may be post-obstructive pneumonia.   - start solumedrol 40mg IV qd (likely will not absorb prednisone because of persistent nausea/vomiting)  - will d/w CTS and Interventional Pulm, re: role in alleviating obstruction in bronchus  - plerux mgmt as per CTS  - sputum Cx if able to collect  - would consider broadening to include vanc if covering pneumonia  - pain control    d/w Dr. Allen and primary team    ----------------------------------------  Echo Flannery MD PGY-5  Pulmonary/Critical Care Fellow  Pager # [phone] (NS), [phone] (ZOË)

## 2019-05-05 LAB
ALBUMIN SERPL ELPH-MCNC: 3.2 G/DL — LOW (ref 3.3–5)
ALP SERPL-CCNC: 130 U/L — HIGH (ref 40–120)
ALT FLD-CCNC: 23 U/L — SIGNIFICANT CHANGE UP (ref 4–33)
AMYLASE P1 CFR SERPL: 51 U/L — SIGNIFICANT CHANGE UP (ref 25–125)
ANION GAP SERPL CALC-SCNC: 18 MMO/L — HIGH (ref 7–14)
AST SERPL-CCNC: 14 U/L — SIGNIFICANT CHANGE UP (ref 4–32)
BASOPHILS # BLD AUTO: 0.02 K/UL — SIGNIFICANT CHANGE UP (ref 0–0.2)
BASOPHILS NFR BLD AUTO: 0.2 % — SIGNIFICANT CHANGE UP (ref 0–2)
BILIRUB SERPL-MCNC: 0.3 MG/DL — SIGNIFICANT CHANGE UP (ref 0.2–1.2)
BUN SERPL-MCNC: 14 MG/DL — SIGNIFICANT CHANGE UP (ref 7–23)
CALCIUM SERPL-MCNC: 8.9 MG/DL — SIGNIFICANT CHANGE UP (ref 8.4–10.5)
CHLORIDE SERPL-SCNC: 91 MMOL/L — LOW (ref 98–107)
CO2 SERPL-SCNC: 21 MMOL/L — LOW (ref 22–31)
CREAT SERPL-MCNC: 0.69 MG/DL — SIGNIFICANT CHANGE UP (ref 0.5–1.3)
EOSINOPHIL # BLD AUTO: 0 K/UL — SIGNIFICANT CHANGE UP (ref 0–0.5)
EOSINOPHIL NFR BLD AUTO: 0 % — SIGNIFICANT CHANGE UP (ref 0–6)
GLUCOSE SERPL-MCNC: 140 MG/DL — HIGH (ref 70–99)
HCT VFR BLD CALC: 36.4 % — SIGNIFICANT CHANGE UP (ref 34.5–45)
HGB BLD-MCNC: 11.1 G/DL — LOW (ref 11.5–15.5)
IMM GRANULOCYTES NFR BLD AUTO: 0.8 % — SIGNIFICANT CHANGE UP (ref 0–1.5)
LIDOCAIN IGE QN: 7.3 U/L — SIGNIFICANT CHANGE UP (ref 7–60)
LYMPHOCYTES # BLD AUTO: 1.14 K/UL — SIGNIFICANT CHANGE UP (ref 1–3.3)
LYMPHOCYTES # BLD AUTO: 12.4 % — LOW (ref 13–44)
MCHC RBC-ENTMCNC: 26.1 PG — LOW (ref 27–34)
MCHC RBC-ENTMCNC: 30.5 % — LOW (ref 32–36)
MCV RBC AUTO: 85.4 FL — SIGNIFICANT CHANGE UP (ref 80–100)
MONOCYTES # BLD AUTO: 1.07 K/UL — HIGH (ref 0–0.9)
MONOCYTES NFR BLD AUTO: 11.6 % — SIGNIFICANT CHANGE UP (ref 2–14)
NEUTROPHILS # BLD AUTO: 6.92 K/UL — SIGNIFICANT CHANGE UP (ref 1.8–7.4)
NEUTROPHILS NFR BLD AUTO: 75 % — SIGNIFICANT CHANGE UP (ref 43–77)
NRBC # FLD: 0 K/UL — SIGNIFICANT CHANGE UP (ref 0–0)
PLATELET # BLD AUTO: 803 K/UL — HIGH (ref 150–400)
PMV BLD: 9.4 FL — SIGNIFICANT CHANGE UP (ref 7–13)
POTASSIUM SERPL-MCNC: 4 MMOL/L — SIGNIFICANT CHANGE UP (ref 3.5–5.3)
POTASSIUM SERPL-SCNC: 4 MMOL/L — SIGNIFICANT CHANGE UP (ref 3.5–5.3)
PROT SERPL-MCNC: 7.2 G/DL — SIGNIFICANT CHANGE UP (ref 6–8.3)
RBC # BLD: 4.26 M/UL — SIGNIFICANT CHANGE UP (ref 3.8–5.2)
RBC # FLD: 13.5 % — SIGNIFICANT CHANGE UP (ref 10.3–14.5)
SODIUM SERPL-SCNC: 130 MMOL/L — LOW (ref 135–145)
WBC # BLD: 9.22 K/UL — SIGNIFICANT CHANGE UP (ref 3.8–10.5)
WBC # FLD AUTO: 9.22 K/UL — SIGNIFICANT CHANGE UP (ref 3.8–10.5)

## 2019-05-05 PROCEDURE — 99222 1ST HOSP IP/OBS MODERATE 55: CPT | Mod: GC

## 2019-05-05 PROCEDURE — 99233 SBSQ HOSP IP/OBS HIGH 50: CPT

## 2019-05-05 PROCEDURE — 74177 CT ABD & PELVIS W/CONTRAST: CPT | Mod: 26

## 2019-05-05 PROCEDURE — 93010 ELECTROCARDIOGRAM REPORT: CPT

## 2019-05-05 PROCEDURE — 71260 CT THORAX DX C+: CPT | Mod: 26

## 2019-05-05 RX ORDER — DIPHENHYDRAMINE HCL 50 MG
50 CAPSULE ORAL ONCE
Qty: 0 | Refills: 0 | Status: COMPLETED | OUTPATIENT
Start: 2019-05-05 | End: 2019-05-05

## 2019-05-05 RX ORDER — VANCOMYCIN HCL 1 G
1000 VIAL (EA) INTRAVENOUS DAILY
Qty: 0 | Refills: 0 | Status: DISCONTINUED | OUTPATIENT
Start: 2019-05-05 | End: 2019-05-10

## 2019-05-05 RX ADMIN — PIPERACILLIN AND TAZOBACTAM 25 GRAM(S): 4; .5 INJECTION, POWDER, LYOPHILIZED, FOR SOLUTION INTRAVENOUS at 21:32

## 2019-05-05 RX ADMIN — OXYCODONE HYDROCHLORIDE 5 MILLIGRAM(S): 5 TABLET ORAL at 23:16

## 2019-05-05 RX ADMIN — GABAPENTIN 100 MILLIGRAM(S): 400 CAPSULE ORAL at 18:16

## 2019-05-05 RX ADMIN — ENOXAPARIN SODIUM 50 MILLIGRAM(S): 100 INJECTION SUBCUTANEOUS at 18:16

## 2019-05-05 RX ADMIN — ENOXAPARIN SODIUM 50 MILLIGRAM(S): 100 INJECTION SUBCUTANEOUS at 06:47

## 2019-05-05 RX ADMIN — Medication 5 MILLIGRAM(S): at 06:47

## 2019-05-05 RX ADMIN — Medication 3 MILLILITER(S): at 09:56

## 2019-05-05 RX ADMIN — Medication 5 MILLIGRAM(S): at 16:11

## 2019-05-05 RX ADMIN — Medication 3 MILLILITER(S): at 21:43

## 2019-05-05 RX ADMIN — GABAPENTIN 100 MILLIGRAM(S): 400 CAPSULE ORAL at 06:41

## 2019-05-05 RX ADMIN — Medication 3 MILLILITER(S): at 05:00

## 2019-05-05 RX ADMIN — PIPERACILLIN AND TAZOBACTAM 25 GRAM(S): 4; .5 INJECTION, POWDER, LYOPHILIZED, FOR SOLUTION INTRAVENOUS at 11:16

## 2019-05-05 RX ADMIN — Medication 250 MILLIGRAM(S): at 16:09

## 2019-05-05 RX ADMIN — Medication 50 MILLIGRAM(S): at 09:11

## 2019-05-05 RX ADMIN — OXYCODONE HYDROCHLORIDE 5 MILLIGRAM(S): 5 TABLET ORAL at 23:55

## 2019-05-05 RX ADMIN — PIPERACILLIN AND TAZOBACTAM 25 GRAM(S): 4; .5 INJECTION, POWDER, LYOPHILIZED, FOR SOLUTION INTRAVENOUS at 05:00

## 2019-05-05 RX ADMIN — Medication 40 MILLIGRAM(S): at 06:40

## 2019-05-05 RX ADMIN — PANTOPRAZOLE SODIUM 40 MILLIGRAM(S): 20 TABLET, DELAYED RELEASE ORAL at 06:46

## 2019-05-05 RX ADMIN — Medication 5 MILLIGRAM(S): at 12:33

## 2019-05-05 RX ADMIN — Medication 3 MILLILITER(S): at 15:39

## 2019-05-05 NOTE — PHYSICAL THERAPY INITIAL EVALUATION ADULT - PERTINENT HX OF CURRENT PROBLEM, REHAB EVAL
Patient is a 54 year old female admitted to [facility] on 5/2 with shortness of breath and cough x 2 days. PMH includes: metastatic Right lung adenocarcinoma on chemo, chronic right pleural effusion s/p pleurx and VATS, subxiphoid pericardial window on 3/20/19, PE on Lovenox s/p IVC filter placement.

## 2019-05-05 NOTE — PROGRESS NOTE ADULT - SUBJECTIVE AND OBJECTIVE BOX
INTERVAL HPI/OVERNIGHT EVENTS:  Patient S&E at bedside. No o/n events.    VITAL SIGNS:  T(F): 98.3 (05-05-19 @ 05:04)  HR: 122 (05-05-19 @ 05:04)  BP: 94/68 (05-05-19 @ 05:04)  RR: 20 (05-05-19 @ 05:04)  SpO2: 98% (05-05-19 @ 05:04)  Wt(kg): --    PHYSICAL EXAM:  Constitutional: NAD  Eyes: EOMI, sclera non-icteric  Neck: supple, no masses, no JVD  Respiratory: CTA b/l, good air entry b/l  Cardiovascular: RRR, no M/R/G  Gastrointestinal: soft, NTND, no masses palpable, + BS, no hepatosplenomegaly  Extremities: no c/c/e  Neurological: AAOx3      MEDICATIONS  (STANDING):  ALBUTerol/ipratropium for Nebulization 3 milliLiter(s) Nebulizer every 6 hours  enoxaparin Injectable 50 milliGRAM(s) SubCutaneous two times a day  gabapentin 100 milliGRAM(s) Oral every 12 hours  methylPREDNISolone sodium succinate Injectable 40 milliGRAM(s) IV Push daily  metoclopramide 5 milliGRAM(s) Oral three times a day before meals  pantoprazole    Tablet 40 milliGRAM(s) Oral before breakfast  piperacillin/tazobactam IVPB. 3.375 Gram(s) IV Intermittent every 8 hours  sodium chloride 0.9%. 1000 milliLiter(s) (50 mL/Hr) IV Continuous <Continuous>    MEDICATIONS  (PRN):  HYDROcodone/homatropine Syrup 5 milliLiter(s) Oral every 6 hours PRN Cough  oxyCODONE    IR 5 milliGRAM(s) Oral every 4 hours PRN moderate and severe pain/shortness of breath      Allergies    No Known Allergies    Intolerances    IV contrast (Rash)      LABS:                        11.1   9.22  )-----------( 803      ( 05 May 2019 06:30 )             36.4     05-05    130<L>  |  91<L>  |  14  ----------------------------<  140<H>  4.0   |  21<L>  |  0.69    Ca    8.9      05 May 2019 06:30    TPro  7.2  /  Alb  3.2<L>  /  TBili  0.3  /  DBili  x   /  AST  14  /  ALT  23  /  AlkPhos  130<H>  05-05          RADIOLOGY & ADDITIONAL TESTS:  Studies reviewed.    ASSESSMENT & PLAN: INTERVAL HPI/OVERNIGHT EVENTS:  Patient S&E at bedside. No o/n events. SOB improving.  Denies abd pain, n/v/d today.    VITAL SIGNS:  T(F): 98.3 (05-05-19 @ 05:04)  HR: 122 (05-05-19 @ 05:04)  BP: 94/68 (05-05-19 @ 05:04)  RR: 20 (05-05-19 @ 05:04)  SpO2: 98% (05-05-19 @ 05:04)  Wt(kg): --    PHYSICAL EXAM:  Constitutional: NAD  Eyes: EOMI, sclera non-icteric  Neck: supple, no masses, no JVD  Respiratory: crackles in whole right lung, left lung clear; chest tube in place   Cardiovascular: RRR, no M/R/G  Gastrointestinal: soft, NTND, no masses palpable, + BS, no hepatosplenomegaly  Extremities: no c/c/e  Neurological: AAOx3      MEDICATIONS  (STANDING):  ALBUTerol/ipratropium for Nebulization 3 milliLiter(s) Nebulizer every 6 hours  enoxaparin Injectable 50 milliGRAM(s) SubCutaneous two times a day  gabapentin 100 milliGRAM(s) Oral every 12 hours  methylPREDNISolone sodium succinate Injectable 40 milliGRAM(s) IV Push daily  metoclopramide 5 milliGRAM(s) Oral three times a day before meals  pantoprazole    Tablet 40 milliGRAM(s) Oral before breakfast  piperacillin/tazobactam IVPB. 3.375 Gram(s) IV Intermittent every 8 hours  sodium chloride 0.9%. 1000 milliLiter(s) (50 mL/Hr) IV Continuous <Continuous>    MEDICATIONS  (PRN):  HYDROcodone/homatropine Syrup 5 milliLiter(s) Oral every 6 hours PRN Cough  oxyCODONE    IR 5 milliGRAM(s) Oral every 4 hours PRN moderate and severe pain/shortness of breath      Allergies    No Known Allergies    Intolerances    IV contrast (Rash)      LABS:                        11.1   9.22  )-----------( 803      ( 05 May 2019 06:30 )             36.4     05-05    130<L>  |  91<L>  |  14  ----------------------------<  140<H>  4.0   |  21<L>  |  0.69    Ca    8.9      05 May 2019 06:30    TPro  7.2  /  Alb  3.2<L>  /  TBili  0.3  /  DBili  x   /  AST  14  /  ALT  23  /  AlkPhos  130<H>  05-05          RADIOLOGY & ADDITIONAL TESTS:  Studies reviewed.    ASSESSMENT & PLAN:

## 2019-05-05 NOTE — PROVIDER CONTACT NOTE (OTHER) - SITUATION
prior to leaving for CT scan - the chest tube connector not from the pt, but to the system was displaced by accident

## 2019-05-05 NOTE — PROGRESS NOTE ADULT - PROBLEM SELECTOR PLAN 1
-pt with extensive mediastinal and pleural disease and loculated right pleural effusion on CT, also possible postobstructive pneumonia  -CT chest 5/5 Central airways are patent including bronchus intermedius which was noted   to be occluded at prior CT 5/2/2019.   Consolidation predominantly in the right lower lobe may represent a   combination of tumor and pneumonia.  -per initial CT collection is in inferior thorax while chest tube terminates in upper thorax. No acute intervention per CTS. They see increased malignancy burden on CT.  -F/U pulm consider thoracentesis  -started on iv solumedrol per possible immunotherapy related pneumonitis per  hemonc  -Patient adherent with Lovenox, less likely progressive PE  -add vanco to zosyn to broaden abx coverage for possible gram negative postobstructive pneumonia with RLL consolidation on CT  -Less likely cardiac in etiology given no intersitial pulmonary edema, trace pericardial fluid seen, unlikely to cause tamponade. Patient is hemodynamically stable.   -Further disease modifying therapy per oncologist Dr. Hraper.

## 2019-05-05 NOTE — PROGRESS NOTE ADULT - PROBLEM SELECTOR PLAN 5
-- CT showing obstruction of bronchus intermedius with atelectasis 2/2 worsening tumor burden, repeat CT 5/5 showed patent bronchus intermedius  -Case discussed with oncologist Dr. Harper  -Outpt follow up for next session of immunotherapy  -Not a hospice candidate yet per onc  -Pall care for symptoms management.

## 2019-05-05 NOTE — PHYSICAL THERAPY INITIAL EVALUATION ADULT - PATIENT PROFILE REVIEW, REHAB EVAL
PT orders received: no formal activity order. Consult with RN Leisa GRIER, pt may participate in PT evaluation and ambulate with PT./yes

## 2019-05-05 NOTE — CONSULT NOTE ADULT - SUBJECTIVE AND OBJECTIVE BOX
Chief Complaint:  Patient is a 54y old  Female who presents with a chief complaint of shortness of breath, cough (05 May 2019 11:40)    HPI:    53 y/o F w/ hx of metastatic lung adenocarcinoma s/p chemotherapy and currently on pembrolizumab, chronic right pleural effusion s/p VATS, pericardial effusion s/p pericardial window in 3/2019, pulmonary embolism on AC s/p IVC filter, who initially presented with shortness of breath and cough, with concern for underlying pneumonia versus progression of disease, with reported episode of diarrhea with concern for pembrolizumab induced colitis.    The patient endorses soft stools yesterday, but denies watery diarrhea. No report of watery diarrhea per nursing. She endorses nausea, but denies vomiting. She denies abdominal pain. She denies fevers and chills.    Allergies:  IV contrast (Rash)  No Known Allergies    Home Medications:    · 	gabapentin 100 mg oral capsule: 1 cap(s) orally every 12 hours, Last Dose Taken:    · 	oxyCODONE 5 mg oral tablet: 1 tab(s) orally every 4 hours, As needed (last filled 4/30/19 x #42 tabs) , Last Dose Taken:    · 	Lovenox 60 mg/0.6 mL injectable solution: 50 milligram(s) injectable 2 times a day, Last Dose Taken:      Hospital Medications:  ALBUTerol/ipratropium for Nebulization 3 milliLiter(s) Nebulizer every 6 hours  enoxaparin Injectable 50 milliGRAM(s) SubCutaneous two times a day  gabapentin 100 milliGRAM(s) Oral every 12 hours  HYDROcodone/homatropine Syrup 5 milliLiter(s) Oral every 6 hours PRN  methylPREDNISolone sodium succinate Injectable 40 milliGRAM(s) IV Push daily  metoclopramide 5 milliGRAM(s) Oral three times a day before meals  oxyCODONE    IR 5 milliGRAM(s) Oral every 4 hours PRN  pantoprazole    Tablet 40 milliGRAM(s) Oral before breakfast  piperacillin/tazobactam IVPB. 3.375 Gram(s) IV Intermittent every 8 hours  sodium chloride 0.9%. 1000 milliLiter(s) IV Continuous <Continuous>  vancomycin  IVPB 1000 milliGRAM(s) IV Intermittent daily    PMHX/PSHX:  Pulmonary embolus  Lung cancer  S/P thoracentesis  S/P pericardiocentesis  History of lung surgery    Family history:  Family history of coronary artery disease (Sibling)  Family history of hypertension  Family history of diabetes mellitus (DM)    Denies family history of colon cancer/polyps, stomach cancer/polyps, pancreatic cancer/masses, liver cancer/disease, ovarian cancer and endometrial cancer.    Social History:     Tob: Denies  EtOH: Denies  Illicit Drugs: Denies    ROS:     General:  No wt loss, fevers, chills, night sweats, fatigue  Eyes:  Good vision, no reported pain  ENT:  No sore throat, pain, runny nose, dysphagia  CV:  No pain, palpitations, hypo/hypertension  Pulm:  No dyspnea, cough, tachypnea, wheezing  GI:  No pain, No nausea, No vomiting, No diarrhea, No constipation, No weight loss, No fever, No pruritis, No rectal bleeding, No tarry stools, No dysphagia,  :  No pain, bleeding, incontinence, nocturia  Skin:  No rash, tattoos, scars, edema    PHYSICAL EXAM:     GENERAL:  No acute distress  HEENT:  Normocephalic/atraumatic, no scleral icterus  CHEST:  Poor inspiratory effort, increased work of breathing  HEART:  Regular rate and rhythm, no murmurs/rubs/gallops  ABDOMEN:  Soft, non-tender, non-distended, normoactive bowel sounds  EXTREMITIES: No cyanosis, clubbing, or edema  SKIN:  No rash/erythema  NEURO:  Alert and oriented x 3    Vital Signs:  Vital Signs Last 24 Hrs  T(C): 36.8 (05 May 2019 10:20), Max: 36.8 (05 May 2019 05:04)  T(F): 98.3 (05 May 2019 10:20), Max: 98.3 (05 May 2019 05:04)  HR: 112 (05 May 2019 10:20) (110 - 124)  BP: 105/65 (05 May 2019 10:20) (91/59 - 131/44)  BP(mean): --  RR: 19 (05 May 2019 10:20) (19 - 22)  SpO2: 96% (05 May 2019 10:20) (94% - 98%)    LABS:                        11.1   9.22  )-----------( 803      ( 05 May 2019 06:30 )             36.4     Mean Cell Volume: 85.4 fL (05-05-19 @ 06:30)    05-05    130<L>  |  91<L>  |  14  ----------------------------<  140<H>  4.0   |  21<L>  |  0.69    Ca    8.9      05 May 2019 06:30    TPro  7.2  /  Alb  3.2<L>  /  TBili  0.3  /  DBili  x   /  AST  14  /  ALT  23  /  AlkPhos  130<H>  05-05    LIVER FUNCTIONS - ( 05 May 2019 06:30 )  Alb: 3.2 g/dL / Pro: 7.2 g/dL / ALK PHOS: 130 u/L / ALT: 23 u/L / AST: 14 u/L / GGT: x           Amylase Serum51      Lipase serum7.3       Ammonia--  Amylase Serum64      Lipase serum6.4       Ammonia--                          11.1   9.22  )-----------( 803      ( 05 May 2019 06:30 )             36.4                         10.5   9.68  )-----------( 642      ( 04 May 2019 06:30 )             33.1                         10.5   9.74  )-----------( 541      ( 03 May 2019 05:40 )             34.4     Imaging:    < from: CT Abdomen and Pelvis w/ IV Cont (05.05.19 @ 10:37) >    EXAM:  CT ABDOMEN AND PELVIS IC      EXAM:  CT CHEST IC        PROCEDURE DATE:  May  5 2019         INTERPRETATION:  CLINICAL INFORMATION: Metastatic right lung cancer with   possible obstructive pneumonia.    COMPARISON: CT chest 5/2/2019, PET CT 4/12/2019    PROCEDURE:   CT of the Chest, Abdomen and Pelvis was performed with intravenous   contrast.   Intravenous contrast: 90 ml Omnipaque 350. 10 ml discarded.  Oral contrast: None.  Sagittal and coronal reformats were performed.    Of note, patient was premedicated for prior contrast allergy.    FINDINGS:    CHEST:     LUNGS AND LARGE AIRWAYS: Trachea and central airways are patent including   bronchus intermedius which was previously noted to be obstructed on prior   imaging 5/2/2019. Distal right lower lobe airways are occluded secondary   to tumor. Interlobular septal thickening in the right upper lobe is   unchanged and likely related to lymphangitic spread of tumor.   Heterogeneous airspace disease predominantly in the right lowerlobe as   well as posterior portions of the right upper lobe may represent a   combination of tumor and infection.   PLEURA: Moderate sized loculated right pleural effusion with a right   chest tube in place. The tip is again noted to be in the rightupper   hemithorax although the majority of the loculated fluid is in the right   lower hemithorax. Extensive right pleural nodularity consistent with a   malignant pleural effusion. Small left pleural effusion.  VESSELS: Within normal limits.  HEART: Heart size is normal. Moderate loculated pericardial effusion with   slight interval increase from prior imaging.  MEDIASTINUM AND DIANA: Mediastinal and right hilar adenopathy without   significant change.  CHEST WALL AND LOWER NECK: Small left supraclavicular nodes. Right   retropectoral right axillary adenopathy as at recent imaging.    ABDOMEN AND PELVIS:    LIVER: Within normal limits.  BILE DUCTS: Normal caliber.  GALLBLADDER: Within normal limits.  SPLEEN: Within normal limits.  PANCREAS: Within normal limits.  ADRENALS: Within normal limits.  KIDNEYS/URETERS: Within normal limits.    BLADDER: Within normal limits.  REPRODUCTIVE ORGANS: Fibroid uterus.    BOWEL: No bowel obstruction.   PERITONEUM: Small ascites.  VESSELS:  IVC filter.  RETROPERITONEUM: No lymphadenopathy.    ABDOMINAL WALL: Within normal limits.  BONES: Within normal limits.    IMPRESSION:     Central airways are patent including bronchus intermedius which was noted   to be occluded at prior CT 5/2/2019.     Consolidation predominantly in the right lower lobe may represent a   combination of tumor and pneumonia.     Extensive pleural and mediastinal disease as well as right axillary,   retropectoral and supraclavicular adenopathy as at prior imaging.    Loculated right pleural effusion as above.    Moderate pericardial effusion with loculation mildly increased from prior   imaging.    These findings were discussed with ADDY MOORE  5/5/2019 11:19 AM by Dr. Saenz with read back confirmation.    PAZ SAENZ M.D., ATTENDING RADIOLOGIST  This document has been electronically signed. May  5 2019 11:20AM      < end of copied text >

## 2019-05-05 NOTE — PHYSICAL THERAPY INITIAL EVALUATION ADULT - ADDITIONAL COMMENTS
Patient reports she lives with her  in a private house, 6 steps to enter. Patient reports she was previously independent in all ADLs and did not use an assistive device for ambulation.     Patient was left sitting at the edge of the bed as found, all lines/tubes intact and call wayne within reach, ANTONIO dwyer

## 2019-05-05 NOTE — CHART NOTE - NSCHARTNOTEFT_GEN_A_CORE
Pt pending CT C/A/P with IV contrast. Pt requiring pre-medication. Discussed with radiology: will give methylprednisolone 45 mg IV now and continue for q 4 hrs until exam is in process (no minimum number of doses after the initial dose). Pt will also require 50 mg iv benadryl 1 hr prior to exam. Discussed with CT scan- pt scheduled for imaging at 10 am. Pt pending CT C/A/P with IV contrast. Pt requiring pre-medication. Discussed with radiology: preparation consists of methylprednisolone 45 mg IV and continue for q 4 hrs until exam is in process (no minimum number of doses after the initial dose) as well as 50 mg iv benadryl 1 hr prior to exam. Radiologist aware that pt already received 40 mg IV methylprednisolone this am, per radiologist this will suffice for initial dose as allergy is rash. As per discussion with CT, pt scheduled for 10 am.

## 2019-05-05 NOTE — CONSULT NOTE ADULT - ASSESSMENT
Impression:    1. Report of diarrhea: Two episodes of "diarrhea" reported yesterday, however, the patient only endorses soft stools and denies watery diarrhea. Minimal concern for infectious colitis or inflammatory colitis from immunotherapy given lack of symptoms.   2. SOB: Concern for pembrolizumab induced pneumonitis per Heme/Onc. On antibiotics for possible pneumonia.  3. Metastatic lung adenocarcinoma  4. Chronic right sided pleural effusion  5. Pericardial effusion  6. Pulmonary embolism on AC    Recommendations:  - Would check C-diff PCR, GI-PCR, and stool cultures if patient develops diarrhea  - Continue PRN anti-emetics  - Palliative care consult  - Rest of care per primary team  - Please page GI (Pager: [phone]) if there are any additional questions or concerns    Kaleb Chou MD  Gastroenterology Fellow  Pager number: [phone] / [phone]

## 2019-05-05 NOTE — PROGRESS NOTE ADULT - ASSESSMENT
54F with R lung adenocarcinoma s/p 1 dose of immunotherapy with keytruda on 4/15, chronic right pleural effusion s/p pleurx, subxiphoid pericardial window on 3/20/19, PE on Lovenox s/p IVC filter placement presenting with shortness of breath and cough x 2 days.     Abd pain w N/V  - viral vs immunotherapy induced colitis  - diarrhea x 2 episodes; if diarrhea does not improve, send for c. diff and send for O and P, CMV and check ESR, CRP.  - s/p prednisone on 5/4, on solumedrol 40mg daily  - c/w protonix  - management of immunotherapy induced colitis ASCO Guidelines can be viewed here: https://ascopubs.org/doi/full/10.1200/JCO.2017.77.6385  - pending CT C/A/P IV contrast  - please check amylase and lipase  - pending swallow study to rule out fistula    SOB 2/2 post obstructive pneumonia  - CT showing obstruction of bronchus intermedius with atelectasis 2/2 worsening tumor burden  - CT surgery on board  - pending echo  - cannot rule out pneumonitis at this time, started on steroids as above  - pulm consult pending  - C/W AC  - palliative care on board for symptom control  - please make cough medicine standing (not PRN) as patient has worsening cough  - Supportive care, pain control, nutrition, PT  - Outpatient oncology f/u with Dr. Leroy Zapata  Hematology/Oncology Fellow  [phone] 54F with R lung adenocarcinoma s/p 1 dose of immunotherapy with keytruda on 4/15, chronic right pleural effusion s/p pleurx, subxiphoid pericardial window on 3/20/19, PE on Lovenox s/p IVC filter placement presenting with shortness of breath and cough x 2 days.      Abd pain w N/V  - c/o diarrhea x 2 episodes; if diarrhea does not improve, send for c. diff and send for O and P, CMV and check ESR, CRP.  - could be viral vs immunotherapy induced colitis; s/p prednisone on 5/4, on solumedrol 40mg daily with improvement in sx  - c/w protonix  - management of immunotherapy induced colitis ASCO Guidelines can be viewed here: https://ascopubs.org/doi/full/10.1200/JCO.2017.77.6385  - s/p CT C/A/P IV contrast, awaiting results  - amylase, lipase wnl  - pending swallow study to rule out fistula    SOB 2/2 post obstructive pneumonia  - clinical status tenuous; would continue aggressive treatment and close monitoring  - Initial CT showed obstruction of bronchus intermedius with atelectasis 2/2 worsening tumor burden;  - cannot rule out pneumonitis at this time, started on steroids as above  - on zosyn, would continue vancomycin as per pulm  - pulm consult appreciated; CT surgery on board, fu for CT results  - pending echo  - C/W AC  - palliative care on board for symptom control  - c/w cough medicine standing (not PRN) as patient has worsening cough  - Supportive care, pain control, nutrition, PT  - Outpatient oncology f/u with Dr. Leroy Zapata  Hematology/Oncology Fellow  [phone] 54F with R lung adenocarcinoma s/p 1 dose of immunotherapy with keytruda on 4/15, chronic right pleural effusion s/p pleurx, subxiphoid pericardial window on 3/20/19, PE on Lovenox s/p IVC filter placement presenting with shortness of breath and cough x 2 days.      Abd pain w N/V  - c/o diarrhea x 2 episodes; if diarrhea does not improve, send for c. diff and send for O and P, CMV and check ESR, CRP.  - could be viral vs immunotherapy induced colitis; s/p prednisone on 5/4, on solumedrol 40mg daily with improvement in sx  - c/w protonix  - management of immunotherapy induced colitis ASCO Guidelines can be viewed here: https://ascopubs.org/doi/full/10.1200/JCO.2017.77.6385  - s/p CT C/A/P IV contrast, awaiting results  - amylase, lipase wnl  - pending swallow study to rule out fistula    SOB 2/2 post obstructive pneumonia  - clinical status tenuous; would continue aggressive treatment and close monitoring  - Initial CT showed obstruction of bronchus intermedius with atelectasis 2/2 worsening tumor burden;  - cannot rule out pneumonitis at this time, started on steroids as above  - on zosyn, would continue vancomycin as per pulm  - pulm consult appreciated; CT surgery on board, fu for CT results  - pending echo  - C/W AC  - palliative care on board for symptom control  - c/w cough medicine standing (not PRN) as patient has worsening cough  - Supportive care, pain control, nutrition, PT  - Outpatient oncology f/u with Dr. Leroy Zapata  Hematology/Oncology Fellow  [phone]

## 2019-05-05 NOTE — PHYSICAL THERAPY INITIAL EVALUATION ADULT - GAIT DEVIATIONS NOTED, PT EVAL
decreased weight-shifting ability/increased time in double stance/decreased slick/decreased velocity of limb motion/decreased step length/decreased stride length

## 2019-05-06 LAB
ALBUMIN SERPL ELPH-MCNC: 3.4 G/DL — SIGNIFICANT CHANGE UP (ref 3.3–5)
ALP SERPL-CCNC: 121 U/L — HIGH (ref 40–120)
ALT FLD-CCNC: 20 U/L — SIGNIFICANT CHANGE UP (ref 4–33)
ANION GAP SERPL CALC-SCNC: 14 MMO/L — SIGNIFICANT CHANGE UP (ref 7–14)
AST SERPL-CCNC: 11 U/L — SIGNIFICANT CHANGE UP (ref 4–32)
BASOPHILS # BLD AUTO: 0.01 K/UL — SIGNIFICANT CHANGE UP (ref 0–0.2)
BASOPHILS NFR BLD AUTO: 0.1 % — SIGNIFICANT CHANGE UP (ref 0–2)
BILIRUB SERPL-MCNC: 0.3 MG/DL — SIGNIFICANT CHANGE UP (ref 0.2–1.2)
BUN SERPL-MCNC: 13 MG/DL — SIGNIFICANT CHANGE UP (ref 7–23)
CALCIUM SERPL-MCNC: 9.2 MG/DL — SIGNIFICANT CHANGE UP (ref 8.4–10.5)
CHLORIDE SERPL-SCNC: 92 MMOL/L — LOW (ref 98–107)
CO2 SERPL-SCNC: 23 MMOL/L — SIGNIFICANT CHANGE UP (ref 22–31)
CREAT SERPL-MCNC: 0.67 MG/DL — SIGNIFICANT CHANGE UP (ref 0.5–1.3)
CULTURE - ACID FAST SMEAR CONCENTRATED: SIGNIFICANT CHANGE UP
EOSINOPHIL # BLD AUTO: 0.06 K/UL — SIGNIFICANT CHANGE UP (ref 0–0.5)
EOSINOPHIL NFR BLD AUTO: 0.4 % — SIGNIFICANT CHANGE UP (ref 0–6)
GLUCOSE SERPL-MCNC: 143 MG/DL — HIGH (ref 70–99)
HCT VFR BLD CALC: 32.6 % — LOW (ref 34.5–45)
HGB BLD-MCNC: 10.2 G/DL — LOW (ref 11.5–15.5)
IMM GRANULOCYTES NFR BLD AUTO: 0.9 % — SIGNIFICANT CHANGE UP (ref 0–1.5)
LYMPHOCYTES # BLD AUTO: 1.61 K/UL — SIGNIFICANT CHANGE UP (ref 1–3.3)
LYMPHOCYTES # BLD AUTO: 12 % — LOW (ref 13–44)
MCHC RBC-ENTMCNC: 26.3 PG — LOW (ref 27–34)
MCHC RBC-ENTMCNC: 31.3 % — LOW (ref 32–36)
MCV RBC AUTO: 84 FL — SIGNIFICANT CHANGE UP (ref 80–100)
MONOCYTES # BLD AUTO: 1.41 K/UL — HIGH (ref 0–0.9)
MONOCYTES NFR BLD AUTO: 10.5 % — SIGNIFICANT CHANGE UP (ref 2–14)
NEUTROPHILS # BLD AUTO: 10.26 K/UL — HIGH (ref 1.8–7.4)
NEUTROPHILS NFR BLD AUTO: 76.1 % — SIGNIFICANT CHANGE UP (ref 43–77)
NRBC # FLD: 0.02 K/UL — SIGNIFICANT CHANGE UP (ref 0–0)
PLATELET # BLD AUTO: 870 K/UL — HIGH (ref 150–400)
PMV BLD: 9.4 FL — SIGNIFICANT CHANGE UP (ref 7–13)
POTASSIUM SERPL-MCNC: 4.2 MMOL/L — SIGNIFICANT CHANGE UP (ref 3.5–5.3)
POTASSIUM SERPL-SCNC: 4.2 MMOL/L — SIGNIFICANT CHANGE UP (ref 3.5–5.3)
PROT SERPL-MCNC: 6.9 G/DL — SIGNIFICANT CHANGE UP (ref 6–8.3)
RBC # BLD: 3.88 M/UL — SIGNIFICANT CHANGE UP (ref 3.8–5.2)
RBC # FLD: 13.8 % — SIGNIFICANT CHANGE UP (ref 10.3–14.5)
SODIUM SERPL-SCNC: 129 MMOL/L — LOW (ref 135–145)
SPECIMEN SOURCE: SIGNIFICANT CHANGE UP
WBC # BLD: 13.47 K/UL — HIGH (ref 3.8–10.5)
WBC # FLD AUTO: 13.47 K/UL — HIGH (ref 3.8–10.5)

## 2019-05-06 PROCEDURE — 99233 SBSQ HOSP IP/OBS HIGH 50: CPT

## 2019-05-06 PROCEDURE — 93306 TTE W/DOPPLER COMPLETE: CPT | Mod: 26

## 2019-05-06 PROCEDURE — 74220 X-RAY XM ESOPHAGUS 1CNTRST: CPT | Mod: 26

## 2019-05-06 PROCEDURE — 99232 SBSQ HOSP IP/OBS MODERATE 35: CPT

## 2019-05-06 RX ORDER — HEPARIN SODIUM 5000 [USP'U]/ML
INJECTION INTRAVENOUS; SUBCUTANEOUS
Qty: 25000 | Refills: 0 | Status: DISCONTINUED | OUTPATIENT
Start: 2019-05-07 | End: 2019-05-07

## 2019-05-06 RX ORDER — OXYCODONE HYDROCHLORIDE 5 MG/1
5 TABLET ORAL
Qty: 0 | Refills: 0 | Status: DISCONTINUED | OUTPATIENT
Start: 2019-05-06 | End: 2019-05-12

## 2019-05-06 RX ORDER — HEPARIN SODIUM 5000 [USP'U]/ML
4000 INJECTION INTRAVENOUS; SUBCUTANEOUS EVERY 6 HOURS
Qty: 0 | Refills: 0 | Status: DISCONTINUED | OUTPATIENT
Start: 2019-05-06 | End: 2019-05-08

## 2019-05-06 RX ORDER — SODIUM CHLORIDE 9 MG/ML
1 INJECTION INTRAMUSCULAR; INTRAVENOUS; SUBCUTANEOUS
Qty: 0 | Refills: 0 | Status: DISCONTINUED | OUTPATIENT
Start: 2019-05-06 | End: 2019-05-16

## 2019-05-06 RX ORDER — HEPARIN SODIUM 5000 [USP'U]/ML
2000 INJECTION INTRAVENOUS; SUBCUTANEOUS EVERY 6 HOURS
Qty: 0 | Refills: 0 | Status: DISCONTINUED | OUTPATIENT
Start: 2019-05-06 | End: 2019-05-08

## 2019-05-06 RX ORDER — SENNA PLUS 8.6 MG/1
2 TABLET ORAL AT BEDTIME
Qty: 0 | Refills: 0 | Status: DISCONTINUED | OUTPATIENT
Start: 2019-05-06 | End: 2019-05-16

## 2019-05-06 RX ADMIN — OXYCODONE HYDROCHLORIDE 5 MILLIGRAM(S): 5 TABLET ORAL at 12:22

## 2019-05-06 RX ADMIN — Medication 5 MILLIGRAM(S): at 12:22

## 2019-05-06 RX ADMIN — GABAPENTIN 100 MILLIGRAM(S): 400 CAPSULE ORAL at 17:07

## 2019-05-06 RX ADMIN — ENOXAPARIN SODIUM 50 MILLIGRAM(S): 100 INJECTION SUBCUTANEOUS at 17:07

## 2019-05-06 RX ADMIN — OXYCODONE HYDROCHLORIDE 5 MILLIGRAM(S): 5 TABLET ORAL at 17:49

## 2019-05-06 RX ADMIN — Medication 40 MILLIGRAM(S): at 06:01

## 2019-05-06 RX ADMIN — OXYCODONE HYDROCHLORIDE 5 MILLIGRAM(S): 5 TABLET ORAL at 13:22

## 2019-05-06 RX ADMIN — Medication 5 MILLIGRAM(S): at 17:08

## 2019-05-06 RX ADMIN — SODIUM CHLORIDE 1 GRAM(S): 9 INJECTION INTRAMUSCULAR; INTRAVENOUS; SUBCUTANEOUS at 19:19

## 2019-05-06 RX ADMIN — Medication 3 MILLILITER(S): at 05:12

## 2019-05-06 RX ADMIN — Medication 5 MILLIGRAM(S): at 06:02

## 2019-05-06 RX ADMIN — PANTOPRAZOLE SODIUM 40 MILLIGRAM(S): 20 TABLET, DELAYED RELEASE ORAL at 06:02

## 2019-05-06 RX ADMIN — ENOXAPARIN SODIUM 50 MILLIGRAM(S): 100 INJECTION SUBCUTANEOUS at 06:02

## 2019-05-06 RX ADMIN — Medication 250 MILLIGRAM(S): at 12:22

## 2019-05-06 RX ADMIN — PIPERACILLIN AND TAZOBACTAM 25 GRAM(S): 4; .5 INJECTION, POWDER, LYOPHILIZED, FOR SOLUTION INTRAVENOUS at 03:57

## 2019-05-06 RX ADMIN — PIPERACILLIN AND TAZOBACTAM 25 GRAM(S): 4; .5 INJECTION, POWDER, LYOPHILIZED, FOR SOLUTION INTRAVENOUS at 12:23

## 2019-05-06 RX ADMIN — OXYCODONE HYDROCHLORIDE 5 MILLIGRAM(S): 5 TABLET ORAL at 17:07

## 2019-05-06 RX ADMIN — PIPERACILLIN AND TAZOBACTAM 25 GRAM(S): 4; .5 INJECTION, POWDER, LYOPHILIZED, FOR SOLUTION INTRAVENOUS at 19:20

## 2019-05-06 RX ADMIN — Medication 3 MILLILITER(S): at 15:46

## 2019-05-06 RX ADMIN — GABAPENTIN 100 MILLIGRAM(S): 400 CAPSULE ORAL at 06:01

## 2019-05-06 RX ADMIN — Medication 3 MILLILITER(S): at 23:00

## 2019-05-06 NOTE — DIETITIAN INITIAL EVALUATION ADULT. - SIGNS/SYMPTOMS
<50% nutrition needs > 1month, weight loss of 4.54% over 1 month, severe muscle wasting and fat loss

## 2019-05-06 NOTE — PROGRESS NOTE ADULT - PROBLEM SELECTOR PLAN 1
-pt with extensive mediastinal and pleural disease and loculated right pleural effusion on CT, also possible postobstructive pneumonia  -CT chest 5/5 Central airways are patent including bronchus intermedius which was noted to be occluded at prior CT 5/2/2019.   Consolidation predominantly in the right lower lobe may represent a   combination of tumor and pneumonia.  -per initial CT collection is in inferior thorax while chest tube terminates in upper thorax. No acute intervention per CTS. They see increased malignancy burden on CT.  -S/P Chest Tube at right side  -started on iv solumedrol per possible immunotherapy related pneumonitis per  hem/onc  -Patient adherent with Lovenox, less likely progressive PE  -add vanco to zosyn to broaden abx coverage for possible gram negative postobstructive pneumonia with RLL consolidation on CT  -Less likely cardiac in etiology given no intersitial pulmonary edema, trace pericardial fluid seen, unlikely to cause tamponade. Patient is hemodynamically stable.   -Further disease modifying therapy per oncologist Dr. Harper.

## 2019-05-06 NOTE — CHART NOTE - NSCHARTNOTEFT_GEN_A_CORE
Patient to be transferred to telemetry. NPO post midnight for possible IR drainage of pericardial effusion. Lovenox held. Heparin drip to be started at 0400. Sign out given to Robert DALY.

## 2019-05-06 NOTE — DIETITIAN INITIAL EVALUATION ADULT. - NS AS NUTRI INTERV MEDICAL AND FOOD SUPPLEMENTS
Recommend Ensure Enlive 240mls 3x daily (1050kcal, 60g protein)-strawberry As patient amenable, 1. Recommend Ensure Enlive 240mls 3x daily (1050kcal, 60g protein)-strawberry 2. Will provide [employer] Vital Shake (520kcal and 22g pro) for trial.

## 2019-05-06 NOTE — DIETITIAN INITIAL EVALUATION ADULT. - PHYSICAL APPEARANCE
Nutrition focused physical exam conducted - found signs of malnutrition [ ]absent [x ]presentSubcutaneous fat loss: [Severe ] Orbital fat pads region, [Severe ]Buccal fat region, [ Severe]Triceps region,  Muscle wasting: [Severe ]Temples region, [Severe ]Clavicle region, [Severe ]Shoulder region,  [ Severe]Interosseous region,  [Severe ]thigh region, [Severe ]Calf region/underweight

## 2019-05-06 NOTE — DIETITIAN INITIAL EVALUATION ADULT. - ENERGY NEEDS
Height (cm): 157.48 (04 May 2019 02:41) Weight (kg): 47.7 (04 May 2019 02:41)  BMI (kg/m2): 19.2 (04 May 2019 02:41) IBW: 110lbs/50kg (+/-10%) %IBW 95%  No edema or pressure injury noted.

## 2019-05-06 NOTE — PROGRESS NOTE ADULT - PROBLEM SELECTOR PLAN 7
pt with intermittent N/V, anorexia, poor intake  pt with severe protein calorie malnutrition, nutrition eval  pt denies active diarrhea   no obstruction or colitis on CT, abdomen nontender and nondistended  c/w reglan and zofran prn

## 2019-05-06 NOTE — DIETITIAN INITIAL EVALUATION ADULT. - PROBLEM SELECTOR PLAN 2
Patient with +cough, shortness of breath, will treat for PNA   Given currently on immunotherapy, will continue Zosyn   F/u sputum cx

## 2019-05-06 NOTE — CHART NOTE - NSCHARTNOTEFT_GEN_A_CORE
Notified by echo that "Compared with echocardiogram of 3/25/2019, a large pericardial effusion is now present, with early cardiac tamponade physiology." CT surgery made aware, awaiting further recommendations. Med attg and Onc attg made aware. Pt to be transferred to tele bed. Sign out given to covering night shift.

## 2019-05-06 NOTE — PROGRESS NOTE ADULT - SUBJECTIVE AND OBJECTIVE BOX
INTERVAL HPI/OVERNIGHT EVENTS: +dyspnea     Code Status: Full Code   Allergies    No Known Allergies    Intolerances    IV contrast (Rash)  MEDICATIONS  (STANDING):  ALBUTerol/ipratropium for Nebulization 3 milliLiter(s) Nebulizer every 6 hours  enoxaparin Injectable 50 milliGRAM(s) SubCutaneous two times a day  gabapentin 100 milliGRAM(s) Oral every 12 hours  methylPREDNISolone sodium succinate Injectable 40 milliGRAM(s) IV Push daily  metoclopramide 5 milliGRAM(s) Oral three times a day before meals  pantoprazole    Tablet 40 milliGRAM(s) Oral before breakfast  piperacillin/tazobactam IVPB. 3.375 Gram(s) IV Intermittent every 8 hours  sodium chloride 0.9%. 1000 milliLiter(s) (50 mL/Hr) IV Continuous <Continuous>  vancomycin  IVPB 1000 milliGRAM(s) IV Intermittent daily    MEDICATIONS  (PRN):  HYDROcodone/homatropine Syrup 5 milliLiter(s) Oral every 6 hours PRN Cough  oxyCODONE    IR 5 milliGRAM(s) Oral every 3 hours PRN moderate and severe pain/shortness of breath      PRESENT SYMPTOMS: [ ]Unable to obtain due to poor mentation   Source if other than patient:  [ ]Family   [ ]Team     Pain (Impact on QOL):  Denies    Dyspnea:  Yes [x ] No [ ] - [x ]Mild [x ]Moderate [ ]Severe  Anxiety:    Yes [ ] No [x ] - [ ]Mild [ ]Moderate [ ]Severe  Fatigue:    Yes [x ] No [ ] - [ ]Mild [ x]Moderate [ ]Severe  Nausea:    Yes [ ] No [x ] - [ ]Mild [ ]Moderate [ ]Severe                         Loss of appetite: Yes [x ] No [ ] - [ ]Mild [x ]Moderate [ ]Severe             Constipation:  Yes [ ] No [x ] - [ ]Mild [ ]Moderate [ ]Severe  Grief: Yes [x ] No [ ]     PAIN AD Score:	  http://geriatrictoolkit.[employer].edu/cog/painad.pdf (Ctrl + left click to view)    Other Symptoms:  [xAll other review of systems negative     Karnofsky Performance Score/Palliative Performance Status Version 2:   50%    http://palliative.info/resource_material/PPSv2.pdf    PHYSICAL EXAM:  Vital Signs Last 24 Hrs  T(C): 36.4 (06 May 2019 05:58), Max: 36.8 (05 May 2019 15:15)  T(F): 97.6 (06 May 2019 05:58), Max: 98.3 (05 May 2019 15:15)  HR: 105 (06 May 2019 05:58) (105 - 114)  BP: 101/47 (06 May 2019 05:58) (93/58 - 109/69)  BP(mean): --  RR: 18 (06 May 2019 05:58) (16 - 19)  SpO2: 100% (06 May 2019 05:58) (94% - 100%) I&O's Summary    05 May 2019 07:01  -  06 May 2019 07:00  --------------------------------------------------------  IN: 0 mL / OUT: 0 mL / NET: 0 mL         GENERAL:  [x ]Alert  [x ]Oriented x  4 [ ]Lethargic  [ ]Cachexia  [ ]Unarousable  [x ]Verbal  [ ]Non-Verbal  PULMONARY:   Decreased to right Right sided pleurx to atrium  CARDIOVASCULAR:    [x ]Regular [ ]Irregular [ ]Tachy  [ ]Ford [ ]Murmur [ ]Other  GASTROINTESTINAL:  [x ]Soft  [ ]Distended   [x ]+BS  [x ]Non tender [ ]Tender  [ ]PEG [ ]OGT/ NGT   Last BM: 5/4/19     GENITOURINARY:  [ x]Normal [ ] Incontinent   [ ]Oliguria/Anuria   [ ]Sanchez  MUSCULOSKELETAL:   [ ]Normal   [ x]Weakness  [ ]Bed/Wheelchair bound [ ]Edema  NEUROLOGIC:   [x ]No focal deficits  [ ] Cognitive impairment  [ ] Dysphagia [ ]Dysarthria [ ] Paresis [ ]Other   SKIN:   [x ]Normal   [ ]Pressure ulcer(s)  [ ]Rash    CRITICAL CARE:  [ ] Shock Present  [ ]Septic [ ]Cardiogenic [ ]Neurologic [ ]Hypovolemic  [ ]  Vasopressors [ ]  Inotropes   [ ] Respiratory failure present  [ ] Acute  [ ] Chronic [ ] Hypoxic  [ ] Hypercarbic [ ] Other  [ ] Other organ failure     LABS:                        10.2   13.47 )-----------( 870      ( 06 May 2019 05:31 )             32.6   05-06    129<L>  |  92<L>  |  13  ----------------------------<  143<H>  4.2   |  23  |  0.67    Ca    9.2      06 May 2019 05:31    TPro  6.9  /  Alb  3.4  /  TBili  0.3  /  DBili  x   /  AST  11  /  ALT  20  /  AlkPhos  121<H>  05-06        RADIOLOGY & ADDITIONAL STUDIES:    Protein Calorie Malnutrition Present: [ ] yes [ ] no  [ ] PPSV2 < or = 30%  [ ] significant weight loss [ ] poor nutritional intake [ ] anasarca [ ] catabolic state Albumin, Serum: 3.4 g/dL (05-06-19 @ 05:31)      REFERRALS:   [ ]Chaplaincy  [ ] Hospice  [ ]Child Life  [ ]Social Work  [ ]Case management [ ]Holistic Therapy   Goals of Care Document:

## 2019-05-06 NOTE — DIETITIAN INITIAL EVALUATION ADULT. - NS FNS WEIGHT CHANGE REASON
usual body weight 110lbs, losing weight over last month on 3/29 (last admit) weighed 47.9kg. This admission documented weight to be 47.7kg (5/2)./unintentional

## 2019-05-06 NOTE — DIETITIAN INITIAL EVALUATION ADULT. - PROBLEM SELECTOR PLAN 3
+tachycardia, +tachypnea +fever  UA negative, RVP negative  Lactate wnl  Will treat for PNA with Zosyn  However, patient's outpatient VS SBP 90s-100s, HR 100s-120s  Fever can also be a result of tumor fever

## 2019-05-06 NOTE — DIETITIAN INITIAL EVALUATION ADULT. - OTHER INFO
Nutrition consult received for assessment. 55y/o Female with metastatic R lung adenocarcinoma on chemo, admitted with SOB and cough x 2 days.  Patient on regular diet, tolerating well but remains with poor appetite and PO intake <25% at this time. States she can only have small bites at a time. Noted with snacks at bedside. Encouraged menu selection and to have preferred snacks added for between meals. She voiced preferences for vegetable soup and PO supplement Ensure strawberry-will provide same. Patient denies any nausea/vomiting/diarrhea/constipation or difficulty chewing and swallowing. Observes No beef and Pork. Encouraged small frequent nutrient and protein dense meals/snacks and to have po supplement between meals.

## 2019-05-06 NOTE — PROGRESS NOTE ADULT - SUBJECTIVE AND OBJECTIVE BOX
Today's echocardiogram result was discussed with Dr Michaels.      Large pericardial effusion, measuring about  2.5 cm  lateral to the left ventricle.  Moderate pericardial  effusion, measuring about  1.6 cm posterior to the left  ventricle and about  1.8 cm around the LV apex.  Echodense  material is visualized within the effusion and may  represent thrombus.  Diastolic collapse of the right  ventricular outflow tract is seen.  In addition, increased  respirophasic variability in the transmitral and  transtricuspid spectral Doppler signals is seen.  These  findings are consistent with early cardiac tamponade.  *** Compared with echocardiogram of 3/25/2019, a large  pericardial effusion is now present, with early cardiac  tamponade physiology.    IR drainage is recommended.  However, in case IR is unable to perform the drainage, the pt should be prepared for a repeat pericardial window in the OR tomorrow.  To that end, the pt has been made NPO except medications after midnight, her Lovenox has been held (VTE ppx has been referred to the primary team), and labs have been ordered for the morning.     If IR is unable to do the drainage, Thoracic Surgery needs to be informed so that the case can be booked with the OR. (Tel [phone])    This was discussed with the ADS team

## 2019-05-06 NOTE — PROGRESS NOTE ADULT - ASSESSMENT
54F with R lung adenocarcinoma s/p 1 dose of immunotherapy with keytruda on 4/15, chronic right pleural effusion s/p pleurx, subxiphoid pericardial window on 3/20/19, PE on Lovenox s/p IVC filter placement presenting with shortness of breath and cough x 2 days.      No evidence of colitis on CT. Worsening shortness of breath in the setting of progressive disease.    -Appreciate pulm and CT surg input, please follow  -Echo pending  -c/w AC  -palliative care input appreciated   -Supportive care, pain control, nutrition, PT  -Outpatient oncology f/u    Will follow. Please do not hesitate to call back with questions.     Thuy Harper MD  Medical Oncology Attending

## 2019-05-06 NOTE — PROGRESS NOTE ADULT - PROBLEM SELECTOR PLAN 1
Patient with one dose of Oxycodone 5mg in 24 hours.  She reports the Oxycodone is helpful in reducing her dyspnea and lasts ~3 hours.  Patient encouraged to utilize PRN Dosing to maximize her functional status as patient with increased shortness of breath with movement.  Patient verbalized understanding.  Oxycodone 5mg PO q3h PRN.  O2 PRN.  Solumedrol in place.  Senna added for bowel regimen.

## 2019-05-06 NOTE — PROGRESS NOTE ADULT - SUBJECTIVE AND OBJECTIVE BOX
INTERVAL HPI/OVERNIGHT EVENTS:  Patient seen at bedside.  Tachypneic and dyspneic.   Has pain in right axilla, tender LAP palpable, reports pain in left chest, epigastric area.     VITAL SIGNS:  T(F): 97.6 (05-06-19 @ 05:58)  HR: 105 (05-06-19 @ 05:58)  BP: 101/47 (05-06-19 @ 05:58)  RR: 18 (05-06-19 @ 05:58)  SpO2: 100% (05-06-19 @ 05:58)  Wt(kg): --    PHYSICAL EXAM:    Constitutional: Tachypneic   Eyes: EOMI, sclera non-icteric  Neck: supple, Axillary LAP tender  Respiratory: decreased breath sounds in the right side  Cardiovascular: RRR, normal S1S2, no M/R/G  Gastrointestinal: soft, NTND  Extremities: no edema  Neurological: AAOx3, non focal  Skin: Normal temperature    MEDICATIONS  (STANDING):  ALBUTerol/ipratropium for Nebulization 3 milliLiter(s) Nebulizer every 6 hours  enoxaparin Injectable 50 milliGRAM(s) SubCutaneous two times a day  gabapentin 100 milliGRAM(s) Oral every 12 hours  methylPREDNISolone sodium succinate Injectable 40 milliGRAM(s) IV Push daily  metoclopramide 5 milliGRAM(s) Oral three times a day before meals  pantoprazole    Tablet 40 milliGRAM(s) Oral before breakfast  piperacillin/tazobactam IVPB. 3.375 Gram(s) IV Intermittent every 8 hours  senna 2 Tablet(s) Oral at bedtime  sodium chloride 0.9%. 1000 milliLiter(s) (50 mL/Hr) IV Continuous <Continuous>  vancomycin  IVPB 1000 milliGRAM(s) IV Intermittent daily    MEDICATIONS  (PRN):  HYDROcodone/homatropine Syrup 5 milliLiter(s) Oral every 6 hours PRN Cough  oxyCODONE    IR 5 milliGRAM(s) Oral every 3 hours PRN moderate and severe pain/shortness of breath      Allergies    No Known Allergies    Intolerances    IV contrast (Rash)      LABS:                        10.2   13.47 )-----------( 870      ( 06 May 2019 05:31 )             32.6     05-06    129<L>  |  92<L>  |  13  ----------------------------<  143<H>  4.2   |  23  |  0.67    Ca    9.2      06 May 2019 05:31    TPro  6.9  /  Alb  3.4  /  TBili  0.3  /  DBili  x   /  AST  11  /  ALT  20  /  AlkPhos  121<H>  05-06          RADIOLOGY & ADDITIONAL TESTS:  Studies reviewed.

## 2019-05-06 NOTE — DIETITIAN INITIAL EVALUATION ADULT. - PERTINENT MEDS FT
MEDICATIONS  (STANDING):  ALBUTerol/ipratropium for Nebulization 3 milliLiter(s) Nebulizer every 6 hours  enoxaparin Injectable 50 milliGRAM(s) SubCutaneous two times a day  gabapentin 100 milliGRAM(s) Oral every 12 hours  methylPREDNISolone sodium succinate Injectable 40 milliGRAM(s) IV Push daily  metoclopramide 5 milliGRAM(s) Oral three times a day before meals  pantoprazole    Tablet 40 milliGRAM(s) Oral before breakfast  piperacillin/tazobactam IVPB. 3.375 Gram(s) IV Intermittent every 8 hours  senna 2 Tablet(s) Oral at bedtime  sodium chloride 0.9%. 1000 milliLiter(s) (50 mL/Hr) IV Continuous <Continuous>  vancomycin  IVPB 1000 milliGRAM(s) IV Intermittent daily    MEDICATIONS  (PRN):  HYDROcodone/homatropine Syrup 5 milliLiter(s) Oral every 6 hours PRN Cough  oxyCODONE    IR 5 milliGRAM(s) Oral every 3 hours PRN moderate and severe pain/shortness of breath

## 2019-05-06 NOTE — PROGRESS NOTE ADULT - SUBJECTIVE AND OBJECTIVE BOX
Patient is a 54y old  Female who presents with a chief complaint of shortness of breath, cough (06 May 2019 13:45)      SUBJECTIVE / OVERNIGHT EVENTS: (+) mild dyspnia, otherwise no complaints.    MEDICATIONS  (STANDING):  ALBUTerol/ipratropium for Nebulization 3 milliLiter(s) Nebulizer every 6 hours  enoxaparin Injectable 50 milliGRAM(s) SubCutaneous two times a day  gabapentin 100 milliGRAM(s) Oral every 12 hours  methylPREDNISolone sodium succinate Injectable 40 milliGRAM(s) IV Push daily  metoclopramide 5 milliGRAM(s) Oral three times a day before meals  pantoprazole    Tablet 40 milliGRAM(s) Oral before breakfast  piperacillin/tazobactam IVPB. 3.375 Gram(s) IV Intermittent every 8 hours  senna 2 Tablet(s) Oral at bedtime  sodium chloride 0.9%. 1000 milliLiter(s) (50 mL/Hr) IV Continuous <Continuous>  vancomycin  IVPB 1000 milliGRAM(s) IV Intermittent daily    MEDICATIONS  (PRN):  HYDROcodone/homatropine Syrup 5 milliLiter(s) Oral every 6 hours PRN Cough  oxyCODONE    IR 5 milliGRAM(s) Oral every 3 hours PRN moderate and severe pain/shortness of breath      Vital Signs Last 24 Hrs  T(C): 36.4 (06 May 2019 05:58), Max: 36.8 (05 May 2019 15:15)  T(F): 97.6 (06 May 2019 05:58), Max: 98.3 (05 May 2019 15:15)  HR: 105 (06 May 2019 05:58) (105 - 114)  BP: 101/47 (06 May 2019 05:58) (93/58 - 109/69)  BP(mean): --  RR: 18 (06 May 2019 05:58) (16 - 19)  SpO2: 100% (06 May 2019 05:58) (94% - 100%)  CAPILLARY BLOOD GLUCOSE        I&O's Summary    05 May 2019 07:01  -  06 May 2019 07:00  --------------------------------------------------------  IN: 0 mL / OUT: 0 mL / NET: 0 mL        PHYSICAL EXAM:  GENERAL: NAD, well-developed  HEAD:  Atraumatic, Normocephalic  EYES: EOMI, PERRLA, conjunctiva and sclera clear  NECK: Supple, No JVD  CHEST/LUNG: (+) decreased BS at right hemithorax; No wheeze, (+) right CT in place.  HEART: Regular rate and rhythm; No murmurs, rubs, or gallops  ABDOMEN: Soft, Nontender, Nondistended; Bowel sounds present  EXTREMITIES:  2+ Peripheral Pulses, No clubbing, cyanosis, or edema  PSYCH: AAOx3  NEUROLOGY: non-focal  SKIN: No rashes or lesions    LABS:                        10.2   13.47 )-----------( 870      ( 06 May 2019 05:31 )             32.6     05-06    129<L>  |  92<L>  |  13  ----------------------------<  143<H>  4.2   |  23  |  0.67    Ca    9.2      06 May 2019 05:31    TPro  6.9  /  Alb  3.4  /  TBili  0.3  /  DBili  x   /  AST  11  /  ALT  20  /  AlkPhos  121<H>  05-06              RADIOLOGY & ADDITIONAL TESTS:    Imaging Personally Reviewed:    Consultant(s) Notes Reviewed:      Care Discussed with Consultants/Other Providers:

## 2019-05-07 ENCOUNTER — APPOINTMENT (OUTPATIENT)
Dept: THORACIC SURGERY | Facility: CLINIC | Age: 54
End: 2019-05-07

## 2019-05-07 ENCOUNTER — APPOINTMENT (OUTPATIENT)
Dept: HEMATOLOGY ONCOLOGY | Facility: CLINIC | Age: 54
End: 2019-05-07

## 2019-05-07 LAB
ALBUMIN SERPL ELPH-MCNC: 3.5 G/DL — SIGNIFICANT CHANGE UP (ref 3.3–5)
ALP SERPL-CCNC: 129 U/L — HIGH (ref 40–120)
ALT FLD-CCNC: 26 U/L — SIGNIFICANT CHANGE UP (ref 4–33)
ANION GAP SERPL CALC-SCNC: 13 MMO/L — SIGNIFICANT CHANGE UP (ref 7–14)
APTT BLD: 31.7 SEC — SIGNIFICANT CHANGE UP (ref 27.5–36.3)
APTT BLD: 31.9 SEC — SIGNIFICANT CHANGE UP (ref 27.5–36.3)
AST SERPL-CCNC: 20 U/L — SIGNIFICANT CHANGE UP (ref 4–32)
BACTERIA BLD CULT: SIGNIFICANT CHANGE UP
BACTERIA BLD CULT: SIGNIFICANT CHANGE UP
BASOPHILS # BLD AUTO: 0.01 K/UL — SIGNIFICANT CHANGE UP (ref 0–0.2)
BASOPHILS NFR BLD AUTO: 0.1 % — SIGNIFICANT CHANGE UP (ref 0–2)
BILIRUB SERPL-MCNC: 0.2 MG/DL — SIGNIFICANT CHANGE UP (ref 0.2–1.2)
BLD GP AB SCN SERPL QL: NEGATIVE — SIGNIFICANT CHANGE UP
BUN SERPL-MCNC: 14 MG/DL — SIGNIFICANT CHANGE UP (ref 7–23)
CALCIUM SERPL-MCNC: 8.8 MG/DL — SIGNIFICANT CHANGE UP (ref 8.4–10.5)
CHLORIDE SERPL-SCNC: 95 MMOL/L — LOW (ref 98–107)
CO2 SERPL-SCNC: 22 MMOL/L — SIGNIFICANT CHANGE UP (ref 22–31)
CREAT SERPL-MCNC: 0.69 MG/DL — SIGNIFICANT CHANGE UP (ref 0.5–1.3)
EOSINOPHIL # BLD AUTO: 0.03 K/UL — SIGNIFICANT CHANGE UP (ref 0–0.5)
EOSINOPHIL NFR BLD AUTO: 0.2 % — SIGNIFICANT CHANGE UP (ref 0–6)
GLUCOSE SERPL-MCNC: 134 MG/DL — HIGH (ref 70–99)
HCT VFR BLD CALC: 32.8 % — LOW (ref 34.5–45)
HCT VFR BLD CALC: 35.9 % — SIGNIFICANT CHANGE UP (ref 34.5–45)
HGB BLD-MCNC: 10.3 G/DL — LOW (ref 11.5–15.5)
HGB BLD-MCNC: 11.1 G/DL — LOW (ref 11.5–15.5)
IMM GRANULOCYTES NFR BLD AUTO: 0.6 % — SIGNIFICANT CHANGE UP (ref 0–1.5)
INR BLD: 1.23 — HIGH (ref 0.88–1.17)
LYMPHOCYTES # BLD AUTO: 1.25 K/UL — SIGNIFICANT CHANGE UP (ref 1–3.3)
LYMPHOCYTES # BLD AUTO: 10.4 % — LOW (ref 13–44)
MCHC RBC-ENTMCNC: 26.1 PG — LOW (ref 27–34)
MCHC RBC-ENTMCNC: 26.3 PG — LOW (ref 27–34)
MCHC RBC-ENTMCNC: 30.9 % — LOW (ref 32–36)
MCHC RBC-ENTMCNC: 31.4 % — LOW (ref 32–36)
MCV RBC AUTO: 83.9 FL — SIGNIFICANT CHANGE UP (ref 80–100)
MCV RBC AUTO: 84.5 FL — SIGNIFICANT CHANGE UP (ref 80–100)
MONOCYTES # BLD AUTO: 1.25 K/UL — HIGH (ref 0–0.9)
MONOCYTES NFR BLD AUTO: 10.4 % — SIGNIFICANT CHANGE UP (ref 2–14)
NEUTROPHILS # BLD AUTO: 9.42 K/UL — HIGH (ref 1.8–7.4)
NEUTROPHILS NFR BLD AUTO: 78.3 % — HIGH (ref 43–77)
NRBC # FLD: 0.05 K/UL — SIGNIFICANT CHANGE UP (ref 0–0)
NRBC # FLD: 0.06 K/UL — SIGNIFICANT CHANGE UP (ref 0–0)
PLATELET # BLD AUTO: 855 K/UL — HIGH (ref 150–400)
PLATELET # BLD AUTO: 913 K/UL — HIGH (ref 150–400)
PMV BLD: 9 FL — SIGNIFICANT CHANGE UP (ref 7–13)
PMV BLD: 9 FL — SIGNIFICANT CHANGE UP (ref 7–13)
POTASSIUM SERPL-MCNC: 4.4 MMOL/L — SIGNIFICANT CHANGE UP (ref 3.5–5.3)
POTASSIUM SERPL-SCNC: 4.4 MMOL/L — SIGNIFICANT CHANGE UP (ref 3.5–5.3)
PROT SERPL-MCNC: 7.1 G/DL — SIGNIFICANT CHANGE UP (ref 6–8.3)
PROTHROM AB SERPL-ACNC: 13.7 SEC — HIGH (ref 9.8–13.1)
RBC # BLD: 3.91 M/UL — SIGNIFICANT CHANGE UP (ref 3.8–5.2)
RBC # BLD: 4.25 M/UL — SIGNIFICANT CHANGE UP (ref 3.8–5.2)
RBC # FLD: 13.7 % — SIGNIFICANT CHANGE UP (ref 10.3–14.5)
RBC # FLD: 13.8 % — SIGNIFICANT CHANGE UP (ref 10.3–14.5)
RH IG SCN BLD-IMP: POSITIVE — SIGNIFICANT CHANGE UP
SODIUM SERPL-SCNC: 130 MMOL/L — LOW (ref 135–145)
WBC # BLD: 10.96 K/UL — HIGH (ref 3.8–10.5)
WBC # BLD: 12.03 K/UL — HIGH (ref 3.8–10.5)
WBC # FLD AUTO: 10.96 K/UL — HIGH (ref 3.8–10.5)
WBC # FLD AUTO: 12.03 K/UL — HIGH (ref 3.8–10.5)

## 2019-05-07 PROCEDURE — 99233 SBSQ HOSP IP/OBS HIGH 50: CPT

## 2019-05-07 PROCEDURE — 99231 SBSQ HOSP IP/OBS SF/LOW 25: CPT

## 2019-05-07 PROCEDURE — 75989 ABSCESS DRAINAGE UNDER X-RAY: CPT | Mod: 26,GC

## 2019-05-07 PROCEDURE — 33015: CPT

## 2019-05-07 PROCEDURE — 71045 X-RAY EXAM CHEST 1 VIEW: CPT | Mod: 26

## 2019-05-07 RX ADMIN — OXYCODONE HYDROCHLORIDE 5 MILLIGRAM(S): 5 TABLET ORAL at 02:00

## 2019-05-07 RX ADMIN — PANTOPRAZOLE SODIUM 40 MILLIGRAM(S): 20 TABLET, DELAYED RELEASE ORAL at 04:18

## 2019-05-07 RX ADMIN — Medication 3 MILLILITER(S): at 22:02

## 2019-05-07 RX ADMIN — OXYCODONE HYDROCHLORIDE 5 MILLIGRAM(S): 5 TABLET ORAL at 15:46

## 2019-05-07 RX ADMIN — OXYCODONE HYDROCHLORIDE 5 MILLIGRAM(S): 5 TABLET ORAL at 21:25

## 2019-05-07 RX ADMIN — PIPERACILLIN AND TAZOBACTAM 25 GRAM(S): 4; .5 INJECTION, POWDER, LYOPHILIZED, FOR SOLUTION INTRAVENOUS at 21:20

## 2019-05-07 RX ADMIN — HEPARIN SODIUM 900 UNIT(S)/HR: 5000 INJECTION INTRAVENOUS; SUBCUTANEOUS at 04:19

## 2019-05-07 RX ADMIN — OXYCODONE HYDROCHLORIDE 5 MILLIGRAM(S): 5 TABLET ORAL at 22:25

## 2019-05-07 RX ADMIN — GABAPENTIN 100 MILLIGRAM(S): 400 CAPSULE ORAL at 17:28

## 2019-05-07 RX ADMIN — Medication 3 MILLILITER(S): at 04:33

## 2019-05-07 RX ADMIN — OXYCODONE HYDROCHLORIDE 5 MILLIGRAM(S): 5 TABLET ORAL at 14:50

## 2019-05-07 RX ADMIN — Medication 40 MILLIGRAM(S): at 04:18

## 2019-05-07 RX ADMIN — Medication 3 MILLILITER(S): at 15:38

## 2019-05-07 RX ADMIN — PIPERACILLIN AND TAZOBACTAM 25 GRAM(S): 4; .5 INJECTION, POWDER, LYOPHILIZED, FOR SOLUTION INTRAVENOUS at 13:33

## 2019-05-07 RX ADMIN — Medication 5 MILLIGRAM(S): at 04:18

## 2019-05-07 RX ADMIN — Medication 250 MILLIGRAM(S): at 12:15

## 2019-05-07 RX ADMIN — GABAPENTIN 100 MILLIGRAM(S): 400 CAPSULE ORAL at 04:18

## 2019-05-07 RX ADMIN — PIPERACILLIN AND TAZOBACTAM 25 GRAM(S): 4; .5 INJECTION, POWDER, LYOPHILIZED, FOR SOLUTION INTRAVENOUS at 04:18

## 2019-05-07 RX ADMIN — SODIUM CHLORIDE 1 GRAM(S): 9 INJECTION INTRAMUSCULAR; INTRAVENOUS; SUBCUTANEOUS at 04:18

## 2019-05-07 RX ADMIN — OXYCODONE HYDROCHLORIDE 5 MILLIGRAM(S): 5 TABLET ORAL at 01:07

## 2019-05-07 RX ADMIN — Medication 3 MILLILITER(S): at 09:48

## 2019-05-07 NOTE — PROGRESS NOTE ADULT - PROBLEM SELECTOR PLAN 1
Patient with 4 doses of Oxycodone 5mg in 24 hours.  Continues to complain of dyspnea - pending IR drainage of pericardial effusion.  O2 in use.  Continue management as per CT surgery, IR, and primary team. No changes to current regimen.

## 2019-05-07 NOTE — PROGRESS NOTE ADULT - PROBLEM SELECTOR PLAN 1
(+) large pericardial effusion with signs of early cardiac tamponade.   -Continue Tele monitoring  -Continue Heparin drip  -NPO except meds  -IR or CT surgery drainage today.  -F/U IR and CT surgery rec.

## 2019-05-07 NOTE — CHART NOTE - NSCHARTNOTEFT_GEN_A_CORE
PRE-INTERVENTIONAL RADIOLOGY  PROCEDURE NOTE  Patient Age: 54  Patient Gender: F  Procedure (including site / side if known): Pericardial Drainage  Diagnosis / Indication: Pericardial effusion  Interventional Radiology Attending Physician: Dr. Julio  Ordering Attending Physician: Dr. Conway  Pertinent medical history: 54F with PMH metastatic R lung adenocarcinoma on chemo, chronic R pleural effusion s/p pleurx and VATS, subxiphoid pericardial window on 3/20/19  Pertinent labs: Hg 11.1  Patient and Family aware? Yes

## 2019-05-07 NOTE — PROGRESS NOTE ADULT - PROBLEM SELECTOR PLAN 7
-Continue heparin drip, then consider resuming  Lovenox after the procedure.  -Continue home meds  -Currently on RA, saturating well

## 2019-05-07 NOTE — PROGRESS NOTE ADULT - ASSESSMENT
54F with PMHx of metastatic R lung adenocarcinoma on chemo, chronic right pleural effusion s/p pleurx and VATS, subxiphoid pericardial window on 3/20/19, PE on Lovenox s/p IVC filter placement presenting with shortness of breath and cough x 2 days. (+) signs of early cardiac tamponade on Echo

## 2019-05-07 NOTE — PROGRESS NOTE ADULT - SUBJECTIVE AND OBJECTIVE BOX
INTERVAL HPI/OVERNIGHT EVENTS: Patient with dyspnea and intermittent pain     Code Status: Full Code   Allergies    No Known Allergies    Intolerances    IV contrast (Rash)  MEDICATIONS  (STANDING):  ALBUTerol/ipratropium for Nebulization 3 milliLiter(s) Nebulizer every 6 hours  gabapentin 100 milliGRAM(s) Oral every 12 hours  methylPREDNISolone sodium succinate Injectable 40 milliGRAM(s) IV Push daily  metoclopramide 5 milliGRAM(s) Oral three times a day before meals  pantoprazole    Tablet 40 milliGRAM(s) Oral before breakfast  piperacillin/tazobactam IVPB. 3.375 Gram(s) IV Intermittent every 8 hours  senna 2 Tablet(s) Oral at bedtime  sodium chloride 1 Gram(s) Oral two times a day  vancomycin  IVPB 1000 milliGRAM(s) IV Intermittent daily    MEDICATIONS  (PRN):  heparin  Injectable 4000 Unit(s) IV Push every 6 hours PRN For aPTT less than 40  heparin  Injectable 2000 Unit(s) IV Push every 6 hours PRN For aPTT between 40 - 57  HYDROcodone/homatropine Syrup 5 milliLiter(s) Oral every 6 hours PRN Cough  oxyCODONE    IR 5 milliGRAM(s) Oral every 3 hours PRN moderate and severe pain/shortness of breath      PRESENT SYMPTOMS: [ ]Unable to obtain due to poor mentation   Source if other than patient:  [ ]Family   [ ]Team     Pain (Impact on QOL):  Patient complained of low back pain - 4/10 - sharp - aggravated by movement - relieved with opioids     Dyspnea:  Yes [x ] No [ ] - [ ]Mild [x ]Moderate [ x]Severe  Anxiety:    Yes [x ] No [ ] - [x ]Mild [ ]Moderate [ ]Severe  Fatigue:    Yes [x ] No [ ] - [x ]Mild [ ]Moderate [ ]Severe  Nausea:    Yes [ ] No [x ] - [ ]Mild [ ]Moderate [ ]Severe                         Loss of appetite: Yes [x ] No [ ] - [ ]Mild [x ]Moderate [ ]Severe             Constipation:  Yes [ ] No [x ] - [ ]Mild [ ]Moderate [ ]Severe  Grief: Yes [x ] No [ ]     PAIN AD Score:	  http://geriatrictoolkit.[email or web address]/painad.pdf (Ctrl + left click to view)    Other Symptoms:  [xAll other review of systems negative     Karnofsky Performance Score/Palliative Performance Status Version 2:  50%    http://palliative.info/resource_material/PPSv2.pdf    PHYSICAL EXAM:  Vital Signs Last 24 Hrs  T(C): 36.5 (07 May 2019 16:00), Max: 36.6 (07 May 2019 12:00)  T(F): 97.7 (07 May 2019 16:00), Max: 97.8 (07 May 2019 12:00)  HR: 108 (07 May 2019 16:00) (87 - 114)  BP: 119/77 (07 May 2019 16:00) (102/49 - 119/77)  BP(mean): --  RR: 18 (07 May 2019 16:00) (18 - 20)  SpO2: 98% (07 May 2019 16:00) (95% - 100%) I&O's Summary    06 May 2019 07:01  -  07 May 2019 07:00  --------------------------------------------------------  IN: 377 mL / OUT: 65 mL / NET: 312 mL         GENERAL:  [x ]Alert  [x ]Oriented x4   [ ]Lethargic  [ ]Cachexia  [ ]Unarousable  [ x]Verbal  [ ]Non-Verbal  PULMONARY:   Decreased to right - pleurx in place - capped   CARDIOVASCULAR:    [x ]Regular [ ]Irregular [ ]Tachy  [ ]Ford [ ]Murmur [ ]Other  GASTROINTESTINAL:  [x ]Soft  [ ]Distended   [x ]+BS  [x ]Non tender [ ]Tender  [ ]PEG [ ]OGT/ NGT   Last BM: 5/7/19     GENITOURINARY:  [ x]Normal [ ] Incontinent   [ ]Oliguria/Anuria   [ ]Sanchez  MUSCULOSKELETAL:   [ ]Normal   [ x]Weakness  [ ]Bed/Wheelchair bound [ ]Edema  NEUROLOGIC:   [x ]No focal deficits  [ ] Cognitive impairment  [ ] Dysphagia [ ]Dysarthria [ ] Paresis [ ]Other   SKIN:   [ x]Normal   [ ]Pressure ulcer(s)  [ ]Rash    CRITICAL CARE:  [ ] Shock Present  [ ]Septic [ ]Cardiogenic [ ]Neurologic [ ]Hypovolemic  [ ]  Vasopressors [ ]  Inotropes   [ ] Respiratory failure present  [ ] Acute  [ ] Chronic [ ] Hypoxic  [ ] Hypercarbic [ ] Other  [ ] Other organ failure     LABS:                        11.1   10.96 )-----------( 913      ( 07 May 2019 10:31 )             35.9   05-07    130<L>  |  95<L>  |  14  ----------------------------<  134<H>  4.4   |  22  |  0.69    Ca    8.8      07 May 2019 04:40    TPro  7.1  /  Alb  3.5  /  TBili  0.2  /  DBili  x   /  AST  20  /  ALT  26  /  AlkPhos  129<H>  05-07  PT/INR - ( 07 May 2019 04:40 )   PT: 13.7 SEC;   INR: 1.23          PTT - ( 07 May 2019 10:31 )  PTT:31.9 SEC      RADIOLOGY & ADDITIONAL STUDIES:    Protein Calorie Malnutrition Present: [ ] yes [ ] no  [ ] PPSV2 < or = 30%  [ ] significant weight loss [ ] poor nutritional intake [ ] anasarca [ ] catabolic state Albumin, Serum: 3.5 g/dL (05-07-19 @ 04:40)      REFERRALS:   [ ]Chaplaincy  [ ] Hospice  [ ]Child Life  [ ]Social Work  [ ]Case management [ ]Holistic Therapy   Goals of Care Document:

## 2019-05-07 NOTE — PROGRESS NOTE ADULT - PROBLEM SELECTOR PLAN 5
-Malignant pleural effusion based on previous thoracentesis, loculated right pleural effusion on CT  -Likely loculated or drain not in place  -Case discussed with CT surg and no further intervention is being considered at this time.  Given size and symptoms will need to be drained, S/p Chest tube placement   -f/u CT surgery recs

## 2019-05-07 NOTE — PROGRESS NOTE ADULT - SUBJECTIVE AND OBJECTIVE BOX
Pt seen and examined. Still c/o SOB but no acute event overnight  Pleurx disconnected earlier today, reconnected by RN.    Vital Signs Last 24 Hrs  T(C): 36.6 (07 May 2019 12:00), Max: 36.7 (06 May 2019 14:45)  T(F): 97.8 (07 May 2019 12:00), Max: 98 (06 May 2019 14:45)  HR: 113 (07 May 2019 12:00) (87 - 114)  BP: 115/71 (07 May 2019 12:00) (102/49 - 115/71)  BP(mean): --  RR: 18 (07 May 2019 12:00) (18 - 20)  SpO2: 98% (07 May 2019 12:00) (95% - 100%)    A+O x 3  Family at bedside  Dec BS throughout Rt. side.   Rt. Pleurx w minimal output x 4 days, to pleuravac  No air leak  Echo results noted. Mod to larger pericardial effusion, multi loculated.    A/P: 53yo F w metastatic Breast CA now w inc pericardial effusion.     -Will cap Pleurx since minimal output, Will need to be drained  2x per week by bedside RN. Can inc to 3 x /week if symptoms worsen  -D/w IR, recommend pericardial drainage even though loculated fluid   Will follow as needed. Dw/ pt, primary tream.

## 2019-05-08 ENCOUNTER — APPOINTMENT (OUTPATIENT)
Dept: INFUSION THERAPY | Facility: HOSPITAL | Age: 54
End: 2019-05-08

## 2019-05-08 LAB
ALBUMIN SERPL ELPH-MCNC: 3.1 G/DL — LOW (ref 3.3–5)
ALP SERPL-CCNC: 102 U/L — SIGNIFICANT CHANGE UP (ref 40–120)
ALT FLD-CCNC: 19 U/L — SIGNIFICANT CHANGE UP (ref 4–33)
ANION GAP SERPL CALC-SCNC: 12 MMO/L — SIGNIFICANT CHANGE UP (ref 7–14)
APTT BLD: 24.6 SEC — LOW (ref 27.5–36.3)
APTT BLD: 47.9 SEC — HIGH (ref 27.5–36.3)
AST SERPL-CCNC: 16 U/L — SIGNIFICANT CHANGE UP (ref 4–32)
BASOPHILS # BLD AUTO: 0.02 K/UL — SIGNIFICANT CHANGE UP (ref 0–0.2)
BASOPHILS NFR BLD AUTO: 0.1 % — SIGNIFICANT CHANGE UP (ref 0–2)
BILIRUB SERPL-MCNC: 0.3 MG/DL — SIGNIFICANT CHANGE UP (ref 0.2–1.2)
BUN SERPL-MCNC: 14 MG/DL — SIGNIFICANT CHANGE UP (ref 7–23)
CALCIUM SERPL-MCNC: 8.9 MG/DL — SIGNIFICANT CHANGE UP (ref 8.4–10.5)
CHLORIDE SERPL-SCNC: 97 MMOL/L — LOW (ref 98–107)
CO2 SERPL-SCNC: 25 MMOL/L — SIGNIFICANT CHANGE UP (ref 22–31)
CREAT SERPL-MCNC: 0.77 MG/DL — SIGNIFICANT CHANGE UP (ref 0.5–1.3)
EOSINOPHIL # BLD AUTO: 0.34 K/UL — SIGNIFICANT CHANGE UP (ref 0–0.5)
EOSINOPHIL NFR BLD AUTO: 2.5 % — SIGNIFICANT CHANGE UP (ref 0–6)
GLUCOSE SERPL-MCNC: 113 MG/DL — HIGH (ref 70–99)
HCT VFR BLD CALC: 32.7 % — LOW (ref 34.5–45)
HCT VFR BLD CALC: 32.7 % — LOW (ref 34.5–45)
HCT VFR BLD CALC: 34.7 % — SIGNIFICANT CHANGE UP (ref 34.5–45)
HGB BLD-MCNC: 10.3 G/DL — LOW (ref 11.5–15.5)
HGB BLD-MCNC: 10.3 G/DL — LOW (ref 11.5–15.5)
HGB BLD-MCNC: 10.7 G/DL — LOW (ref 11.5–15.5)
IMM GRANULOCYTES NFR BLD AUTO: 1.1 % — SIGNIFICANT CHANGE UP (ref 0–1.5)
LYMPHOCYTES # BLD AUTO: 1.18 K/UL — SIGNIFICANT CHANGE UP (ref 1–3.3)
LYMPHOCYTES # BLD AUTO: 8.7 % — LOW (ref 13–44)
MAGNESIUM SERPL-MCNC: 2.3 MG/DL — SIGNIFICANT CHANGE UP (ref 1.6–2.6)
MCHC RBC-ENTMCNC: 26 PG — LOW (ref 27–34)
MCHC RBC-ENTMCNC: 26.3 PG — LOW (ref 27–34)
MCHC RBC-ENTMCNC: 26.3 PG — LOW (ref 27–34)
MCHC RBC-ENTMCNC: 30.8 % — LOW (ref 32–36)
MCHC RBC-ENTMCNC: 31.5 % — LOW (ref 32–36)
MCHC RBC-ENTMCNC: 31.5 % — LOW (ref 32–36)
MCV RBC AUTO: 83.6 FL — SIGNIFICANT CHANGE UP (ref 80–100)
MCV RBC AUTO: 83.6 FL — SIGNIFICANT CHANGE UP (ref 80–100)
MCV RBC AUTO: 84.2 FL — SIGNIFICANT CHANGE UP (ref 80–100)
MONOCYTES # BLD AUTO: 1.5 K/UL — HIGH (ref 0–0.9)
MONOCYTES NFR BLD AUTO: 11.1 % — SIGNIFICANT CHANGE UP (ref 2–14)
NEUTROPHILS # BLD AUTO: 10.31 K/UL — HIGH (ref 1.8–7.4)
NEUTROPHILS NFR BLD AUTO: 76.5 % — SIGNIFICANT CHANGE UP (ref 43–77)
NRBC # FLD: 0.04 K/UL — SIGNIFICANT CHANGE UP (ref 0–0)
NRBC # FLD: 0.06 K/UL — SIGNIFICANT CHANGE UP (ref 0–0)
NRBC # FLD: 0.06 K/UL — SIGNIFICANT CHANGE UP (ref 0–0)
PHOSPHATE SERPL-MCNC: 2.8 MG/DL — SIGNIFICANT CHANGE UP (ref 2.5–4.5)
PLATELET # BLD AUTO: 797 K/UL — HIGH (ref 150–400)
PLATELET # BLD AUTO: 797 K/UL — HIGH (ref 150–400)
PLATELET # BLD AUTO: 857 K/UL — HIGH (ref 150–400)
PMV BLD: 8.8 FL — SIGNIFICANT CHANGE UP (ref 7–13)
PMV BLD: 8.9 FL — SIGNIFICANT CHANGE UP (ref 7–13)
PMV BLD: 8.9 FL — SIGNIFICANT CHANGE UP (ref 7–13)
POTASSIUM SERPL-MCNC: 4.2 MMOL/L — SIGNIFICANT CHANGE UP (ref 3.5–5.3)
POTASSIUM SERPL-SCNC: 4.2 MMOL/L — SIGNIFICANT CHANGE UP (ref 3.5–5.3)
PROT SERPL-MCNC: 5.9 G/DL — LOW (ref 6–8.3)
RBC # BLD: 3.91 M/UL — SIGNIFICANT CHANGE UP (ref 3.8–5.2)
RBC # BLD: 3.91 M/UL — SIGNIFICANT CHANGE UP (ref 3.8–5.2)
RBC # BLD: 4.12 M/UL — SIGNIFICANT CHANGE UP (ref 3.8–5.2)
RBC # FLD: 13.9 % — SIGNIFICANT CHANGE UP (ref 10.3–14.5)
RBC # FLD: 13.9 % — SIGNIFICANT CHANGE UP (ref 10.3–14.5)
RBC # FLD: 14.1 % — SIGNIFICANT CHANGE UP (ref 10.3–14.5)
SODIUM SERPL-SCNC: 134 MMOL/L — LOW (ref 135–145)
VANCOMYCIN TROUGH SERPL-MCNC: 8.4 UG/ML — LOW (ref 10–20)
WBC # BLD: 13.25 K/UL — HIGH (ref 3.8–10.5)
WBC # BLD: 13.5 K/UL — HIGH (ref 3.8–10.5)
WBC # BLD: 13.5 K/UL — HIGH (ref 3.8–10.5)
WBC # FLD AUTO: 13.25 K/UL — HIGH (ref 3.8–10.5)
WBC # FLD AUTO: 13.5 K/UL — HIGH (ref 3.8–10.5)
WBC # FLD AUTO: 13.5 K/UL — HIGH (ref 3.8–10.5)

## 2019-05-08 PROCEDURE — 71045 X-RAY EXAM CHEST 1 VIEW: CPT | Mod: 26

## 2019-05-08 PROCEDURE — 99232 SBSQ HOSP IP/OBS MODERATE 35: CPT | Mod: GC

## 2019-05-08 PROCEDURE — 99231 SBSQ HOSP IP/OBS SF/LOW 25: CPT

## 2019-05-08 PROCEDURE — 99233 SBSQ HOSP IP/OBS HIGH 50: CPT

## 2019-05-08 RX ORDER — HEPARIN SODIUM 5000 [USP'U]/ML
4000 INJECTION INTRAVENOUS; SUBCUTANEOUS EVERY 6 HOURS
Qty: 0 | Refills: 0 | Status: DISCONTINUED | OUTPATIENT
Start: 2019-05-08 | End: 2019-05-09

## 2019-05-08 RX ORDER — MORPHINE SULFATE 15 MG/1
15 TABLET ORAL
Qty: 6 | Refills: 0 | Status: DISCONTINUED | OUTPATIENT
Start: 2019-05-08 | End: 2019-05-08

## 2019-05-08 RX ORDER — HEPARIN SODIUM 5000 [USP'U]/ML
2000 INJECTION INTRAVENOUS; SUBCUTANEOUS EVERY 6 HOURS
Qty: 0 | Refills: 0 | Status: DISCONTINUED | OUTPATIENT
Start: 2019-05-08 | End: 2019-05-09

## 2019-05-08 RX ORDER — HEPARIN SODIUM 5000 [USP'U]/ML
INJECTION INTRAVENOUS; SUBCUTANEOUS
Qty: 25000 | Refills: 0 | Status: DISCONTINUED | OUTPATIENT
Start: 2019-05-08 | End: 2019-05-09

## 2019-05-08 RX ADMIN — HEPARIN SODIUM 1000 UNIT(S)/HR: 5000 INJECTION INTRAVENOUS; SUBCUTANEOUS at 18:27

## 2019-05-08 RX ADMIN — Medication 3 MILLILITER(S): at 10:27

## 2019-05-08 RX ADMIN — GABAPENTIN 100 MILLIGRAM(S): 400 CAPSULE ORAL at 05:04

## 2019-05-08 RX ADMIN — HEPARIN SODIUM 2000 UNIT(S): 5000 INJECTION INTRAVENOUS; SUBCUTANEOUS at 18:27

## 2019-05-08 RX ADMIN — HEPARIN SODIUM 900 UNIT(S)/HR: 5000 INJECTION INTRAVENOUS; SUBCUTANEOUS at 11:45

## 2019-05-08 RX ADMIN — PANTOPRAZOLE SODIUM 40 MILLIGRAM(S): 20 TABLET, DELAYED RELEASE ORAL at 05:04

## 2019-05-08 RX ADMIN — PIPERACILLIN AND TAZOBACTAM 25 GRAM(S): 4; .5 INJECTION, POWDER, LYOPHILIZED, FOR SOLUTION INTRAVENOUS at 21:41

## 2019-05-08 RX ADMIN — Medication 5 MILLIGRAM(S): at 11:46

## 2019-05-08 RX ADMIN — OXYCODONE HYDROCHLORIDE 5 MILLIGRAM(S): 5 TABLET ORAL at 18:16

## 2019-05-08 RX ADMIN — OXYCODONE HYDROCHLORIDE 5 MILLIGRAM(S): 5 TABLET ORAL at 02:00

## 2019-05-08 RX ADMIN — OXYCODONE HYDROCHLORIDE 5 MILLIGRAM(S): 5 TABLET ORAL at 21:40

## 2019-05-08 RX ADMIN — Medication 40 MILLIGRAM(S): at 05:04

## 2019-05-08 RX ADMIN — GABAPENTIN 100 MILLIGRAM(S): 400 CAPSULE ORAL at 17:16

## 2019-05-08 RX ADMIN — OXYCODONE HYDROCHLORIDE 5 MILLIGRAM(S): 5 TABLET ORAL at 01:00

## 2019-05-08 RX ADMIN — Medication 5 MILLIGRAM(S): at 17:16

## 2019-05-08 RX ADMIN — Medication 3 MILLILITER(S): at 03:15

## 2019-05-08 RX ADMIN — Medication 3 MILLILITER(S): at 21:57

## 2019-05-08 RX ADMIN — OXYCODONE HYDROCHLORIDE 5 MILLIGRAM(S): 5 TABLET ORAL at 22:30

## 2019-05-08 RX ADMIN — Medication 250 MILLIGRAM(S): at 11:45

## 2019-05-08 RX ADMIN — OXYCODONE HYDROCHLORIDE 5 MILLIGRAM(S): 5 TABLET ORAL at 17:16

## 2019-05-08 RX ADMIN — Medication 5 MILLIGRAM(S): at 05:04

## 2019-05-08 RX ADMIN — PIPERACILLIN AND TAZOBACTAM 25 GRAM(S): 4; .5 INJECTION, POWDER, LYOPHILIZED, FOR SOLUTION INTRAVENOUS at 12:54

## 2019-05-08 RX ADMIN — SODIUM CHLORIDE 1 GRAM(S): 9 INJECTION INTRAMUSCULAR; INTRAVENOUS; SUBCUTANEOUS at 05:04

## 2019-05-08 RX ADMIN — PIPERACILLIN AND TAZOBACTAM 25 GRAM(S): 4; .5 INJECTION, POWDER, LYOPHILIZED, FOR SOLUTION INTRAVENOUS at 05:04

## 2019-05-08 NOTE — CHART NOTE - NSCHARTNOTEFT_GEN_A_CORE
Family and primary team feel that there is more risk to interventional pulmonology attempt to improve patient's symptoms than potential benefit at this time. As such we will sign off the case for now. If there is anything else that the pulmonary service can offer, please feel free to re-consult and we would be more than happy to help participate in any further care of this patient.     - Patient no longer needs to be NPOpMN, no specific labs need to be drawn. Anticoagulation can be continued per primary team.         Sandeep Greenfield MD  PGY-4  Pulmonary and Critical Care Fellow  Pager: [phone]

## 2019-05-08 NOTE — PROGRESS NOTE ADULT - SUBJECTIVE AND OBJECTIVE BOX
CHIEF COMPLAINT:    Interval Events: Patient currently s/p paracentesis which is c/d/i. Patient is afebrile, denies SOB/TAMAYO, or cough. She is on 2L supplemental O2.     REVIEW OF SYSTEMS:  CONSTITUTIONAL: +weakness. No fevers or chills  EYES/ENT: No visual changes;  No vertigo or throat pain   NECK: No pain or stiffness  RESPIRATORY: No cough, wheezing, hemoptysis; No shortness of breath  CARDIOVASCULAR: No chest pain or palpitations  GASTROINTESTINAL: No abdominal or epigastric pain. No nausea, vomiting, or hematemesis; No diarrhea or constipation. No melena or hematochezia.  GENITOURINARY: No dysuria, frequency or hematuria  NEUROLOGICAL: No numbness or weakness  SKIN: No itching, burning, rashes, or lesions   All other review of systems is negative unless indicated above.    OBJECTIVE:  ICU Vital Signs Last 24 Hrs  T(C): 36.6 (08 May 2019 04:59), Max: 36.8 (08 May 2019 00:00)  T(F): 97.8 (08 May 2019 04:59), Max: 98.2 (08 May 2019 00:00)  HR: 101 (08 May 2019 04:59) (87 - 118)  BP: 102/65 (08 May 2019 04:59) (102/65 - 119/77)  BP(mean): --  ABP: --  ABP(mean): --  RR: 18 (08 May 2019 04:59) (18 - 18)  SpO2: 100% (08 May 2019 04:59) (93% - 100%)        05-07 @ 07:01  -  05-08 @ 07:00  --------------------------------------------------------  IN: 480 mL / OUT: 35 mL / NET: 445 mL      CAPILLARY BLOOD GLUCOSE          PHYSICAL EXAM:  General: WN/WD. Tired appearing and thin.  Neurology: A&Ox3, nonfocal, PAZ x 4  Eyes: PERRLA/ EOMI, Gross vision intact  ENT/Neck: Neck supple, trachea midline, No JVD, Gross hearing intact  Respiratory: CTA B/L, No wheezing, rales, rhonchi  CV: RRR, +S1/S2, -S3/S4, no murmurs, rubs or gallops  Abdominal: Soft, NT, ND +BS, No HSM  MSK: 5/5 strength UE/LE bilaterally  Extremities: No edema, 2+ peripheral pulses  Skin: No Rashes, Hematoma, Ecchymosis      HOSPITAL MEDICATIONS:  MEDICATIONS  (STANDING):  ALBUTerol/ipratropium for Nebulization 3 milliLiter(s) Nebulizer every 6 hours  gabapentin 100 milliGRAM(s) Oral every 12 hours  metoclopramide 5 milliGRAM(s) Oral three times a day before meals  pantoprazole    Tablet 40 milliGRAM(s) Oral before breakfast  piperacillin/tazobactam IVPB. 3.375 Gram(s) IV Intermittent every 8 hours  senna 2 Tablet(s) Oral at bedtime  sodium chloride 1 Gram(s) Oral two times a day  vancomycin  IVPB 1000 milliGRAM(s) IV Intermittent daily    MEDICATIONS  (PRN):  heparin  Injectable 4000 Unit(s) IV Push every 6 hours PRN For aPTT less than 40  heparin  Injectable 2000 Unit(s) IV Push every 6 hours PRN For aPTT between 40 - 57  HYDROcodone/homatropine Syrup 5 milliLiter(s) Oral every 6 hours PRN Cough  oxyCODONE    IR 5 milliGRAM(s) Oral every 3 hours PRN moderate and severe pain/shortness of breath      LABS:                        10.3   13.50 )-----------( 797      ( 08 May 2019 06:11 )             32.7     Hgb Trend: 10.3<--, 11.1<--, 10.3<--, 10.2<--, 11.1<--  05-08    134<L>  |  97<L>  |  14  ----------------------------<  113<H>  4.2   |  25  |  0.77    Ca    8.9      08 May 2019 06:11  Phos  2.8     05-08  Mg     2.3     05-08    TPro  5.9<L>  /  Alb  3.1<L>  /  TBili  0.3  /  DBili  x   /  AST  16  /  ALT  19  /  AlkPhos  102  05-08    Creatinine Trend: 0.77<--, 0.69<--, 0.67<--, 0.69<--, 0.65<--, 0.63<--  PT/INR - ( 07 May 2019 04:40 )   PT: 13.7 SEC;   INR: 1.23          PTT - ( 08 May 2019 06:11 )  PTT:24.6 SEC          MICROBIOLOGY:     Culture - Acid Fast - Sputum w/Smear (collected 06 May 2019 00:18)  Source: SPUTUM  Final Report (06 May 2019 15:36):    AFB SMEAR= NO ACID FAST BACILLI SEEN CHIEF COMPLAINT:    Interval Events: Patient currently s/p paracentesis which is c/d/i. Patient is afebrile, denies SOB/TAMAYO, or cough. She is on 2L supplemental O2.     REVIEW OF SYSTEMS:  CONSTITUTIONAL: +weakness. No fevers or chills  EYES/ENT: No visual changes;  No vertigo or throat pain   NECK: No pain or stiffness  RESPIRATORY: No cough, wheezing, hemoptysis; No shortness of breath  CARDIOVASCULAR: No chest pain or palpitations  GASTROINTESTINAL: No abdominal or epigastric pain. No nausea, vomiting, or hematemesis; No diarrhea or constipation. No melena or hematochezia.  GENITOURINARY: No dysuria, frequency or hematuria  NEUROLOGICAL: No numbness or weakness  SKIN: No itching, burning, rashes, or lesions   All other review of systems is negative unless indicated above.    OBJECTIVE:  ICU Vital Signs Last 24 Hrs  T(C): 36.6 (08 May 2019 04:59), Max: 36.8 (08 May 2019 00:00)  T(F): 97.8 (08 May 2019 04:59), Max: 98.2 (08 May 2019 00:00)  HR: 101 (08 May 2019 04:59) (87 - 118)  BP: 102/65 (08 May 2019 04:59) (102/65 - 119/77)  BP(mean): --  ABP: --  ABP(mean): --  RR: 18 (08 May 2019 04:59) (18 - 18)  SpO2: 100% (08 May 2019 04:59) (93% - 100%)        05-07 @ 07:01  -  05-08 @ 07:00  --------------------------------------------------------  IN: 480 mL / OUT: 35 mL / NET: 445 mL      CAPILLARY BLOOD GLUCOSE          PHYSICAL EXAM:  General: WN/WD. Tired appearing and thin.  Neurology: A&Ox3, nonfocal, PAZ x 4  Eyes: PERRLA/ EOMI, Gross vision intact  ENT/Neck: Neck supple, trachea midline, No JVD, Gross hearing intact  Respiratory: CTA B/L, No wheezing, rales, rhonchi  CV: RRR, +S1/S2, -S3/S4, no murmurs, rubs or gallops  Abdominal: Soft, NT, ND +BS, No HSM  MSK: 5/5 strength UE/LE bilaterally  Extremities: No edema, 2+ peripheral pulses  Skin: No Rashes, Hematoma, Ecchymosis      HOSPITAL MEDICATIONS:  MEDICATIONS  (STANDING):  ALBUTerol/ipratropium for Nebulization 3 milliLiter(s) Nebulizer every 6 hours  gabapentin 100 milliGRAM(s) Oral every 12 hours  metoclopramide 5 milliGRAM(s) Oral three times a day before meals  pantoprazole    Tablet 40 milliGRAM(s) Oral before breakfast  piperacillin/tazobactam IVPB. 3.375 Gram(s) IV Intermittent every 8 hours  senna 2 Tablet(s) Oral at bedtime  sodium chloride 1 Gram(s) Oral two times a day  vancomycin  IVPB 1000 milliGRAM(s) IV Intermittent daily    MEDICATIONS  (PRN):  heparin  Injectable 4000 Unit(s) IV Push every 6 hours PRN For aPTT less than 40  heparin  Injectable 2000 Unit(s) IV Push every 6 hours PRN For aPTT between 40 - 57  HYDROcodone/homatropine Syrup 5 milliLiter(s) Oral every 6 hours PRN Cough  oxyCODONE    IR 5 milliGRAM(s) Oral every 3 hours PRN moderate and severe pain/shortness of breath      LABS:                        10.3   13.50 )-----------( 797      ( 08 May 2019 06:11 )             32.7     Hgb Trend: 10.3<--, 11.1<--, 10.3<--, 10.2<--, 11.1<--  05-08    134<L>  |  97<L>  |  14  ----------------------------<  113<H>  4.2   |  25  |  0.77    Ca    8.9      08 May 2019 06:11  Phos  2.8     05-08  Mg     2.3     05-08    TPro  5.9<L>  /  Alb  3.1<L>  /  TBili  0.3  /  DBili  x   /  AST  16  /  ALT  19  /  AlkPhos  102  05-08    Creatinine Trend: 0.77<--, 0.69<--, 0.67<--, 0.69<--, 0.65<--, 0.63<--  PT/INR - ( 07 May 2019 04:40 )   PT: 13.7 SEC;   INR: 1.23          PTT - ( 08 May 2019 06:11 )  PTT:24.6 SEC          MICROBIOLOGY:     Culture - Acid Fast - Sputum w/Smear (collected 06 May 2019 00:18)  Source: SPUTUM  Final Report (06 May 2019 15:36):    AFB SMEAR= NO ACID FAST BACILLI SEEN      IMAGING:  [x] Reviewed by me    CT chest w/ contrast: 5/5/2109  - Interval RBI obstruction not present compared to prior study  - Distal RLL occluded  - ILS with airspace disease on right  - Right sided loculated pleural effusion  - Mediastinal lymphadenopathy present

## 2019-05-08 NOTE — CONSULT NOTE ADULT - REASON FOR ADMISSION
shortness of breath, cough
Cough
shortness of breath, cough

## 2019-05-08 NOTE — CONSULT NOTE ADULT - CONSULT REQUESTED DATE/TIME
02-May-2019
04-May-2019 15:25
05-May-2019 12:30
03-May-2019 12:32
08-May-2019 15:52
03-May-2019 17:09

## 2019-05-08 NOTE — PROGRESS NOTE ADULT - SUBJECTIVE AND OBJECTIVE BOX
Patient is a 54y old  Female who presents with a chief complaint of shortness of breath, cough (08 May 2019 08:25)      SUBJECTIVE / OVERNIGHT EVENTS: S/p IR pericardial drain, feels better, decreased SOB.     MEDICATIONS  (STANDING):  ALBUTerol/ipratropium for Nebulization 3 milliLiter(s) Nebulizer every 6 hours  gabapentin 100 milliGRAM(s) Oral every 12 hours  metoclopramide 5 milliGRAM(s) Oral three times a day before meals  pantoprazole    Tablet 40 milliGRAM(s) Oral before breakfast  piperacillin/tazobactam IVPB. 3.375 Gram(s) IV Intermittent every 8 hours  senna 2 Tablet(s) Oral at bedtime  sodium chloride 1 Gram(s) Oral two times a day  vancomycin  IVPB 1000 milliGRAM(s) IV Intermittent daily    MEDICATIONS  (PRN):  heparin  Injectable 4000 Unit(s) IV Push every 6 hours PRN For aPTT less than 40  heparin  Injectable 2000 Unit(s) IV Push every 6 hours PRN For aPTT between 40 - 57  HYDROcodone/homatropine Syrup 5 milliLiter(s) Oral every 6 hours PRN Cough  oxyCODONE    IR 5 milliGRAM(s) Oral every 3 hours PRN moderate and severe pain/shortness of breath      Vital Signs Last 24 Hrs  T(C): 36.6 (08 May 2019 09:00), Max: 36.8 (08 May 2019 00:00)  T(F): 97.9 (08 May 2019 09:00), Max: 98.2 (08 May 2019 00:00)  HR: 93 (08 May 2019 09:00) (87 - 118)  BP: 108/68 (08 May 2019 09:00) (102/65 - 119/77)  BP(mean): --  RR: 18 (08 May 2019 09:00) (18 - 18)  SpO2: 97% (08 May 2019 09:00) (93% - 100%)  CAPILLARY BLOOD GLUCOSE        I&O's Summary    07 May 2019 07:01  -  08 May 2019 07:00  --------------------------------------------------------  IN: 480 mL / OUT: 35 mL / NET: 445 mL        PHYSICAL EXAM:  GENERAL: NAD, well-developed  HEAD:  Atraumatic, Normocephalic  EYES: EOMI, PERRLA, conjunctiva and sclera clear  NECK: Supple, No JVD  CHEST/LUNG: Clear to auscultation left lung, (+) decreased BS at right hemithorax; No wheeze  HEART: Regular rate and rhythm; No murmurs, rubs, or gallops, (+) pericardial drain.   ABDOMEN: Soft, Nontender, Nondistended; Bowel sounds present  EXTREMITIES:  2+ Peripheral Pulses, No clubbing, cyanosis, or edema  PSYCH: AAOx3  NEUROLOGY: non-focal  SKIN: No rashes or lesions    LABS:                        10.3   13.50 )-----------( 797      ( 08 May 2019 06:11 )             32.7     05-08    134<L>  |  97<L>  |  14  ----------------------------<  113<H>  4.2   |  25  |  0.77    Ca    8.9      08 May 2019 06:11  Phos  2.8     05-08  Mg     2.3     05-08    TPro  5.9<L>  /  Alb  3.1<L>  /  TBili  0.3  /  DBili  x   /  AST  16  /  ALT  19  /  AlkPhos  102  05-08    PT/INR - ( 07 May 2019 04:40 )   PT: 13.7 SEC;   INR: 1.23          PTT - ( 08 May 2019 06:11 )  PTT:24.6 SEC          RADIOLOGY & ADDITIONAL TESTS:    Imaging Personally Reviewed:    Consultant(s) Notes Reviewed:  Pulm, CT surgery    Care Discussed with Consultants/Other Providers:

## 2019-05-08 NOTE — CHART NOTE - NSCHARTNOTEFT_GEN_A_CORE
Discussed with IR resident P 50085 ok to restart heparin gtt ( ful a/c ) s/p pericardial drain 5/8/19. Will monitor.

## 2019-05-08 NOTE — PROGRESS NOTE ADULT - PROBLEM SELECTOR PLAN 1
(+) large pericardial effusion with signs of early cardiac tamponade. S/P IR drainage of pericardial effusion. Improving.   -Continue Tele monitoring  -Resart Heparin drip if OK with IR  -F/U with IR and CT surgery  -Cardiology consult for pre-procedural clerance for Bronch as per Pulm

## 2019-05-08 NOTE — CONSULT NOTE ADULT - SUBJECTIVE AND OBJECTIVE BOX
CHIEF COMPLAINT:Patient is a 54y old  Female who presents with a chief complaint of shortness of breath, cough (08 May 2019 12:45)      HISTORY OF PRESENT ILLNESS:    54 female with history as below   Metastatic lung disease  PE   Pericardial effusion s/p window with recurrence now   s/p IR drainage   has sob  may need Bronchoscopy but pt does not want it  cardiology is called for eval     PAST MEDICAL & SURGICAL HISTORY:  Pulmonary embolus  Lung cancer: with mets, adenocardinoma  S/P thoracentesis  S/P pericardiocentesis  History of lung surgery: VATS          MEDICATIONS:  heparin  Infusion.  Unit(s)/Hr IV Continuous <Continuous>  heparin  Injectable 4000 Unit(s) IV Push every 6 hours PRN  heparin  Injectable 2000 Unit(s) IV Push every 6 hours PRN    piperacillin/tazobactam IVPB. 3.375 Gram(s) IV Intermittent every 8 hours  vancomycin  IVPB 1000 milliGRAM(s) IV Intermittent daily    ALBUTerol/ipratropium for Nebulization 3 milliLiter(s) Nebulizer every 6 hours  HYDROcodone/homatropine Syrup 5 milliLiter(s) Oral every 6 hours PRN    gabapentin 100 milliGRAM(s) Oral every 12 hours  oxyCODONE    IR 5 milliGRAM(s) Oral every 3 hours PRN    metoclopramide 5 milliGRAM(s) Oral three times a day before meals  pantoprazole    Tablet 40 milliGRAM(s) Oral before breakfast  senna 2 Tablet(s) Oral at bedtime      sodium chloride 1 Gram(s) Oral two times a day      FAMILY HISTORY:  Family history of coronary artery disease (Sibling): Brother  Family history of hypertension: Mother  Family history of diabetes mellitus (DM): Mother      Non-contributory    SOCIAL HISTORY:    No tobacco, drugs or etoh    Allergies    No Known Allergies    Intolerances    IV contrast (Rash)  	    REVIEW OF SYSTEMS:  as above  The rest of the 14 points ROS reviewed and except above they are unremarkable.        PHYSICAL EXAM:  T(C): 36.5 (05-08-19 @ 13:00), Max: 36.8 (05-08-19 @ 00:00)  HR: 95 (05-08-19 @ 13:00) (87 - 118)  BP: 113/77 (05-08-19 @ 13:00) (102/65 - 119/77)  RR: 18 (05-08-19 @ 13:00) (18 - 18)  SpO2: 95% (05-08-19 @ 13:00) (93% - 100%)  Wt(kg): --  I&O's Summary    07 May 2019 07:01  -  08 May 2019 07:00  --------------------------------------------------------  IN: 480 mL / OUT: 35 mL / NET: 445 mL    08 May 2019 07:01  -  08 May 2019 15:52  --------------------------------------------------------  IN: 206 mL / OUT: 0 mL / NET: 206 mL        JVP: Normal  Neck: supple  Lung: clear   CV: S1 S2 , Murmur:  Abd: soft  Ext: No edema  neuro: Awake   Psych: flat affect  Skin: normal      LABS/DATA:    TELEMETRY: 	    ECG:  	   	  CARDIAC MARKERS:                                      10.3   13.50 )-----------( 797      ( 08 May 2019 06:11 )             32.7     05-08    134<L>  |  97<L>  |  14  ----------------------------<  113<H>  4.2   |  25  |  0.77    Ca    8.9      08 May 2019 06:11  Phos  2.8     05-08  Mg     2.3     05-08    TPro  5.9<L>  /  Alb  3.1<L>  /  TBili  0.3  /  DBili  x   /  AST  16  /  ALT  19  /  AlkPhos  102  05-08    proBNP: Serum Pro-Brain Natriuretic Peptide: 1215 pg/mL (05-02 @ 10:30)    Lipid Profile:   HgA1c:   TSH:       < from: Transthoracic Echocardiogram (05.06.19 @ 17:19) >  ------------------------------------------------------------------------  CONCLUSIONS:  1. Mitral annular calcification, otherwise normal mitral  valve. Minimal mitral regurgitation.  2. Endocardium not well visualized; grossly normal left  ventricular systolic function.  3. The right ventricle is not well visualized; grossly  normal right ventricular systolic function.  4. Large pericardial effuson, measuring about  2.5 cm  lateral to the left ventricle.  Moderate pericardial  effusion, measuring about  1.6 cm posterior to the left  ventricle and about  1.8 cm around the LV apex.  Echodense  material is visualized within the effusion and may  represent thrombus.  Diastolic collapse of the right  ventricular outflow tract is seen.  In addition, increased  respirophasic variability in the transmitral and  transtricuspid spectral Doppler signals is seen.  These  findings are consistent with early cardiac tamponade.  5. Bilateral pleural effusions.  *** Compared with echocardiogram of 3/25/2019, a large  pericardial effusion is now present, with early cardiac  tamponade physiology.  Findings discussed with Sapna DALY.    < end of copied text >

## 2019-05-08 NOTE — PROGRESS NOTE ADULT - ASSESSMENT
54F PMH Stage 4 AdenoCA (last keytruda 4/15), malignant pleural effusion s/p R pleurx (3/4/2019), pericardial effusion s/p pericardial window (3/20/2019), PE s/p IVC on lovenox, who p/w SOB and cough productive of whitish sputum. Also c/o chest and abdominal pain. CT Chest showing progressive disease, small pericardial effusion, loculated pleff, and lymphangitic spread. Likely symptoms from progressive disease and lymphangitic spread. Possible post-obstructive PNA.    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   - Please have patient NPOpMN, hold heparin at 0300, and check AM CBC and coags    Cased discussed with primary team and CT surgery        Sandeep Greenfield MD  PGY-4  Pulmonary and Critical Care Fellow  Pager: [phone] 54F PMH Stage 4 AdenoCA (last keytruda 4/15), malignant pleural effusion s/p R pleurx (3/4/2019), pericardial effusion s/p pericardial window (3/20/2019), PE s/p IVC on lovenox, who p/w SOB and cough productive of whitish sputum. Also c/o chest and abdominal pain. CT Chest showing progressive disease, small pericardial effusion, loculated pleff, and lymphangitic spread. Likely symptoms from progressive disease and lymphangitic spread. Possible post-obstructive PNA.    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   - Please have patient NPOpMN, hold heparin at 0300, and check AM CBC and coags    Cased discussed with primary team and CT surgery        Sandeep Greenfield MD  PGY-4  Pulmonary and Critical Care Fellow  Pager: [phone]

## 2019-05-08 NOTE — CONSULT NOTE ADULT - CONSULT REASON
Diarrhea
PNA s/p pleurx
SOB
Metastatic adenocarcinoma of the lung
pericardial effusion
symptom management

## 2019-05-08 NOTE — CONSULT NOTE ADULT - ASSESSMENT
Pericardial effusion  recurrent   s/p drainage  given her malignancy , suspect very high risk of recurrence and hemorraghic transformation   would consider stopping a/c and placing IVCF    PE  stable  plan as above     LUNG CA  fu with Onc, pulm  if pt needs Bronch, she is high risk for CV events   no absolute contra indication

## 2019-05-08 NOTE — PROGRESS NOTE ADULT - SUBJECTIVE AND OBJECTIVE BOX
Pt seen with Dr. Morris this am  Pt sitting at edge of bed. NO distress  States some improvement in SOB    Vital Signs Last 24 Hrs  T(C): 36.6 (08 May 2019 09:00), Max: 36.8 (08 May 2019 00:00)  T(F): 97.9 (08 May 2019 09:00), Max: 98.2 (08 May 2019 00:00)  HR: 88 (08 May 2019 10:29) (87 - 118)  BP: 108/68 (08 May 2019 09:00) (102/65 - 119/77)  BP(mean): --  RR: 18 (08 May 2019 09:00) (18 - 18)  SpO2: 98% (08 May 2019 10:29) (93% - 100%)    A+ox 3  Lung dec throughout Rt. side.   Rt Pleurx c/d/i, Capped yesterday  Pericardial drain in place, output abt 40cc on waterseal    a/P: 55yo F w metastatic Lung CA w sob, pericardial effusion. S/p IR drainage on 5/7    -Will put Pericardial Tera to wall suction per Dr. Morris  -MOnitor output Q shift.   -Will likely repeat Bleo pleurodesis at bedside to help prevent recurrence prior to drain removal.   -Cont to drain Pleurx as ordered  -Care per primary team.

## 2019-05-09 LAB
ANION GAP SERPL CALC-SCNC: 12 MMO/L — SIGNIFICANT CHANGE UP (ref 7–14)
APTT BLD: 55.9 SEC — HIGH (ref 27.5–36.3)
APTT BLD: 90.6 SEC — HIGH (ref 27.5–36.3)
BUN SERPL-MCNC: 13 MG/DL — SIGNIFICANT CHANGE UP (ref 7–23)
CALCIUM SERPL-MCNC: 8.6 MG/DL — SIGNIFICANT CHANGE UP (ref 8.4–10.5)
CHLORIDE SERPL-SCNC: 104 MMOL/L — SIGNIFICANT CHANGE UP (ref 98–107)
CO2 SERPL-SCNC: 24 MMOL/L — SIGNIFICANT CHANGE UP (ref 22–31)
CREAT SERPL-MCNC: 0.63 MG/DL — SIGNIFICANT CHANGE UP (ref 0.5–1.3)
GLUCOSE SERPL-MCNC: 102 MG/DL — HIGH (ref 70–99)
HCT VFR BLD CALC: 33.1 % — LOW (ref 34.5–45)
HGB BLD-MCNC: 10.1 G/DL — LOW (ref 11.5–15.5)
INR BLD: 1.1 — SIGNIFICANT CHANGE UP (ref 0.88–1.17)
MAGNESIUM SERPL-MCNC: 2.2 MG/DL — SIGNIFICANT CHANGE UP (ref 1.6–2.6)
MCHC RBC-ENTMCNC: 26.6 PG — LOW (ref 27–34)
MCHC RBC-ENTMCNC: 30.5 % — LOW (ref 32–36)
MCV RBC AUTO: 87.1 FL — SIGNIFICANT CHANGE UP (ref 80–100)
NRBC # FLD: 0.02 K/UL — SIGNIFICANT CHANGE UP (ref 0–0)
PLATELET # BLD AUTO: 790 K/UL — HIGH (ref 150–400)
PMV BLD: SIGNIFICANT CHANGE UP FL (ref 7–13)
POTASSIUM SERPL-MCNC: 4.5 MMOL/L — SIGNIFICANT CHANGE UP (ref 3.5–5.3)
POTASSIUM SERPL-SCNC: 4.5 MMOL/L — SIGNIFICANT CHANGE UP (ref 3.5–5.3)
PROTHROM AB SERPL-ACNC: 12.2 SEC — SIGNIFICANT CHANGE UP (ref 9.8–13.1)
RBC # BLD: 3.8 M/UL — SIGNIFICANT CHANGE UP (ref 3.8–5.2)
RBC # FLD: 14.1 % — SIGNIFICANT CHANGE UP (ref 10.3–14.5)
SODIUM SERPL-SCNC: 140 MMOL/L — SIGNIFICANT CHANGE UP (ref 135–145)
WBC # BLD: 15.88 K/UL — HIGH (ref 3.8–10.5)
WBC # FLD AUTO: 15.88 K/UL — HIGH (ref 3.8–10.5)

## 2019-05-09 PROCEDURE — 99232 SBSQ HOSP IP/OBS MODERATE 35: CPT

## 2019-05-09 PROCEDURE — 99233 SBSQ HOSP IP/OBS HIGH 50: CPT

## 2019-05-09 RX ORDER — ENOXAPARIN SODIUM 100 MG/ML
40 INJECTION SUBCUTANEOUS DAILY
Refills: 0 | Status: DISCONTINUED | OUTPATIENT
Start: 2019-05-09 | End: 2019-05-16

## 2019-05-09 RX ADMIN — Medication 3 MILLILITER(S): at 18:29

## 2019-05-09 RX ADMIN — OXYCODONE HYDROCHLORIDE 5 MILLIGRAM(S): 5 TABLET ORAL at 01:31

## 2019-05-09 RX ADMIN — GABAPENTIN 100 MILLIGRAM(S): 400 CAPSULE ORAL at 05:23

## 2019-05-09 RX ADMIN — PIPERACILLIN AND TAZOBACTAM 25 GRAM(S): 4; .5 INJECTION, POWDER, LYOPHILIZED, FOR SOLUTION INTRAVENOUS at 05:23

## 2019-05-09 RX ADMIN — Medication 3 MILLILITER(S): at 04:24

## 2019-05-09 RX ADMIN — PIPERACILLIN AND TAZOBACTAM 25 GRAM(S): 4; .5 INJECTION, POWDER, LYOPHILIZED, FOR SOLUTION INTRAVENOUS at 20:51

## 2019-05-09 RX ADMIN — Medication 5 MILLIGRAM(S): at 13:00

## 2019-05-09 RX ADMIN — SODIUM CHLORIDE 1 GRAM(S): 9 INJECTION INTRAMUSCULAR; INTRAVENOUS; SUBCUTANEOUS at 05:23

## 2019-05-09 RX ADMIN — Medication 250 MILLIGRAM(S): at 11:23

## 2019-05-09 RX ADMIN — PIPERACILLIN AND TAZOBACTAM 25 GRAM(S): 4; .5 INJECTION, POWDER, LYOPHILIZED, FOR SOLUTION INTRAVENOUS at 13:00

## 2019-05-09 RX ADMIN — OXYCODONE HYDROCHLORIDE 5 MILLIGRAM(S): 5 TABLET ORAL at 06:10

## 2019-05-09 RX ADMIN — Medication 5 MILLIGRAM(S): at 17:08

## 2019-05-09 RX ADMIN — GABAPENTIN 100 MILLIGRAM(S): 400 CAPSULE ORAL at 17:08

## 2019-05-09 RX ADMIN — Medication 3 MILLILITER(S): at 21:19

## 2019-05-09 RX ADMIN — SODIUM CHLORIDE 1 GRAM(S): 9 INJECTION INTRAMUSCULAR; INTRAVENOUS; SUBCUTANEOUS at 17:08

## 2019-05-09 RX ADMIN — OXYCODONE HYDROCHLORIDE 5 MILLIGRAM(S): 5 TABLET ORAL at 16:53

## 2019-05-09 RX ADMIN — Medication 5 MILLIGRAM(S): at 05:23

## 2019-05-09 RX ADMIN — OXYCODONE HYDROCHLORIDE 5 MILLIGRAM(S): 5 TABLET ORAL at 02:30

## 2019-05-09 RX ADMIN — OXYCODONE HYDROCHLORIDE 5 MILLIGRAM(S): 5 TABLET ORAL at 05:23

## 2019-05-09 RX ADMIN — OXYCODONE HYDROCHLORIDE 5 MILLIGRAM(S): 5 TABLET ORAL at 15:38

## 2019-05-09 RX ADMIN — HEPARIN SODIUM 1100 UNIT(S)/HR: 5000 INJECTION INTRAVENOUS; SUBCUTANEOUS at 09:05

## 2019-05-09 RX ADMIN — HEPARIN SODIUM 1100 UNIT(S)/HR: 5000 INJECTION INTRAVENOUS; SUBCUTANEOUS at 01:31

## 2019-05-09 RX ADMIN — Medication 3 MILLILITER(S): at 12:46

## 2019-05-09 RX ADMIN — HEPARIN SODIUM 2000 UNIT(S): 5000 INJECTION INTRAVENOUS; SUBCUTANEOUS at 01:31

## 2019-05-09 RX ADMIN — PANTOPRAZOLE SODIUM 40 MILLIGRAM(S): 20 TABLET, DELAYED RELEASE ORAL at 05:23

## 2019-05-09 NOTE — PROGRESS NOTE ADULT - SUBJECTIVE AND OBJECTIVE BOX
INTERVAL HPI/OVERNIGHT EVENTS: Pain and dyspnea improved     Code Status: Full Code  Allergies    No Known Allergies    Intolerances    IV contrast (Rash)  MEDICATIONS  (STANDING):  ALBUTerol/ipratropium for Nebulization 3 milliLiter(s) Nebulizer every 6 hours  enoxaparin Injectable 40 milliGRAM(s) SubCutaneous daily  gabapentin 100 milliGRAM(s) Oral every 12 hours  metoclopramide 5 milliGRAM(s) Oral three times a day before meals  pantoprazole    Tablet 40 milliGRAM(s) Oral before breakfast  piperacillin/tazobactam IVPB. 3.375 Gram(s) IV Intermittent every 8 hours  senna 2 Tablet(s) Oral at bedtime  sodium chloride 1 Gram(s) Oral two times a day  vancomycin  IVPB 1000 milliGRAM(s) IV Intermittent daily    MEDICATIONS  (PRN):  HYDROcodone/homatropine Syrup 5 milliLiter(s) Oral every 6 hours PRN Cough  oxyCODONE    IR 5 milliGRAM(s) Oral every 3 hours PRN moderate and severe pain/shortness of breath      PRESENT SYMPTOMS: [ ]Unable to obtain due to poor mentation   Source if other than patient:  [ ]Family   [ ]Team     Pain (Impact on QOL):  Denies  Dyspnea:  Yes [ ] No [x ] - [ ]Mild [ ]Moderate [ ]Severe  Anxiety:    Yes [ ] No [x ] - [ ]Mild [ ]Moderate [ ]Severe  Fatigue:    Yes [x ] No [ ] - [x ]Mild [ ]Moderate [ ]Severe  Nausea:    Yes [ ] No [x ] - [ ]Mild [ ]Moderate [ ]Severe                         Loss of appetite: Yes [x ] No [ ] - [ ]Mild [ x]Moderate [ ]Severe             Constipation:  Yes [ ] No [x ] - [ ]Mild [ ]Moderate [ ]Severe  Grief: Yes [x ] No [ ]     PAIN AD Score:	  http://geriatrictoolkit.[place].[facility]/cog/painad.pdf (Ctrl + left click to view)    Other Symptoms:  [x ]All other review of systems negative     Karnofsky Performance Score/Palliative Performance Status Version 2:   50-60%    http://palliative.info/resource_material/PPSv2.pdf    PHYSICAL EXAM:  Vital Signs Last 24 Hrs  T(C): 36.5 (09 May 2019 05:17), Max: 36.7 (08 May 2019 17:00)  T(F): 97.7 (09 May 2019 05:17), Max: 98 (08 May 2019 17:00)  HR: 96 (09 May 2019 12:47) (94 - 107)  BP: 106/66 (09 May 2019 05:17) (106/66 - 134/77)  BP(mean): --  RR: 17 (09 May 2019 05:17) (17 - 18)  SpO2: 95% (09 May 2019 12:47) (95% - 100%) I&O's Summary    08 May 2019 07:01  -  09 May 2019 07:00  --------------------------------------------------------  IN: 933 mL / OUT: 45 mL / NET: 888 mL    09 May 2019 07:01  -  09 May 2019 13:43  --------------------------------------------------------  IN: 55 mL / OUT: 0 mL / NET: 55 mL         GENERAL:  [x ]Alert  [x ]Oriented x 4  [ ]Lethargic  [ ]Cachexia  [ ]Unarousable  [ x]Verbal  [ ]Non-Verbal  PULMONARY:   decreased to right - pleurx in place - clamped   CARDIOVASCULAR:    [ x]Regular - pericardial drain in place   GASTROINTESTINAL:  [x ]Soft  [ ]Distended   [x ]+BS  [x ]Non tender [ ]Tender  [ ]PEG [ ]OGT/ NGT   Last BM: 5/8/19     GENITOURINARY:  [ x]Normal [ ] Incontinent   [ ]Oliguria/Anuria   [ ]Sanchez  MUSCULOSKELETAL:   [ ]Normal   [ x]Weakness  [ ]Bed/Wheelchair bound [ ]Edema  NEUROLOGIC:   [x ]No focal deficits  [ ] Cognitive impairment  [ ] Dysphagia [ ]Dysarthria [ ] Paresis [ ]Other   SKIN:   [x]Normal   [ ]Pressure ulcer(s)  [ ]Rash    CRITICAL CARE:  [ ] Shock Present  [ ]Septic [ ]Cardiogenic [ ]Neurologic [ ]Hypovolemic  [ ]  Vasopressors [ ]  Inotropes   [ ] Respiratory failure present  [ ] Acute  [ ] Chronic [ ] Hypoxic  [ ] Hypercarbic [ ] Other  [ ] Other organ failure     LABS:                        10.1   15.88 )-----------( 790      ( 09 May 2019 08:02 )             33.1   05-09    140  |  104  |  13  ----------------------------<  102<H>  4.5   |  24  |  0.63    Ca    8.6      09 May 2019 08:02  Phos  2.8     05-08  Mg     2.2     05-09    TPro  5.9<L>  /  Alb  3.1<L>  /  TBili  0.3  /  DBili  x   /  AST  16  /  ALT  19  /  AlkPhos  102  05-08  PT/INR - ( 09 May 2019 08:02 )   PT: 12.2 SEC;   INR: 1.10          PTT - ( 09 May 2019 08:02 )  PTT:90.6 SEC      RADIOLOGY & ADDITIONAL STUDIES:    Protein Calorie Malnutrition Present: [ ] yes [ ] no  [ ] PPSV2 < or = 30%  [ ] significant weight loss [ ] poor nutritional intake [ ] anasarca [ ] catabolic state Albumin, Serum: 3.1 g/dL (05-08-19 @ 06:11)      REFERRALS:   [ ]Chaplaincy  [ ] Hospice  [ ]Child Life  [ ]Social Work  [ ]Case management [ ]Holistic Therapy   Goals of Care Document:

## 2019-05-09 NOTE — CHART NOTE - NSCHARTNOTEFT_GEN_A_CORE
Cardiology Rec appreciated. D/W Dr Harper, Oncology Consult, will d/c therapeutic anticoagulation in the setting hemorrhagic pericardial effusion, negative CTPA for PE and IVC filter in place as per discussion. Will also start Lovenox 40mg SQ daily as per discussion.

## 2019-05-09 NOTE — PROGRESS NOTE ADULT - SUBJECTIVE AND OBJECTIVE BOX
Subjective: pt seen at bedside, found sitting up in chair in NAD. Denies sob, cp, nvd. PleurX capped, pericardial mark to sxn  Family member in room      HPI: 53 y/o F w/ recent dx of R lung adenocarcinoma with mediastinal and hilar lymphadenopathy undergoing treatment with Keytruda, R pleural effusion s/p RVATS,  pleurx placement, subxiphoid pericardial window on 3/20/19, PE (3/4) on lovenox presents with 2 days of fevers, worsening cough, SOB. Pt stated she her symptoms started since her dx of lung ca but much worse in the last two days. Her pleurX was drained on Tuesday with minimal discharge. Pleural fluid was described as clear not cloudy or purulent. No hemoptysis, chest pain, pain at PluerX site, warm, redness noted at the site.       Vital Signs:  Vital Signs Last 24 Hrs  T(C): 36.5 (05-09-19 @ 13:50), Max: 36.5 (05-09-19 @ 05:17)  T(F): 97.7 (05-09-19 @ 13:50), Max: 97.7 (05-09-19 @ 05:17)  HR: 102 (05-09-19 @ 13:50) (94 - 107)  BP: 116/74 (05-09-19 @ 13:50) (106/66 - 116/74)  RR: 16 (05-09-19 @ 13:50) (16 - 18)  SpO2: 96% (05-09-19 @ 13:50) (95% - 100%) on (O2)    Pertinent Physical Exam:    Neuro: A+O x 3, non-focal, speech clear and intact  CV: regular rate, regular rhythm, +S1S2, no murmurs or rub  Pulm/chest: lung sounds CTA and equal bilaterally, no accessory muscle use noted  Ext: Moves all extremities x 4, without clubbing/cyanosis/edema  Skin: warm, well perfused, no rashes  mark to suction  pleurx capped

## 2019-05-09 NOTE — PROGRESS NOTE ADULT - SUBJECTIVE AND OBJECTIVE BOX
INTERVAL HPI/OVERNIGHT EVENTS:  Patient seen at bedside. She is feeling much better. respiratory status has improved and appetite is better. Pericardial drain in place, draining bloody effusion.      VITAL SIGNS:  T(F): 97.7 (05-09-19 @ 13:50)  HR: 102 (05-09-19 @ 13:50)  BP: 116/74 (05-09-19 @ 13:50)  RR: 16 (05-09-19 @ 13:50)  SpO2: 96% (05-09-19 @ 13:50)  Wt(kg): --    PHYSICAL EXAM:    Constitutional: NAD, resting in bed comfortably  Eyes: EOMI, sclera non-icteric  Neck: supple, no LAP  Respiratory: CTA b/l ant  Cardiovascular: RRR, normal S1S2, no M/R/G  Gastrointestinal: soft, NTND  Extremities: no edema  Neurological: AAOx3, non focal  Skin: Normal temperature    MEDICATIONS  (STANDING):  ALBUTerol/ipratropium for Nebulization 3 milliLiter(s) Nebulizer every 6 hours  enoxaparin Injectable 40 milliGRAM(s) SubCutaneous daily  gabapentin 100 milliGRAM(s) Oral every 12 hours  metoclopramide 5 milliGRAM(s) Oral three times a day before meals  pantoprazole    Tablet 40 milliGRAM(s) Oral before breakfast  piperacillin/tazobactam IVPB. 3.375 Gram(s) IV Intermittent every 8 hours  senna 2 Tablet(s) Oral at bedtime  sodium chloride 1 Gram(s) Oral two times a day  vancomycin  IVPB 1000 milliGRAM(s) IV Intermittent daily    MEDICATIONS  (PRN):  HYDROcodone/homatropine Syrup 5 milliLiter(s) Oral every 6 hours PRN Cough  oxyCODONE    IR 5 milliGRAM(s) Oral every 3 hours PRN moderate and severe pain/shortness of breath      Allergies    No Known Allergies    Intolerances    IV contrast (Rash)      LABS:                        10.1   15.88 )-----------( 790      ( 09 May 2019 08:02 )             33.1     05-09    140  |  104  |  13  ----------------------------<  102<H>  4.5   |  24  |  0.63    Ca    8.6      09 May 2019 08:02  Phos  2.8     05-08  Mg     2.2     05-09    TPro  5.9<L>  /  Alb  3.1<L>  /  TBili  0.3  /  DBili  x   /  AST  16  /  ALT  19  /  AlkPhos  102  05-08    PT/INR - ( 09 May 2019 08:02 )   PT: 12.2 SEC;   INR: 1.10          PTT - ( 09 May 2019 08:02 )  PTT:90.6 SEC      RADIOLOGY & ADDITIONAL TESTS:  Studies reviewed.

## 2019-05-09 NOTE — PROGRESS NOTE ADULT - ASSESSMENT
Pericardial effusion  recurrent   s/p drainage  given her malignancy , suspect very high risk of recurrence and hemorraghic transformation   would consider stopping a/c  pt is s/p IVCF    PE  stable  plan as above     LUNG CA  fu with Onc, pulm

## 2019-05-09 NOTE — PROGRESS NOTE ADULT - PROBLEM SELECTOR PLAN 1
(+) large pericardial effusion with signs of early cardiac tamponade. S/P IR drainage of pericardial effusion. Improving.   -Continue Tele monitoring  -Cont. Heparin drip   -F/U with IR and CT surgery  -Cardiology consult appreciated.

## 2019-05-09 NOTE — PROGRESS NOTE ADULT - SUBJECTIVE AND OBJECTIVE BOX
Patient is a 54y old  Female who presents with a chief complaint of shortness of breath, cough (09 May 2019 09:40)      SUBJECTIVE / OVERNIGHT EVENTS: Feels better. decreased SOB    MEDICATIONS  (STANDING):  ALBUTerol/ipratropium for Nebulization 3 milliLiter(s) Nebulizer every 6 hours  gabapentin 100 milliGRAM(s) Oral every 12 hours  heparin  Infusion.  Unit(s)/Hr (9 mL/Hr) IV Continuous <Continuous>  metoclopramide 5 milliGRAM(s) Oral three times a day before meals  pantoprazole    Tablet 40 milliGRAM(s) Oral before breakfast  piperacillin/tazobactam IVPB. 3.375 Gram(s) IV Intermittent every 8 hours  senna 2 Tablet(s) Oral at bedtime  sodium chloride 1 Gram(s) Oral two times a day  vancomycin  IVPB 1000 milliGRAM(s) IV Intermittent daily    MEDICATIONS  (PRN):  heparin  Injectable 4000 Unit(s) IV Push every 6 hours PRN For aPTT less than 40  heparin  Injectable 2000 Unit(s) IV Push every 6 hours PRN For aPTT between 40 - 57  HYDROcodone/homatropine Syrup 5 milliLiter(s) Oral every 6 hours PRN Cough  oxyCODONE    IR 5 milliGRAM(s) Oral every 3 hours PRN moderate and severe pain/shortness of breath      Vital Signs Last 24 Hrs  T(C): 36.5 (09 May 2019 05:17), Max: 36.7 (08 May 2019 17:00)  T(F): 97.7 (09 May 2019 05:17), Max: 98 (08 May 2019 17:00)  HR: 104 (09 May 2019 05:17) (88 - 107)  BP: 106/66 (09 May 2019 05:17) (106/66 - 134/77)  BP(mean): --  RR: 17 (09 May 2019 05:17) (17 - 18)  SpO2: 100% (09 May 2019 05:17) (95% - 100%)  CAPILLARY BLOOD GLUCOSE        I&O's Summary    08 May 2019 07:01  -  09 May 2019 07:00  --------------------------------------------------------  IN: 922 mL / OUT: 45 mL / NET: 877 mL        PHYSICAL EXAM:  GENERAL: NAD, well-developed  HEAD:  Atraumatic, Normocephalic  EYES: EOMI, PERRLA, conjunctiva and sclera clear  NECK: Supple, No JVD  CHEST/LUNG: Clear to auscultation (L), (+) decreased BS on right hemithorax; No wheeze  HEART: Regular rate and rhythm; No murmurs, rubs, or gallops  ABDOMEN: Soft, Nontender, Nondistended; Bowel sounds present  EXTREMITIES:  2+ Peripheral Pulses, No clubbing, cyanosis, or edema  PSYCH: AAOx3  NEUROLOGY: non-focal  SKIN: No rashes or lesions    LABS:                        10.7   13.25 )-----------( 857      ( 08 May 2019 17:50 )             34.7     05-09    140  |  104  |  13  ----------------------------<  102<H>  4.5   |  24  |  0.63    Ca    8.6      09 May 2019 08:02  Phos  2.8     05-08  Mg     2.2     05-09    TPro  5.9<L>  /  Alb  3.1<L>  /  TBili  0.3  /  DBili  x   /  AST  16  /  ALT  19  /  AlkPhos  102  05-08    PT/INR - ( 09 May 2019 08:02 )   PT: 12.2 SEC;   INR: 1.10          PTT - ( 09 May 2019 08:02 )  PTT:90.6 SEC          RADIOLOGY & ADDITIONAL TESTS:    Imaging Personally Reviewed:    Consultant(s) Notes Reviewed:  Cardiology, CT surgery, Pulm    Care Discussed with Consultants/Other Providers: Pulm

## 2019-05-09 NOTE — PROGRESS NOTE ADULT - ASSESSMENT
55 y/o female with lung cancer with recurrent pericardial effusion. Now s/p IR pericardial PTC placement on 5/7/19    PLAN  Maintain pericardial drain to sxn; 45cc/24hrs  Record drainage qshift  Drain PleurX 3x week; currently on T/TH/Sat schedule > Discussed with Tele PA   Continue care per primary team

## 2019-05-09 NOTE — PROGRESS NOTE ADULT - SUBJECTIVE AND OBJECTIVE BOX
Subjective: Patient seen and examined. No new events except as noted.     SUBJECTIVE/ROS:  feels ok       MEDICATIONS:  MEDICATIONS  (STANDING):  ALBUTerol/ipratropium for Nebulization 3 milliLiter(s) Nebulizer every 6 hours  gabapentin 100 milliGRAM(s) Oral every 12 hours  heparin  Infusion.  Unit(s)/Hr (9 mL/Hr) IV Continuous <Continuous>  metoclopramide 5 milliGRAM(s) Oral three times a day before meals  pantoprazole    Tablet 40 milliGRAM(s) Oral before breakfast  piperacillin/tazobactam IVPB. 3.375 Gram(s) IV Intermittent every 8 hours  senna 2 Tablet(s) Oral at bedtime  sodium chloride 1 Gram(s) Oral two times a day  vancomycin  IVPB 1000 milliGRAM(s) IV Intermittent daily      PHYSICAL EXAM:  T(C): 36.5 (05-09-19 @ 05:17), Max: 36.7 (05-08-19 @ 17:00)  HR: 104 (05-09-19 @ 05:17) (88 - 107)  BP: 106/66 (05-09-19 @ 05:17) (106/66 - 134/77)  RR: 17 (05-09-19 @ 05:17) (17 - 18)  SpO2: 100% (05-09-19 @ 05:17) (95% - 100%)  Wt(kg): --  I&O's Summary    08 May 2019 07:01  -  09 May 2019 07:00  --------------------------------------------------------  IN: 922 mL / OUT: 45 mL / NET: 877 mL            JVP: Normal  Neck: supple  Lung: clear   CV: S1 S2 , Murmur:  Abd: soft  Ext: No edema  neuro: Awake / alert  Psych: flat affect  Skin: normal``    LABS/DATA:    CARDIAC MARKERS:                                10.7   13.25 )-----------( 857      ( 08 May 2019 17:50 )             34.7     05-09    140  |  104  |  13  ----------------------------<  102<H>  4.5   |  24  |  0.63    Ca    8.6      09 May 2019 08:02  Phos  2.8     05-08  Mg     2.2     05-09    TPro  5.9<L>  /  Alb  3.1<L>  /  TBili  0.3  /  DBili  x   /  AST  16  /  ALT  19  /  AlkPhos  102  05-08    proBNP:   Lipid Profile:   HgA1c:   TSH:     TELE:  EKG:

## 2019-05-09 NOTE — PROGRESS NOTE ADULT - ASSESSMENT
54F with R lung adenocarcinoma s/p 1 dose of immunotherapy with keytruda on 4/15, chronic right pleural effusion s/p pleurx, subxiphoid pericardial window on 3/20/19, PE on Lovenox s/p IVC filter placement presenting with shortness of breath and cough x 2 days. No evidence of colitis on CT.  Echo with impending tamponade, s/p drainage and pericardial drain, draining bloody effusion.   Breathing status much improved.     -Discontinue AC given bloody pericardial effusion and concern for tamponade. Pericardial drain management per CT surg.   -Drain pleurex catheter every other day  -palliative care input appreciated   -Supportive care, pain control, nutrition, PT  -Outpatient oncology f/u    Will follow. Please do not hesitate to call back with questions.     Thuy Harper MD  Medical Oncology Attending

## 2019-05-09 NOTE — PROGRESS NOTE ADULT - PROBLEM SELECTOR PLAN 1
Patient with 4 doses of Oxycodone 5mg in 24 hours.  Denies dyspnea and states she feels "better"  O2 in use.  Continue management as per CT surgery, IR, and primary team. No changes to current regimen.

## 2019-05-10 LAB — VANCOMYCIN TROUGH SERPL-MCNC: 9.1 UG/ML — LOW (ref 10–20)

## 2019-05-10 PROCEDURE — 99233 SBSQ HOSP IP/OBS HIGH 50: CPT

## 2019-05-10 PROCEDURE — 99231 SBSQ HOSP IP/OBS SF/LOW 25: CPT

## 2019-05-10 PROCEDURE — 99233 SBSQ HOSP IP/OBS HIGH 50: CPT | Mod: 57

## 2019-05-10 RX ORDER — VANCOMYCIN HCL 1 G
750 VIAL (EA) INTRAVENOUS EVERY 12 HOURS
Refills: 0 | Status: DISCONTINUED | OUTPATIENT
Start: 2019-05-10 | End: 2019-05-14

## 2019-05-10 RX ORDER — VANCOMYCIN HCL 1 G
750 VIAL (EA) INTRAVENOUS ONCE
Refills: 0 | Status: DISCONTINUED | OUTPATIENT
Start: 2019-05-10 | End: 2019-05-10

## 2019-05-10 RX ORDER — VANCOMYCIN HCL 1 G
VIAL (EA) INTRAVENOUS
Refills: 0 | Status: DISCONTINUED | OUTPATIENT
Start: 2019-05-10 | End: 2019-05-10

## 2019-05-10 RX ORDER — LEVALBUTEROL 1.25 MG/.5ML
0.63 SOLUTION, CONCENTRATE RESPIRATORY (INHALATION) EVERY 6 HOURS
Refills: 0 | Status: DISCONTINUED | OUTPATIENT
Start: 2019-05-10 | End: 2019-05-16

## 2019-05-10 RX ADMIN — PIPERACILLIN AND TAZOBACTAM 25 GRAM(S): 4; .5 INJECTION, POWDER, LYOPHILIZED, FOR SOLUTION INTRAVENOUS at 05:06

## 2019-05-10 RX ADMIN — Medication 3 MILLILITER(S): at 04:22

## 2019-05-10 RX ADMIN — GABAPENTIN 100 MILLIGRAM(S): 400 CAPSULE ORAL at 17:16

## 2019-05-10 RX ADMIN — PANTOPRAZOLE SODIUM 40 MILLIGRAM(S): 20 TABLET, DELAYED RELEASE ORAL at 05:06

## 2019-05-10 RX ADMIN — OXYCODONE HYDROCHLORIDE 5 MILLIGRAM(S): 5 TABLET ORAL at 23:24

## 2019-05-10 RX ADMIN — Medication 5 MILLIGRAM(S): at 11:10

## 2019-05-10 RX ADMIN — LEVALBUTEROL 0.63 MILLIGRAM(S): 1.25 SOLUTION, CONCENTRATE RESPIRATORY (INHALATION) at 16:01

## 2019-05-10 RX ADMIN — SODIUM CHLORIDE 1 GRAM(S): 9 INJECTION INTRAMUSCULAR; INTRAVENOUS; SUBCUTANEOUS at 17:16

## 2019-05-10 RX ADMIN — PIPERACILLIN AND TAZOBACTAM 25 GRAM(S): 4; .5 INJECTION, POWDER, LYOPHILIZED, FOR SOLUTION INTRAVENOUS at 12:42

## 2019-05-10 RX ADMIN — Medication 5 MILLIGRAM(S): at 17:16

## 2019-05-10 RX ADMIN — SODIUM CHLORIDE 1 GRAM(S): 9 INJECTION INTRAMUSCULAR; INTRAVENOUS; SUBCUTANEOUS at 05:06

## 2019-05-10 RX ADMIN — ENOXAPARIN SODIUM 40 MILLIGRAM(S): 100 INJECTION SUBCUTANEOUS at 11:10

## 2019-05-10 RX ADMIN — Medication 250 MILLIGRAM(S): at 17:16

## 2019-05-10 RX ADMIN — GABAPENTIN 100 MILLIGRAM(S): 400 CAPSULE ORAL at 05:06

## 2019-05-10 RX ADMIN — Medication 5 MILLIGRAM(S): at 05:06

## 2019-05-10 RX ADMIN — LEVALBUTEROL 0.63 MILLIGRAM(S): 1.25 SOLUTION, CONCENTRATE RESPIRATORY (INHALATION) at 21:06

## 2019-05-10 RX ADMIN — PIPERACILLIN AND TAZOBACTAM 25 GRAM(S): 4; .5 INJECTION, POWDER, LYOPHILIZED, FOR SOLUTION INTRAVENOUS at 22:45

## 2019-05-10 RX ADMIN — LEVALBUTEROL 0.63 MILLIGRAM(S): 1.25 SOLUTION, CONCENTRATE RESPIRATORY (INHALATION) at 10:00

## 2019-05-10 NOTE — PROGRESS NOTE ADULT - PROBLEM SELECTOR PLAN 5
-Malignant pleural effusion based on previous thoracentesis, loculated right pleural effusion on CT  -Likely loculated or drain not in place  -Case discussed with CT surg and no further intervention is being considered at this time.  Given size and symptoms will need to be drained, S/p Chest tube placement   -f/u CT surgery recs  -Drainage of Pleurx 3X /week

## 2019-05-10 NOTE — PROGRESS NOTE ADULT - SUBJECTIVE AND OBJECTIVE BOX
Subjective: Patient seen and examined. No new events except as noted.     SUBJECTIVE/ROS:  resting       MEDICATIONS:  MEDICATIONS  (STANDING):  ALBUTerol/ipratropium for Nebulization 3 milliLiter(s) Nebulizer every 6 hours  enoxaparin Injectable 40 milliGRAM(s) SubCutaneous daily  gabapentin 100 milliGRAM(s) Oral every 12 hours  metoclopramide 5 milliGRAM(s) Oral three times a day before meals  pantoprazole    Tablet 40 milliGRAM(s) Oral before breakfast  piperacillin/tazobactam IVPB. 3.375 Gram(s) IV Intermittent every 8 hours  senna 2 Tablet(s) Oral at bedtime  sodium chloride 1 Gram(s) Oral two times a day  vancomycin  IVPB 1000 milliGRAM(s) IV Intermittent daily      PHYSICAL EXAM:  T(C): 36.9 (05-10-19 @ 05:05), Max: 37 (05-09-19 @ 20:51)  HR: 109 (05-10-19 @ 05:05) (91 - 109)  BP: 105/64 (05-10-19 @ 05:05) (105/64 - 116/74)  RR: 16 (05-10-19 @ 05:05) (16 - 16)  SpO2: 95% (05-10-19 @ 05:05) (95% - 98%)  Wt(kg): --  I&O's Summary    09 May 2019 07:01  -  10 May 2019 07:00  --------------------------------------------------------  IN: 430 mL / OUT: 215 mL / NET: 215 mL            JVP: Normal  Neck: supple  Lung: clear   CV: S1 S2 , Murmur:  Abd: soft  Ext: No edema  neuro: Awake / alert  Psych: flat affect  Skin: normal``    LABS/DATA:    CARDIAC MARKERS:                                10.1   15.88 )-----------( 790      ( 09 May 2019 08:02 )             33.1     05-09    140  |  104  |  13  ----------------------------<  102<H>  4.5   |  24  |  0.63    Ca    8.6      09 May 2019 08:02  Mg     2.2     05-09      proBNP:   Lipid Profile:   HgA1c:   TSH:     TELE:  EKG:

## 2019-05-10 NOTE — PROGRESS NOTE ADULT - SUBJECTIVE AND OBJECTIVE BOX
Pt seen with Dr. Michaels. OOB in chair.   States some improvement in SOB  Pleurx drained yesterday    Vital Signs Last 24 Hrs  T(C): 37 (10 May 2019 13:29), Max: 37 (09 May 2019 20:51)  T(F): 98.6 (10 May 2019 13:29), Max: 98.6 (09 May 2019 20:51)  HR: 115 (10 May 2019 13:29) (91 - 115)  BP: 89/52 (10 May 2019 13:29) (89/52 - 111/64)  BP(mean): --  RR: 16 (10 May 2019 13:29) (16 - 16)  SpO2: 92% (10 May 2019 13:29) (92% - 98%)    A+O x 3  Pericardial drain site c/d/i  Stripped at bedside.   Output 15cc/24hrs      A/p: 55yo w metastatic lung CA w recurrent pericardial effusion s/p IR drainage.     -Cont PTC to suction  -Strip as needed  -Possible Bleo pleurodesis over the weekend  Will cont to follow.

## 2019-05-10 NOTE — PROGRESS NOTE ADULT - ASSESSMENT
54F with PMHx of metastatic R lung adenocarcinoma on chemo, chronic right pleural effusion s/p pleurx and VATS, subxiphoid pericardial window on 3/20/19, PE on Lovenox s/p IVC filter placement presenting with shortness of breath and cough x 2 days. (+) signs of early cardiac tamponade on Echo , s/p IR drainage

## 2019-05-10 NOTE — PROGRESS NOTE ADULT - ASSESSMENT
Pericardial effusion  recurrent   s/p drainage  given her malignancy , suspect very high risk of recurrence and hemorraghic transformation as her pericardial effusion was bloody   she is now off a/c  pt is s/p IVCF    PE  stable  plan as above     LUNG CA  fu with Onc, pulm

## 2019-05-10 NOTE — PROGRESS NOTE ADULT - PROBLEM SELECTOR PLAN 7
-Off heparin drip in the setting of hemorrhagic pericardial effusion, pt has IVC filter  -Continue home meds  -Currently on RA, saturating well

## 2019-05-10 NOTE — PROGRESS NOTE ADULT - PROBLEM SELECTOR PLAN 1
(+) large pericardial effusion with signs of early cardiac tamponade. S/P IR drainage of pericardial effusion. Improving.   -Continue Tele monitoring  -F/U with IR and CT surgery  -Cardiology consult appreciated. Heparin drip d/c'ed.

## 2019-05-10 NOTE — PROGRESS NOTE ADULT - SUBJECTIVE AND OBJECTIVE BOX
Patient is a 54y old  Female who presents with a chief complaint of shortness of breath, cough (10 May 2019 07:39)      SUBJECTIVE / OVERNIGHT EVENTS: Feeling better today, SOB much improved.    MEDICATIONS  (STANDING):  enoxaparin Injectable 40 milliGRAM(s) SubCutaneous daily  gabapentin 100 milliGRAM(s) Oral every 12 hours  levalbuterol Inhalation 0.63 milliGRAM(s) Inhalation every 6 hours  metoclopramide 5 milliGRAM(s) Oral three times a day before meals  pantoprazole    Tablet 40 milliGRAM(s) Oral before breakfast  piperacillin/tazobactam IVPB. 3.375 Gram(s) IV Intermittent every 8 hours  senna 2 Tablet(s) Oral at bedtime  sodium chloride 1 Gram(s) Oral two times a day  vancomycin  IVPB 1000 milliGRAM(s) IV Intermittent daily    MEDICATIONS  (PRN):  HYDROcodone/homatropine Syrup 5 milliLiter(s) Oral every 6 hours PRN Cough  oxyCODONE    IR 5 milliGRAM(s) Oral every 3 hours PRN moderate and severe pain/shortness of breath      Vital Signs Last 24 Hrs  T(C): 36.9 (10 May 2019 05:05), Max: 37 (09 May 2019 20:51)  T(F): 98.5 (10 May 2019 05:05), Max: 98.6 (09 May 2019 20:51)  HR: 109 (10 May 2019 05:05) (91 - 109)  BP: 105/64 (10 May 2019 05:05) (105/64 - 116/74)  BP(mean): --  RR: 16 (10 May 2019 05:05) (16 - 16)  SpO2: 95% (10 May 2019 05:05) (95% - 98%)  CAPILLARY BLOOD GLUCOSE        I&O's Summary    09 May 2019 07:01  -  10 May 2019 07:00  --------------------------------------------------------  IN: 430 mL / OUT: 215 mL / NET: 215 mL        PHYSICAL EXAM:  GENERAL: NAD, well-developed  HEAD:  Atraumatic, Normocephalic  EYES: EOMI, PERRLA, conjunctiva and sclera clear  NECK: Supple, No JVD  CHEST/LUNG: Clear to auscultation (L), (+) decreased BS at right hemithorax; No wheeze  HEART: Regular rate and rhythm; No murmurs, rubs, or gallops, (+) pericardial drain  ABDOMEN: Soft, Nontender, Nondistended; Bowel sounds present  EXTREMITIES:  2+ Peripheral Pulses, No clubbing, cyanosis, or edema  PSYCH: AAOx3  NEUROLOGY: non-focal  SKIN: No rashes or lesions    LABS:                        10.1   15.88 )-----------( 790      ( 09 May 2019 08:02 )             33.1     05-09    140  |  104  |  13  ----------------------------<  102<H>  4.5   |  24  |  0.63    Ca    8.6      09 May 2019 08:02  Mg     2.2     05-09      PT/INR - ( 09 May 2019 08:02 )   PT: 12.2 SEC;   INR: 1.10          PTT - ( 09 May 2019 08:02 )  PTT:90.6 SEC          RADIOLOGY & ADDITIONAL TESTS:    Imaging Personally Reviewed:    Consultant(s) Notes Reviewed:  Card, Onc.    Care Discussed with Consultants/Other Providers:

## 2019-05-11 LAB
ANION GAP SERPL CALC-SCNC: 10 MMO/L — SIGNIFICANT CHANGE UP (ref 7–14)
BASOPHILS # BLD AUTO: 0.02 K/UL — SIGNIFICANT CHANGE UP (ref 0–0.2)
BASOPHILS NFR BLD AUTO: 0.2 % — SIGNIFICANT CHANGE UP (ref 0–2)
BUN SERPL-MCNC: 9 MG/DL — SIGNIFICANT CHANGE UP (ref 7–23)
CALCIUM SERPL-MCNC: 8.4 MG/DL — SIGNIFICANT CHANGE UP (ref 8.4–10.5)
CHLORIDE SERPL-SCNC: 97 MMOL/L — LOW (ref 98–107)
CO2 SERPL-SCNC: 26 MMOL/L — SIGNIFICANT CHANGE UP (ref 22–31)
CREAT SERPL-MCNC: 0.66 MG/DL — SIGNIFICANT CHANGE UP (ref 0.5–1.3)
EOSINOPHIL # BLD AUTO: 1.86 K/UL — HIGH (ref 0–0.5)
EOSINOPHIL NFR BLD AUTO: 14.2 % — HIGH (ref 0–6)
GLUCOSE SERPL-MCNC: 109 MG/DL — HIGH (ref 70–99)
HCT VFR BLD CALC: 31.7 % — LOW (ref 34.5–45)
HCT VFR BLD CALC: 31.7 % — LOW (ref 34.5–45)
HGB BLD-MCNC: 9.6 G/DL — LOW (ref 11.5–15.5)
HGB BLD-MCNC: 9.6 G/DL — LOW (ref 11.5–15.5)
IMM GRANULOCYTES NFR BLD AUTO: 0.7 % — SIGNIFICANT CHANGE UP (ref 0–1.5)
LYMPHOCYTES # BLD AUTO: 1.17 K/UL — SIGNIFICANT CHANGE UP (ref 1–3.3)
LYMPHOCYTES # BLD AUTO: 8.9 % — LOW (ref 13–44)
MAGNESIUM SERPL-MCNC: 1.9 MG/DL — SIGNIFICANT CHANGE UP (ref 1.6–2.6)
MCHC RBC-ENTMCNC: 25.7 PG — LOW (ref 27–34)
MCHC RBC-ENTMCNC: 25.7 PG — LOW (ref 27–34)
MCHC RBC-ENTMCNC: 30.3 % — LOW (ref 32–36)
MCHC RBC-ENTMCNC: 30.3 % — LOW (ref 32–36)
MCV RBC AUTO: 84.8 FL — SIGNIFICANT CHANGE UP (ref 80–100)
MCV RBC AUTO: 84.8 FL — SIGNIFICANT CHANGE UP (ref 80–100)
MONOCYTES # BLD AUTO: 1.21 K/UL — HIGH (ref 0–0.9)
MONOCYTES NFR BLD AUTO: 9.2 % — SIGNIFICANT CHANGE UP (ref 2–14)
NEUTROPHILS # BLD AUTO: 8.74 K/UL — HIGH (ref 1.8–7.4)
NEUTROPHILS NFR BLD AUTO: 66.8 % — SIGNIFICANT CHANGE UP (ref 43–77)
NRBC # FLD: 0 K/UL — SIGNIFICANT CHANGE UP (ref 0–0)
NRBC # FLD: 0 K/UL — SIGNIFICANT CHANGE UP (ref 0–0)
PLATELET # BLD AUTO: 553 K/UL — HIGH (ref 150–400)
PLATELET # BLD AUTO: 553 K/UL — HIGH (ref 150–400)
PMV BLD: 8.7 FL — SIGNIFICANT CHANGE UP (ref 7–13)
PMV BLD: 8.7 FL — SIGNIFICANT CHANGE UP (ref 7–13)
POTASSIUM SERPL-MCNC: 4.2 MMOL/L — SIGNIFICANT CHANGE UP (ref 3.5–5.3)
POTASSIUM SERPL-SCNC: 4.2 MMOL/L — SIGNIFICANT CHANGE UP (ref 3.5–5.3)
RBC # BLD: 3.74 M/UL — LOW (ref 3.8–5.2)
RBC # BLD: 3.74 M/UL — LOW (ref 3.8–5.2)
RBC # FLD: 13.9 % — SIGNIFICANT CHANGE UP (ref 10.3–14.5)
RBC # FLD: 13.9 % — SIGNIFICANT CHANGE UP (ref 10.3–14.5)
SODIUM SERPL-SCNC: 133 MMOL/L — LOW (ref 135–145)
VANCOMYCIN TROUGH SERPL-MCNC: 14 UG/ML — SIGNIFICANT CHANGE UP (ref 10–20)
WBC # BLD: 13.09 K/UL — HIGH (ref 3.8–10.5)
WBC # BLD: 13.09 K/UL — HIGH (ref 3.8–10.5)
WBC # FLD AUTO: 13.09 K/UL — HIGH (ref 3.8–10.5)
WBC # FLD AUTO: 13.09 K/UL — HIGH (ref 3.8–10.5)

## 2019-05-11 PROCEDURE — 99233 SBSQ HOSP IP/OBS HIGH 50: CPT

## 2019-05-11 RX ADMIN — LEVALBUTEROL 0.63 MILLIGRAM(S): 1.25 SOLUTION, CONCENTRATE RESPIRATORY (INHALATION) at 21:03

## 2019-05-11 RX ADMIN — SODIUM CHLORIDE 1 GRAM(S): 9 INJECTION INTRAMUSCULAR; INTRAVENOUS; SUBCUTANEOUS at 06:05

## 2019-05-11 RX ADMIN — Medication 250 MILLIGRAM(S): at 19:34

## 2019-05-11 RX ADMIN — PIPERACILLIN AND TAZOBACTAM 25 GRAM(S): 4; .5 INJECTION, POWDER, LYOPHILIZED, FOR SOLUTION INTRAVENOUS at 23:24

## 2019-05-11 RX ADMIN — SODIUM CHLORIDE 1 GRAM(S): 9 INJECTION INTRAMUSCULAR; INTRAVENOUS; SUBCUTANEOUS at 17:12

## 2019-05-11 RX ADMIN — LEVALBUTEROL 0.63 MILLIGRAM(S): 1.25 SOLUTION, CONCENTRATE RESPIRATORY (INHALATION) at 03:43

## 2019-05-11 RX ADMIN — Medication 5 MILLIGRAM(S): at 06:05

## 2019-05-11 RX ADMIN — OXYCODONE HYDROCHLORIDE 5 MILLIGRAM(S): 5 TABLET ORAL at 00:24

## 2019-05-11 RX ADMIN — PIPERACILLIN AND TAZOBACTAM 25 GRAM(S): 4; .5 INJECTION, POWDER, LYOPHILIZED, FOR SOLUTION INTRAVENOUS at 14:11

## 2019-05-11 RX ADMIN — ENOXAPARIN SODIUM 40 MILLIGRAM(S): 100 INJECTION SUBCUTANEOUS at 12:09

## 2019-05-11 RX ADMIN — Medication 100 MILLIGRAM(S): at 19:33

## 2019-05-11 RX ADMIN — LEVALBUTEROL 0.63 MILLIGRAM(S): 1.25 SOLUTION, CONCENTRATE RESPIRATORY (INHALATION) at 10:22

## 2019-05-11 RX ADMIN — PIPERACILLIN AND TAZOBACTAM 25 GRAM(S): 4; .5 INJECTION, POWDER, LYOPHILIZED, FOR SOLUTION INTRAVENOUS at 06:05

## 2019-05-11 RX ADMIN — LEVALBUTEROL 0.63 MILLIGRAM(S): 1.25 SOLUTION, CONCENTRATE RESPIRATORY (INHALATION) at 16:26

## 2019-05-11 RX ADMIN — PANTOPRAZOLE SODIUM 40 MILLIGRAM(S): 20 TABLET, DELAYED RELEASE ORAL at 06:05

## 2019-05-11 RX ADMIN — Medication 100 MILLIGRAM(S): at 12:09

## 2019-05-11 RX ADMIN — Medication 250 MILLIGRAM(S): at 06:05

## 2019-05-11 RX ADMIN — Medication 5 MILLIGRAM(S): at 17:12

## 2019-05-11 RX ADMIN — GABAPENTIN 100 MILLIGRAM(S): 400 CAPSULE ORAL at 06:05

## 2019-05-11 RX ADMIN — GABAPENTIN 100 MILLIGRAM(S): 400 CAPSULE ORAL at 17:12

## 2019-05-11 RX ADMIN — Medication 5 MILLIGRAM(S): at 12:09

## 2019-05-11 NOTE — PROGRESS NOTE ADULT - SUBJECTIVE AND OBJECTIVE BOX
Patient is a 54y old  Female who presents with a chief complaint of shortness of breath, cough (11 May 2019 11:15)      SUBJECTIVE / OVERNIGHT EVENTS: Feels better. Decreased dyspnia     MEDICATIONS  (STANDING):  enoxaparin Injectable 40 milliGRAM(s) SubCutaneous daily  gabapentin 100 milliGRAM(s) Oral every 12 hours  levalbuterol Inhalation 0.63 milliGRAM(s) Inhalation every 6 hours  metoclopramide 5 milliGRAM(s) Oral three times a day before meals  pantoprazole    Tablet 40 milliGRAM(s) Oral before breakfast  piperacillin/tazobactam IVPB. 3.375 Gram(s) IV Intermittent every 8 hours  senna 2 Tablet(s) Oral at bedtime  sodium chloride 1 Gram(s) Oral two times a day  vancomycin  IVPB 750 milliGRAM(s) IV Intermittent every 12 hours    MEDICATIONS  (PRN):  guaiFENesin    Syrup 100 milliGRAM(s) Oral every 6 hours PRN Cough  oxyCODONE    IR 5 milliGRAM(s) Oral every 3 hours PRN moderate and severe pain/shortness of breath      Vital Signs Last 24 Hrs  T(C): 37 (11 May 2019 05:58), Max: 37 (10 May 2019 13:29)  T(F): 98.6 (11 May 2019 05:58), Max: 98.6 (10 May 2019 13:29)  HR: 108 (11 May 2019 10:23) (92 - 115)  BP: 91/60 (11 May 2019 05:58) (89/52 - 96/60)  BP(mean): --  RR: 18 (11 May 2019 05:58) (16 - 18)  SpO2: 93% (11 May 2019 10:23) (92% - 100%)  CAPILLARY BLOOD GLUCOSE        I&O's Summary    10 May 2019 07:01  -  11 May 2019 07:00  --------------------------------------------------------  IN: 720 mL / OUT: 0 mL / NET: 720 mL        PHYSICAL EXAM:  GENERAL: NAD, well-developed  HEAD:  Atraumatic, Normocephalic  EYES: EOMI, PERRLA, conjunctiva and sclera clear  NECK: Supple, No JVD  CHEST/LUNG: Clear to auscultation left, (+) decreased BS on right hemithorax; No wheeze  HEART: Regular rate and rhythm; No murmurs, rubs, or gallops  ABDOMEN: Soft, Nontender, Nondistended; Bowel sounds present  EXTREMITIES:  2+ Peripheral Pulses, No clubbing, cyanosis, or edema  PSYCH: AAOx3  NEUROLOGY: non-focal  SKIN: No rashes or lesions    LABS:                        9.6    13.09 )-----------( 553      ( 11 May 2019 03:30 )             31.7     05-11    133<L>  |  97<L>  |  9   ----------------------------<  109<H>  4.2   |  26  |  0.66    Ca    8.4      11 May 2019 03:30  Mg     1.9     05-11                RADIOLOGY & ADDITIONAL TESTS:    Imaging Personally Reviewed:    Consultant(s) Notes Reviewed:      Care Discussed with Consultants/Other Providers:

## 2019-05-11 NOTE — PROGRESS NOTE ADULT - ASSESSMENT
Pericardial effusion  recurrent   s/p drainage  stable     PE  stable  off a/c due to hemorrhagic pericardial effusion      LUNG CA  fu with Onc, pulm

## 2019-05-11 NOTE — PROGRESS NOTE ADULT - PROBLEM SELECTOR PLAN 7
-Off heparin drip in the setting of hemorrhagic pericardial effusion, pt has IVC filter  -Continue home meds  -Currently on RA, saturating well  -SQ Lovenox 40mg daily

## 2019-05-11 NOTE — PROGRESS NOTE ADULT - SUBJECTIVE AND OBJECTIVE BOX
Subjective: Patient seen and examined. No new events except as noted.     SUBJECTIVE/ROS:        MEDICATIONS:  MEDICATIONS  (STANDING):  enoxaparin Injectable 40 milliGRAM(s) SubCutaneous daily  gabapentin 100 milliGRAM(s) Oral every 12 hours  levalbuterol Inhalation 0.63 milliGRAM(s) Inhalation every 6 hours  metoclopramide 5 milliGRAM(s) Oral three times a day before meals  pantoprazole    Tablet 40 milliGRAM(s) Oral before breakfast  piperacillin/tazobactam IVPB. 3.375 Gram(s) IV Intermittent every 8 hours  senna 2 Tablet(s) Oral at bedtime  sodium chloride 1 Gram(s) Oral two times a day  vancomycin  IVPB 750 milliGRAM(s) IV Intermittent every 12 hours      PHYSICAL EXAM:  T(C): 37 (05-11-19 @ 05:58), Max: 37 (05-10-19 @ 13:29)  HR: 108 (05-11-19 @ 10:23) (92 - 115)  BP: 91/60 (05-11-19 @ 05:58) (89/52 - 96/60)  RR: 18 (05-11-19 @ 05:58) (16 - 18)  SpO2: 93% (05-11-19 @ 10:23) (92% - 100%)  Wt(kg): --  I&O's Summary    10 May 2019 07:01  -  11 May 2019 07:00  --------------------------------------------------------  IN: 720 mL / OUT: 0 mL / NET: 720 mL            JVP: Normal  Neck: supple  Lung: clear   CV: S1 S2 , Murmur:  Abd: soft  Ext: No edema  neuro: Awake   Psych: flat affect  Skin: normal``    LABS/DATA:    CARDIAC MARKERS:                                9.6    13.09 )-----------( 553      ( 11 May 2019 03:30 )             31.7     05-11    133<L>  |  97<L>  |  9   ----------------------------<  109<H>  4.2   |  26  |  0.66    Ca    8.4      11 May 2019 03:30  Mg     1.9     05-11      proBNP:   Lipid Profile:   HgA1c:   TSH:     TELE:  EKG:

## 2019-05-11 NOTE — PROGRESS NOTE ADULT - PROBLEM SELECTOR PLAN 1
(+) large pericardial effusion with signs of early cardiac tamponade. S/P IR drainage of pericardial effusion. Improving.   -Continue Tele monitoring  -F/U with IR and CT surgery  -f/u with Cardiology consult

## 2019-05-12 LAB
ANION GAP SERPL CALC-SCNC: 15 MMO/L — HIGH (ref 7–14)
BASOPHILS # BLD AUTO: 0.04 K/UL — SIGNIFICANT CHANGE UP (ref 0–0.2)
BASOPHILS NFR BLD AUTO: 0.3 % — SIGNIFICANT CHANGE UP (ref 0–2)
BUN SERPL-MCNC: 9 MG/DL — SIGNIFICANT CHANGE UP (ref 7–23)
CALCIUM SERPL-MCNC: 8.5 MG/DL — SIGNIFICANT CHANGE UP (ref 8.4–10.5)
CHLORIDE SERPL-SCNC: 96 MMOL/L — LOW (ref 98–107)
CO2 SERPL-SCNC: 22 MMOL/L — SIGNIFICANT CHANGE UP (ref 22–31)
CREAT SERPL-MCNC: 0.97 MG/DL — SIGNIFICANT CHANGE UP (ref 0.5–1.3)
EOSINOPHIL # BLD AUTO: 1.57 K/UL — HIGH (ref 0–0.5)
EOSINOPHIL NFR BLD AUTO: 11.4 % — HIGH (ref 0–6)
GLUCOSE SERPL-MCNC: 103 MG/DL — HIGH (ref 70–99)
HCT VFR BLD CALC: 31.8 % — LOW (ref 34.5–45)
HCT VFR BLD CALC: 31.8 % — LOW (ref 34.5–45)
HGB BLD-MCNC: 9.6 G/DL — LOW (ref 11.5–15.5)
HGB BLD-MCNC: 9.6 G/DL — LOW (ref 11.5–15.5)
IMM GRANULOCYTES NFR BLD AUTO: 0.9 % — SIGNIFICANT CHANGE UP (ref 0–1.5)
LYMPHOCYTES # BLD AUTO: 1 K/UL — SIGNIFICANT CHANGE UP (ref 1–3.3)
LYMPHOCYTES # BLD AUTO: 7.2 % — LOW (ref 13–44)
MAGNESIUM SERPL-MCNC: 2 MG/DL — SIGNIFICANT CHANGE UP (ref 1.6–2.6)
MCHC RBC-ENTMCNC: 25.7 PG — LOW (ref 27–34)
MCHC RBC-ENTMCNC: 25.7 PG — LOW (ref 27–34)
MCHC RBC-ENTMCNC: 30.2 % — LOW (ref 32–36)
MCHC RBC-ENTMCNC: 30.2 % — LOW (ref 32–36)
MCV RBC AUTO: 85.3 FL — SIGNIFICANT CHANGE UP (ref 80–100)
MCV RBC AUTO: 85.3 FL — SIGNIFICANT CHANGE UP (ref 80–100)
MONOCYTES # BLD AUTO: 1.46 K/UL — HIGH (ref 0–0.9)
MONOCYTES NFR BLD AUTO: 10.6 % — SIGNIFICANT CHANGE UP (ref 2–14)
NEUTROPHILS # BLD AUTO: 9.63 K/UL — HIGH (ref 1.8–7.4)
NEUTROPHILS NFR BLD AUTO: 69.6 % — SIGNIFICANT CHANGE UP (ref 43–77)
NRBC # FLD: 0 K/UL — SIGNIFICANT CHANGE UP (ref 0–0)
NRBC # FLD: 0 K/UL — SIGNIFICANT CHANGE UP (ref 0–0)
PLATELET # BLD AUTO: 515 K/UL — HIGH (ref 150–400)
PLATELET # BLD AUTO: 515 K/UL — HIGH (ref 150–400)
PMV BLD: 8.9 FL — SIGNIFICANT CHANGE UP (ref 7–13)
PMV BLD: 8.9 FL — SIGNIFICANT CHANGE UP (ref 7–13)
POTASSIUM SERPL-MCNC: 4.1 MMOL/L — SIGNIFICANT CHANGE UP (ref 3.5–5.3)
POTASSIUM SERPL-SCNC: 4.1 MMOL/L — SIGNIFICANT CHANGE UP (ref 3.5–5.3)
RBC # BLD: 3.73 M/UL — LOW (ref 3.8–5.2)
RBC # BLD: 3.73 M/UL — LOW (ref 3.8–5.2)
RBC # FLD: 14.2 % — SIGNIFICANT CHANGE UP (ref 10.3–14.5)
RBC # FLD: 14.2 % — SIGNIFICANT CHANGE UP (ref 10.3–14.5)
SODIUM SERPL-SCNC: 133 MMOL/L — LOW (ref 135–145)
VANCOMYCIN FLD-MCNC: 13.7 UG/ML — SIGNIFICANT CHANGE UP
WBC # BLD: 13.83 K/UL — HIGH (ref 3.8–10.5)
WBC # BLD: 13.83 K/UL — HIGH (ref 3.8–10.5)
WBC # FLD AUTO: 13.83 K/UL — HIGH (ref 3.8–10.5)
WBC # FLD AUTO: 13.83 K/UL — HIGH (ref 3.8–10.5)

## 2019-05-12 PROCEDURE — 99233 SBSQ HOSP IP/OBS HIGH 50: CPT

## 2019-05-12 PROCEDURE — 93306 TTE W/DOPPLER COMPLETE: CPT | Mod: 26

## 2019-05-12 PROCEDURE — 71045 X-RAY EXAM CHEST 1 VIEW: CPT | Mod: 26

## 2019-05-12 PROCEDURE — 71250 CT THORAX DX C-: CPT | Mod: 26

## 2019-05-12 RX ORDER — SODIUM CHLORIDE 9 MG/ML
500 INJECTION INTRAMUSCULAR; INTRAVENOUS; SUBCUTANEOUS ONCE
Refills: 0 | Status: COMPLETED | OUTPATIENT
Start: 2019-05-12 | End: 2019-05-12

## 2019-05-12 RX ORDER — MIDODRINE HYDROCHLORIDE 2.5 MG/1
5 TABLET ORAL ONCE
Refills: 0 | Status: DISCONTINUED | OUTPATIENT
Start: 2019-05-12 | End: 2019-05-12

## 2019-05-12 RX ADMIN — SODIUM CHLORIDE 1 GRAM(S): 9 INJECTION INTRAMUSCULAR; INTRAVENOUS; SUBCUTANEOUS at 06:26

## 2019-05-12 RX ADMIN — OXYCODONE HYDROCHLORIDE 5 MILLIGRAM(S): 5 TABLET ORAL at 21:52

## 2019-05-12 RX ADMIN — Medication 250 MILLIGRAM(S): at 06:25

## 2019-05-12 RX ADMIN — SODIUM CHLORIDE 1 GRAM(S): 9 INJECTION INTRAMUSCULAR; INTRAVENOUS; SUBCUTANEOUS at 17:47

## 2019-05-12 RX ADMIN — LEVALBUTEROL 0.63 MILLIGRAM(S): 1.25 SOLUTION, CONCENTRATE RESPIRATORY (INHALATION) at 15:25

## 2019-05-12 RX ADMIN — LEVALBUTEROL 0.63 MILLIGRAM(S): 1.25 SOLUTION, CONCENTRATE RESPIRATORY (INHALATION) at 03:41

## 2019-05-12 RX ADMIN — Medication 250 MILLIGRAM(S): at 17:47

## 2019-05-12 RX ADMIN — PIPERACILLIN AND TAZOBACTAM 25 GRAM(S): 4; .5 INJECTION, POWDER, LYOPHILIZED, FOR SOLUTION INTRAVENOUS at 14:00

## 2019-05-12 RX ADMIN — Medication 5 MILLIGRAM(S): at 17:47

## 2019-05-12 RX ADMIN — SODIUM CHLORIDE 500 MILLILITER(S): 9 INJECTION INTRAMUSCULAR; INTRAVENOUS; SUBCUTANEOUS at 06:25

## 2019-05-12 RX ADMIN — GABAPENTIN 100 MILLIGRAM(S): 400 CAPSULE ORAL at 17:47

## 2019-05-12 RX ADMIN — PIPERACILLIN AND TAZOBACTAM 25 GRAM(S): 4; .5 INJECTION, POWDER, LYOPHILIZED, FOR SOLUTION INTRAVENOUS at 06:25

## 2019-05-12 RX ADMIN — OXYCODONE HYDROCHLORIDE 5 MILLIGRAM(S): 5 TABLET ORAL at 22:40

## 2019-05-12 RX ADMIN — LEVALBUTEROL 0.63 MILLIGRAM(S): 1.25 SOLUTION, CONCENTRATE RESPIRATORY (INHALATION) at 09:28

## 2019-05-12 RX ADMIN — PIPERACILLIN AND TAZOBACTAM 25 GRAM(S): 4; .5 INJECTION, POWDER, LYOPHILIZED, FOR SOLUTION INTRAVENOUS at 21:42

## 2019-05-12 RX ADMIN — LEVALBUTEROL 0.63 MILLIGRAM(S): 1.25 SOLUTION, CONCENTRATE RESPIRATORY (INHALATION) at 21:35

## 2019-05-12 NOTE — PROGRESS NOTE ADULT - ASSESSMENT
Hypotension   improving  CT of chest non contrast  obtain echo to eval for pericardial effusion / tamponade   rule out overwhelming sepsis     Pericardial effusion  recurrent   s/p drainage  repeat echo     PE  stable  off a/c due to hemorrhagic pericardial effusion      LUNG CA  fu with Onc, pulm

## 2019-05-12 NOTE — PROGRESS NOTE ADULT - SUBJECTIVE AND OBJECTIVE BOX
Subjective: Patient seen and examined. No new events except as noted.     SUBJECTIVE/ROS:  episode of hypotension       MEDICATIONS:  MEDICATIONS  (STANDING):  enoxaparin Injectable 40 milliGRAM(s) SubCutaneous daily  gabapentin 100 milliGRAM(s) Oral every 12 hours  levalbuterol Inhalation 0.63 milliGRAM(s) Inhalation every 6 hours  metoclopramide 5 milliGRAM(s) Oral three times a day before meals  pantoprazole    Tablet 40 milliGRAM(s) Oral before breakfast  piperacillin/tazobactam IVPB. 3.375 Gram(s) IV Intermittent every 8 hours  senna 2 Tablet(s) Oral at bedtime  sodium chloride 1 Gram(s) Oral two times a day  vancomycin  IVPB 750 milliGRAM(s) IV Intermittent every 12 hours      PHYSICAL EXAM:  T(C): 36.8 (05-12-19 @ 06:04), Max: 37.2 (05-11-19 @ 23:22)  HR: 96 (05-12-19 @ 09:28) (94 - 110)  BP: 111/77 (05-12-19 @ 08:00) (72/46 - 111/77)  RR: 50 (05-12-19 @ 07:21) (18 - 50)  SpO2: 95% (05-12-19 @ 09:28) (93% - 100%)  Wt(kg): --  I&O's Summary    11 May 2019 07:01  -  12 May 2019 07:00  --------------------------------------------------------  IN: 1000 mL / OUT: 50 mL / NET: 950 mL            JVP: Normal  Neck: supple  Lung: dec bs on right side   CV: S1 S2 , Murmur:  Abd: soft  Ext: No edema  neuro: Awake / alert  Psych: flat affect  Skin: normal``    LABS/DATA:    CARDIAC MARKERS:                                9.6    13.83 )-----------( 515      ( 12 May 2019 08:35 )             31.8     05-12    133<L>  |  96<L>  |  9   ----------------------------<  103<H>  4.1   |  22  |  0.97    Ca    8.5      12 May 2019 08:35  Mg     2.0     05-12      proBNP:   Lipid Profile:   HgA1c:   TSH:     TELE:  EKG:

## 2019-05-12 NOTE — PROGRESS NOTE ADULT - SUBJECTIVE AND OBJECTIVE BOX
Patient is a 54y old  Female who presents with a chief complaint of shortness of breath, cough (12 May 2019 10:12)      SUBJECTIVE / OVERNIGHT EVENTS: Pt was hypotensive this morning, responded to IVF bolus. Currently c/o SOB and cough.     MEDICATIONS  (STANDING):  enoxaparin Injectable 40 milliGRAM(s) SubCutaneous daily  gabapentin 100 milliGRAM(s) Oral every 12 hours  levalbuterol Inhalation 0.63 milliGRAM(s) Inhalation every 6 hours  metoclopramide 5 milliGRAM(s) Oral three times a day before meals  pantoprazole    Tablet 40 milliGRAM(s) Oral before breakfast  piperacillin/tazobactam IVPB. 3.375 Gram(s) IV Intermittent every 8 hours  senna 2 Tablet(s) Oral at bedtime  sodium chloride 1 Gram(s) Oral two times a day  vancomycin  IVPB 750 milliGRAM(s) IV Intermittent every 12 hours    MEDICATIONS  (PRN):  guaiFENesin    Syrup 100 milliGRAM(s) Oral every 6 hours PRN Cough  oxyCODONE    IR 5 milliGRAM(s) Oral every 3 hours PRN moderate and severe pain/shortness of breath      Vital Signs Last 24 Hrs  T(C): 36.6 (12 May 2019 10:33), Max: 37.2 (11 May 2019 23:22)  T(F): 97.8 (12 May 2019 10:33), Max: 98.9 (11 May 2019 23:22)  HR: 100 (12 May 2019 10:33) (94 - 108)  BP: 110/70 (12 May 2019 10:33) (72/46 - 111/77)  BP(mean): --  RR: 22 (12 May 2019 10:33) (18 - 50)  SpO2: 93% (12 May 2019 10:33) (93% - 100%)  CAPILLARY BLOOD GLUCOSE        I&O's Summary    11 May 2019 07:01  -  12 May 2019 07:00  --------------------------------------------------------  IN: 1000 mL / OUT: 50 mL / NET: 950 mL        PHYSICAL EXAM:  GENERAL: NAD, well-developed  HEAD:  Atraumatic, Normocephalic  EYES: EOMI, PERRLA, conjunctiva and sclera clear  NECK: Supple, No JVD  CHEST/LUNG: Clear to auscultation (L), (+) decreased BS on right; No wheeze  HEART: Regular tachy at 100's; No murmurs, rubs, or gallops  ABDOMEN: Soft, Nontender, Nondistended; Bowel sounds present  EXTREMITIES:  2+ Peripheral Pulses, (+) b/l ankle edema.   PSYCH: AAOx3  NEUROLOGY: non-focal  SKIN: No rashes or lesions    LABS:                        9.6    13.83 )-----------( 515      ( 12 May 2019 08:35 )             31.8     05-12    133<L>  |  96<L>  |  9   ----------------------------<  103<H>  4.1   |  22  |  0.97    Ca    8.5      12 May 2019 08:35  Mg     2.0     05-12                RADIOLOGY & ADDITIONAL TESTS:    Imaging Personally Reviewed:    Consultant(s) Notes Reviewed:      Care Discussed with Consultants/Other Providers:

## 2019-05-12 NOTE — PROGRESS NOTE ADULT - ASSESSMENT
55yo w metastatic lung CA w recurrent pericardial effusion s/p IR drainage.       Plan  Continue care per primary team  Continue with pleurX drainage 3x week  CT scan read c/w no significant changes or findings, no enlarging pericardial effusion  f/u Echo read  Pericardial drain with minimal to no output in the last two days; will discuss plan for drain with attending   Will continue to follow  Discussed at AM rounds

## 2019-05-12 NOTE — PROGRESS NOTE ADULT - SUBJECTIVE AND OBJECTIVE BOX
54y Female found in room earlier this morning, sitting up in bed in NAD, oxygen saturation mid to high 90's on 2L oxygen supplementation via nasal canula. PleurX catheter in place, capped. Pericardial drain in place, no drainage coming out. Otherwise patient AOx3, denies sob, cp at this time.    24hr Events: Overnight patient found to have hypotension BP 77/43, asymptomatic (per patient report) with tachycaridia . Per report/chart patient administered 500 fluid bolus but could only tolerate 250cc before becoming sob with cough and sputum production. Stat CXR was not significantly different from previous imaging. F/U vitals much improved with /60 and  (of note patient's HR is usually in the  NSR range). CT chest with IV contrast without significant change as compared to previous scan, pericardial drain noted with small decrease in pericardial effusion not noted to be in tamponade. Since this AM patient has been assymptomatic and BP has improved. TTE is pending read.     HPI: 53 y/o F w/ recent dx of R lung adenocarcinoma with mediastinal and hilar lymphadenopathy undergoing treatment with Keytruda, R pleural effusion s/p RVATS,  pleurx placement, subxiphoid pericardial window on 3/20/19, PE (3/4) on lovenox presents with 2 days of fevers, worsening cough, SOB. Pt stated she her symptoms started since her dx of lung ca but much worse in the last two days. Her pleurX was drained on Tuesday with minimal discharge. Pleural fluid was described as clear not cloudy or purulent. No hemoptysis, chest pain, pain at PluerX site, warm, redness noted at the site.    Vital Signs:  Vital Signs Last 24 Hrs  T(C): 36.6 (05-12-19 @ 10:33), Max: 37.2 (05-11-19 @ 23:22)  T(F): 97.8 (05-12-19 @ 10:33), Max: 98.9 (05-11-19 @ 23:22)  HR: 95 (05-12-19 @ 15:25) (94 - 115)  BP: 99/59 (05-12-19 @ 12:30) (72/46 - 111/77)  RR: 17 (05-12-19 @ 12:30) (17 - 50)  SpO2: 95% (05-12-19 @ 15:25) (93% - 100%) on (O2)    Relevant labs, Radiology and Medications reviewed    CT chest:   < from: CT Chest No Cont (05.12.19 @ 13:04) >  IMPRESSION:     Status post pericardial tube placement with interval decrease of   pericardial effusion.    Moderate bilateral pleural effusions. Right lower lobe opacity and   bilateral lung compressive atelectasis are stable.          < end of copied text >      Pertinent Physical Exam  Gen: WN/WD, NAD  Neuro: CTA B/L  CV: RRR, S1S2  Abd: Soft, NT, ND +BS  Ext: No edema, +peripheral pulses    Assessment  54y Female admitted with complaints of Patient is a 54y old  Female who presents with a chief complaint of shortness of breath, cough (12 May 2019 11:00)  .    On (Date), patient underwent .    Plan  Continue care per primary team  CXR in AM   Discussed at AM rounds 54y Female found in room earlier this morning, sitting up in bed in NAD, oxygen saturation mid to high 90's on 2L oxygen supplementation via nasal canula. PleurX catheter in place, capped. Pericardial drain in place, no drainage coming out. Otherwise patient AOx3, denies sob, cp at this time.    24hr Events: Overnight patient found to have hypotension BP 77/43, asymptomatic (per patient report) with tachycaridia . Per report/chart patient administered 500 fluid bolus but could only tolerate 250cc before becoming sob with cough and sputum production. Stat CXR was not significantly different from previous imaging. F/U vitals much improved with /60 and  (of note patient's HR is usually in the  NSR range). CT chest with IV contrast without significant change as compared to previous scan, pericardial drain noted with small decrease in pericardial effusion not noted to be in tamponade. Since this AM patient has been assymptomatic and BP has improved. TTE is pending read.     HPI: 53 y/o F w/ recent dx of R lung adenocarcinoma with mediastinal and hilar lymphadenopathy undergoing treatment with Keytruda, R pleural effusion s/p RVATS,  pleurx placement, subxiphoid pericardial window on 3/20/19, PE (3/4) on lovenox presents with 2 days of fevers, worsening cough, SOB. Pt stated she her symptoms started since her dx of lung ca but much worse in the last two days. Her pleurX was drained on Tuesday with minimal discharge. Pleural fluid was described as clear not cloudy or purulent. No hemoptysis, chest pain, pain at PluerX site, warm, redness noted at the site.    Vital Signs:  Vital Signs Last 24 Hrs  T(C): 36.6 (05-12-19 @ 10:33), Max: 37.2 (05-11-19 @ 23:22)  T(F): 97.8 (05-12-19 @ 10:33), Max: 98.9 (05-11-19 @ 23:22)  HR: 95 (05-12-19 @ 15:25) (94 - 115)  BP: 99/59 (05-12-19 @ 12:30) (72/46 - 111/77)  RR: 17 (05-12-19 @ 12:30) (17 - 50)  SpO2: 95% (05-12-19 @ 15:25) (93% - 100%) on (O2)    Relevant labs, Radiology and Medications reviewed    CT chest:   < from: CT Chest No Cont (05.12.19 @ 13:04) >  IMPRESSION:     Status post pericardial tube placement with interval decrease of   pericardial effusion.    Moderate bilateral pleural effusions. Right lower lobe opacity and   bilateral lung compressive atelectasis are stable.          < end of copied text >      Pertinent Physical Exam  Gen: WN/WD, NAD  PulM: CTA B/L  CV: RRR, S1S2  Abd: Soft, NT, ND +BS  Ext: No edema, +peripheral pulses  Pericardial drain: milked no output

## 2019-05-12 NOTE — PROVIDER CONTACT NOTE (OTHER) - ASSESSMENT
chest tube dressing c/d/i
pt A&OX4, VS stable, dyspneic but not new onset.
pt asymptomatic.
pt asymptomatic.
Elevated hr upon auscultation of apical pulse rate. Pt denied pain at the time, however dyspneic with frequent ( moderate clear thin secretions) cough.
Pt is resting in bed comfortably, no C/O or noted any S&S
hypotensive
pt denies dizziness, faintness, alert and apposable

## 2019-05-12 NOTE — CHART NOTE - NSCHARTNOTEFT_GEN_A_CORE
Called by RN as pt has become SOB while getting fluid bolus. On exam pt is tachypneic, BP improved 101/76, SpO2 100% on 4L, HR low 100's on monitor. Pt coughing up clear fluid. Bolus stopped, pt received 250cc NS. Discussed case with on call hospitalist, CTS, and Dr. Isaac, will hold lasix for now and order stat CXR and bedside echo to assess pericardial effusion (no output from drain since 5/10), wi    Addendum: BP improved, 111/77, pt appears more comfortable, no longer tachypneic. CXR showing possible increase in size of R effusion, will order CT chest non-con to further assess. Discussed case with Dr. Conway, bedside echo pending, will f/u with CTS recommendations once echo and CT chest result. Called by RN as pt has become SOB while getting fluid bolus. On exam pt is tachypneic, BP improved 101/76, SpO2 100% on 4L, HR low 100's on monitor. Pt coughing up clear fluid. Bolus stopped, pt received 250cc NS. Discussed case with on call hospitalist, CTS, and Dr. Isaac, will hold lasix for now and order stat CXR and bedside echo to assess pericardial effusion (no output from drain since 5/10)    Addendum: BP improved, 111/77, pt appears more comfortable, no longer tachypneic. CXR showing possible increase in size of R effusion, will order CT chest non-con to further assess. Discussed case with Dr. Conway, bedside echo pending, will f/u with CTS recommendations once echo and CT chest result.

## 2019-05-12 NOTE — CHART NOTE - NSCHARTNOTEFT_GEN_A_CORE
Notified by RN that the patient's BP is 72/46. Patient is asymptomatic. Spoke with Hospitalist Dr. Friedman, will give 500cc IVF bolus now. If pressure does not respond to IVF, will call MICU out of concern for cardiac tamponade. RN made aware to notify provider immediately if any change in status, continuing to monitor very closely.     Vital Signs Last 24 Hrs  T(C): 36.8 (12 May 2019 06:04), Max: 37.2 (11 May 2019 23:22)  T(F): 98.2 (12 May 2019 06:04), Max: 98.9 (11 May 2019 23:22)  HR: 101 (12 May 2019 06:05) (94 - 110)  BP: 72/46 (12 May 2019 06:05) (72/46 - 102/64)  RR: 18 (12 May 2019 06:04) (18 - 18)  SpO2: 100% (12 May 2019 06:04) (93% - 100%)

## 2019-05-13 DIAGNOSIS — I31.4 CARDIAC TAMPONADE: ICD-10-CM

## 2019-05-13 DIAGNOSIS — D64.9 ANEMIA, UNSPECIFIED: ICD-10-CM

## 2019-05-13 DIAGNOSIS — I95.9 HYPOTENSION, UNSPECIFIED: ICD-10-CM

## 2019-05-13 DIAGNOSIS — R06.00 DYSPNEA, UNSPECIFIED: ICD-10-CM

## 2019-05-13 LAB
ANION GAP SERPL CALC-SCNC: 11 MMO/L — SIGNIFICANT CHANGE UP (ref 7–14)
BASOPHILS # BLD AUTO: 0.04 K/UL — SIGNIFICANT CHANGE UP (ref 0–0.2)
BASOPHILS NFR BLD AUTO: 0.3 % — SIGNIFICANT CHANGE UP (ref 0–2)
BUN SERPL-MCNC: 7 MG/DL — SIGNIFICANT CHANGE UP (ref 7–23)
CALCIUM SERPL-MCNC: 8.3 MG/DL — LOW (ref 8.4–10.5)
CHLORIDE SERPL-SCNC: 100 MMOL/L — SIGNIFICANT CHANGE UP (ref 98–107)
CO2 SERPL-SCNC: 24 MMOL/L — SIGNIFICANT CHANGE UP (ref 22–31)
CREAT SERPL-MCNC: 0.94 MG/DL — SIGNIFICANT CHANGE UP (ref 0.5–1.3)
EOSINOPHIL # BLD AUTO: 1.54 K/UL — HIGH (ref 0–0.5)
EOSINOPHIL NFR BLD AUTO: 12.4 % — HIGH (ref 0–6)
GLUCOSE SERPL-MCNC: 99 MG/DL — SIGNIFICANT CHANGE UP (ref 70–99)
HCT VFR BLD CALC: 29 % — LOW (ref 34.5–45)
HCT VFR BLD CALC: 29 % — LOW (ref 34.5–45)
HGB BLD-MCNC: 8.7 G/DL — LOW (ref 11.5–15.5)
HGB BLD-MCNC: 8.7 G/DL — LOW (ref 11.5–15.5)
IMM GRANULOCYTES NFR BLD AUTO: 0.6 % — SIGNIFICANT CHANGE UP (ref 0–1.5)
LYMPHOCYTES # BLD AUTO: 1.13 K/UL — SIGNIFICANT CHANGE UP (ref 1–3.3)
LYMPHOCYTES # BLD AUTO: 9.1 % — LOW (ref 13–44)
MAGNESIUM SERPL-MCNC: 1.9 MG/DL — SIGNIFICANT CHANGE UP (ref 1.6–2.6)
MCHC RBC-ENTMCNC: 25.7 PG — LOW (ref 27–34)
MCHC RBC-ENTMCNC: 25.7 PG — LOW (ref 27–34)
MCHC RBC-ENTMCNC: 30 % — LOW (ref 32–36)
MCHC RBC-ENTMCNC: 30 % — LOW (ref 32–36)
MCV RBC AUTO: 85.8 FL — SIGNIFICANT CHANGE UP (ref 80–100)
MCV RBC AUTO: 85.8 FL — SIGNIFICANT CHANGE UP (ref 80–100)
MONOCYTES # BLD AUTO: 1.72 K/UL — HIGH (ref 0–0.9)
MONOCYTES NFR BLD AUTO: 13.8 % — SIGNIFICANT CHANGE UP (ref 2–14)
NEUTROPHILS # BLD AUTO: 7.95 K/UL — HIGH (ref 1.8–7.4)
NEUTROPHILS NFR BLD AUTO: 63.8 % — SIGNIFICANT CHANGE UP (ref 43–77)
NRBC # FLD: 0 K/UL — SIGNIFICANT CHANGE UP (ref 0–0)
NRBC # FLD: 0 K/UL — SIGNIFICANT CHANGE UP (ref 0–0)
PLATELET # BLD AUTO: 470 K/UL — HIGH (ref 150–400)
PLATELET # BLD AUTO: 470 K/UL — HIGH (ref 150–400)
PMV BLD: 8.4 FL — SIGNIFICANT CHANGE UP (ref 7–13)
PMV BLD: 8.4 FL — SIGNIFICANT CHANGE UP (ref 7–13)
POTASSIUM SERPL-MCNC: 3.9 MMOL/L — SIGNIFICANT CHANGE UP (ref 3.5–5.3)
POTASSIUM SERPL-SCNC: 3.9 MMOL/L — SIGNIFICANT CHANGE UP (ref 3.5–5.3)
RBC # BLD: 3.38 M/UL — LOW (ref 3.8–5.2)
RBC # BLD: 3.38 M/UL — LOW (ref 3.8–5.2)
RBC # FLD: 14.2 % — SIGNIFICANT CHANGE UP (ref 10.3–14.5)
RBC # FLD: 14.2 % — SIGNIFICANT CHANGE UP (ref 10.3–14.5)
SODIUM SERPL-SCNC: 135 MMOL/L — SIGNIFICANT CHANGE UP (ref 135–145)
VANCOMYCIN FLD-MCNC: 24.2 UG/ML — SIGNIFICANT CHANGE UP
WBC # BLD: 12.46 K/UL — HIGH (ref 3.8–10.5)
WBC # BLD: 12.46 K/UL — HIGH (ref 3.8–10.5)
WBC # FLD AUTO: 12.46 K/UL — HIGH (ref 3.8–10.5)
WBC # FLD AUTO: 12.46 K/UL — HIGH (ref 3.8–10.5)

## 2019-05-13 PROCEDURE — 99232 SBSQ HOSP IP/OBS MODERATE 35: CPT

## 2019-05-13 PROCEDURE — 74018 RADEX ABDOMEN 1 VIEW: CPT | Mod: 26

## 2019-05-13 PROCEDURE — 99233 SBSQ HOSP IP/OBS HIGH 50: CPT

## 2019-05-13 PROCEDURE — 99231 SBSQ HOSP IP/OBS SF/LOW 25: CPT | Mod: 25

## 2019-05-13 RX ORDER — SIMETHICONE 80 MG/1
80 TABLET, CHEWABLE ORAL ONCE
Refills: 0 | Status: COMPLETED | OUTPATIENT
Start: 2019-05-13 | End: 2019-05-13

## 2019-05-13 RX ORDER — METOCLOPRAMIDE HCL 10 MG
10 TABLET ORAL ONCE
Refills: 0 | Status: DISCONTINUED | OUTPATIENT
Start: 2019-05-13 | End: 2019-05-13

## 2019-05-13 RX ORDER — ONDANSETRON 8 MG/1
4 TABLET, FILM COATED ORAL ONCE
Refills: 0 | Status: COMPLETED | OUTPATIENT
Start: 2019-05-13 | End: 2019-05-13

## 2019-05-13 RX ORDER — METOCLOPRAMIDE HCL 10 MG
10 TABLET ORAL ONCE
Refills: 0 | Status: COMPLETED | OUTPATIENT
Start: 2019-05-13 | End: 2019-05-13

## 2019-05-13 RX ORDER — BLEOMYCIN SULFATE 30 UNIT
60 VIAL (EA) INJECTION ONCE
Refills: 0 | Status: COMPLETED | OUTPATIENT
Start: 2019-05-13 | End: 2019-05-13

## 2019-05-13 RX ADMIN — PANTOPRAZOLE SODIUM 40 MILLIGRAM(S): 20 TABLET, DELAYED RELEASE ORAL at 05:21

## 2019-05-13 RX ADMIN — ONDANSETRON 4 MILLIGRAM(S): 8 TABLET, FILM COATED ORAL at 20:09

## 2019-05-13 RX ADMIN — Medication 10 MILLIGRAM(S): at 17:58

## 2019-05-13 RX ADMIN — Medication 5 MILLIGRAM(S): at 05:21

## 2019-05-13 RX ADMIN — Medication 5 MILLIGRAM(S): at 13:09

## 2019-05-13 RX ADMIN — ENOXAPARIN SODIUM 40 MILLIGRAM(S): 100 INJECTION SUBCUTANEOUS at 13:09

## 2019-05-13 RX ADMIN — LEVALBUTEROL 0.63 MILLIGRAM(S): 1.25 SOLUTION, CONCENTRATE RESPIRATORY (INHALATION) at 21:35

## 2019-05-13 RX ADMIN — PIPERACILLIN AND TAZOBACTAM 25 GRAM(S): 4; .5 INJECTION, POWDER, LYOPHILIZED, FOR SOLUTION INTRAVENOUS at 13:09

## 2019-05-13 RX ADMIN — PIPERACILLIN AND TAZOBACTAM 25 GRAM(S): 4; .5 INJECTION, POWDER, LYOPHILIZED, FOR SOLUTION INTRAVENOUS at 05:20

## 2019-05-13 RX ADMIN — Medication 60 UNIT(S): at 12:20

## 2019-05-13 RX ADMIN — LEVALBUTEROL 0.63 MILLIGRAM(S): 1.25 SOLUTION, CONCENTRATE RESPIRATORY (INHALATION) at 04:26

## 2019-05-13 RX ADMIN — LEVALBUTEROL 0.63 MILLIGRAM(S): 1.25 SOLUTION, CONCENTRATE RESPIRATORY (INHALATION) at 15:26

## 2019-05-13 RX ADMIN — SODIUM CHLORIDE 1 GRAM(S): 9 INJECTION INTRAMUSCULAR; INTRAVENOUS; SUBCUTANEOUS at 05:21

## 2019-05-13 RX ADMIN — PIPERACILLIN AND TAZOBACTAM 25 GRAM(S): 4; .5 INJECTION, POWDER, LYOPHILIZED, FOR SOLUTION INTRAVENOUS at 22:31

## 2019-05-13 RX ADMIN — LEVALBUTEROL 0.63 MILLIGRAM(S): 1.25 SOLUTION, CONCENTRATE RESPIRATORY (INHALATION) at 09:14

## 2019-05-13 RX ADMIN — ONDANSETRON 4 MILLIGRAM(S): 8 TABLET, FILM COATED ORAL at 22:51

## 2019-05-13 RX ADMIN — GABAPENTIN 100 MILLIGRAM(S): 400 CAPSULE ORAL at 05:21

## 2019-05-13 NOTE — PROGRESS NOTE ADULT - SUBJECTIVE AND OBJECTIVE BOX
Subjective: Patient seen and examined. No new events except as noted.     SUBJECTIVE/ROS:  feels much better today        MEDICATIONS:  MEDICATIONS  (STANDING):  bleomycin PF IntraPleural (Non - oncologic) 60 Unit(s) IntraPleural. once  enoxaparin Injectable 40 milliGRAM(s) SubCutaneous daily  gabapentin 100 milliGRAM(s) Oral every 12 hours  levalbuterol Inhalation 0.63 milliGRAM(s) Inhalation every 6 hours  metoclopramide 5 milliGRAM(s) Oral three times a day before meals  pantoprazole    Tablet 40 milliGRAM(s) Oral before breakfast  piperacillin/tazobactam IVPB. 3.375 Gram(s) IV Intermittent every 8 hours  senna 2 Tablet(s) Oral at bedtime  sodium chloride 1 Gram(s) Oral two times a day  vancomycin  IVPB 750 milliGRAM(s) IV Intermittent every 12 hours      PHYSICAL EXAM:  T(C): 36.8 (05-13-19 @ 04:20), Max: 36.9 (05-12-19 @ 21:37)  HR: 96 (05-13-19 @ 09:14) (95 - 115)  BP: 106/61 (05-13-19 @ 04:20) (99/59 - 110/70)  RR: 17 (05-13-19 @ 04:20) (17 - 22)  SpO2: 95% (05-13-19 @ 09:14) (93% - 99%)  Wt(kg): --  I&O's Summary    12 May 2019 07:01  -  13 May 2019 07:00  --------------------------------------------------------  IN: 400 mL / OUT: 0 mL / NET: 400 mL    13 May 2019 07:01  -  13 May 2019 09:38  --------------------------------------------------------  IN: 240 mL / OUT: 0 mL / NET: 240 mL            JVP: Normal  Neck: supple  Lung: few crackles   CV: S1 S2 , Murmur:  Abd: soft  Ext: No edema  neuro: Awake / alert  Psych: flat affect  Skin: normal``    LABS/DATA:    CARDIAC MARKERS:                                8.7    12.46 )-----------( 470      ( 13 May 2019 03:10 )             29.0     05-13    135  |  100  |  7   ----------------------------<  99  3.9   |  24  |  0.94    Ca    8.3<L>      13 May 2019 03:10  Mg     1.9     05-13      proBNP:   Lipid Profile:   HgA1c:   TSH:     TELE:  EKG:

## 2019-05-13 NOTE — PROVIDER CONTACT NOTE (OTHER) - ACTION/TREATMENT ORDERED:
Zofran iv   Abdominal xray
adjust as per heparin nomogram
administer bolus of 0.9 NacL 250 mL.
continue to monitor on telemetry
continue to monitor on telemetry
will put in orders for zofran
Provider notified, will continue to monitor, the tube was readjusted and taped down. pt educated on being gentle with the tube, to monitor if it leaks, or comes out again (let RN know).
tele pa Lexie came to assess, tubing reattached, pa to call IR to come assess drain
tele pa aware. will continue to monitor.
tele pa aware. will continue to monitor.
Nothing to be done at this time
Prov requested for EKG over phone. (prov told that ekg would be left in chart)
midodrine ordered, however per MAR give fluids instead. Fluids started. MAR in room to assess pt
will monitor and reassess in AM

## 2019-05-13 NOTE — PROGRESS NOTE ADULT - SUBJECTIVE AND OBJECTIVE BOX
Patient is a 54y old  Female who presents with a chief complaint of shortness of breath, cough (13 May 2019 09:38)      SUBJECTIVE / OVERNIGHT EVENTS:    MEDICATIONS  (STANDING):  bleomycin PF IntraPleural (Non - oncologic) 60 Unit(s) IntraPleural. once  enoxaparin Injectable 40 milliGRAM(s) SubCutaneous daily  gabapentin 100 milliGRAM(s) Oral every 12 hours  levalbuterol Inhalation 0.63 milliGRAM(s) Inhalation every 6 hours  metoclopramide 5 milliGRAM(s) Oral three times a day before meals  pantoprazole    Tablet 40 milliGRAM(s) Oral before breakfast  piperacillin/tazobactam IVPB. 3.375 Gram(s) IV Intermittent every 8 hours  senna 2 Tablet(s) Oral at bedtime  sodium chloride 1 Gram(s) Oral two times a day  vancomycin  IVPB 750 milliGRAM(s) IV Intermittent every 12 hours    MEDICATIONS  (PRN):  guaiFENesin    Syrup 100 milliGRAM(s) Oral every 6 hours PRN Cough  oxyCODONE    IR 5 milliGRAM(s) Oral every 3 hours PRN moderate and severe pain/shortness of breath      T(C): 36.8 (05-13-19 @ 04:20), Max: 36.9 (05-12-19 @ 21:37)  HR: 96 (05-13-19 @ 09:14) (95 - 115)  BP: 106/61 (05-13-19 @ 04:20) (99/59 - 106/61)  RR: 17 (05-13-19 @ 04:20) (17 - 18)  SpO2: 95% (05-13-19 @ 09:14) (94% - 99%)  CAPILLARY BLOOD GLUCOSE        I&O's Summary    12 May 2019 07:01  -  13 May 2019 07:00  --------------------------------------------------------  IN: 400 mL / OUT: 0 mL / NET: 400 mL    13 May 2019 07:01  -  13 May 2019 12:11  --------------------------------------------------------  IN: 240 mL / OUT: 0 mL / NET: 240 mL        PHYSICAL EXAM:  GENERAL: NAD, well-developed  HEAD:  Atraumatic, Normocephalic  EYES: EOMI, PERRLA, conjunctiva and sclera clear  NECK: Supple, No JVD  CHEST/LUNG: Clear to auscultation bilaterally; No wheeze  HEART: s1 s2, regular rhythm and rate   ABDOMEN: Soft, Nontender, Nondistended; Bowel sounds present  EXTREMITIES:  2+ Peripheral Pulses, No clubbing, cyanosis, or edema  PSYCH: AAOx3, calm   NEUROLOGY: non-focal  SKIN: No rashes or lesions    LABS:                        8.7    12.46 )-----------( 470      ( 13 May 2019 03:10 )             29.0     05-13    135  |  100  |  7   ----------------------------<  99  3.9   |  24  |  0.94    Ca    8.3<L>      13 May 2019 03:10  Mg     1.9     05-13                RADIOLOGY & ADDITIONAL TESTS:    Imaging Personally Reviewed:    Consultant(s) Notes Reviewed:      Care Discussed with Consultants/Other Providers: Patient is a 54y old  Female who presents with a chief complaint of shortness of breath, cough (13 May 2019 09:38)      SUBJECTIVE / OVERNIGHT EVENTS:  Pt was seen and examined in AM. Pt in bed wearing NC, chronic ill appearance. She is awake, alert, denied cp/sob/dizziness/palpitation. Reports overall improved.     MEDICATIONS  (STANDING):  bleomycin PF IntraPleural (Non - oncologic) 60 Unit(s) IntraPleural. once  enoxaparin Injectable 40 milliGRAM(s) SubCutaneous daily  gabapentin 100 milliGRAM(s) Oral every 12 hours  levalbuterol Inhalation 0.63 milliGRAM(s) Inhalation every 6 hours  metoclopramide 5 milliGRAM(s) Oral three times a day before meals  pantoprazole    Tablet 40 milliGRAM(s) Oral before breakfast  piperacillin/tazobactam IVPB. 3.375 Gram(s) IV Intermittent every 8 hours  senna 2 Tablet(s) Oral at bedtime  sodium chloride 1 Gram(s) Oral two times a day  vancomycin  IVPB 750 milliGRAM(s) IV Intermittent every 12 hours    MEDICATIONS  (PRN):  guaiFENesin    Syrup 100 milliGRAM(s) Oral every 6 hours PRN Cough  oxyCODONE    IR 5 milliGRAM(s) Oral every 3 hours PRN moderate and severe pain/shortness of breath      T(C): 36.8 (05-13-19 @ 04:20), Max: 36.9 (05-12-19 @ 21:37)  HR: 96 (05-13-19 @ 09:14) (95 - 115)  BP: 106/61 (05-13-19 @ 04:20) (99/59 - 106/61)  RR: 17 (05-13-19 @ 04:20) (17 - 18)  SpO2: 95% (05-13-19 @ 09:14) (94% - 99%)  CAPILLARY BLOOD GLUCOSE        I&O's Summary    12 May 2019 07:01  -  13 May 2019 07:00  --------------------------------------------------------  IN: 400 mL / OUT: 0 mL / NET: 400 mL    13 May 2019 07:01  -  13 May 2019 12:11  --------------------------------------------------------  IN: 240 mL / OUT: 0 mL / NET: 240 mL        PHYSICAL EXAM:  GENERAL: NAD, thin built   HEAD:  Atraumatic, Normocephalic  EYES: EOMI, PERRLA, conjunctiva and sclera clear  NECK: Supple, No JVD  CHEST/LUNG: nonlabored in breathing, ; No wheeze  HEART: + pericardial drain, No murmurs, rubs, or gallops  ABDOMEN: Soft, Nontender, Nondistended; Bowel sounds present  EXTREMITIES:  2+ Peripheral Pulses, (+) b/l ankle edema.   PSYCH: AAOx3  NEUROLOGY: non-focal  SKIN: No rashes or lesions    LABS:                        8.7    12.46 )-----------( 470      ( 13 May 2019 03:10 )             29.0     05-13    135  |  100  |  7   ----------------------------<  99  3.9   |  24  |  0.94    Ca    8.3<L>      13 May 2019 03:10  Mg     1.9     05-13                RADIOLOGY & ADDITIONAL TESTS:    Imaging Personally Reviewed: < from: CT Chest No Cont (05.12.19 @ 13:04) >  IMPRESSION:     Status post pericardial tube placement with interval decrease of   pericardial effusion.    Moderate bilateral pleural effusions. Right lower lobe opacity and   bilateral lung compressive atelectasis are stable.                < end of copied text >    < from: Transthoracic Echocardiogram (05.12.19 @ 09:18) >  CONCLUSIONS:  Normal left ventricular systolic function. No segmental  wall motion abnormalities.  Circumferential pericardial effusion. The efuusion is best  visualized in the subcostal window, where it appears  "moderate-large" adjacent to the right atrium and  ventricle.  No right heart collapse is seen.  Signficiant respiratory variation in the mitral inflow is  present.  Estimated RA pressure is normal.    < end of copied text >      Consultant(s) Notes Reviewed:      Care Discussed with Consultants/Other Providers: Dr. Harper regarding cough management

## 2019-05-13 NOTE — PROGRESS NOTE ADULT - PROBLEM SELECTOR PLAN 5
No fever in the hospital.   -UA negative, RVP negative, blood cx 5/2: NGTD  -vanco/zosyn for ? RLL pneumonia  Pt still has leukocytosis, tachycardia. This is more likely due to underlying malignancy and its complications rather than active sepsis or uncontrolled infection.

## 2019-05-13 NOTE — PROGRESS NOTE ADULT - ASSESSMENT
54F with R lung adenocarcinoma s/p 1 dose of immunotherapy with keytruda on 4/15, chronic right pleural effusion s/p pleurx, subxiphoid pericardial window on 3/20/19, PE on Lovenox s/p IVC filter placement presenting with shortness of breath and cough x 2 days. No evidence of colitis on CT.  Echo with impending tamponade, s/p drainage and pericardial drain, draining bloody effusion. S/p bleomycin pericardial sclerosis 5/13.    -Patient reports relief of cough with Hycodan. Please consider ordering this prn  -Off AC given bloody pericardial effusion and concern for tamponade. Pericardial drain management per CT surg. Can resume AC when/if OK per CT surgery.   -Drain pleurex catheter every other day  -palliative care input appreciated   -Supportive care, pain control, nutrition, PT  -Outpatient oncology f/u    Will follow. Please do not hesitate to call back with questions.     Thuy Harper MD  Medical Oncology Attending

## 2019-05-13 NOTE — PROGRESS NOTE ADULT - PROBLEM SELECTOR PLAN 7
CT showing obstruction of bronchus intermedius with atelectasis 2/2 worsening tumor burden, repeat CT 5/5 showed patent bronchus intermedius  -Outpt follow up for next session of immunotherapy  -Not a hospice candidate yet per onc  -Pall care for symptoms management.

## 2019-05-13 NOTE — PROGRESS NOTE ADULT - SUBJECTIVE AND OBJECTIVE BOX
HPI: 53 y/o F w/ recent dx of R lung adenocarcinoma with mediastinal and hilar lymphadenopathy undergoing treatment with Keytruda, R pleural effusion s/p RVATS,  pleurx placement, subxiphoid pericardial window on 3/20/19, PE (3/4) on lovenox presents with 2 days of fevers, worsening cough, SOB. Pt stated she her symptoms started since her dx of lung ca but much worse in the last two days. Her pleurX was drained on Tuesday with minimal discharge. Pleural fluid was described as clear not cloudy or purulent. Pt readmitted on 5/4 with a large pericardial effusion w/ tamponade physiology. Pt underwent an IR guided pericardicocentesis on 5/7. Drainage has reduced to minimal output and a repeat CT Chest and ECHO performed. TTE official report sates mod/large PC effusion though CT Chest shows minimal effusion w/ Bilateral pleural effusions.    Pt sitting up in bed without complaints. No further hypotension. PC drain 0 overnight      Vital Signs:  Vital Signs Last 24 Hrs  T(C): 36.8 (05-13-19 @ 04:20), Max: 36.9 (05-12-19 @ 21:37)  T(F): 98.2 (05-13-19 @ 04:20), Max: 98.5 (05-12-19 @ 21:37)  HR: 96 (05-13-19 @ 09:14) (95 - 115)  BP: 106/61 (05-13-19 @ 04:20) (99/59 - 110/70)  RR: 17 (05-13-19 @ 04:20) (17 - 22)  SpO2: 95% (05-13-19 @ 09:14) (93% - 99%) on (O2)    Telemetry/Alarms:  General: WN/WD NAD  Neurology: Awake, nonfocal, PAZ x 4  Eyes: Scleras clear, PERRLA/ EOMI, Gross vision intact  ENT:Gross hearing intact, grossly patent pharynx, no stridor  Neck: Neck supple, trachea midline, No JVD,   Respiratory: CTA B/L, No wheezing, rales, rhonchi  CV: RRR, S1S2, no murmurs, rubs or gallops  Abdominal: Soft, NT, ND +BS,   Extremities: No edema, + peripheral pulses  Skin: No Rashes, Hematoma, Ecchymosis  Lymphatic: No Neck, axilla, groin LAD  Psych: Oriented x 3, normal affect  Incisions:   Tubes: PC drain intact w/ minimal output, PleurX capped and intact w/o erythema  Relevant labs, radiology and Medications reviewed                        8.7    12.46 )-----------( 470      ( 13 May 2019 03:10 )             29.0     05-13    135  |  100  |  7   ----------------------------<  99  3.9   |  24  |  0.94    Ca    8.3<L>      13 May 2019 03:10  Mg     1.9     05-13        MEDICATIONS  (STANDING):  bleomycin PF IntraPleural (Non - oncologic) 60 Unit(s) IntraPleural. once  enoxaparin Injectable 40 milliGRAM(s) SubCutaneous daily  gabapentin 100 milliGRAM(s) Oral every 12 hours  levalbuterol Inhalation 0.63 milliGRAM(s) Inhalation every 6 hours  metoclopramide 5 milliGRAM(s) Oral three times a day before meals  pantoprazole    Tablet 40 milliGRAM(s) Oral before breakfast  piperacillin/tazobactam IVPB. 3.375 Gram(s) IV Intermittent every 8 hours  senna 2 Tablet(s) Oral at bedtime  sodium chloride 1 Gram(s) Oral two times a day  vancomycin  IVPB 750 milliGRAM(s) IV Intermittent every 12 hours    MEDICATIONS  (PRN):  guaiFENesin    Syrup 100 milliGRAM(s) Oral every 6 hours PRN Cough  oxyCODONE    IR 5 milliGRAM(s) Oral every 3 hours PRN moderate and severe pain/shortness of breath        I&O's Summary    12 May 2019 07:01  -  13 May 2019 07:00  --------------------------------------------------------  IN: 400 mL / OUT: 0 mL / NET: 400 mL    13 May 2019 07:01  -  13 May 2019 09:42  --------------------------------------------------------  IN: 240 mL / OUT: 0 mL / NET: 240 mL        Assessment  54y Female  w/ PAST MEDICAL & SURGICAL HISTORY:  Pulmonary embolus  Lung cancer: with mets, adenocardinoma  S/P thoracentesis  S/P pericardiocentesis  History of lung surgery: VATS  admitted with complaints of Patient is a 54y old  Female who presents with a chief complaint of shortness of breath, cough (13 May 2019 09:38)  .53 y/o F w/ recent dx of R lung adenocarcinoma with mediastinal and hilar lymphadenopathy undergoing treatment with Keytruda, R pleural effusion s/p RVATS,  pleurx placement, subxiphoid pericardial window on 3/20/19, PE (3/4) on lovenox presents with 2 days of fevers, worsening cough, SOB. Pt stated she her symptoms started since her dx of lung ca but much worse in the last two days. Her pleurX was drained on Tuesday with minimal discharge. Pleural fluid was described as clear not cloudy or purulent. Pt readmitted on 5/4 with a large pericardial effusion w/ tamponade physiology. Pt underwent an IR guided pericardicocentesis on 5/7.      PLAN  Bleomycin pericardial sclerosis  monitor PC drain output  Plan d/w with patient who wants to speak with her son regarding procedure  ECHO/TTE reviewed with Dr Isaac who agrees the pericardial effusion is not moderate-large  Plan/ d/w Xenia Torres Kohani

## 2019-05-13 NOTE — PROGRESS NOTE ADULT - ASSESSMENT
Hypotension   improved with hydration   Echo and CT personally reviewed with CTS  small to medium size pericardial effusion   no RV collapse   Pericardial effusion has echogenic material in it, suggestive her underlying malignant effusion , appears to be organized and chronic     Pericardial effusion  recurrent   s/p drainage  as above     PE  stable  off a/c due to hemorrhagic pericardial effusion      LUNG CA  fu with Onc, pulm

## 2019-05-13 NOTE — PROGRESS NOTE ADULT - PROBLEM SELECTOR PLAN 1
(+) large pericardial effusion with signs of early cardiac tamponade. S/P IR drainage of pericardial effusion. Improved based on TTE 5/12  s/p Bleomycin pericardial sclerosis 5/13, pt tolerated procedure well   -Continue Tele monitoring  -f/u CT surgery  -f/u with Cardiology consult

## 2019-05-13 NOTE — PROCEDURE NOTE - NSICDXPROCEDURE_GEN_ALL_CORE_FT
PROCEDURES:  Pericardiolysis 13-May-2019 11:51:09 Bedside Bleomycin Pericardial sclerosis Casimiro Odom J

## 2019-05-13 NOTE — PROGRESS NOTE ADULT - SUBJECTIVE AND OBJECTIVE BOX
INTERVAL HPI/OVERNIGHT EVENTS:  Patient seen at bedside. Reports cough and shortness of breath. Appears uncomfortable.       VITAL SIGNS:  T(F): 98.8 (05-13-19 @ 12:32)  HR: 106 (05-13-19 @ 12:32)  BP: 99/65 (05-13-19 @ 12:32)  RR: 19 (05-13-19 @ 12:32)  SpO2: 94% (05-13-19 @ 12:32)  Wt(kg): --    PHYSICAL EXAM:    Constitutional: NAD  Eyes: EOMI, sclera non-icteric  Neck: supple  Extremities: no edema  Neurological: AAOx3, non focal  Skin: Normal temperature    MEDICATIONS  (STANDING):  enoxaparin Injectable 40 milliGRAM(s) SubCutaneous daily  gabapentin 100 milliGRAM(s) Oral every 12 hours  levalbuterol Inhalation 0.63 milliGRAM(s) Inhalation every 6 hours  metoclopramide 5 milliGRAM(s) Oral three times a day before meals  pantoprazole    Tablet 40 milliGRAM(s) Oral before breakfast  piperacillin/tazobactam IVPB. 3.375 Gram(s) IV Intermittent every 8 hours  senna 2 Tablet(s) Oral at bedtime  sodium chloride 1 Gram(s) Oral two times a day  vancomycin  IVPB 750 milliGRAM(s) IV Intermittent every 12 hours    MEDICATIONS  (PRN):  guaiFENesin    Syrup 100 milliGRAM(s) Oral every 6 hours PRN Cough  HYDROcodone/homatropine Syrup 5 milliLiter(s) Oral every 6 hours PRN Cough  oxyCODONE    IR 5 milliGRAM(s) Oral every 3 hours PRN moderate and severe pain/shortness of breath      Allergies    No Known Allergies    Intolerances    IV contrast (Rash)      LABS:                        8.7    12.46 )-----------( 470      ( 13 May 2019 03:10 )             29.0     05-13    135  |  100  |  7   ----------------------------<  99  3.9   |  24  |  0.94    Ca    8.3<L>      13 May 2019 03:10  Mg     1.9     05-13            RADIOLOGY & ADDITIONAL TESTS:  Studies reviewed.

## 2019-05-14 LAB
ANION GAP SERPL CALC-SCNC: 13 MMO/L — SIGNIFICANT CHANGE UP (ref 7–14)
BLD GP AB SCN SERPL QL: NEGATIVE — SIGNIFICANT CHANGE UP
BUN SERPL-MCNC: 8 MG/DL — SIGNIFICANT CHANGE UP (ref 7–23)
CALCIUM SERPL-MCNC: 8.6 MG/DL — SIGNIFICANT CHANGE UP (ref 8.4–10.5)
CHLORIDE SERPL-SCNC: 98 MMOL/L — SIGNIFICANT CHANGE UP (ref 98–107)
CO2 SERPL-SCNC: 23 MMOL/L — SIGNIFICANT CHANGE UP (ref 22–31)
CREAT SERPL-MCNC: 0.82 MG/DL — SIGNIFICANT CHANGE UP (ref 0.5–1.3)
GLUCOSE SERPL-MCNC: 123 MG/DL — HIGH (ref 70–99)
HCT VFR BLD CALC: 31.2 % — LOW (ref 34.5–45)
HGB BLD-MCNC: 9.6 G/DL — LOW (ref 11.5–15.5)
MAGNESIUM SERPL-MCNC: 1.9 MG/DL — SIGNIFICANT CHANGE UP (ref 1.6–2.6)
MCHC RBC-ENTMCNC: 25.7 PG — LOW (ref 27–34)
MCHC RBC-ENTMCNC: 30.8 % — LOW (ref 32–36)
MCV RBC AUTO: 83.6 FL — SIGNIFICANT CHANGE UP (ref 80–100)
NRBC # FLD: 0 K/UL — SIGNIFICANT CHANGE UP (ref 0–0)
PLATELET # BLD AUTO: 440 K/UL — HIGH (ref 150–400)
PMV BLD: 8.8 FL — SIGNIFICANT CHANGE UP (ref 7–13)
POTASSIUM SERPL-MCNC: 3.7 MMOL/L — SIGNIFICANT CHANGE UP (ref 3.5–5.3)
POTASSIUM SERPL-SCNC: 3.7 MMOL/L — SIGNIFICANT CHANGE UP (ref 3.5–5.3)
RBC # BLD: 3.73 M/UL — LOW (ref 3.8–5.2)
RBC # FLD: 14.1 % — SIGNIFICANT CHANGE UP (ref 10.3–14.5)
RH IG SCN BLD-IMP: POSITIVE — SIGNIFICANT CHANGE UP
SODIUM SERPL-SCNC: 134 MMOL/L — LOW (ref 135–145)
VANCOMYCIN TROUGH SERPL-MCNC: 11.8 UG/ML — SIGNIFICANT CHANGE UP (ref 10–20)
WBC # BLD: 15.25 K/UL — HIGH (ref 3.8–10.5)
WBC # FLD AUTO: 15.25 K/UL — HIGH (ref 3.8–10.5)

## 2019-05-14 PROCEDURE — 71045 X-RAY EXAM CHEST 1 VIEW: CPT | Mod: 26

## 2019-05-14 PROCEDURE — 99233 SBSQ HOSP IP/OBS HIGH 50: CPT

## 2019-05-14 RX ORDER — DOCUSATE SODIUM 100 MG
100 CAPSULE ORAL DAILY
Refills: 0 | Status: DISCONTINUED | OUTPATIENT
Start: 2019-05-14 | End: 2019-05-16

## 2019-05-14 RX ORDER — OXYCODONE HYDROCHLORIDE 5 MG/1
5 TABLET ORAL
Refills: 0 | Status: DISCONTINUED | OUTPATIENT
Start: 2019-05-14 | End: 2019-05-16

## 2019-05-14 RX ORDER — POLYETHYLENE GLYCOL 3350 17 G/17G
17 POWDER, FOR SOLUTION ORAL DAILY
Refills: 0 | Status: COMPLETED | OUTPATIENT
Start: 2019-05-14 | End: 2019-05-16

## 2019-05-14 RX ADMIN — Medication 5 MILLIGRAM(S): at 17:00

## 2019-05-14 RX ADMIN — LEVALBUTEROL 0.63 MILLIGRAM(S): 1.25 SOLUTION, CONCENTRATE RESPIRATORY (INHALATION) at 09:38

## 2019-05-14 RX ADMIN — LEVALBUTEROL 0.63 MILLIGRAM(S): 1.25 SOLUTION, CONCENTRATE RESPIRATORY (INHALATION) at 15:15

## 2019-05-14 RX ADMIN — LEVALBUTEROL 0.63 MILLIGRAM(S): 1.25 SOLUTION, CONCENTRATE RESPIRATORY (INHALATION) at 03:50

## 2019-05-14 RX ADMIN — ENOXAPARIN SODIUM 40 MILLIGRAM(S): 100 INJECTION SUBCUTANEOUS at 12:34

## 2019-05-14 RX ADMIN — LEVALBUTEROL 0.63 MILLIGRAM(S): 1.25 SOLUTION, CONCENTRATE RESPIRATORY (INHALATION) at 21:48

## 2019-05-14 RX ADMIN — PANTOPRAZOLE SODIUM 40 MILLIGRAM(S): 20 TABLET, DELAYED RELEASE ORAL at 05:31

## 2019-05-14 RX ADMIN — OXYCODONE HYDROCHLORIDE 5 MILLIGRAM(S): 5 TABLET ORAL at 23:15

## 2019-05-14 RX ADMIN — PIPERACILLIN AND TAZOBACTAM 25 GRAM(S): 4; .5 INJECTION, POWDER, LYOPHILIZED, FOR SOLUTION INTRAVENOUS at 12:34

## 2019-05-14 RX ADMIN — GABAPENTIN 100 MILLIGRAM(S): 400 CAPSULE ORAL at 05:31

## 2019-05-14 RX ADMIN — Medication 250 MILLIGRAM(S): at 17:00

## 2019-05-14 RX ADMIN — Medication 250 MILLIGRAM(S): at 04:30

## 2019-05-14 RX ADMIN — PIPERACILLIN AND TAZOBACTAM 25 GRAM(S): 4; .5 INJECTION, POWDER, LYOPHILIZED, FOR SOLUTION INTRAVENOUS at 05:30

## 2019-05-14 RX ADMIN — Medication 5 MILLIGRAM(S): at 05:30

## 2019-05-14 RX ADMIN — SODIUM CHLORIDE 1 GRAM(S): 9 INJECTION INTRAMUSCULAR; INTRAVENOUS; SUBCUTANEOUS at 05:30

## 2019-05-14 RX ADMIN — OXYCODONE HYDROCHLORIDE 5 MILLIGRAM(S): 5 TABLET ORAL at 22:15

## 2019-05-14 RX ADMIN — Medication 5 MILLIGRAM(S): at 12:34

## 2019-05-14 RX ADMIN — SIMETHICONE 80 MILLIGRAM(S): 80 TABLET, CHEWABLE ORAL at 00:18

## 2019-05-14 RX ADMIN — PIPERACILLIN AND TAZOBACTAM 25 GRAM(S): 4; .5 INJECTION, POWDER, LYOPHILIZED, FOR SOLUTION INTRAVENOUS at 21:55

## 2019-05-14 RX ADMIN — GABAPENTIN 100 MILLIGRAM(S): 400 CAPSULE ORAL at 17:00

## 2019-05-14 NOTE — PROGRESS NOTE ADULT - SUBJECTIVE AND OBJECTIVE BOX
Patient is a 54y old  Female who presents with a chief complaint of shortness of breath, cough (14 May 2019 08:14)      SUBJECTIVE / OVERNIGHT EVENTS:    MEDICATIONS  (STANDING):  docusate sodium 100 milliGRAM(s) Oral daily  enoxaparin Injectable 40 milliGRAM(s) SubCutaneous daily  gabapentin 100 milliGRAM(s) Oral every 12 hours  levalbuterol Inhalation 0.63 milliGRAM(s) Inhalation every 6 hours  metoclopramide 5 milliGRAM(s) Oral three times a day before meals  pantoprazole    Tablet 40 milliGRAM(s) Oral before breakfast  piperacillin/tazobactam IVPB. 3.375 Gram(s) IV Intermittent every 8 hours  polyethylene glycol 3350 17 Gram(s) Oral daily  senna 2 Tablet(s) Oral at bedtime  sodium chloride 1 Gram(s) Oral two times a day  vancomycin  IVPB 750 milliGRAM(s) IV Intermittent every 12 hours    MEDICATIONS  (PRN):  guaiFENesin    Syrup 100 milliGRAM(s) Oral every 6 hours PRN Cough  HYDROcodone/homatropine Syrup 5 milliLiter(s) Oral every 6 hours PRN Cough      T(C): 36.6 (05-14-19 @ 04:19), Max: 37.1 (05-13-19 @ 12:32)  HR: 110 (05-14-19 @ 04:19) (96 - 129)  BP: 105/59 (05-14-19 @ 04:19) (99/65 - 107/68)  RR: 19 (05-14-19 @ 04:19) (18 - 19)  SpO2: 100% (05-14-19 @ 04:19) (94% - 100%)  CAPILLARY BLOOD GLUCOSE        I&O's Summary    13 May 2019 07:01  -  14 May 2019 07:00  --------------------------------------------------------  IN: 890 mL / OUT: 10 mL / NET: 880 mL        PHYSICAL EXAM:  GENERAL: NAD, well-developed  HEAD:  Atraumatic, Normocephalic  EYES: EOMI, PERRLA, conjunctiva and sclera clear  NECK: Supple, No JVD  CHEST/LUNG: Clear to auscultation bilaterally; No wheeze  HEART: s1 s2, regular rhythm and rate   ABDOMEN: Soft, Nontender, Nondistended; Bowel sounds present  EXTREMITIES:  2+ Peripheral Pulses, No clubbing, cyanosis, or edema  PSYCH: AAOx3, calm   NEUROLOGY: non-focal  SKIN: No rashes or lesions    LABS:                        9.6    15.25 )-----------( 440      ( 14 May 2019 03:45 )             31.2     05-14    134<L>  |  98  |  8   ----------------------------<  123<H>  3.7   |  23  |  0.82    Ca    8.6      14 May 2019 03:45  Mg     1.9     05-14                RADIOLOGY & ADDITIONAL TESTS:    Imaging Personally Reviewed:    Consultant(s) Notes Reviewed:      Care Discussed with Consultants/Other Providers: Patient is a 54y old  Female who presents with a chief complaint of shortness of breath, cough (14 May 2019 08:14)      SUBJECTIVE / OVERNIGHT EVENTS:  Pt was seen and examined in AM. Pt was sitting in chair, no distress. She denied cp/sob/dizziness/palpitation. She reports nausea and vomiting mucus, poor appetite. When I examined the vomitus, it is more likely she spits out mucus     MEDICATIONS  (STANDING):  docusate sodium 100 milliGRAM(s) Oral daily  enoxaparin Injectable 40 milliGRAM(s) SubCutaneous daily  gabapentin 100 milliGRAM(s) Oral every 12 hours  levalbuterol Inhalation 0.63 milliGRAM(s) Inhalation every 6 hours  metoclopramide 5 milliGRAM(s) Oral three times a day before meals  pantoprazole    Tablet 40 milliGRAM(s) Oral before breakfast  piperacillin/tazobactam IVPB. 3.375 Gram(s) IV Intermittent every 8 hours  polyethylene glycol 3350 17 Gram(s) Oral daily  senna 2 Tablet(s) Oral at bedtime  sodium chloride 1 Gram(s) Oral two times a day  vancomycin  IVPB 750 milliGRAM(s) IV Intermittent every 12 hours    MEDICATIONS  (PRN):  guaiFENesin    Syrup 100 milliGRAM(s) Oral every 6 hours PRN Cough  HYDROcodone/homatropine Syrup 5 milliLiter(s) Oral every 6 hours PRN Cough      T(C): 36.6 (05-14-19 @ 04:19), Max: 37.1 (05-13-19 @ 12:32)  HR: 110 (05-14-19 @ 04:19) (96 - 129)  BP: 105/59 (05-14-19 @ 04:19) (99/65 - 107/68)  RR: 19 (05-14-19 @ 04:19) (18 - 19)  SpO2: 100% (05-14-19 @ 04:19) (94% - 100%)  CAPILLARY BLOOD GLUCOSE        I&O's Summary    13 May 2019 07:01  -  14 May 2019 07:00  --------------------------------------------------------  IN: 890 mL / OUT: 10 mL / NET: 880 mL        PHYSICAL EXAM:  GENERAL: NAD, thin built   HEAD:  Atraumatic, Normocephalic  EYES: EOMI, PERRLA, conjunctiva and sclera clear  NECK: Supple, No JVD  CHEST/LUNG: nonlabored in breathing, ; No wheeze; + pleurX on right side   HEART: + pericardial drain, No murmurs, rubs, or gallops  ABDOMEN: Soft, Nontender, Nondistended; Bowel sounds present  EXTREMITIES:  2+ Peripheral Pulses, (+) b/l ankle edema.   PSYCH: AAOx3  NEUROLOGY: non-focal  SKIN: No rashes or lesions    LABS:                        9.6    15.25 )-----------( 440      ( 14 May 2019 03:45 )             31.2     05-14    134<L>  |  98  |  8   ----------------------------<  123<H>  3.7   |  23  |  0.82    Ca    8.6      14 May 2019 03:45  Mg     1.9     05-14                RADIOLOGY & ADDITIONAL TESTS:    Imaging Personally Reviewed:    Consultant(s) Notes Reviewed:      Care Discussed with Consultants/Other Providers:

## 2019-05-14 NOTE — PROGRESS NOTE ADULT - SUBJECTIVE AND OBJECTIVE BOX
Subjective: Patient seen and examined. No new events except as noted.     SUBJECTIVE/ROS:  abd pain  n/v      MEDICATIONS:  MEDICATIONS  (STANDING):  docusate sodium 100 milliGRAM(s) Oral daily  enoxaparin Injectable 40 milliGRAM(s) SubCutaneous daily  gabapentin 100 milliGRAM(s) Oral every 12 hours  levalbuterol Inhalation 0.63 milliGRAM(s) Inhalation every 6 hours  metoclopramide 5 milliGRAM(s) Oral three times a day before meals  pantoprazole    Tablet 40 milliGRAM(s) Oral before breakfast  piperacillin/tazobactam IVPB. 3.375 Gram(s) IV Intermittent every 8 hours  polyethylene glycol 3350 17 Gram(s) Oral daily  senna 2 Tablet(s) Oral at bedtime  sodium chloride 1 Gram(s) Oral two times a day  vancomycin  IVPB 750 milliGRAM(s) IV Intermittent every 12 hours      PHYSICAL EXAM:  T(C): 36.6 (05-14-19 @ 04:19), Max: 37.1 (05-13-19 @ 12:32)  HR: 110 (05-14-19 @ 04:19) (96 - 129)  BP: 105/59 (05-14-19 @ 04:19) (99/65 - 107/68)  RR: 19 (05-14-19 @ 04:19) (18 - 19)  SpO2: 100% (05-14-19 @ 04:19) (94% - 100%)  Wt(kg): --  I&O's Summary    13 May 2019 07:01  -  14 May 2019 07:00  --------------------------------------------------------  IN: 890 mL / OUT: 10 mL / NET: 880 mL            JVP: Normal  Neck: supple  Lung: clear   CV: S1 S2 , Murmur:  Abd: soft  Ext: No edema  neuro: Awake / alert  Psych: flat affect  Skin: normal``    LABS/DATA:    CARDIAC MARKERS:                                9.6    15.25 )-----------( 440      ( 14 May 2019 03:45 )             31.2     05-14    134<L>  |  98  |  8   ----------------------------<  123<H>  3.7   |  23  |  0.82    Ca    8.6      14 May 2019 03:45  Mg     1.9     05-14      proBNP:   Lipid Profile:   HgA1c:   TSH:     TELE:  EKG:

## 2019-05-14 NOTE — PROGRESS NOTE ADULT - ASSESSMENT
Hypotension   improved     Pericardial effusion  recurrent   s/p drainage  stable     PE  stable  off a/c due to hemorrhagic pericardial effusion      LUNG CA  fu with Onc, pulm    abd pain, N/V  fu with primary team for work up

## 2019-05-14 NOTE — PROGRESS NOTE ADULT - PROBLEM SELECTOR PLAN 1
(+) large pericardial effusion with signs of early cardiac tamponade. S/P IR drainage of pericardial effusion. Improved based on TTE 5/12  s/p Bleomycin pericardial sclerosis 5/13, pt tolerated procedure well   -Continue Tele monitoring, reviewed   -f/u CT surgery  -f/u with Cardiology

## 2019-05-14 NOTE — PROGRESS NOTE ADULT - SUBJECTIVE AND OBJECTIVE BOX
Subjective: 55 y/o female presents sitting up in chair in NAD. Pericardial drain in place with 10cc drainage in last 24hours. POD 1 s/p bedside bleomycin pericardial sclerosis with 10cc output.  ECHO/TTE reviewed with Dr Isaac who agrees the pericardial effusion is not moderate-large      HPI: 55 y/o F w/ recent dx of R lung adenocarcinoma with mediastinal and hilar lymphadenopathy undergoing treatment with Keytruda, R pleural effusion s/p RVATS,  pleurx placement, subxiphoid pericardial window on 3/20/19, PE (3/4) on lovenox presents with 2 days of fevers, worsening cough, SOB. Pt stated she her symptoms started since her dx of lung ca but much worse in the last two days. Her pleurX was drained on Tuesday with minimal discharge. Pleural fluid was described as clear not cloudy or purulent. Pt readmitted on 5/4 with a large pericardial effusion w/ tamponade physiology. Pt underwent an IR guided pericardicocentesis on 5/7. Drainage has reduced to minimal output and a repeat CT Chest and ECHO performed. TTE official report sates mod/large PC effusion though CT Chest shows minimal effusion w/ Bilateral pleural effusions.    Vital Signs:  Vital Signs Last 24 Hrs  T(C): 36.5 (05-14-19 @ 12:19), Max: 37.1 (05-13-19 @ 21:33)  T(F): 97.7 (05-14-19 @ 12:19), Max: 98.8 (05-13-19 @ 21:33)  HR: 107 (05-14-19 @ 15:16) (75 - 129)  BP: 99/67 (05-14-19 @ 12:19) (99/67 - 107/68)  RR: 18 (05-14-19 @ 12:19) (18 - 19)  SpO2: 98% (05-14-19 @ 15:16) (96% - 100%) on (O2)        Assessment  54y Female  w/ PAST MEDICAL & SURGICAL HISTORY:  Pulmonary embolus  Lung cancer: with mets, adenocardinoma  S/P thoracentesis  S/P pericardiocentesis  History of lung surgery: VATS  admitted with complaints of Patient is a 54y old  Female who presents with a chief complaint of shortness of breath, cough (13 May 2019 09:38)    55 y/o F w/ recent dx of R lung adenocarcinoma with mediastinal and hilar lymphadenopathy undergoing treatment with Keytruda, R pleural effusion s/p RVATS,  pleurx placement, subxiphoid pericardial window on 3/20/19, PE (3/4) on lovenox presents with 2 days of fevers, worsening cough, SOB. Pt stated she her symptoms started since her dx of lung ca but much worse in the last two days. Her pleurX was drained on Tuesday with minimal discharge. Pleural fluid was described as clear not cloudy or purulent. Pt readmitted on 5/4 with a large pericardial effusion w/ tamponade physiology. Pt underwent an IR guided pericardicocentesis on 5/7.      PLAN  s/p bleomycin through pericardial tube > 10cc drainage output from pericardial tube> will d/c pericardial drain   No further thoracic surgery intervention at this time   Recall if needed  Continue care per primary team

## 2019-05-14 NOTE — PROGRESS NOTE ADULT - PROBLEM SELECTOR PLAN 5
No fever in the hospital.   -UA negative, RVP negative, blood cx 5/2: NGTD  -vanco/zosyn for ? RLL pneumonia  Pt still has leukocytosis, tachycardia. This is more likely due to underlying malignancy and its complications rather than active sepsis or uncontrolled infection.  observe off abx

## 2019-05-14 NOTE — CHART NOTE - NSCHARTNOTEFT_GEN_A_CORE
Pt had nausea and vomiting, no relief from Reglan or Zofran, denies abdominal pain    Abdominal Xray performed, final report pending but shows a large amount of air in the large bowel with stool    A/P: Simethicone ordered  Bowel regimen

## 2019-05-15 DIAGNOSIS — E43 UNSPECIFIED SEVERE PROTEIN-CALORIE MALNUTRITION: ICD-10-CM

## 2019-05-15 LAB
ANION GAP SERPL CALC-SCNC: 15 MMO/L — HIGH (ref 7–14)
BUN SERPL-MCNC: 8 MG/DL — SIGNIFICANT CHANGE UP (ref 7–23)
CALCIUM SERPL-MCNC: 8.6 MG/DL — SIGNIFICANT CHANGE UP (ref 8.4–10.5)
CHLORIDE SERPL-SCNC: 97 MMOL/L — LOW (ref 98–107)
CO2 SERPL-SCNC: 19 MMOL/L — LOW (ref 22–31)
CREAT SERPL-MCNC: 0.94 MG/DL — SIGNIFICANT CHANGE UP (ref 0.5–1.3)
GLUCOSE SERPL-MCNC: 90 MG/DL — SIGNIFICANT CHANGE UP (ref 70–99)
HCT VFR BLD CALC: 34.9 % — SIGNIFICANT CHANGE UP (ref 34.5–45)
HGB BLD-MCNC: 9.9 G/DL — LOW (ref 11.5–15.5)
MCHC RBC-ENTMCNC: 25.9 PG — LOW (ref 27–34)
MCHC RBC-ENTMCNC: 28.4 % — LOW (ref 32–36)
MCV RBC AUTO: 91.4 FL — SIGNIFICANT CHANGE UP (ref 80–100)
NRBC # FLD: 0 K/UL — SIGNIFICANT CHANGE UP (ref 0–0)
PLATELET # BLD AUTO: 447 K/UL — HIGH (ref 150–400)
PMV BLD: 9.3 FL — SIGNIFICANT CHANGE UP (ref 7–13)
POTASSIUM SERPL-MCNC: 4 MMOL/L — SIGNIFICANT CHANGE UP (ref 3.5–5.3)
POTASSIUM SERPL-SCNC: 4 MMOL/L — SIGNIFICANT CHANGE UP (ref 3.5–5.3)
RBC # BLD: 3.82 M/UL — SIGNIFICANT CHANGE UP (ref 3.8–5.2)
RBC # FLD: 14.4 % — SIGNIFICANT CHANGE UP (ref 10.3–14.5)
SODIUM SERPL-SCNC: 131 MMOL/L — LOW (ref 135–145)
WBC # BLD: 12.9 K/UL — HIGH (ref 3.8–10.5)
WBC # FLD AUTO: 12.9 K/UL — HIGH (ref 3.8–10.5)

## 2019-05-15 PROCEDURE — 99232 SBSQ HOSP IP/OBS MODERATE 35: CPT

## 2019-05-15 PROCEDURE — 99233 SBSQ HOSP IP/OBS HIGH 50: CPT

## 2019-05-15 RX ORDER — ACETAMINOPHEN 500 MG
650 TABLET ORAL EVERY 6 HOURS
Refills: 0 | Status: DISCONTINUED | OUTPATIENT
Start: 2019-05-15 | End: 2019-05-16

## 2019-05-15 RX ADMIN — GABAPENTIN 100 MILLIGRAM(S): 400 CAPSULE ORAL at 05:09

## 2019-05-15 RX ADMIN — Medication 650 MILLIGRAM(S): at 15:24

## 2019-05-15 RX ADMIN — GABAPENTIN 100 MILLIGRAM(S): 400 CAPSULE ORAL at 17:11

## 2019-05-15 RX ADMIN — PANTOPRAZOLE SODIUM 40 MILLIGRAM(S): 20 TABLET, DELAYED RELEASE ORAL at 05:09

## 2019-05-15 RX ADMIN — SODIUM CHLORIDE 1 GRAM(S): 9 INJECTION INTRAMUSCULAR; INTRAVENOUS; SUBCUTANEOUS at 17:11

## 2019-05-15 RX ADMIN — Medication 5 MILLIGRAM(S): at 05:09

## 2019-05-15 RX ADMIN — Medication 650 MILLIGRAM(S): at 14:37

## 2019-05-15 RX ADMIN — LEVALBUTEROL 0.63 MILLIGRAM(S): 1.25 SOLUTION, CONCENTRATE RESPIRATORY (INHALATION) at 16:34

## 2019-05-15 RX ADMIN — ENOXAPARIN SODIUM 40 MILLIGRAM(S): 100 INJECTION SUBCUTANEOUS at 11:00

## 2019-05-15 RX ADMIN — LEVALBUTEROL 0.63 MILLIGRAM(S): 1.25 SOLUTION, CONCENTRATE RESPIRATORY (INHALATION) at 04:50

## 2019-05-15 RX ADMIN — LEVALBUTEROL 0.63 MILLIGRAM(S): 1.25 SOLUTION, CONCENTRATE RESPIRATORY (INHALATION) at 10:24

## 2019-05-15 RX ADMIN — Medication 5 MILLIGRAM(S): at 17:11

## 2019-05-15 NOTE — PROGRESS NOTE ADULT - SUBJECTIVE AND OBJECTIVE BOX
Subjective: Patient seen and examined. No new events except as noted.     SUBJECTIVE/ROS:  feels ok       MEDICATIONS:  MEDICATIONS  (STANDING):  docusate sodium 100 milliGRAM(s) Oral daily  enoxaparin Injectable 40 milliGRAM(s) SubCutaneous daily  gabapentin 100 milliGRAM(s) Oral every 12 hours  levalbuterol Inhalation 0.63 milliGRAM(s) Inhalation every 6 hours  metoclopramide 5 milliGRAM(s) Oral three times a day before meals  pantoprazole    Tablet 40 milliGRAM(s) Oral before breakfast  polyethylene glycol 3350 17 Gram(s) Oral daily  senna 2 Tablet(s) Oral at bedtime  sodium chloride 1 Gram(s) Oral two times a day      PHYSICAL EXAM:  T(C): 36.6 (05-15-19 @ 05:08), Max: 36.8 (05-14-19 @ 21:50)  HR: 108 (05-15-19 @ 05:08) (75 - 111)  BP: 103/63 (05-15-19 @ 05:08) (94/60 - 103/63)  RR: 18 (05-15-19 @ 05:08) (18 - 18)  SpO2: 98% (05-15-19 @ 05:08) (98% - 100%)  Wt(kg): --  I&O's Summary    14 May 2019 07:01  -  15 May 2019 07:00  --------------------------------------------------------  IN: 100 mL / OUT: 10 mL / NET: 90 mL            JVP: Normal  Neck: supple  Lung: few crackles   CV: S1 S2 , Murmur:  Abd: soft  Ext: No edema  neuro: Awake / awake   Psych: flat affect  Skin: normal``    LABS/DATA:    CARDIAC MARKERS:                                9.9    12.90 )-----------( 447      ( 15 May 2019 08:05 )             34.9     05-15    131<L>  |  97<L>  |  8   ----------------------------<  90  4.0   |  19<L>  |  0.94    Ca    8.6      15 May 2019 08:05  Mg     1.9     05-14      proBNP:   Lipid Profile:   HgA1c:   TSH:     TELE:  EKG:

## 2019-05-15 NOTE — PROGRESS NOTE ADULT - PROBLEM SELECTOR PLAN 1
Patient with 1 dose of Oxycodone 5mg in 24 hours.  Denies dyspnea and states she feels "better". O2 in use, reports mild TAMAYO with ambulation, but overall much improved.  S/P removal of pericardial drain.  Continue management as per CT surgery and primary team. No changes to current regimen.  Patient's  to schedule appointment with Dr. Eagle for outpatient follow-up.  Known to Dr. Allen as an outpatient for symptom management. Patient with 1 dose of Oxycodone 5mg in 24 hours.  Denies dyspnea and states she feels "better". O2 in use, reports mild TAMAYO with ambulation, but overall much improved.  S/P removal of pericardial drain.  Continue management as per CT surgery and primary team. No changes to current regimen.  Patient's  to schedule appointment with Dr. Eagle for outpatient follow-up.  Known to Dr. Allen as an outpatient for symptom management.    Addendum:  Follow-up appointment scheduled for Dr. Allen fro 5/30/19 at 4pm - patient and  aware.  Endorsed to team to include in discharge.

## 2019-05-15 NOTE — PROGRESS NOTE ADULT - PROBLEM SELECTOR PLAN 7
CT showing obstruction of bronchus intermedius with atelectasis 2/2 worsening tumor burden, repeat CT 5/5 showed patent bronchus intermedius  -Outpt follow up for next session of immunotherapy  -Not a hospice candidate yet per onc  -Pall care for symptoms management. prognosis is guarded

## 2019-05-15 NOTE — PROGRESS NOTE ADULT - PROBLEM SELECTOR PLAN 5
Psychosocial support provided to patient and . Psychosocial support provided to patient and .  Please re-consult as needed.

## 2019-05-15 NOTE — PROGRESS NOTE ADULT - SUBJECTIVE AND OBJECTIVE BOX
INTERVAL HPI/OVERNIGHT EVENTS:  Patient s/p removal of pericardial drain     Code Status: Full code   Allergies    No Known Allergies    Intolerances    IV contrast (Rash)  MEDICATIONS  (STANDING):  docusate sodium 100 milliGRAM(s) Oral daily  enoxaparin Injectable 40 milliGRAM(s) SubCutaneous daily  gabapentin 100 milliGRAM(s) Oral every 12 hours  levalbuterol Inhalation 0.63 milliGRAM(s) Inhalation every 6 hours  metoclopramide 5 milliGRAM(s) Oral three times a day before meals  pantoprazole    Tablet 40 milliGRAM(s) Oral before breakfast  polyethylene glycol 3350 17 Gram(s) Oral daily  senna 2 Tablet(s) Oral at bedtime  sodium chloride 1 Gram(s) Oral two times a day    MEDICATIONS  (PRN):  acetaminophen   Tablet .. 650 milliGRAM(s) Oral every 6 hours PRN Mild Pain (1 - 3)  guaiFENesin    Syrup 100 milliGRAM(s) Oral every 6 hours PRN Cough  HYDROcodone/homatropine Syrup 5 milliLiter(s) Oral every 6 hours PRN Cough  oxyCODONE    IR 5 milliGRAM(s) Oral every 3 hours PRN Severe Pain (7 - 10)      PRESENT SYMPTOMS: [ ]Unable to obtain due to poor mentation   Source if other than patient:  [ ]Family   [ ]Team     Pain (Impact on QOL):  Denies currently     Dyspnea:  Yes [ ] No [x ] - reports mild TAMAYO   Anxiety:    Yes [ ] No [x ] - [ ]Mild [ ]Moderate [ ]Severe  Fatigue:    Yes [x ] No [ ] - [x ]Mild [ ]Moderate [ ]Severe  Nausea:    Yes [ ] No [x ] - [ ]Mild [ ]Moderate [ ]Severe                         Loss of appetite: Yes [x ] No [ ] - [ ]Mild [ ]Moderate [x ]Severe             Constipation:  Yes [ ] No [x ] - [ ]Mild [ ]Moderate [ ]Severe  Grief: Yes [x ] No [ ]     PAIN AD Score:	  http://geriatrictoolkit.[employer].edu/cog/painad.pdf (Ctrl + left click to view)    Other Symptoms:  [x ]All other review of systems negative     Karnofsky Performance Score/Palliative Performance Status Version 2:    70%    http://palliative.info/resource_material/[email or web address].pdf    PHYSICAL EXAM:  Vital Signs Last 24 Hrs  T(C): 37.9 (15 May 2019 12:42), Max: 37.9 (15 May 2019 12:42)  T(F): 100.2 (15 May 2019 12:42), Max: 100.2 (15 May 2019 12:42)  HR: 111 (15 May 2019 12:42) (92 - 111)  BP: 95/44 (15 May 2019 12:42) (94/60 - 103/63)  BP(mean): --  RR: 16 (15 May 2019 12:42) (16 - 18)  SpO2: 100% (15 May 2019 12:42) (98% - 100%) I&O's Summary    14 May 2019 07:01  -  15 May 2019 07:00  --------------------------------------------------------  IN: 100 mL / OUT: 10 mL / NET: 90 mL         GENERAL:  [x ]Alert  [x ]Oriented x4   [ ]Lethargic  [ ]Cachexia  [ ]Unarousable  [x ]Verbal   PULMONARY:   decreased to right - right pleurx catheter in place   CARDIOVASCULAR:    [x ]Regular [ ]Irregular [ x][id number]'s   GASTROINTESTINAL:  [x ]Soft  [ ]Distended   [x ]+BS  [x ]Non tender [ ]Tender  [ ]PEG [ ]OGT/ NGT   Last BM: 5/15/19     GENITOURINARY:  [x ]Normal [ ] Incontinent   [ ]Oliguria/Anuria   [ ]Sanchez  MUSCULOSKELETAL:   [ ]Normal   [x ]Weakness  [ ]Bed/Wheelchair bound [ ]Edema  NEUROLOGIC:   [x ]No focal deficits  [ ] Cognitive impairment  [ ] Dysphagia [ ]Dysarthria [ ] Paresis [ ]Other   SKIN:   [x ]Normal   [ ]Pressure ulcer(s)  [ ]Rash    CRITICAL CARE:  [ ] Shock Present  [ ]Septic [ ]Cardiogenic [ ]Neurologic [ ]Hypovolemic  [ ]  Vasopressors [ ]  Inotropes   [ ] Respiratory failure present  [ ] Acute  [ ] Chronic [ ] Hypoxic  [ ] Hypercarbic [ ] Other  [ ] Other organ failure     LABS:                        9.9    12.90 )-----------( 447      ( 15 May 2019 08:05 )             34.9   05-15    131<L>  |  97<L>  |  8   ----------------------------<  90  4.0   |  19<L>  |  0.94    Ca    8.6      15 May 2019 08:05  Mg     1.9     05-14          RADIOLOGY & ADDITIONAL STUDIES:    Protein Calorie Malnutrition Present: [ ] yes [ ] no  [ ] PPSV2 < or = 30%  [ ] significant weight loss [ ] poor nutritional intake [ ] anasarca [ ] catabolic state Albumin, Serum: 3.1 g/dL (05-08-19 @ 06:11)      REFERRALS:   [ ]Chaplaincy  [ ] Hospice  [ ]Child Life  [ ]Social Work  [ ]Case management [ ]Holistic Therapy   Goals of Care Document:

## 2019-05-15 NOTE — PROGRESS NOTE ADULT - PROBLEM SELECTOR PLAN 1
(+) large pericardial effusion with signs of early cardiac tamponade. S/P IR drainage of pericardial effusion. Improved based on TTE 5/12  s/p Bleomycin pericardial sclerosis 5/13, pt tolerated procedure well   -Continue Tele monitoring, reviewed   -f/u CT surgery, s/p removal of pericardial drain   -f/u with Cardiology

## 2019-05-15 NOTE — PROGRESS NOTE ADULT - SUBJECTIVE AND OBJECTIVE BOX
Patient is a 54y old  Female who presents with a chief complaint of shortness of breath, cough (15 May 2019 09:32)      SUBJECTIVE / OVERNIGHT EVENTS:    MEDICATIONS  (STANDING):  docusate sodium 100 milliGRAM(s) Oral daily  enoxaparin Injectable 40 milliGRAM(s) SubCutaneous daily  gabapentin 100 milliGRAM(s) Oral every 12 hours  levalbuterol Inhalation 0.63 milliGRAM(s) Inhalation every 6 hours  metoclopramide 5 milliGRAM(s) Oral three times a day before meals  pantoprazole    Tablet 40 milliGRAM(s) Oral before breakfast  polyethylene glycol 3350 17 Gram(s) Oral daily  senna 2 Tablet(s) Oral at bedtime  sodium chloride 1 Gram(s) Oral two times a day    MEDICATIONS  (PRN):  guaiFENesin    Syrup 100 milliGRAM(s) Oral every 6 hours PRN Cough  HYDROcodone/homatropine Syrup 5 milliLiter(s) Oral every 6 hours PRN Cough  oxyCODONE    IR 5 milliGRAM(s) Oral every 3 hours PRN Severe Pain (7 - 10)      T(C): 36.6 (05-15-19 @ 05:08), Max: 36.8 (05-14-19 @ 21:50)  HR: 92 (05-15-19 @ 10:24) (75 - 110)  BP: 103/63 (05-15-19 @ 05:08) (94/60 - 103/63)  RR: 18 (05-15-19 @ 05:08) (18 - 18)  SpO2: 98% (05-15-19 @ 10:24) (98% - 100%)  CAPILLARY BLOOD GLUCOSE        I&O's Summary    14 May 2019 07:01  -  15 May 2019 07:00  --------------------------------------------------------  IN: 100 mL / OUT: 10 mL / NET: 90 mL        PHYSICAL EXAM:  GENERAL: NAD, well-developed  HEAD:  Atraumatic, Normocephalic  EYES: EOMI, PERRLA, conjunctiva and sclera clear  NECK: Supple, No JVD  CHEST/LUNG: Clear to auscultation bilaterally; No wheeze  HEART: s1 s2, regular rhythm and rate   ABDOMEN: Soft, Nontender, Nondistended; Bowel sounds present  EXTREMITIES:  2+ Peripheral Pulses, No clubbing, cyanosis, or edema  PSYCH: AAOx3, calm   NEUROLOGY: non-focal  SKIN: No rashes or lesions    LABS:                        9.9    12.90 )-----------( 447      ( 15 May 2019 08:05 )             34.9     05-15    131<L>  |  97<L>  |  8   ----------------------------<  90  4.0   |  19<L>  |  0.94    Ca    8.6      15 May 2019 08:05  Mg     1.9     05-14                RADIOLOGY & ADDITIONAL TESTS:    Imaging Personally Reviewed:    Consultant(s) Notes Reviewed:      Care Discussed with Consultants/Other Providers: Patient is a 54y old  Female who presents with a chief complaint of shortness of breath, cough (15 May 2019 09:32)      SUBJECTIVE / OVERNIGHT EVENTS:  Pt appears comfortable. reports intermittent nausea and poor appetite, but no vomiting. She denied cp/sob/dizziness/palpitation.     MEDICATIONS  (STANDING):  docusate sodium 100 milliGRAM(s) Oral daily  enoxaparin Injectable 40 milliGRAM(s) SubCutaneous daily  gabapentin 100 milliGRAM(s) Oral every 12 hours  levalbuterol Inhalation 0.63 milliGRAM(s) Inhalation every 6 hours  metoclopramide 5 milliGRAM(s) Oral three times a day before meals  pantoprazole    Tablet 40 milliGRAM(s) Oral before breakfast  polyethylene glycol 3350 17 Gram(s) Oral daily  senna 2 Tablet(s) Oral at bedtime  sodium chloride 1 Gram(s) Oral two times a day    MEDICATIONS  (PRN):  guaiFENesin    Syrup 100 milliGRAM(s) Oral every 6 hours PRN Cough  HYDROcodone/homatropine Syrup 5 milliLiter(s) Oral every 6 hours PRN Cough  oxyCODONE    IR 5 milliGRAM(s) Oral every 3 hours PRN Severe Pain (7 - 10)      T(C): 36.6 (05-15-19 @ 05:08), Max: 36.8 (05-14-19 @ 21:50)  HR: 92 (05-15-19 @ 10:24) (75 - 110)  BP: 103/63 (05-15-19 @ 05:08) (94/60 - 103/63)  RR: 18 (05-15-19 @ 05:08) (18 - 18)  SpO2: 98% (05-15-19 @ 10:24) (98% - 100%)  CAPILLARY BLOOD GLUCOSE        I&O's Summary    14 May 2019 07:01  -  15 May 2019 07:00  --------------------------------------------------------  IN: 100 mL / OUT: 10 mL / NET: 90 mL      PHYSICAL EXAM:  GENERAL: NAD, cachectic   HEAD:  Atraumatic, Normocephalic  EYES: EOMI, PERRLA, conjunctiva and sclera clear  NECK: Supple, No JVD  CHEST/LUNG: nonlabored in breathing; No wheeze; + pleurX on right side   HEART: s/p removal of pericardial drain, No murmurs, rubs, or gallops  ABDOMEN: Soft, Nontender, Nondistended; Bowel sounds present  EXTREMITIES:  2+ Peripheral Pulses, no edema   PSYCH: AAOx3  NEUROLOGY: non-focal  SKIN: No rashes or lesions    LABS:                        9.9    12.90 )-----------( 447      ( 15 May 2019 08:05 )             34.9     05-15    131<L>  |  97<L>  |  8   ----------------------------<  90  4.0   |  19<L>  |  0.94    Ca    8.6      15 May 2019 08:05  Mg     1.9     05-14                RADIOLOGY & ADDITIONAL TESTS:    Imaging Personally Reviewed:    Consultant(s) Notes Reviewed:      Care Discussed with Consultants/Other Providers:

## 2019-05-15 NOTE — PROGRESS NOTE ADULT - ASSESSMENT
Hypotension   improved     Pericardial effusion  recurrent   s/p drainage  s/p bleomycin   fu with cts   stable     PE  stable  off a/c due to hemorrhagic pericardial effusion      LUNG CA  fu with Onc, pulm

## 2019-05-16 ENCOUNTER — TRANSCRIPTION ENCOUNTER (OUTPATIENT)
Age: 54
End: 2019-05-16

## 2019-05-16 VITALS — OXYGEN SATURATION: 99 %

## 2019-05-16 LAB
ANION GAP SERPL CALC-SCNC: 12 MMO/L — SIGNIFICANT CHANGE UP (ref 7–14)
BUN SERPL-MCNC: 7 MG/DL — SIGNIFICANT CHANGE UP (ref 7–23)
CALCIUM SERPL-MCNC: 8.4 MG/DL — SIGNIFICANT CHANGE UP (ref 8.4–10.5)
CHLORIDE SERPL-SCNC: 98 MMOL/L — SIGNIFICANT CHANGE UP (ref 98–107)
CO2 SERPL-SCNC: 25 MMOL/L — SIGNIFICANT CHANGE UP (ref 22–31)
CREAT SERPL-MCNC: 0.81 MG/DL — SIGNIFICANT CHANGE UP (ref 0.5–1.3)
GLUCOSE SERPL-MCNC: 97 MG/DL — SIGNIFICANT CHANGE UP (ref 70–99)
HCT VFR BLD CALC: 31.4 % — LOW (ref 34.5–45)
HGB BLD-MCNC: 9.7 G/DL — LOW (ref 11.5–15.5)
MCHC RBC-ENTMCNC: 25.8 PG — LOW (ref 27–34)
MCHC RBC-ENTMCNC: 30.9 % — LOW (ref 32–36)
MCV RBC AUTO: 83.5 FL — SIGNIFICANT CHANGE UP (ref 80–100)
NRBC # FLD: 0 K/UL — SIGNIFICANT CHANGE UP (ref 0–0)
PLATELET # BLD AUTO: 485 K/UL — HIGH (ref 150–400)
PMV BLD: 9.3 FL — SIGNIFICANT CHANGE UP (ref 7–13)
POTASSIUM SERPL-MCNC: 3.7 MMOL/L — SIGNIFICANT CHANGE UP (ref 3.5–5.3)
POTASSIUM SERPL-SCNC: 3.7 MMOL/L — SIGNIFICANT CHANGE UP (ref 3.5–5.3)
RBC # BLD: 3.76 M/UL — LOW (ref 3.8–5.2)
RBC # FLD: 14.2 % — SIGNIFICANT CHANGE UP (ref 10.3–14.5)
SODIUM SERPL-SCNC: 135 MMOL/L — SIGNIFICANT CHANGE UP (ref 135–145)
WBC # BLD: 11.42 K/UL — HIGH (ref 3.8–10.5)
WBC # FLD AUTO: 11.42 K/UL — HIGH (ref 3.8–10.5)

## 2019-05-16 PROCEDURE — 99239 HOSP IP/OBS DSCHRG MGMT >30: CPT

## 2019-05-16 RX ORDER — LEVALBUTEROL 1.25 MG/.5ML
3 SOLUTION, CONCENTRATE RESPIRATORY (INHALATION)
Qty: 1 | Refills: 0
Start: 2019-05-16

## 2019-05-16 RX ORDER — ENOXAPARIN SODIUM 100 MG/ML
50 INJECTION SUBCUTANEOUS
Qty: 0 | Refills: 0 | DISCHARGE

## 2019-05-16 RX ORDER — METOCLOPRAMIDE HCL 10 MG
1 TABLET ORAL
Qty: 90 | Refills: 0
Start: 2019-05-16 | End: 2019-06-14

## 2019-05-16 RX ORDER — PANTOPRAZOLE SODIUM 20 MG/1
1 TABLET, DELAYED RELEASE ORAL
Qty: 30 | Refills: 0
Start: 2019-05-16 | End: 2019-06-14

## 2019-05-16 RX ORDER — ALBUTEROL 90 UG/1
2 AEROSOL, METERED ORAL
Qty: 1 | Refills: 0
Start: 2019-05-16 | End: 2019-06-14

## 2019-05-16 RX ORDER — DOCUSATE SODIUM 100 MG
1 CAPSULE ORAL
Qty: 30 | Refills: 0
Start: 2019-05-16 | End: 2019-06-14

## 2019-05-16 RX ORDER — OXYCODONE HYDROCHLORIDE 5 MG/1
1 TABLET ORAL
Qty: 0 | Refills: 0 | DISCHARGE
Start: 2019-05-16

## 2019-05-16 RX ORDER — SENNA PLUS 8.6 MG/1
2 TABLET ORAL
Qty: 60 | Refills: 0
Start: 2019-05-16 | End: 2019-06-14

## 2019-05-16 RX ORDER — ENOXAPARIN SODIUM 100 MG/ML
40 INJECTION SUBCUTANEOUS
Qty: 1200 | Refills: 0
Start: 2019-05-16 | End: 2019-06-14

## 2019-05-16 RX ORDER — SODIUM CHLORIDE 9 MG/ML
1 INJECTION INTRAMUSCULAR; INTRAVENOUS; SUBCUTANEOUS
Qty: 60 | Refills: 0
Start: 2019-05-16 | End: 2019-06-14

## 2019-05-16 RX ADMIN — ENOXAPARIN SODIUM 40 MILLIGRAM(S): 100 INJECTION SUBCUTANEOUS at 11:08

## 2019-05-16 RX ADMIN — SODIUM CHLORIDE 1 GRAM(S): 9 INJECTION INTRAMUSCULAR; INTRAVENOUS; SUBCUTANEOUS at 17:07

## 2019-05-16 RX ADMIN — Medication 5 MILLIGRAM(S): at 17:07

## 2019-05-16 RX ADMIN — LEVALBUTEROL 0.63 MILLIGRAM(S): 1.25 SOLUTION, CONCENTRATE RESPIRATORY (INHALATION) at 03:56

## 2019-05-16 RX ADMIN — Medication 5 MILLIGRAM(S): at 11:08

## 2019-05-16 RX ADMIN — LEVALBUTEROL 0.63 MILLIGRAM(S): 1.25 SOLUTION, CONCENTRATE RESPIRATORY (INHALATION) at 16:46

## 2019-05-16 RX ADMIN — GABAPENTIN 100 MILLIGRAM(S): 400 CAPSULE ORAL at 17:07

## 2019-05-16 RX ADMIN — LEVALBUTEROL 0.63 MILLIGRAM(S): 1.25 SOLUTION, CONCENTRATE RESPIRATORY (INHALATION) at 10:30

## 2019-05-16 NOTE — DISCHARGE NOTE NURSING/CASE MANAGEMENT/SOCIAL WORK - NSDCDPATPORTLINK_GEN_ALL_CORE
You can access the [facility] Portal, offered by [facility], by registering with the following website: [email or web address]

## 2019-05-16 NOTE — PROGRESS NOTE ADULT - PROBLEM SELECTOR PROBLEM 7
Other chronic pulmonary embolism without acute cor pulmonale
Nausea and vomiting
Other chronic pulmonary embolism without acute cor pulmonale
Other chronic pulmonary embolism without acute cor pulmonale
Nausea and vomiting
Nausea and vomiting
Other chronic pulmonary embolism without acute cor pulmonale
Other chronic pulmonary embolism without acute cor pulmonale
Preventive measure
Malignant neoplasm of right lung, unspecified part of lung
Other chronic pulmonary embolism without acute cor pulmonale

## 2019-05-16 NOTE — PROGRESS NOTE ADULT - SUBJECTIVE AND OBJECTIVE BOX
Patient is a 54y old  Female who presents with a chief complaint of shortness of breath, cough (15 May 2019 14:14)      SUBJECTIVE / OVERNIGHT EVENTS:    MEDICATIONS  (STANDING):  docusate sodium 100 milliGRAM(s) Oral daily  enoxaparin Injectable 40 milliGRAM(s) SubCutaneous daily  gabapentin 100 milliGRAM(s) Oral every 12 hours  levalbuterol Inhalation 0.63 milliGRAM(s) Inhalation every 6 hours  metoclopramide 5 milliGRAM(s) Oral three times a day before meals  pantoprazole    Tablet 40 milliGRAM(s) Oral before breakfast  polyethylene glycol 3350 17 Gram(s) Oral daily  senna 2 Tablet(s) Oral at bedtime  sodium chloride 1 Gram(s) Oral two times a day    MEDICATIONS  (PRN):  acetaminophen   Tablet .. 650 milliGRAM(s) Oral every 6 hours PRN Mild Pain (1 - 3)  guaiFENesin    Syrup 100 milliGRAM(s) Oral every 6 hours PRN Cough  HYDROcodone/homatropine Syrup 5 milliLiter(s) Oral every 6 hours PRN Cough  oxyCODONE    IR 5 milliGRAM(s) Oral every 3 hours PRN Severe Pain (7 - 10)      T(C): 36.9 (05-16-19 @ 06:12), Max: 37.9 (05-15-19 @ 12:42)  HR: 98 (05-16-19 @ 06:12) (84 - 111)  BP: 109/72 (05-16-19 @ 06:12) (95/44 - 112/71)  RR: 19 (05-16-19 @ 06:12) (16 - 19)  SpO2: 99% (05-16-19 @ 06:12) (97% - 100%)  CAPILLARY BLOOD GLUCOSE        I&O's Summary      PHYSICAL EXAM:  GENERAL: NAD, well-developed  HEAD:  Atraumatic, Normocephalic  EYES: EOMI, PERRLA, conjunctiva and sclera clear  NECK: Supple, No JVD  CHEST/LUNG: Clear to auscultation bilaterally; No wheeze  HEART: s1 s2, regular rhythm and rate   ABDOMEN: Soft, Nontender, Nondistended; Bowel sounds present  EXTREMITIES:  2+ Peripheral Pulses, No clubbing, cyanosis, or edema  PSYCH: AAOx3, calm   NEUROLOGY: non-focal  SKIN: No rashes or lesions    LABS:                        9.7    11.42 )-----------( 485      ( 16 May 2019 06:15 )             31.4     05-16    135  |  98  |  7   ----------------------------<  97  3.7   |  25  |  0.81    Ca    8.4      16 May 2019 06:15                RADIOLOGY & ADDITIONAL TESTS:    Imaging Personally Reviewed:    Consultant(s) Notes Reviewed:      Care Discussed with Consultants/Other Providers: Patient is a 54y old  Female who presents with a chief complaint of shortness of breath, cough (15 May 2019 14:14)      SUBJECTIVE / OVERNIGHT EVENTS:  Pt was seen and examined in AM. Pt feels well, + intermittent nausea, but not severe, no vomiting. denied cp/sob/fever. wants go home today     MEDICATIONS  (STANDING):  docusate sodium 100 milliGRAM(s) Oral daily  enoxaparin Injectable 40 milliGRAM(s) SubCutaneous daily  gabapentin 100 milliGRAM(s) Oral every 12 hours  levalbuterol Inhalation 0.63 milliGRAM(s) Inhalation every 6 hours  metoclopramide 5 milliGRAM(s) Oral three times a day before meals  pantoprazole    Tablet 40 milliGRAM(s) Oral before breakfast  polyethylene glycol 3350 17 Gram(s) Oral daily  senna 2 Tablet(s) Oral at bedtime  sodium chloride 1 Gram(s) Oral two times a day    MEDICATIONS  (PRN):  acetaminophen   Tablet .. 650 milliGRAM(s) Oral every 6 hours PRN Mild Pain (1 - 3)  guaiFENesin    Syrup 100 milliGRAM(s) Oral every 6 hours PRN Cough  HYDROcodone/homatropine Syrup 5 milliLiter(s) Oral every 6 hours PRN Cough  oxyCODONE    IR 5 milliGRAM(s) Oral every 3 hours PRN Severe Pain (7 - 10)      T(C): 36.9 (05-16-19 @ 06:12), Max: 37.9 (05-15-19 @ 12:42)  HR: 98 (05-16-19 @ 06:12) (84 - 111)  BP: 109/72 (05-16-19 @ 06:12) (95/44 - 112/71)  RR: 19 (05-16-19 @ 06:12) (16 - 19)  SpO2: 99% (05-16-19 @ 06:12) (97% - 100%)  CAPILLARY BLOOD GLUCOSE        I&O's Summary      PHYSICAL EXAM:  GENERAL: NAD, cachectic   HEAD:  Atraumatic, Normocephalic  EYES: EOMI, PERRLA, conjunctiva and sclera clear  NECK: Supple, No JVD  CHEST/LUNG: nonlabored in breathing; No wheeze; + pleurX on right side   HEART: s/p removal of pericardial drain, site clean, No murmurs, rubs, or gallops  ABDOMEN: Soft, Nontender, Nondistended; Bowel sounds present  EXTREMITIES:  2+ Peripheral Pulses, no edema   PSYCH: AAOx3  NEUROLOGY: non-focal  SKIN: No rashes or lesions  LABS:                        9.7    11.42 )-----------( 485      ( 16 May 2019 06:15 )             31.4     05-16    135  |  98  |  7   ----------------------------<  97  3.7   |  25  |  0.81    Ca    8.4      16 May 2019 06:15                RADIOLOGY & ADDITIONAL TESTS:    Imaging Personally Reviewed:    Consultant(s) Notes Reviewed:      Care Discussed with Consultants/Other Providers:

## 2019-05-16 NOTE — PROGRESS NOTE ADULT - ATTENDING COMMENTS
Plan for possible interventional bronchoscopy, will need cardiac clearance.
I saw the patient with the fellow and agree with the above with the addendum- start prednisone 1mg/kg day to treat possible pneumonitis due to immunotherapy; closely monitor diarrhea- if it increases in frequency or worsening abdominal pain with blood. In that case, please contact us ASAP and call GI for possible scope.     Nicki Diggs MD
I saw the patient with the fellow and agree with the above.    Nicki Diggs MD
Had extensive discussion with Pulmonary consult, Palliative care consult and pt re: possible Bronchoscopy. Pt very concerned about complication and possible intubation from bronchoscopy. Decision was made to hold off bronchoscopy in the setting of pericardial effusion/tamponade. Will re-consider Bronch later if pt not improved with current management as per discussion.  Will discuss with Onc re: anticoagulation.
Patient seen and examined.  Meds, labs and vitals all reviewed.  Agree with NP note
Patient seen and examined.  Meds, labs and vitals all reviewed.  Patient examined by physician.   Agree with NP note.
Patient seen and examined.  Meds, labs and vitals all reviewed.  Patient examined by physician.  Agree with NP note.
Patient seen and examined.  Meds, labs and vitals all reviewed.  Agree with NP note.
d/w pt's son at bedside. d/w [facility] Palliative team at bedside.
dc home today. Time 40 min

## 2019-05-16 NOTE — PROGRESS NOTE ADULT - PROBLEM SELECTOR PROBLEM 5
Pleural effusion
Malignant neoplasm of right lung, unspecified part of lung
Pleural effusion
Pleural effusion
Malignant neoplasm of right lung, unspecified part of lung
Palliative care encounter
Pleural effusion
Pleural effusion
Sepsis, due to unspecified organism
Pleural effusion

## 2019-05-16 NOTE — PROGRESS NOTE ADULT - PROBLEM SELECTOR PROBLEM 9
Preventive measure
Nausea and vomiting
Preventive measure

## 2019-05-16 NOTE — DISCHARGE NOTE PROVIDER - NSDCFUADDINST_GEN_ALL_CORE_FT
follow up with your PMD in one week - call for appointment    resume pleurex catheter drainage as before    continue home oxygen as prescribed

## 2019-05-16 NOTE — PROGRESS NOTE ADULT - PROBLEM SELECTOR PLAN 5
No fever in the hospital.   -UA negative, RVP negative, blood cx 5/2: NGTD  -vanco/zosyn for ? RLL pneumonia  Pt still has leukocytosis, tachycardia. This is more likely due to underlying malignancy and its complications rather than active sepsis or uncontrolled infection.  observe off abx  Low grade fever yesterday noon, remains afebrile afterwards. Pt clinically unchanged. No further intervention is indicated at this time.

## 2019-05-16 NOTE — PROGRESS NOTE ADULT - PROBLEM SELECTOR PLAN 6
-- CT showing obstruction of bronchus intermedius with atelectasis 2/2 worsening tumor burden, repeat CT 5/5 showed patent bronchus intermedius  -Case discussed with oncologist Dr. Harper  -Outpt follow up for next session of immunotherapy  -Not a hospice candidate yet per onc  -Pall care for symptoms management.
-Patient adherent with Lovenox BID  -Continue home meds  -Currently on RA, saturating well
-- CT showing obstruction of bronchus intermedius with atelectasis 2/2 worsening tumor burden, repeat CT 5/5 showed patent bronchus intermedius  -Case discussed with oncologist Dr. Harper  -Outpt follow up for next session of immunotherapy  -Not a hospice candidate yet per onc  -Pall care for symptoms management.
-- CT showing obstruction of bronchus intermedius with atelectasis 2/2 worsening tumor burden, repeat CT 5/5 showed patent bronchus intermedius  -Case discussed with oncologist Dr. Harper  -Outpt follow up for next session of immunotherapy  -Not a hospice candidate yet per onc  -Pall care for symptoms management.
-Patient adherent with Lovenox BID  -Continue home meds  -Currently on RA, saturating well
-- CT showing obstruction of bronchus intermedius with atelectasis 2/2 worsening tumor burden, repeat CT 5/5 showed patent bronchus intermedius  -Case discussed with oncologist Dr. Harper  -Outpt follow up for next session of immunotherapy  -Not a hospice candidate yet per onc  -Pall care for symptoms management.
-- CT showing obstruction of bronchus intermedius with atelectasis 2/2 worsening tumor burden, repeat CT 5/5 showed patent bronchus intermedius  -Case discussed with oncologist Dr. Harper  -Outpt follow up for next session of immunotherapy  -Not a hospice candidate yet per onc  -Pall care for symptoms management.
-Patient adherent with Lovenox BID  -Continue home meds  -Currently on RA, saturating well
-Patient adherent with Lovenox BID  -Continue home meds  -Currently on RA, saturating well
-Malignant pleural effusion based on previous thoracentesis, loculated right pleural effusion on CT  -f/u CT surgery recs  -Drainage of Pleurx 3X /week
-- CT showing obstruction of bronchus intermedius with atelectasis 2/2 worsening tumor burden, repeat CT 5/5 showed patent bronchus intermedius  -Case discussed with oncologist Dr. Harper  -Outpt follow up for next session of immunotherapy  -Not a hospice candidate yet per onc  -Pall care for symptoms management.

## 2019-05-16 NOTE — DISCHARGE NOTE PROVIDER - CARE PROVIDERS DIRECT ADDRESSES
,DirectAddress_Unknown,DirectAddress_Unknown ,DirectAddress_Unknown,DirectAddress_Unknown,[email or web address]

## 2019-05-16 NOTE — PROGRESS NOTE ADULT - PROBLEM SELECTOR PLAN 9
DVT PPX: On Heparin drip  Diet: regular  Dispo: pending
DVT PPX: SQ Lovenix 40mg daily  Diet: regular  Dispo: pending
DVT PPX: SQ Lovenix 40mg daily  Diet: regular  Dispo: pending
DVT PPX: On Heparin drip  Diet: regular  Dispo: pending
DVT PPX: SQ Lovenix 40mg daily  Diet: regular  Dispo: pending
pt with intermittent N/V, anorexia, poor intake  severe protein calorie malnutrition, s/p nutrition eval, c/w ensure enlive   no obstruction or colitis on CT, abdomen nontender and nondistended  Xray abd 5/13: nonobstructive gas pattern   c/w reglan and zofran prn
pt with intermittent N/V, anorexia, poor intake  severe protein calorie malnutrition, s/p nutrition eval, c/w ensure enlive   no obstruction or colitis on CT, abdomen nontender and nondistended  Xray abd 5/13: nonobstructive gas pattern   c/w reglan and zofran prn
pt with intermittent N/V, anorexia, poor intake  severe protein calorie malnutrition, s/p nutrition eval, c/w ensure enlive   no obstruction or colitis on CT, abdomen nontender and nondistended  c/w reglan and zofran prn
pt with intermittent N/V, anorexia, poor intake  severe protein calorie malnutrition, s/p nutrition eval, c/w ensure enlive   no obstruction or colitis on CT, abdomen nontender and nondistended  c/w reglan and zofran prn
DVT PPX: On Heparin drip  Diet: regular  Dispo: pending

## 2019-05-16 NOTE — CHART NOTE - NSCHARTNOTESELECT_GEN_ALL_CORE
Event Note
ADS/Event Note
CT scan/Event Note
Event Note
Event Note/CT pre medication
Event Note/Echo results
Event Note/Pulmonology
Nutrition Services/Malnutrition Alert

## 2019-05-16 NOTE — PROGRESS NOTE ADULT - REASON FOR ADMISSION
shortness of breath, cough

## 2019-05-16 NOTE — CHART NOTE - NSCHARTNOTEFT_GEN_A_CORE
Pt will require home O2 due to ling Ca. Pt was tested, she was 96% resting on room air, 87% on room air ambulating, 98% ambulating on O2. Pt will require home O2 due to lung Ca. Pt was tested, she was 96% resting on room air, 87% on room air ambulating, 98% ambulating on O2. Pt will require home O2 due to lung Ca. Pt was tested, she was 96% resting on room air, 98% on room air ambulating, 87% ambulating on O2. Pt will require home O2 due to lung Ca. Pt was tested, she was 95% resting on room air, 87% on room air ambulating, 96% ambulating on O2.

## 2019-05-16 NOTE — PROGRESS NOTE ADULT - ASSESSMENT
Hypotension   resolved     Pericardial effusion  recurrent   s/p drainage  s/p bleomycin   fu with cts   stable     PE  stable  off a/c due to hemorrhagic pericardial effusion  and high risk of tamponade     LUNG CA  fu with Onc, pulm

## 2019-05-16 NOTE — PROGRESS NOTE ADULT - PROBLEM SELECTOR PROBLEM 8
Nausea and vomiting
Preventive measure
Nausea and vomiting
Nausea and vomiting
Preventive measure
Nausea and vomiting
Nausea and vomiting
Preventive measure
Other chronic pulmonary embolism without acute cor pulmonale
Nausea and vomiting

## 2019-05-16 NOTE — PROGRESS NOTE ADULT - PROBLEM SELECTOR PROBLEM 4
Sepsis, due to unspecified organism
Pleural effusion
Sepsis, due to unspecified organism
Palliative care encounter
Sepsis, due to unspecified organism
Pleural effusion
Lung cancer
Palliative care encounter
Palliative care encounter
Pleural effusion
Pleural effusion
Sepsis, due to unspecified organism
Sepsis, due to unspecified organism
Dyspnea
Sepsis, due to unspecified organism

## 2019-05-16 NOTE — PROGRESS NOTE ADULT - SUBJECTIVE AND OBJECTIVE BOX
Subjective: Patient seen and examined. No new events except as noted.     SUBJECTIVE/ROS:    feels better    MEDICATIONS:  MEDICATIONS  (STANDING):  docusate sodium 100 milliGRAM(s) Oral daily  enoxaparin Injectable 40 milliGRAM(s) SubCutaneous daily  gabapentin 100 milliGRAM(s) Oral every 12 hours  levalbuterol Inhalation 0.63 milliGRAM(s) Inhalation every 6 hours  metoclopramide 5 milliGRAM(s) Oral three times a day before meals  pantoprazole    Tablet 40 milliGRAM(s) Oral before breakfast  polyethylene glycol 3350 17 Gram(s) Oral daily  senna 2 Tablet(s) Oral at bedtime  sodium chloride 1 Gram(s) Oral two times a day      PHYSICAL EXAM:  T(C): 36.9 (05-16-19 @ 06:12), Max: 37.9 (05-15-19 @ 12:42)  HR: 98 (05-16-19 @ 06:12) (84 - 111)  BP: 109/72 (05-16-19 @ 06:12) (95/44 - 112/71)  RR: 19 (05-16-19 @ 06:12) (16 - 19)  SpO2: 99% (05-16-19 @ 06:12) (97% - 100%)  Wt(kg): --  I&O's Summary          JVP: Normal  Neck: supple  Lung: clear   CV: S1 S2 , Murmur:  Abd: soft  Ext: No edema  neuro: Awake / alert  Psych: flat affect  Skin: normal``    LABS/DATA:    CARDIAC MARKERS:                                9.7    11.42 )-----------( 485      ( 16 May 2019 06:15 )             31.4     05-16    135  |  98  |  7   ----------------------------<  97  3.7   |  25  |  0.81    Ca    8.4      16 May 2019 06:15      proBNP:   Lipid Profile:   HgA1c:   TSH:     TELE:  EKG:

## 2019-05-16 NOTE — PROGRESS NOTE ADULT - PROBLEM SELECTOR PLAN 2
-Patient with +cough, shortness of breath, will treat for possible post-obstructive PNA   -Given currently on immunotherapy, will continue Zosyn   -F/u sputum cx
Likely multifactorial, infection, pleural effusion, pericardial effusion etc.  -Continue IV Zosyn/Vancomycin  -IVF bolus as needed  -Check bedside echo  -CT of chest w/o contrast  -F/U with CT surgery and Cardiology
-pt with extensive mediastinal and pleural disease and loculated right pleural effusion on CT, also possible postobstructive pneumonia, (+) Pericardial effusion and early cardiac tamponade, likely contributing to her dyspnea.  -CT chest 5/5 Central airways are patent including bronchus intermedius which was noted to be occluded at prior CT 5/2/2019.   Consolidation predominantly in the right lower lobe may represent a   combination of tumor and pneumonia.  -per initial CT collection is in inferior thorax while chest tube terminates in upper thorax. No acute intervention per CTS. They see increased malignancy burden on CT.  -S/P Pleurx catheter at right   -started on iv solumedrol per possible immunotherapy related pneumonitis per  hem/onc  -Patient adherent with Lovenox, less likely progressive PE  -add vanco to zosyn to broaden abx coverage for possible gram negative postobstructive pneumonia with RLL consolidation on CT  -(+) Cardiac tamponade, s/p IR drainage.   -Further disease modifying therapy per oncologist Dr. Harper.
Assist with ADL's.  Fall precautions.
-Patient with +cough, shortness of breath, likely d/t tumor burden and disease progression, possible RLL pneumonia with consolidation on CT  -Given currently on immunotherapy, add vanco to zosyn, d/w ads np  -F/u sputum cx
-Patient with +cough, shortness of breath, likely d/t tumor burden and disease progression, possible RLL pneumonia with consolidation on CT  -Given currently on immunotherapy, add vanco to zosyn, d/w ads np  -F/u sputum cx
-Patient with +cough, shortness of breath, will treat for possible post-obstructive PNA   -Given currently on immunotherapy, will continue Zosyn   -F/u sputum cx
-pt with extensive mediastinal and pleural disease and loculated right pleural effusion on CT, also possible postobstructive pneumonia, (+) Pericardial effusion and early cardiac tamponade, likely contributing to her dyspnea.  -CT chest 5/5 Central airways are patent including bronchus intermedius which was noted to be occluded at prior CT 5/2/2019.   Consolidation predominantly in the right lower lobe may represent a   combination of tumor and pneumonia.  -per initial CT collection is in inferior thorax while chest tube terminates in upper thorax. No acute intervention per CTS. They see increased malignancy burden on CT.  -S/P Chest Tube at right side  -started on iv solumedrol per possible immunotherapy related pneumonitis per  hem/onc  -Patient adherent with Lovenox, less likely progressive PE  -add vanco to zosyn to broaden abx coverage for possible gram negative postobstructive pneumonia with RLL consolidation on CT  -(+) Cardiac tamponade, s/p IR drainage.   -Further disease modifying therapy per oncologist Dr. Harper.
-pt with extensive mediastinal and pleural disease and loculated right pleural effusion on CT, also possible postobstructive pneumonia, (+) Pericardial effusion and early cardiac tamponade, likely contributing to her dyspnea.  -CT chest 5/5 Central airways are patent including bronchus intermedius which was noted to be occluded at prior CT 5/2/2019.   Consolidation predominantly in the right lower lobe may represent a   combination of tumor and pneumonia.  -per initial CT collection is in inferior thorax while chest tube terminates in upper thorax. No acute intervention per CTS. They see increased malignancy burden on CT.  -S/P Chest Tube at right side  -started on iv solumedrol per possible immunotherapy related pneumonitis per  hem/onc  -Patient adherent with Lovenox, less likely progressive PE  -add vanco to zosyn to broaden abx coverage for possible gram negative postobstructive pneumonia with RLL consolidation on CT  -Less likely cardiac in etiology given no intersitial pulmonary edema, trace pericardial fluid seen, unlikely to cause tamponade. Patient is hemodynamically stable.   -Further disease modifying therapy per oncologist Dr. Harper.  - Drainage of pericardia effusion today per CT surgery
-pt with extensive mediastinal and pleural disease and loculated right pleural effusion on CT, also possible postobstructive pneumonia, (+) Pericardial effusion and early cardiac tamponade, likely contributing to her dyspnea.  -CT chest 5/5 Central airways are patent including bronchus intermedius which was noted to be occluded at prior CT 5/2/2019.   Consolidation predominantly in the right lower lobe may represent a   combination of tumor and pneumonia.  -per initial CT collection is in inferior thorax while chest tube terminates in upper thorax. No acute intervention per CTS. They see increased malignancy burden on CT.  -S/P Pleurx catheter at right   -started on iv solumedrol per possible immunotherapy related pneumonitis per  hem/onc  -Patient adherent with Lovenox, less likely progressive PE  -add vanco to zosyn to broaden abx coverage for possible gram negative postobstructive pneumonia with RLL consolidation on CT  -(+) Cardiac tamponade, s/p IR drainage.   -Further disease modifying therapy per oncologist Dr. Harper.
Assist with ADL's.  Fall precautions.
Assist with ADL's.  Fall precautions.
Likely due to malignancy.  Patient with chronically poor appetite.  Discussion as an outpatient regarding medical marijuana.  PO as tolerated.  Patient to follow-up as an outpatient with Dr. Allen to discuss initiation of medical marijuana to assist with appetite.
Likely multifactorial, infection, pleural effusion, pericardial effusion etc.  On IV Zosyn/Vancomycin (5/2-  -IVF bolus as needed  -CT of chest w/o contrast 5/12: Right lower lung consolidative opacities unchanged. Moderate likely right middle, bilateral lower lobe compressive atelectasis. Moderate right upper lobe compressive atelectasis.  -F/U with CT surgery and Cardiology
Likely multifactorial, infection, pleural effusion, pericardial effusion etc.  On IV Zosyn/Vancomycin (5/2-  -IVF bolus as needed  -CT of chest w/o contrast 5/12: Right lower lung consolidative opacities unchanged. Moderate likely right middle, bilateral lower lobe compressive atelectasis. Moderate right upper lobe compressive atelectasis.  -F/U with CT surgery and Cardiology  No sign of active infection at this time. observe off abx
Likely multifactorial, infection, pleural effusion, pericardial effusion etc.  On IV Zosyn/Vancomycin (5/2-5/14)  -IVF bolus as needed  -CT of chest w/o contrast 5/12: Right lower lung consolidative opacities unchanged. Moderate likely right middle, bilateral lower lobe compressive atelectasis. Moderate right upper lobe compressive atelectasis.  -F/U with CT surgery and Cardiology  No sign of active infection at this time. observe off abx
Likely multifactorial, infection, pleural effusion, pericardial effusion, poor oral intake etc.  On IV Zosyn/Vancomycin (5/2-5/14)  Now resolved   -CT of chest w/o contrast 5/12: Right lower lung consolidative opacities unchanged. Moderate likely right middle, bilateral lower lobe compressive atelectasis. Moderate right upper lobe compressive atelectasis.  No sign of active infection at this time. observe off abx
-pt with extensive mediastinal and pleural disease and loculated right pleural effusion on CT, also possible postobstructive pneumonia, (+) Pericardial effusion and early cardiac tamponade, likely contributing to her dyspnea.  -CT chest 5/5 Central airways are patent including bronchus intermedius which was noted to be occluded at prior CT 5/2/2019.   Consolidation predominantly in the right lower lobe may represent a   combination of tumor and pneumonia.  -per initial CT collection is in inferior thorax while chest tube terminates in upper thorax. No acute intervention per CTS. They see increased malignancy burden on CT.  -S/P Chest Tube at right side  -started on iv solumedrol per possible immunotherapy related pneumonitis per  hem/onc  -Patient adherent with Lovenox, less likely progressive PE  -add vanco to zosyn to broaden abx coverage for possible gram negative postobstructive pneumonia with RLL consolidation on CT  -Less likely cardiac in etiology given no intersitial pulmonary edema, trace pericardial fluid seen, unlikely to cause tamponade. Patient is hemodynamically stable.   -Further disease modifying therapy per oncologist Dr. Harper.  - Drainage of pericardia effusion today per CT surgery

## 2019-05-16 NOTE — DISCHARGE NOTE PROVIDER - CARE PROVIDER_API CALL
Thuy Harper)  Internal Medicine; Medical Oncology  [address] [place] [address]  Phone: [phone]  Fax: [phone]  Follow Up Time:     [place])  Cardiovascular Disease; Internal Medicine  [address] [facility] [address]  [place] [address]  Phone: [phone]  Fax: [phone]  Follow Up Time: Thuy Harper)  Internal Medicine; Medical Oncology  [address] [place] [address]  Phone: [phone]  Fax: [phone]  Follow Up Time:     [place])  Cardiovascular Disease; Internal Medicine  [address] [facility] [address]  [place] [address]  Phone: [phone]  Fax: [phone]  Follow Up Time:     Ana Maria Casillas)  \[facility]\""ative Medicine; Internal Medicine  [address] [place] [address]  Phone: [phone]  Fax: [phone]  Follow Up Time:

## 2019-05-16 NOTE — PROGRESS NOTE ADULT - PROBLEM SELECTOR PROBLEM 1
Dyspnea
R/O Cardiac tamponade
Shortness of breath
Dyspnea
R/O Cardiac tamponade
Shortness of breath
Shortness of breath
Cardiac tamponade
R/O Cardiac tamponade
Shortness of breath
R/O Cardiac tamponade

## 2019-05-16 NOTE — PROGRESS NOTE ADULT - PROBLEM SELECTOR PLAN 4
-+tachycardia, +tachypnea +fever , EKG sinus tach, repeat EKG  -UA negative, RVP negative  -Lactate wnl  -vanco/zosyn for RLL pneumonia  -Fever can also be a result of tumor fever  -Check Vanco trough
-Malignant pleural effusion based on previous thoracentesis  -Likely loculated or drain not in place  -Case discussed with CT surg and no further intervention is being considered at this time.    Given size and symptoms will need to be drained
-+tachycardia, +tachypnea +fever , EKG sinus tach, repeat EKG  -UA negative, RVP negative  -Lactate wnl  -vanco/zosyn for RLL pneumonia  -Fever can also be a result of tumor fever  -f/u vanco trough
-+tachycardia, +tachypnea +fever , EKG sinus tach, repeat EKG  -UA negative, RVP negative  -Lactate wnl  -vanco/zosyn for RLL pneumonia  -Fever can also be a result of tumor fever  -Vanco trough 14
Psychosocial support provided.  Will continue to follow for ongoing symptom management.
-Malignant pleural effusion based on previous thoracentesis, loculated right pleural effusion on CT  -Likely loculated or drain not in place  -Case discussed with CT surg and no further intervention is being considered at this time.  Given size and symptoms will need to be drained  -f/u pulm recs, consider thoracentesis
-+tachycardia, +tachypnea +fever , EKG sinus tach, repeat EKG  -UA negative, RVP negative  -Lactate wnl  -vanco/zosyn for RLL pneumonia  -Fever can also be a result of tumor fever
-+tachycardia, +tachypnea +fever , EKG sinus tach, repeat EKG  -UA negative, RVP negative  -Lactate wnl  -vanco/zosyn for RLL pneumonia  -Fever can also be a result of tumor fever  -Check Vanco trough
-Malignant pleural effusion based on previous thoracentesis  -Likely loculated or drain not in place  -Case discussed with CT surg and no further intervention is being considered at this time.    Given size and symptoms will need to be drained
-Malignant pleural effusion based on previous thoracentesis, loculated right pleural effusion on CT  -Likely loculated or drain not in place  -Case discussed with CT surg and no further intervention is being considered at this time.  Given size and symptoms will need to be drained, S/p Chest tube placement   -f/u pulm recs
Patient received one dose of Keytruda as an outpatient.  She would like to continue disease modifying treatment as an outpatient.  Will follow-up with Dr. Harper as an outpatient.
Psychosocial support provided.  Will continue to follow for ongoing symptom management.
Psychosocial support provided.  Will continue to follow for ongoing symptom management.
Likely multifactorial.  -cont O2 support  -CT of Chest findings as above   -f/u with CT surgery and Cardiology
Likely multifactorial.  -cont O2 support  -CT of Chest findings as above   -f/u with CT surgery and Cardiology
Likely multifactorial. clinically improved significantly   -cont O2 support  -CT of Chest findings as above   -f/u with CT surgery and Cardiology
Likely multifactorial. clinically improved significantly   chronic hypoxic respiratory failure, cont O2 support  -CT of Chest findings as above   -f/u with CT surgery and Cardiology as outpatient
-+tachycardia, +tachypnea +fever , EKG sinus tach, repeat EKG  -UA negative, RVP negative  -Lactate wnl  -vanco/zosyn for RLL pneumonia  -Fever can also be a result of tumor fever  -Check Vanco trough

## 2019-05-16 NOTE — PROGRESS NOTE ADULT - PROBLEM SELECTOR PROBLEM 6
Malignant neoplasm of right lung, unspecified part of lung
Other chronic pulmonary embolism without acute cor pulmonale
Malignant neoplasm of right lung, unspecified part of lung
Malignant neoplasm of right lung, unspecified part of lung
Other chronic pulmonary embolism without acute cor pulmonale
Malignant neoplasm of right lung, unspecified part of lung
Malignant neoplasm of right lung, unspecified part of lung
Other chronic pulmonary embolism without acute cor pulmonale
Other chronic pulmonary embolism without acute cor pulmonale
Pleural effusion
Malignant neoplasm of right lung, unspecified part of lung

## 2019-05-16 NOTE — DISCHARGE NOTE PROVIDER - HOSPITAL COURSE
54F with PMHx of metastatic R lung adenocarcinoma on chemo, chronic right pleural effusion s/p pleurx and VATS, subxiphoid pericardial window on 3/20/19, PE on Lovenox s/p IVC filter placement presenting with shortness of breath and cough x 2 days    Transfer 5/6 for telemetry for large pericardial effusion         + large pericardial effusion with early signs of cardiac tamponade - s/p IR drainage of pericardial effusion, hep gtt d/c'ed now on lovenox for DVT ppx only     +SOB - loculated, malignant R pleural effusion with possible post obstructive PNA - on Vanco & Zosyn - s/p pleurx catheter to R side - no further intervention as per CTS - pleurx drainage 3/week. Neg CTPA for PE, has IVF in place     +cough - likely from tumor burden and disease progression with PNA on CT -- on abx         5/7 - Pericardial drainage done by IR     5/12 Hypotensive s/p 250cc - pt became tachypneic and SOB with fluids, improved without intervention, repeat CT scan without change in pleural effusion, decrease size in pericardial effusion, CTS following         5/13 bleomycin pericardial sclerosis done at bedside by CTS TO PREVENT REACCUMULATION     LARGE R sided pleural effusion, with limited drainage out of pleurex    -adherent with Lovenox, less likely progressive PE    -CT evidence of right chest tube terminates in the upper pleural space with a loculated right pleural effusion     -CTX consulted regarding adjustment of chest tube        Sepsis --> Pneumonia (likely post obstructive)    -Pulm consulted    -Vanco and zosyn     -UA negative, RVP negative    -Lactate wnl    -EKG sinus tachy    -F/u sputum cx        Malignant neoplasm of right lung, unspecified part of lung.     -Onc consulted    -CT showing obstruction of bronchus intermedius with atelectasis 2/2 worsening tumor burden, repeat CT 5/5 showed patent bronchus intermedius    -Outpt follow up for next session of immunotherapy    -Not a hospice candidate yet per onc    -Pall care for symptoms management            Nausea and vomiting.      -pt with intermittent N/V, anorexia, poor intake    -pt with severe protein calorie malnutrition, nutrition eval    -CT abd/pelvis: no obstruction or colitis on CT, abdomen nontender and nondistended    -c/w reglan and zofran prn    -GI consulted: c/w anti-emetics    -amylase/lipsae wnl     -Barium swallow: Tertiary contractions in the distal esophagus consistent with dysmotility.        Hyponatremia    -Salt tabs    -Fluid restriction         Cardiac tamponade    -Seen on echo    -Per CT surg ---     -Transfer to [facility]         5/6 Echo:1. Mitral annular calcification, otherwise normal mitral    valve. Minimal mitral regurgitation.    2. Endocardium not well visualized; grossly normal left    ventricular systolic function.    3. The right ventricle is not well visualized; grossly    normal right ventricular systolic function.    4. Large pericardial effuson, measuring about  2.5 cm    lateral to the left ventricle.  Moderate pericardial    effusion, measuring about  1.6 cm posterior to the left    ventricle and about  1.8 cm around the LV apex.  Echodense    material is visualized within the effusion and may    represent thrombus.  Diastolic collapse of the right    ventricular outflow tract is seen.  In addition, increased    respirophasic variability in the transmitral and    transtricuspid spectral Doppler signals is seen.  These    findings are consistent with early cardiac tamponade.    5. Bilateral pleural effusions.        5/12  CT chest: Status post pericardial tube placement with interval decrease of     pericardial effusion.    Moderate bilateral pleural effusions. Right lower lobe opacity and     bilateral lung compressive atelectasis are stable.        5/12 bedside echo-     Normal left ventricular systolic function. No segmental    wall motion abnormalities.    Circumferential pericardial effusion. The efuusion is best    visualized in the subcostal window, where it appears    "moderate-large" adjacent to the right atrium and    ventricle.    No right heart collapse is seen.    Signficiant respiratory variation in the mitral inflow is    present. Estimated RA pressure is normal.            5/12  CXR -Status post pericardial tube placement with interval decrease of pericardial effusion.    Moderate bilateral pleural effusions. Right lower lobe opacity and     bilateral lung compressive atelectasis are stable.    5/14 Abd xray- non obstructing bowel gas pattern    5/15 CXR- moderate left pleural effusion, Moderate right pleural effusion        will go home on home O2        Discussed with MD - pt stable for discharge home, pt with no complaints, discharge medications reviewed with MD. 54F with PMHx of metastatic R lung adenocarcinoma on chemo, chronic right pleural effusion s/p pleurx and VATS, subxiphoid pericardial window on 3/20/19, PE on Lovenox s/p IVC filter placement presenting with shortness of breath and cough x 2 days        + large pericardial effusion with early signs of cardiac tamponade - s/p IR drainage of pericardial effusion, full dose of AC was on hold due to hemorrhagic pericardial effusion.     +SOB - loculated, malignant R pleural effusion with possible post obstructive PNA - s/p a course of Vanco & Zosyn - s/p pleurx catheter to R side - no further intervention as per CTS - pleurx drainage 3/week. Neg CTPA for PE, has IVF in place     +cough - likely from tumor burden and disease progression with PNA on CT -- on abx         5/13 bleomycin pericardial sclerosis done at bedside by CTS TO PREVENT REACCUMULATION         Nausea and vomiting.      -pt with intermittent N/V, anorexia, poor intake    -pt with severe protein calorie malnutrition    -CT abd/pelvis: no obstruction or colitis on CT, abdomen nontender and nondistended    -c/w reglan and zofran prn    -GI consulted: c/w anti-emetics    -amylase/lipsae wnl     -Barium swallow: Tertiary contractions in the distal esophagus consistent with dysmotility.        Hyponatremia    -Salt tabs    -Fluid restriction         5/6 Echo:1. Mitral annular calcification, otherwise normal mitral    valve. Minimal mitral regurgitation.    2. Endocardium not well visualized; grossly normal left    ventricular systolic function.    3. The right ventricle is not well visualized; grossly    normal right ventricular systolic function.    4. Large pericardial effuson, measuring about  2.5 cm    lateral to the left ventricle.  Moderate pericardial    effusion, measuring about  1.6 cm posterior to the left    ventricle and about  1.8 cm around the LV apex.  Echodense    material is visualized within the effusion and may    represent thrombus.  Diastolic collapse of the right    ventricular outflow tract is seen.  In addition, increased    respirophasic variability in the transmitral and    transtricuspid spectral Doppler signals is seen.  These    findings are consistent with early cardiac tamponade.    5. Bilateral pleural effusions.        5/12  CT chest: Status post pericardial tube placement with interval decrease of     pericardial effusion.    Moderate bilateral pleural effusions. Right lower lobe opacity and     bilateral lung compressive atelectasis are stable.        5/12 bedside echo-     Normal left ventricular systolic function. No segmental    wall motion abnormalities.    Circumferential pericardial effusion. The efuusion is best    visualized in the subcostal window, where it appears    "moderate-large" adjacent to the right atrium and    ventricle.    No right heart collapse is seen.    Signficiant respiratory variation in the mitral inflow is    present. Estimated RA pressure is normal.            5/12  CXR -Status post pericardial tube placement with interval decrease of pericardial effusion.    Moderate bilateral pleural effusions. Right lower lobe opacity and     bilateral lung compressive atelectasis are stable.    5/14 Abd xray- non obstructing bowel gas pattern    5/15 CXR- moderate left pleural effusion, Moderate right pleural effusion        will go home on home O2        After discussion with oncology and CT-Sx, pt was discharged home with lovenox 40mg daily instead of therapeutic lovenox after weigh the benefit and risk.

## 2019-05-16 NOTE — PROGRESS NOTE ADULT - PROBLEM SELECTOR PROBLEM 3
Sepsis, due to unspecified organism
R/O Dyspnea
Cough
Lung cancer
Sepsis, due to unspecified organism
Cough
Cough
Debility
Lung cancer
Lung cancer
Sepsis, due to unspecified organism
Sepsis, due to unspecified organism
Anemia
Cough
Cough

## 2019-05-16 NOTE — PROGRESS NOTE ADULT - PROBLEM SELECTOR PLAN 8
pt with intermittent N/V, anorexia, poor intake  pt with severe protein calorie malnutrition, nutrition eval  pt denies active diarrhea   no obstruction or colitis on CT, abdomen nontender and nondistended  c/w reglan and zofran prn
DVT PPX: Lovenox  Diet: regular  Dispo: pending
pt with intermittent N/V, anorexia, poor intake  pt with severe protein calorie malnutrition, nutrition eval  pt denies active diarrhea   no obstruction or colitis on CT, abdomen nontender and nondistended  c/w reglan and zofran prn
pt with intermittent N/V, anorexia, poor intake  pt with severe protein calorie malnutrition, nutrition eval  pt denies active diarrhea   no obstruction or colitis on CT, abdomen nontender and nondistended  c/w reglan and zofran prn
DVT PPX: Lovenox  Diet: regular  Dispo: pending
DVT PPX: Lovenox  Diet: regular  Dispo: pending
pt with intermittent N/V, anorexia, poor intake  pt with severe protein calorie malnutrition, nutrition eval  pt denies active diarrhea   no obstruction or colitis on CT, abdomen nontender and nondistended  c/w reglan and zofran prn
pt with intermittent N/V, anorexia, poor intake  pt with severe protein calorie malnutrition, nutrition eval  pt denies active diarrhea   no obstruction or colitis on CT, abdomen nontender and nondistended  c/w reglan and zofran prn
-Off heparin drip in the setting of hemorrhagic pericardial effusion, pt has IVC filter  -Continue home meds  -Currently on RA, saturating well  -SQ Lovenox 40mg daily
-Off therapeutic lovenox in the setting of hemorrhagic pericardial effusion, pt has IVC filter  Pt has tolerated lovenox 40mg daily well. I d/w  [place] and CT surgeon. will dc pt home with lovenox 40mg daily instead of therapeutic lovenox due to the risk of bleeding.
pt with intermittent N/V, anorexia, poor intake  pt with severe protein calorie malnutrition, nutrition eval  pt denies active diarrhea   no obstruction or colitis on CT, abdomen nontender and nondistended  c/w reglan and zofran prn

## 2019-05-16 NOTE — PROGRESS NOTE ADULT - PROBLEM SELECTOR PLAN 3
-+tachycardia, +tachypnea +fever - now resolving.   -UA negative, RVP negative  -Lactate wnl  Will treat for PNA with Zosyn  However, patient's outpatient VS SBP 90s-100s, HR 100s-120s  Fever can also be a result of tumor fever
Likely multifactorial.  -cont O2 support  -CT of Chest to evaluate pleural effusion  -f/u with CT surgery and Cardiology
-Patient with +cough, shortness of breath, likely d/t tumor burden and disease progression, possible RLL pneumonia with consolidation on CT  -Given currently on immunotherapy, add vanco to zosyn, d/w ads np  -F/u sputum cx
Patient received one dose of Keytruda as an outpatient.  She would like to continue disease modifying treatment as an outpatient.  Will follow-up with oncology.
-+tachycardia, +tachypnea +fever , EKG sinus tach, repeat EKG  -UA negative, RVP negative  -Lactate wnl  -vanco/zosyn for RLL pneumonia  -Fever can also be a result of tumor fever
-+tachycardia, +tachypnea +fever , EKG sinus tach, repeat EKG  -UA negative, RVP negative  -Lactate wnl  -vanco/zosyn for RLL pneumonia  -Fever can also be a result of tumor fever
-+tachycardia, +tachypnea +fever - now resolving.   -UA negative, RVP negative  -Lactate wnl  Will treat for PNA with Zosyn  However, patient's outpatient VS SBP 90s-100s, HR 100s-120s  Fever can also be a result of tumor fever
-Patient with +cough, shortness of breath, likely d/t tumor burden and disease progression, possible RLL pneumonia with consolidation on CT  -Given currently on immunotherapy, add vanco to zosyn, d/w ads np  -F/u sputum cx
-Patient with +cough, shortness of breath, likely d/t tumor burden and disease progression, possible RLL pneumonia with consolidation on CT  -Given currently on immunotherapy, add vanco to zosyn, d/w ads np  -F/u sputum cx
Assist with ADL's.  Fall precautions.
Patient received one dose of Keytruda as an outpatient.  She would like to continue disease modifying treatment as an outpatient.  Will follow-up with oncology.
Patient received one dose of Keytruda as an outpatient.  She would like to continue disease modifying treatment as an outpatient.  Will follow-up with oncology.
H/H stable   no sign of bleeding
drop of H/H, no signs of active bleeding   likely due to lab variation  Now H/H around baseline
drop of H/H, no signs of active bleeding   likely due to lab variation  Now H/H around baseline
drop of H/H, no signs of active bleeding   trend CBC
-Patient with +cough, shortness of breath, likely d/t tumor burden and disease progression, possible RLL pneumonia with consolidation on CT  -Given currently on immunotherapy, add vanco to zosyn, d/w ads np  -F/u sputum cx
-Patient with +cough, shortness of breath, likely d/t tumor burden and disease progression, possible RLL pneumonia with consolidation on CT  -Given currently on immunotherapy, add vanco to zosyn, d/w ads np  -F/u sputum cx

## 2019-05-16 NOTE — DISCHARGE NOTE PROVIDER - NSDCCPCAREPLAN_GEN_ALL_CORE_FT
PRINCIPAL DISCHARGE DIAGNOSIS  Diagnosis: Pericardial effusion with cardiac tamponade  Assessment and Plan of Treatment: cont pleurex catheter drainage as before  follow up with your MD in one week - call for appointment        SECONDARY DISCHARGE DIAGNOSES  Diagnosis: Fever  Assessment and Plan of Treatment: PRINCIPAL DISCHARGE DIAGNOSIS  Diagnosis: Pericardial effusion with cardiac tamponade  Assessment and Plan of Treatment: cont pleurex catheter drainage as before  follow up with your MD in one week - call for appointment  If you develop shortness of breath, chest pain, dizziness, low blood pressure or any concerns, you need to return to the hospital immediately.         SECONDARY DISCHARGE DIAGNOSES  Diagnosis: Fever  Assessment and Plan of Treatment: If you have fever at home, you need to return to the hospital immediately for evaluation

## 2019-05-16 NOTE — DISCHARGE NOTE PROVIDER - PROVIDER TOKENS
PROVIDER:[TOKEN:[[id number]]],PROVIDER:[TOKEN:[[id number]]] PROVIDER:[TOKEN:[[id number]]],PROVIDER:[TOKEN:[[id number]]],PROVIDER:[TOKEN:[[id number]]]

## 2019-05-16 NOTE — PROGRESS NOTE ADULT - PROBLEM SELECTOR PLAN 7
CT showing obstruction of bronchus intermedius with atelectasis 2/2 worsening tumor burden, repeat CT 5/5 showed patent bronchus intermedius  -Outpt follow up for next session of immunotherapy  -Not a hospice candidate yet per onc  -Pall care for symptoms management. prognosis is poor.

## 2019-05-20 ENCOUNTER — FORM ENCOUNTER (OUTPATIENT)
Age: 54
End: 2019-05-20

## 2019-05-21 ENCOUNTER — OUTPATIENT (OUTPATIENT)
Dept: OUTPATIENT SERVICES | Facility: HOSPITAL | Age: 54
LOS: 1 days | End: 2019-05-21
Payer: MEDICAID

## 2019-05-21 ENCOUNTER — APPOINTMENT (OUTPATIENT)
Dept: RADIOLOGY | Facility: IMAGING CENTER | Age: 54
End: 2019-05-21
Payer: MEDICAID

## 2019-05-21 ENCOUNTER — RX RENEWAL (OUTPATIENT)
Age: 54
End: 2019-05-21

## 2019-05-21 ENCOUNTER — APPOINTMENT (OUTPATIENT)
Age: 54
End: 2019-05-21
Payer: MEDICAID

## 2019-05-21 ENCOUNTER — APPOINTMENT (OUTPATIENT)
Dept: HEMATOLOGY ONCOLOGY | Facility: CLINIC | Age: 54
End: 2019-05-21
Payer: MEDICAID

## 2019-05-21 VITALS
RESPIRATION RATE: 24 BRPM | DIASTOLIC BLOOD PRESSURE: 78 MMHG | HEART RATE: 111 BPM | OXYGEN SATURATION: 98 % | TEMPERATURE: 98.4 F | SYSTOLIC BLOOD PRESSURE: 111 MMHG | WEIGHT: 100.31 LBS | BODY MASS INDEX: 20.26 KG/M2

## 2019-05-21 DIAGNOSIS — Z51.5 ENCOUNTER FOR PALLIATIVE CARE: ICD-10-CM

## 2019-05-21 DIAGNOSIS — Z98.890 OTHER SPECIFIED POSTPROCEDURAL STATES: Chronic | ICD-10-CM

## 2019-05-21 DIAGNOSIS — C34.90 MALIGNANT NEOPLASM OF UNSPECIFIED PART OF UNSPECIFIED BRONCHUS OR LUNG: ICD-10-CM

## 2019-05-21 DIAGNOSIS — J91.0 MALIGNANT PLEURAL EFFUSION: ICD-10-CM

## 2019-05-21 DIAGNOSIS — R11.2 NAUSEA WITH VOMITING, UNSPECIFIED: ICD-10-CM

## 2019-05-21 DIAGNOSIS — G89.3 NEOPLASM RELATED PAIN (ACUTE) (CHRONIC): ICD-10-CM

## 2019-05-21 DIAGNOSIS — R63.0 ANOREXIA: ICD-10-CM

## 2019-05-21 PROCEDURE — 71046 X-RAY EXAM CHEST 2 VIEWS: CPT | Mod: 26

## 2019-05-21 PROCEDURE — 99214 OFFICE O/P EST MOD 30 MIN: CPT

## 2019-05-21 PROCEDURE — 99215 OFFICE O/P EST HI 40 MIN: CPT

## 2019-05-21 PROCEDURE — 71046 X-RAY EXAM CHEST 2 VIEWS: CPT

## 2019-05-21 PROCEDURE — 99497 ADVNCD CARE PLAN 30 MIN: CPT

## 2019-05-21 RX ORDER — METHADONE HYDROCHLORIDE 5 MG/1
5 TABLET ORAL TWICE DAILY
Qty: 30 | Refills: 0 | Status: ACTIVE | COMMUNITY
Start: 2019-05-21 | End: 1900-01-01

## 2019-05-21 RX ORDER — DEXAMETHASONE 2 MG/1
2 TABLET ORAL DAILY
Qty: 14 | Refills: 0 | Status: ACTIVE | COMMUNITY
Start: 2019-05-21 | End: 1900-01-01

## 2019-05-21 NOTE — HISTORY OF PRESENT ILLNESS
[FreeTextEntry1] : 54yoF with recently diagnosed metastatic NSCLC being seen for acute visit per request of Dr. Harper for symptom management. \par No significant PMH prior to the cancer diagnosis.  PE on Lovenox, s/p IVC filter\par \par Patient developed a cough just before Christmastime and was initially treated as pneumonia with antibiotics. Patient was admitted to [facility] in February and during that admission she was found to have a lung mass and pleural effusion. Subsequently had two hospitalizations at [facility], most recently had a pericardial drain placement for malignant effusion. Has R sided PleurX drain in place. \par \par Recently hospitalized from [facility] 5/2/19-5/16/19 for large pericardial effusion with signs of early cardiac tamponade S/P IR drainage of pericardial effusion. \par \par ROS:\par +SOB with minimal exertion\par +poor appetite - eats 1 bite of food and is full. Family urging her to eat but she has no desire at all\par +increased back pain with radiation to RUE. Reports pain at worse is 8/10. Reports PRN use of Oxycodone 5mg approximately 4 times per day. Gets relief for about 3 hours with a dose of Oxycodone. Patient reports that she was significantly more comfortable on hospital bed.\par +nausea, persistent. \par +hypersalivation - spitting saliva frequently\par +trouble sleeping - gets up and paces at night, feels restless\par +fatigue\par +weakness\par Denies constipation\par \par PMD: Dr. Dina Sarkar ([phone])\par \par [employer] Ref#: [id number]

## 2019-05-21 NOTE — ASSESSMENT
[FreeTextEntry1] : 54yoF with:\par \par 1. Stage IV NSCLC - currently on hold due to decline in functional status. Med Onc follow up.\par \par 2. Anorexia/cachexia of malignancy - Medical cannabis certification completed last visit.  Provided cannabis re-education. Recommend starting with 1:1 THC:CBD formulation at low dose of THC (~2mg/dose) to help with appetite and other symptoms. Reinforced that this is for improvement in quality of life and not expected to help patient put on weight. \par \par 3. Nausea/vomiting- C/W  Metoclopramide PRN. Medical cannabis could be beneficial.   Start Dexamethasone 2mg daily. \par \par 4. Neoplasm related pain-- Start Methadone 2.5mg BID.  C/W oxycodone 5mg PRN Q4h.\par \par 5. R pleural effusion - R PleurX in place. \par \par 6. Encounter for Palliative Care/Advance Care Planning -  Goals of care addressed during visit.  MOLST form completed in office today delineating wishes: DNR/DNI/no feeding tube.   Patient and son have original copy.   Additional copy placed for EMR. Discussed the benefit of incorporating home hospice services at this juncture. Pt and son are agreeable to initiating services.  Referral made to [facility]. DME can be provided through hospice.   Emotional support provided.  \par Dr. Harper updated.\par \par RTO as needed, remain available for medical management, if needed. [DNR] : Code Status: DNR [Limited] : Treatment Guidelines: Limited [DNI] : Intubation: DNI

## 2019-05-22 ENCOUNTER — OUTPATIENT (OUTPATIENT)
Dept: OUTPATIENT SERVICES | Facility: HOSPITAL | Age: 54
LOS: 1 days | Discharge: HOME | End: 2019-05-22

## 2019-05-22 ENCOUNTER — APPOINTMENT (OUTPATIENT)
Dept: INFUSION THERAPY | Facility: HOSPITAL | Age: 54
End: 2019-05-22

## 2019-05-22 DIAGNOSIS — C34.90 MALIGNANT NEOPLASM OF UNSPECIFIED PART OF UNSPECIFIED BRONCHUS OR LUNG: ICD-10-CM

## 2019-05-22 DIAGNOSIS — Z98.890 OTHER SPECIFIED POSTPROCEDURAL STATES: Chronic | ICD-10-CM

## 2019-05-23 ENCOUNTER — MESSAGE (OUTPATIENT)
Age: 54
End: 2019-05-23

## 2019-05-23 NOTE — ASSESSMENT
[______] : HCP: [unfilled] [FreeTextEntry1] : 54f with newly diagnosed advanced adenocarcinoma of the lung, with pdl1>90%, and EGFR exon 20 insetion, started on pembrolizumab on 4/15. Exon 20 insertion is resistant to currently available EGFR TKIs. \par Presenting for f/u s/p 1 dose of pembrolizumab. \par Course has been complicated by multiple hospitalizations with pleural and pericardial effusion and decline in performance status. \par Patient is on lovenox. \par \par CXR to assess effusion\par Palliative care evaluation, hospice referral \par RTC 2 weeks as desired, patient will call our office if she would like to follow up. She is asking to focus on comfort at this point and will call us as needed. Would continue pembrolizumab if PS improves and if she decides to go back on treatment. \par

## 2019-05-23 NOTE — REVIEW OF SYSTEMS
[Fatigue] : fatigue [Recent Change In Weight] : ~T recent weight change [Chest Pain] : chest pain [Lower Ext Edema] : lower extremity edema [Shortness Of Breath] : shortness of breath [Cough] : cough [SOB on Exertion] : shortness of breath during exertion [Vomiting] : vomiting [Negative] : Heme/Lymph

## 2019-05-23 NOTE — HISTORY OF PRESENT ILLNESS
[Disease: _____________________] : Disease: [unfilled] [T: ___] : T[unfilled] [M: ___] : M[unfilled] [AJCC Stage: ____] : AJCC Stage: [unfilled] [de-identified] : 54f, never smoker, with newly diagnosed advanced NSCLC, presenting to [facility]. \par Ms Kennedy presented to [facility] with shortness of breath, imaging showed a lung mass, she was told she has lung cancer based on a positive pleural effusion. She experienced worsening SOB and presented to [facility] on 3/1/2019. She was found to have multiple PEs in the left upper and lower lobes and a large right-sided pleural effusion with associated partial collapse of R lung and obstruction of the right lower lobe bronchus. She was evaluated by thoracic surgery and underwent a Right VATS, pleural biopsy, drainage of pleural effusion, insertion of pleurX catheter. She also had Placement of retrievable IVC Filter. I met Ms. Kennedy while she was admitted at [facility].  \par Pathology came back as metastatic adenocarcinoma of lung, pleural effusion + for malignant cells. PDL1>90%. Foundation testing is still pending. \par During hospitalization, an MRI brain was obtained, showing no evidence of metastatic disease.\par \par She was hospitalized again 3/2019 due to SOB and was found to have pericardial effusion, s/p window.\par \par PET/CT done 4/12/2019 showed RUL mass with Hypermetabolic mediastinal, bilateral hilar, and right axillary and right retropectoral nodes, likely metastatic.   \par \par Started Pembrolizumab on 4/15/2019.\par \par She lives with her family and has good support.\par She is a  at [employer]. \par She has no f/h of cancer. \par  [de-identified] : Adenocarcinoma [de-identified] : PDL1 >90%\par EGFR exon 20 insertion [de-identified] : Patient presents for follow up. She is s/p another prolonged hospitalization for pericardial effusion, requiring drain placement and bleomycin sclerosis. \par She is doing poorly and her functional status is worsening. She is in severe pain and discomfort, unable to sleep at nights or eat. \par She tells me that she does not think her body can take any treatment at this point. She requests that focus be on comfort at this point.

## 2019-05-23 NOTE — PHYSICAL EXAM
[General Appearance - Alert] : alert [Sclera] : the sclera and conjunctiva were normal [Normal Oral Mucosa] : normal oral mucosa [Heart Sounds] : normal S1 and S2 [Bowel Sounds] : normal bowel sounds [Abdomen Soft] : soft [Abdomen Tenderness] : non-tender [Skin Color & Pigmentation] : normal skin color and pigmentation [No Focal Deficits] : no focal deficits [Oriented To Time, Place, And Person] : oriented to person, place, and time [Affect] : the affect was normal [Auscultation Breath Sounds / Voice Sounds] : lungs were clear to auscultation bilaterally [Thin] : thin [Normal] : affect appropriate [de-identified] : crackles at bases [de-identified] : tachycardic [de-identified] : right axillary LAP

## 2019-05-28 ENCOUNTER — TRANSCRIPTION ENCOUNTER (OUTPATIENT)
Age: 54
End: 2019-05-28

## 2019-05-28 ENCOUNTER — INPATIENT (INPATIENT)
Facility: HOSPITAL | Age: 54
LOS: 6 days | Discharge: HOSPICE | End: 2019-06-04
Attending: STUDENT IN AN ORGANIZED HEALTH CARE EDUCATION/TRAINING PROGRAM | Admitting: STUDENT IN AN ORGANIZED HEALTH CARE EDUCATION/TRAINING PROGRAM
Payer: MEDICAID

## 2019-05-28 ENCOUNTER — APPOINTMENT (OUTPATIENT)
Dept: HEMATOLOGY ONCOLOGY | Facility: CLINIC | Age: 54
End: 2019-05-28

## 2019-05-28 VITALS
TEMPERATURE: 98 F | OXYGEN SATURATION: 100 % | RESPIRATION RATE: 22 BRPM | SYSTOLIC BLOOD PRESSURE: 116 MMHG | DIASTOLIC BLOOD PRESSURE: 74 MMHG | HEART RATE: 118 BPM

## 2019-05-28 DIAGNOSIS — C34.90 MALIGNANT NEOPLASM OF UNSPECIFIED PART OF UNSPECIFIED BRONCHUS OR LUNG: ICD-10-CM

## 2019-05-28 DIAGNOSIS — Z98.890 OTHER SPECIFIED POSTPROCEDURAL STATES: Chronic | ICD-10-CM

## 2019-05-28 DIAGNOSIS — Z29.9 ENCOUNTER FOR PROPHYLACTIC MEASURES, UNSPECIFIED: ICD-10-CM

## 2019-05-28 DIAGNOSIS — R06.02 SHORTNESS OF BREATH: ICD-10-CM

## 2019-05-28 DIAGNOSIS — C80.1 MALIGNANT (PRIMARY) NEOPLASM, UNSPECIFIED: ICD-10-CM

## 2019-05-28 DIAGNOSIS — I26.99 OTHER PULMONARY EMBOLISM WITHOUT ACUTE COR PULMONALE: ICD-10-CM

## 2019-05-28 DIAGNOSIS — J91.0 MALIGNANT PLEURAL EFFUSION: ICD-10-CM

## 2019-05-28 LAB
ALBUMIN SERPL ELPH-MCNC: 3.2 G/DL — LOW (ref 3.3–5)
ALP SERPL-CCNC: 143 U/L — HIGH (ref 40–120)
ALT FLD-CCNC: 25 U/L — SIGNIFICANT CHANGE UP (ref 4–33)
ANION GAP SERPL CALC-SCNC: 13 MMO/L — SIGNIFICANT CHANGE UP (ref 7–14)
AST SERPL-CCNC: 21 U/L — SIGNIFICANT CHANGE UP (ref 4–32)
BASE EXCESS BLDV CALC-SCNC: 5.7 MMOL/L — SIGNIFICANT CHANGE UP
BASOPHILS # BLD AUTO: 0.06 K/UL — SIGNIFICANT CHANGE UP (ref 0–0.2)
BASOPHILS NFR BLD AUTO: 0.5 % — SIGNIFICANT CHANGE UP (ref 0–2)
BILIRUB SERPL-MCNC: 0.4 MG/DL — SIGNIFICANT CHANGE UP (ref 0.2–1.2)
BLOOD GAS VENOUS - CREATININE: 0.59 MG/DL — SIGNIFICANT CHANGE UP (ref 0.5–1.3)
BUN SERPL-MCNC: 7 MG/DL — SIGNIFICANT CHANGE UP (ref 7–23)
CALCIUM SERPL-MCNC: 9.3 MG/DL — SIGNIFICANT CHANGE UP (ref 8.4–10.5)
CHLORIDE BLDV-SCNC: 98 MMOL/L — SIGNIFICANT CHANGE UP (ref 96–108)
CHLORIDE SERPL-SCNC: 98 MMOL/L — SIGNIFICANT CHANGE UP (ref 98–107)
CO2 SERPL-SCNC: 27 MMOL/L — SIGNIFICANT CHANGE UP (ref 22–31)
CREAT SERPL-MCNC: 0.67 MG/DL — SIGNIFICANT CHANGE UP (ref 0.5–1.3)
EOSINOPHIL # BLD AUTO: 0.69 K/UL — HIGH (ref 0–0.5)
EOSINOPHIL NFR BLD AUTO: 6.2 % — HIGH (ref 0–6)
GAS PNL BLDV: 135 MMOL/L — LOW (ref 136–146)
GLUCOSE BLDV-MCNC: 109 MG/DL — HIGH (ref 70–99)
GLUCOSE SERPL-MCNC: 108 MG/DL — HIGH (ref 70–99)
HCO3 BLDV-SCNC: 27 MMOL/L — SIGNIFICANT CHANGE UP (ref 20–27)
HCT VFR BLD CALC: 38.9 % — SIGNIFICANT CHANGE UP (ref 34.5–45)
HCT VFR BLDV CALC: 38.9 % — SIGNIFICANT CHANGE UP (ref 34.5–45)
HGB BLD-MCNC: 11.9 G/DL — SIGNIFICANT CHANGE UP (ref 11.5–15.5)
HGB BLDV-MCNC: 12.6 G/DL — SIGNIFICANT CHANGE UP (ref 11.5–15.5)
IMM GRANULOCYTES NFR BLD AUTO: 0.5 % — SIGNIFICANT CHANGE UP (ref 0–1.5)
LACTATE BLDV-MCNC: 1.6 MMOL/L — SIGNIFICANT CHANGE UP (ref 0.5–2)
LYMPHOCYTES # BLD AUTO: 0.75 K/UL — LOW (ref 1–3.3)
LYMPHOCYTES # BLD AUTO: 6.8 % — LOW (ref 13–44)
MCHC RBC-ENTMCNC: 25.2 PG — LOW (ref 27–34)
MCHC RBC-ENTMCNC: 30.6 % — LOW (ref 32–36)
MCV RBC AUTO: 82.2 FL — SIGNIFICANT CHANGE UP (ref 80–100)
MONOCYTES # BLD AUTO: 0.8 K/UL — SIGNIFICANT CHANGE UP (ref 0–0.9)
MONOCYTES NFR BLD AUTO: 7.2 % — SIGNIFICANT CHANGE UP (ref 2–14)
NEUTROPHILS # BLD AUTO: 8.69 K/UL — HIGH (ref 1.8–7.4)
NEUTROPHILS NFR BLD AUTO: 78.8 % — HIGH (ref 43–77)
NRBC # FLD: 0 K/UL — SIGNIFICANT CHANGE UP (ref 0–0)
PCO2 BLDV: 54 MMHG — HIGH (ref 41–51)
PH BLDV: 7.37 PH — SIGNIFICANT CHANGE UP (ref 7.32–7.43)
PLATELET # BLD AUTO: 820 K/UL — HIGH (ref 150–400)
PMV BLD: 8.7 FL — SIGNIFICANT CHANGE UP (ref 7–13)
PO2 BLDV: 17 MMHG — LOW (ref 35–40)
POTASSIUM BLDV-SCNC: 4.7 MMOL/L — HIGH (ref 3.4–4.5)
POTASSIUM SERPL-MCNC: 5.1 MMOL/L — SIGNIFICANT CHANGE UP (ref 3.5–5.3)
POTASSIUM SERPL-SCNC: 5.1 MMOL/L — SIGNIFICANT CHANGE UP (ref 3.5–5.3)
PROT SERPL-MCNC: 6.8 G/DL — SIGNIFICANT CHANGE UP (ref 6–8.3)
RBC # BLD: 4.73 M/UL — SIGNIFICANT CHANGE UP (ref 3.8–5.2)
RBC # FLD: 14.5 % — SIGNIFICANT CHANGE UP (ref 10.3–14.5)
SAO2 % BLDV: 17.5 % — LOW (ref 60–85)
SODIUM SERPL-SCNC: 138 MMOL/L — SIGNIFICANT CHANGE UP (ref 135–145)
TROPONIN T, HIGH SENSITIVITY: 20 NG/L — SIGNIFICANT CHANGE UP (ref ?–14)
WBC # BLD: 11.05 K/UL — HIGH (ref 3.8–10.5)
WBC # FLD AUTO: 11.05 K/UL — HIGH (ref 3.8–10.5)

## 2019-05-28 PROCEDURE — 71045 X-RAY EXAM CHEST 1 VIEW: CPT | Mod: 26

## 2019-05-28 PROCEDURE — 99223 1ST HOSP IP/OBS HIGH 75: CPT | Mod: GC

## 2019-05-28 PROCEDURE — 99223 1ST HOSP IP/OBS HIGH 75: CPT | Mod: 57

## 2019-05-28 PROCEDURE — 93306 TTE W/DOPPLER COMPLETE: CPT | Mod: 26

## 2019-05-28 RX ORDER — HYDROMORPHONE HYDROCHLORIDE 2 MG/ML
0.5 INJECTION INTRAMUSCULAR; INTRAVENOUS; SUBCUTANEOUS EVERY 4 HOURS
Refills: 0 | Status: DISCONTINUED | OUTPATIENT
Start: 2019-05-28 | End: 2019-05-29

## 2019-05-28 RX ORDER — GABAPENTIN 400 MG/1
100 CAPSULE ORAL AT BEDTIME
Refills: 0 | Status: DISCONTINUED | OUTPATIENT
Start: 2019-05-28 | End: 2019-06-04

## 2019-05-28 RX ORDER — PANTOPRAZOLE SODIUM 20 MG/1
40 TABLET, DELAYED RELEASE ORAL
Refills: 0 | Status: DISCONTINUED | OUTPATIENT
Start: 2019-05-28 | End: 2019-05-29

## 2019-05-28 RX ORDER — METOCLOPRAMIDE HCL 10 MG
5 TABLET ORAL
Refills: 0 | Status: DISCONTINUED | OUTPATIENT
Start: 2019-05-28 | End: 2019-06-02

## 2019-05-28 RX ORDER — ALBUTEROL 90 UG/1
2 AEROSOL, METERED ORAL EVERY 6 HOURS
Refills: 0 | Status: DISCONTINUED | OUTPATIENT
Start: 2019-05-28 | End: 2019-06-04

## 2019-05-28 RX ORDER — OXYCODONE HYDROCHLORIDE 5 MG/1
5 TABLET ORAL EVERY 4 HOURS
Refills: 0 | Status: DISCONTINUED | OUTPATIENT
Start: 2019-05-28 | End: 2019-05-29

## 2019-05-28 RX ORDER — ACETAMINOPHEN 500 MG
975 TABLET ORAL EVERY 6 HOURS
Refills: 0 | Status: DISCONTINUED | OUTPATIENT
Start: 2019-05-28 | End: 2019-05-30

## 2019-05-28 RX ORDER — ENOXAPARIN SODIUM 100 MG/ML
40 INJECTION SUBCUTANEOUS DAILY
Refills: 0 | Status: DISCONTINUED | OUTPATIENT
Start: 2019-05-28 | End: 2019-06-04

## 2019-05-28 RX ADMIN — GABAPENTIN 100 MILLIGRAM(S): 400 CAPSULE ORAL at 22:32

## 2019-05-28 RX ADMIN — OXYCODONE HYDROCHLORIDE 5 MILLIGRAM(S): 5 TABLET ORAL at 20:29

## 2019-05-28 RX ADMIN — ALBUTEROL 2 PUFF(S): 90 AEROSOL, METERED ORAL at 22:32

## 2019-05-28 RX ADMIN — OXYCODONE HYDROCHLORIDE 5 MILLIGRAM(S): 5 TABLET ORAL at 19:10

## 2019-05-28 RX ADMIN — ENOXAPARIN SODIUM 40 MILLIGRAM(S): 100 INJECTION SUBCUTANEOUS at 22:32

## 2019-05-28 RX ADMIN — OXYCODONE HYDROCHLORIDE 5 MILLIGRAM(S): 5 TABLET ORAL at 23:45

## 2019-05-28 NOTE — ED PROVIDER NOTE - OBJECTIVE STATEMENT
54F with PMHx of metastatic R lung adenocarcinoma on chemo, chronic right pleural effusion s/p pleurx and VATS, subxiphoid pericardial window on 3/20/19, PE on Lovenox s/p IVC filter placement presenting with epigastric discomfort and sensation that she can't breath. Per pt at bedside the symptoms have been going on ~4 days. She denies overt pain, fevers, chills, abdominal pain, n/v/d.

## 2019-05-28 NOTE — ED ADULT NURSE NOTE - OBJECTIVE STATEMENT
54y F AaOx4 w/ hx of Lung CA to R lung on chemo. pt has chronic pleural effusion and hx of PE's. Pt respirations are shallow and slightly labored with no adventitious breath sounds at this time. symptoms have been present for four days and gradually getting worse. pt lower extremities noted with +1 pitting edema. pt states that has been gradually getting worse also. pt noted with non productive dry cough .

## 2019-05-28 NOTE — H&P ADULT - PROBLEM SELECTOR PLAN 2
-pleurX catheter in place -s/p subxdiphoid pericardial window placed on 5/7 by CT surgery while inpatient, however drain removed prior to discharge. Will consult CT surgery to evaluate as patient is re-admitted, to determine if drain needs to be re-placed.   -supportive care, as above

## 2019-05-28 NOTE — H&P ADULT - PROBLEM SELECTOR PLAN 4
-c/w therapeutic lovenox -Patient with metastatic lung cancer and declining performance status, course has been complicated by malignant effusions which are re-accumulating despite intervention. Prognosis is poor, appreciate palliative following to assist with pain management and comfort. Oxy PRN with dilaudid IV for breakthrough  -consult inpatient oncology

## 2019-05-28 NOTE — H&P ADULT - PROBLEM SELECTOR PLAN 6
dvt ppx: therapeutic lovenox  diet: as tolerated, nutrition consult for protein calorie malnutrition  GOC- ongoing, currently full code

## 2019-05-28 NOTE — ED PROVIDER NOTE - CLINICAL SUMMARY MEDICAL DECISION MAKING FREE TEXT BOX
54F extensive pulm CA w/ mets and recurrent R pleural effusion w/ pleurex cath in place requiring VATS in past, presenting w/ sob. Low suspicion for PE given AC and IVC filter. Will check trop, ekg, cxr, labs, vbg, admit.

## 2019-05-28 NOTE — H&P ADULT - HISTORY OF PRESENT ILLNESS
55 yo F, never smoker with recently dx’ed stage IV NSCLC, c/b chronic R. pleural effusion s/p VATs, pleurX catheter, also pericardial effusion with early tamponade physiology  s/p IR subxiphoid pericardial window with bleomycin pericardial sclerosis on last admission 5/7, also PE on therapeutic Lovenox s/p IVC filter placement presenting with shortness of breath, weakness x 4 days.     Patient is severely uncomfortable. She reports fatigue, weakness, poor appetite with little PO intake, nausea, and abdominal pain. Main complaint is shortness of breath which is constant, not worse with exertion, though she has not left bed.    Denies fevers/chills/chest pain, vomiting, diarrhea, rashes, sick contacts.     In ED Vital Signs Last 24 Hrs  T(C): 36.9 (28 May 2019 17:20), Max: 36.9 (28 May 2019 17:20)  T(F): 98.5 (28 May 2019 17:20), Max: 98.5 (28 May 2019 17:20)  HR: 110 (28 May 2019 17:20) (68 - 118)  BP: 133/75 (28 May 2019 17:20) (116/74 - 141/68)  RR: 19 (28 May 2019 17:20) (19 - 22)  SpO2: 100% (28 May 2019 17:20) (100% - 100%)    Bedside U/S showed evidence of re-accumulation of pericardial effusion, though I have not witnessed. 55 yo F, never smoker with recently dx’ed stage IV NSCLC, c/b chronic R. pleural effusion s/p VATs, pleurX catheter, also pericardial effusion with early tamponade physiology  s/p IR subxiphoid pericardial window with bleomycin pericardial sclerosis on last admission 5/7, also PE on therapeutic Lovenox s/p IVC filter placement presenting with shortness of breath, weakness x 4 days. SOB is constant and patient is severely uncomfortable. She reports fatigue, weakness, poor appetite with little PO intake, nausea, and abdominal pain. Main complaint is shortness of breath which is constant, not worse with exertion, though she has not left bed.    Denies fevers/chills/chest pain, vomiting, diarrhea, rashes, sick contacts.     In ED Vital Signs Last 24 Hrs  T(C): 36.9 (28 May 2019 17:20), Max: 36.9 (28 May 2019 17:20)  T(F): 98.5 (28 May 2019 17:20), Max: 98.5 (28 May 2019 17:20)  HR: 110 (28 May 2019 17:20) (68 - 118)  BP: 133/75 (28 May 2019 17:20) (116/74 - 141/68)  RR: 19 (28 May 2019 17:20) (19 - 22)  SpO2: 100% (28 May 2019 17:20) (100% - 100%)    Bedside U/S showed evidence of re-accumulation of pericardial effusion, though I have not witnessed. 53 yo F, never smoker with recently dx’ed stage IV NSCLC, c/b chronic R. pleural effusion s/p VATs, pleurX catheter, also pericardial effusion with early tamponade physiology  s/p IR subxiphoid pericardial window with bleomycin pericardial sclerosis on last admission 5/7, also PE on therapeutic Lovenox s/p IVC filter placement presenting with shortness of breath, weakness x 4 days. SOB is constant and patient is severely uncomfortable. She reports fatigue, weakness, poor appetite with little PO intake, nausea, and abdominal pain. Main complaint is shortness of breath which is constant, not worse with exertion, though she has not left bed. At last visit with outpatient oncologist she expressed desire to hold treatment as she "is not sure body can handle more" and decision was made to proceed with palliative care with hopes of regaining functional status and being able to continue pembrolizumab therapy at some point in future.     Denies fevers/chills/chest pain, vomiting, diarrhea, rashes, sick contacts.     In ED Vital Signs Last 24 Hrs  T(C): 36.9 (28 May 2019 17:20), Max: 36.9 (28 May 2019 17:20)  T(F): 98.5 (28 May 2019 17:20), Max: 98.5 (28 May 2019 17:20)  HR: 110 (28 May 2019 17:20) (68 - 118)  BP: 133/75 (28 May 2019 17:20) (116/74 - 141/68)  RR: 19 (28 May 2019 17:20) (19 - 22)  SpO2: 100% (28 May 2019 17:20) (100% - 100%)    Bedside U/S showed evidence of re-accumulation of pericardial effusion, though I have not witnessed.

## 2019-05-28 NOTE — CONSULT NOTE ADULT - SUBJECTIVE AND OBJECTIVE BOX
55 yo F, never smoker with recently dx’ed stage IV NSCLC, c/b chronic R. pleural effusion s/p VATs, pleurX catheter, also pericardial effusion with early tamponade physiology  s/p IR subxiphoid pericardial window with bleomycin pericardial sclerosis on last admission 5/7, also PE on therapeutic Lovenox s/p IVC filter placement presenting with shortness of breath, weakness x 4 days. SOB is constant and patient is severely uncomfortable. She reports fatigue, weakness, poor appetite with little PO intake, nausea, and abdominal pain. Main complaint is shortness of breath which is constant, not worse with exertion, though she has not left bed. At last visit with outpatient oncologist she expressed desire to hold treatment as she "is not sure body can handle more" and decision was made to proceed with palliative care with hopes of regaining functional status and being able to continue pembrolizumab therapy at some point in future.Denies fevers/chills/chest pain, vomiting, diarrhea, rashes, sick contacts. Bedside U/S showed evidence of re-accumulation of pericardial effusion, pleurx cath drained today    PAST MEDICAL & SURGICAL HISTORY:  Malignant pericardial effusion  Pulmonary embolus  Lung cancer: with mets, adenocardinoma  S/P thoracentesis  S/P pericardiocentesis  History of lung surgery: VATS      Allergies    No Known Allergies    Intolerances    IV contrast (Rash)    SOCIAL HISTORY:  denies smoking and alcohol use    FAMILY HISTORY:  Family history of coronary artery disease (Sibling): Brother  Family history of hypertension: Mother  Family history of diabetes mellitus (DM): Mother    Review of Systems:  Review of Systems: REVIEW OF SYSTEMS:   CONSTITUTIONAL: No weakness, fevers or chills  EYES/ENT: No visual changes;  No vertigo or throat pain   NECK: No pain or stiffness  RESPIRATORY: No cough, wheezing, hemoptysis + SOB  CARDIOVASCULAR: No chest pain or palpitations  GASTROINTESTINAL: No abdominal pain, + epigastric pain + nausea; No diarrhea or constipation. No melena or hematochezia.  GENITOURINARY: No dysuria, frequency or hematuria  NEUROLOGICAL: No numbness or weakness  SKIN: No itching, burning, rashes, or lesions  All other review of systems is negative unless indicated above.	    PHYSICAL EXAM:  Vital Signs Last 24 Hrs  T(C): 36.7 (28 May 2019 22:09), Max: 36.9 (28 May 2019 17:20)  T(F): 98.1 (28 May 2019 22:09), Max: 98.5 (28 May 2019 17:20)  HR: 110 (28 May 2019 22:09) (68 - 118)  BP: 135/83 (28 May 2019 22:09) (116/74 - 141/68)  BP(mean): --  RR: 20 (28 May 2019 22:09) (19 - 22)  SpO2: 95% (28 May 2019 22:09) (95% - 100%)    General: WN/WD NAD  Neurology: A&Ox3, nonfocal, PAZ x 4  Eyes: PERRLA/ EOMI, Gross vision intact  ENT/Neck: Neck supple, trachea midline, No JVD, Gross hearing intact  Respiratory: decreased, pleurx cath in place   CV: RRR, S1S2, no murmurs, rubs or gallops  Abdominal: Soft, NT, ND +BS,   Extremities: No edema, + peripheral pulses  Skin: No Rashes, Hematoma, Ecchymosis     (05-28 @ 14:50)                      11.9  11.05 )-----------( 820                 38.9    Neutrophils = 8.69 (78.8%)  Lymphocytes = 0.75 (6.8%)  Eosinophils = 0.69 (6.2%)  Basophils = 0.06 (0.5%)  Monocytes = 0.80 (7.2%)  Bands = --%    05-28    138  |  98  |  7   ----------------------------<  108<H>  5.1   |  27  |  0.67    Ca    9.3      28 May 2019 14:50    TPro  6.8  /  Alb  3.2<L>  /  TBili  0.4  /  DBili  x   /  AST  21  /  ALT  25  /  AlkPhos  143<H>  05-28          RVP:    Venous Blood Gas:  05-28 @ 14:50  7.37/54/17/27/17.5  VBG Lactate: 1.6        Tox:

## 2019-05-28 NOTE — H&P ADULT - NSICDXPASTSURGICALHX_GEN_ALL_CORE_FT
PAST SURGICAL HISTORY:  History of lung surgery VATS    S/P pericardiocentesis     S/P thoracentesis

## 2019-05-28 NOTE — ED ADULT NURSE NOTE - CHIEF COMPLAINT QUOTE
Pt c/o SOB and bilateral leg edema that is worse than usual. Pt seen recently for same symptoms and states she had a tube place to drain fluid from the lung. PMH lung CA on home O2

## 2019-05-28 NOTE — H&P ADULT - ATTENDING COMMENTS
Agree with Housestaff Note Above, edits made where appropriate, case discussed with housestaff    Patient seen and examined. This is a 54F being admitted for dyspnea & weakness. Recent admission for pericardial effusion s/p pericardial window. Patient c/o fatigue, weakness, poor appetite, dyspnea. No fevers and chills  VS and PE above   Labs and Imaging Reviewed   Agree with A&P above - f/u Echo and CT Chest non Con. Appreciate Oncology and CT surgery recommendations. Pain control as suggested by palliative medicine. GOC discussion. Rest of plan above    Casimiro Mcgowan DO  Division of Hospital Medicine  Pager #: [phone]

## 2019-05-28 NOTE — ED ADULT TRIAGE NOTE - CHIEF COMPLAINT QUOTE
Pt c/o SOB and bilateral leg edema that is worse than usual. Pt seen recently for same symptoms and states she had a tube place to drain fluid from the lung. PMH lung CA on home O2 The patient is a 39y year old Female complaining of medication refill.

## 2019-05-28 NOTE — H&P ADULT - PROBLEM SELECTOR PLAN 5
dvt ppx: therapeutic lovenox  diet: as tolerated, nutrition consult for protein calorie malnutrition  GOC- ongoing, currently full code -c/w therapeutic lovenox

## 2019-05-28 NOTE — H&P ADULT - NSHPREVIEWOFSYSTEMS_GEN_ALL_CORE
REVIEW OF SYSTEMS:    CONSTITUTIONAL: No weakness, fevers or chills  EYES/ENT: No visual changes;  No vertigo or throat pain   NECK: No pain or stiffness  RESPIRATORY: No cough, wheezing, hemoptysis + SOB  CARDIOVASCULAR: No chest pain or palpitations  GASTROINTESTINAL: No abdominal pain, + epigastric pain + nausea; No diarrhea or constipation. No melena or hematochezia.  GENITOURINARY: No dysuria, frequency or hematuria  NEUROLOGICAL: No numbness or weakness  SKIN: No itching, burning, rashes, or lesions   All other review of systems is negative unless indicated above.

## 2019-05-28 NOTE — H&P ADULT - PROBLEM SELECTOR PLAN 3
-Patient with metastatic lung cancer and declining performance status, course has been complicated by malignant effusions which are re-accumulating despite intervention. Prognosis is poor, appreciate palliative following to assist with pain management and comfort. Oxy PRN with dilaudid IV for breakthrough  -consult inpatient oncology -pleurX catheter in place, will consult thoracic surgery -pleurX catheter in place, will consult thoracic surgery, check echo and CT stat

## 2019-05-28 NOTE — H&P ADULT - NSHPPHYSICALEXAM_GEN_ALL_CORE
GENERAL: thin frail appearing female, looks older than stated age  HEAD:  Atraumatic, Normocephalic  CHEST/LUNG: Clear to auscultation bilaterally; No wheeze, equal breath sounds bilaterally   HEART: Regular rate and rhythm; No murmurs, rubs, or gallops  ABDOMEN: Soft, Nontender, Nondistended; Bowel sounds present, no organomegaly  EXTREMITIES:  2+ Peripheral Pulses, No clubbing, cyanosis, or edema  PSYCH: AAOx3  NEUROLOGY: non-focal, cranial nerves intact  SKIN: Normal color, No rashes or lesions GENERAL: thin frail appearing female, looks older than stated age  HEAD:  Atraumatic, Normocephalic  EXTREMITIES:  2+ Peripheral Pulses, +b/l lower extremity edema, 2+  PSYCH: AAOx3  NEUROLOGY: non-focal, cranial nerves intact  SKIN: Normal color, No rashes or lesions

## 2019-05-28 NOTE — ED PROVIDER NOTE - ATTENDING CONTRIBUTION TO CARE
Attending note:   After face to face evaluation of this patient, I concur with above noted hx, pe, and care plan for this patient.  55 y/o M with r lung cancer with drain and recent pericardiocentesis, here again with sob.  Cachectic, frail, dyspneic.   +IVC filter and AC.    Evaluation in progress

## 2019-05-28 NOTE — H&P ADULT - NSHPLABSRESULTS_GEN_ALL_CORE
LABS:  CBC Full  -  ( 28 May 2019 14:50 )  WBC Count : 11.05 K/uL  Hemoglobin : 11.9 g/dL  Hematocrit : 38.9 %  Platelet Count - Automated : 820 K/uL  Mean Cell Volume : 82.2 fL  Mean Cell Hemoglobin : 25.2 pg  Mean Cell Hemoglobin Concentration : 30.6 %  Auto Neutrophil # : 8.69 K/uL  Auto Lymphocyte # : 0.75 K/uL  Auto Monocyte # : 0.80 K/uL  Auto Eosinophil # : 0.69 K/uL  Auto Basophil # : 0.06 K/uL  Auto Neutrophil % : 78.8 %  Auto Lymphocyte % : 6.8 %  Auto Monocyte % : 7.2 %  Auto Eosinophil % : 6.2 %  Auto Basophil % : 0.5 %    138    |  98     |  7      ----------------------------<  108<H>    28 May 2019 14:50  5.1     |  27     |  0.67         Ca 9.3           28 May 2019 14:50        TPro  6.8    /  Alb  3.2<L>  /  TBili  0.4    /  DBili  x      /  AST  21     /  ALT  25     /  AlkPhos  143<H>  28 May 2019 14:50        < from: 12 Lead ECG (05.05.19 @ 14:24) >      Ventricular Rate 115 BPM    Atrial Rate 115 BPM    P-R Interval 114 ms    QRS Duration 76 ms    Q-T Interval 314 ms    QTC Calculation(Bezet) 434 ms    P Axis 65 degrees    R Axis 87 degrees      < end of copied text >    < from: Xray Chest 2 Views PA/Lat (05.21.19 @ 18:23) >      FINDINGS:     Cardiac silhouette not well evaluated. Small left pleural effusion   unchanged. Unchanged partially loculated moderate right pleural effusion   with adjacent linear lung opacities that may represent atelectasis. No   pneumothorax.    IMPRESSION:   No change from the prior study.    < end of copied text >      T Axis 135 degrees

## 2019-05-28 NOTE — H&P ADULT - ASSESSMENT
53 yo F, never smoker with recently dx’ed stage IV NSCLC, c/b chronic R. pleural effusion s/p VATs, pleurX catheter, also pericardial effusion with early tamponade physiology  s/p IR subxiphoid pericardial window with bleomycin pericardial sclerosis on last admission 5/7, also PE on therapeutic Lovenox s/p IVC filter placement presenting with shortness of breath, weakness x 4 days concerning for re-accumulation of pericardial effusion +/- worsening R. pleural effusion. 54F with PMHx of metastatic R lung adenocarcinoma on chemo, chronic right pleural effusion s/p pleurx and VATS, subxiphoid pericardial window on 3/20/19, PE on Lovenox s/p IVC filter placement presenting with shortness of breath and weakness x 4 days concerning for re-accumulation of pericardial effusion +/- worsening R. pleural effusion.

## 2019-05-28 NOTE — H&P ADULT - PROBLEM SELECTOR PLAN 1
-s/p subxdiphoid pericardial window placed on 5/7 by CT surgery while inpatient, however drain removed prior to discharge. Will consult CT surgery to evaluate as patient is re-admitted Review of imaging reveals extensive mediastinal and pleural disease and loculated right pleural effusion on CT which does not appear to be changed on today's XR. History of pericardial effusion with early tamponade physiology in past- this may be reaccumulating and contributing to symptoms.   -continue supplemental O2 as needed

## 2019-05-28 NOTE — ED PROVIDER NOTE - CRITICAL CARE PROVIDED
interpretation of diagnostic studies/consult w/ pt's family directly relating to pts condition/direct patient care (not related to procedure)

## 2019-05-28 NOTE — CHART NOTE - NSCHARTNOTEFT_GEN_A_CORE
Saw pt in the ED  Follow up requested by Dr. Allen who follows in the community.  Pt c/o SOB on exertion mainly, able to walk to the bathroom.   Would recommend to restart home dose of methadone 2.5mg BID and have Dilaudid IV PRN available for breakthrough pain and SOB.  Full consult note to follow.

## 2019-05-28 NOTE — CONSULT NOTE ADULT - ASSESSMENT
Plan:  CT chest and echo  NPO after midnight  Type and Cross 53 yo F, never smoker with recently dx’ed stage IV NSCLC, c/b chronic R. pleural effusion s/p VATs, pleurX catheter, also pericardial effusion with early tamponade physiology  s/p IR subxiphoid pericardial window with bleomycin pericardial sclerosis on last admission 5/7, also PE on therapeutic Lovenox s/p IVC filter placement presenting with shortness of breath, weakness x 4 days.    Plan:  CT chest and echo  NPO after midnight  Type and Cross   Above discussed with Dr. Morris

## 2019-05-29 ENCOUNTER — TRANSCRIPTION ENCOUNTER (OUTPATIENT)
Age: 54
End: 2019-05-29

## 2019-05-29 ENCOUNTER — CHART COPY (OUTPATIENT)
Age: 54
End: 2019-05-29

## 2019-05-29 ENCOUNTER — APPOINTMENT (OUTPATIENT)
Dept: INFUSION THERAPY | Facility: HOSPITAL | Age: 54
End: 2019-05-29

## 2019-05-29 DIAGNOSIS — Z51.5 ENCOUNTER FOR PALLIATIVE CARE: ICD-10-CM

## 2019-05-29 DIAGNOSIS — R06.00 DYSPNEA, UNSPECIFIED: ICD-10-CM

## 2019-05-29 DIAGNOSIS — Z71.89 OTHER SPECIFIED COUNSELING: ICD-10-CM

## 2019-05-29 DIAGNOSIS — R53.81 OTHER MALAISE: ICD-10-CM

## 2019-05-29 LAB
ANION GAP SERPL CALC-SCNC: 13 MMO/L — SIGNIFICANT CHANGE UP (ref 7–14)
APPEARANCE UR: CLEAR — SIGNIFICANT CHANGE UP
APTT BLD: 30.7 SEC — SIGNIFICANT CHANGE UP (ref 27.5–36.3)
BASOPHILS # BLD AUTO: 0.11 K/UL — SIGNIFICANT CHANGE UP (ref 0–0.2)
BASOPHILS NFR BLD AUTO: 0.9 % — SIGNIFICANT CHANGE UP (ref 0–2)
BILIRUB UR-MCNC: NEGATIVE — SIGNIFICANT CHANGE UP
BLD GP AB SCN SERPL QL: NEGATIVE — SIGNIFICANT CHANGE UP
BLOOD UR QL VISUAL: NEGATIVE — SIGNIFICANT CHANGE UP
BUN SERPL-MCNC: 6 MG/DL — LOW (ref 7–23)
CALCIUM SERPL-MCNC: 8.6 MG/DL — SIGNIFICANT CHANGE UP (ref 8.4–10.5)
CHLORIDE SERPL-SCNC: 98 MMOL/L — SIGNIFICANT CHANGE UP (ref 98–107)
CO2 SERPL-SCNC: 25 MMOL/L — SIGNIFICANT CHANGE UP (ref 22–31)
COLOR SPEC: YELLOW — SIGNIFICANT CHANGE UP
CREAT SERPL-MCNC: 0.58 MG/DL — SIGNIFICANT CHANGE UP (ref 0.5–1.3)
EOSINOPHIL # BLD AUTO: 1.29 K/UL — HIGH (ref 0–0.5)
EOSINOPHIL NFR BLD AUTO: 10.3 % — HIGH (ref 0–6)
GLUCOSE SERPL-MCNC: 84 MG/DL — SIGNIFICANT CHANGE UP (ref 70–99)
GLUCOSE UR-MCNC: NEGATIVE — SIGNIFICANT CHANGE UP
HCG UR-SCNC: NEGATIVE — SIGNIFICANT CHANGE UP
HCT VFR BLD CALC: 38.6 % — SIGNIFICANT CHANGE UP (ref 34.5–45)
HGB BLD-MCNC: 11.6 G/DL — SIGNIFICANT CHANGE UP (ref 11.5–15.5)
IMM GRANULOCYTES NFR BLD AUTO: 0.3 % — SIGNIFICANT CHANGE UP (ref 0–1.5)
INR BLD: 1.24 — HIGH (ref 0.88–1.17)
KETONES UR-MCNC: SIGNIFICANT CHANGE UP
LEUKOCYTE ESTERASE UR-ACNC: NEGATIVE — SIGNIFICANT CHANGE UP
LYMPHOCYTES # BLD AUTO: 1.08 K/UL — SIGNIFICANT CHANGE UP (ref 1–3.3)
LYMPHOCYTES # BLD AUTO: 8.7 % — LOW (ref 13–44)
MAGNESIUM SERPL-MCNC: 1.7 MG/DL — SIGNIFICANT CHANGE UP (ref 1.6–2.6)
MCHC RBC-ENTMCNC: 25 PG — LOW (ref 27–34)
MCHC RBC-ENTMCNC: 30.1 % — LOW (ref 32–36)
MCV RBC AUTO: 83.2 FL — SIGNIFICANT CHANGE UP (ref 80–100)
MONOCYTES # BLD AUTO: 1.42 K/UL — HIGH (ref 0–0.9)
MONOCYTES NFR BLD AUTO: 11.4 % — SIGNIFICANT CHANGE UP (ref 2–14)
NEUTROPHILS # BLD AUTO: 8.53 K/UL — HIGH (ref 1.8–7.4)
NEUTROPHILS NFR BLD AUTO: 68.4 % — SIGNIFICANT CHANGE UP (ref 43–77)
NITRITE UR-MCNC: NEGATIVE — SIGNIFICANT CHANGE UP
NRBC # FLD: 0 K/UL — SIGNIFICANT CHANGE UP (ref 0–0)
PH UR: 6 — SIGNIFICANT CHANGE UP (ref 5–8)
PHOSPHATE SERPL-MCNC: 3.4 MG/DL — SIGNIFICANT CHANGE UP (ref 2.5–4.5)
PLATELET # BLD AUTO: 823 K/UL — HIGH (ref 150–400)
PMV BLD: 9.3 FL — SIGNIFICANT CHANGE UP (ref 7–13)
POTASSIUM SERPL-MCNC: 3.9 MMOL/L — SIGNIFICANT CHANGE UP (ref 3.5–5.3)
POTASSIUM SERPL-SCNC: 3.9 MMOL/L — SIGNIFICANT CHANGE UP (ref 3.5–5.3)
PROT UR-MCNC: 10 — SIGNIFICANT CHANGE UP
PROTHROM AB SERPL-ACNC: 13.9 SEC — HIGH (ref 9.8–13.1)
RBC # BLD: 4.64 M/UL — SIGNIFICANT CHANGE UP (ref 3.8–5.2)
RBC # FLD: 14.7 % — HIGH (ref 10.3–14.5)
RH IG SCN BLD-IMP: POSITIVE — SIGNIFICANT CHANGE UP
SODIUM SERPL-SCNC: 136 MMOL/L — SIGNIFICANT CHANGE UP (ref 135–145)
SP GR SPEC: 1.02 — SIGNIFICANT CHANGE UP (ref 1–1.04)
SP GR UR: 1.01 — SIGNIFICANT CHANGE UP (ref 1–1.03)
UROBILINOGEN FLD QL: NORMAL — SIGNIFICANT CHANGE UP
WBC # BLD: 12.47 K/UL — HIGH (ref 3.8–10.5)
WBC # FLD AUTO: 12.47 K/UL — HIGH (ref 3.8–10.5)

## 2019-05-29 PROCEDURE — 99233 SBSQ HOSP IP/OBS HIGH 50: CPT | Mod: 57

## 2019-05-29 PROCEDURE — 99223 1ST HOSP IP/OBS HIGH 75: CPT

## 2019-05-29 PROCEDURE — 99233 SBSQ HOSP IP/OBS HIGH 50: CPT

## 2019-05-29 PROCEDURE — 71250 CT THORAX DX C-: CPT | Mod: 26

## 2019-05-29 PROCEDURE — 99497 ADVNCD CARE PLAN 30 MIN: CPT | Mod: 25

## 2019-05-29 PROCEDURE — 99232 SBSQ HOSP IP/OBS MODERATE 35: CPT

## 2019-05-29 RX ORDER — METHADONE HYDROCHLORIDE 40 MG/1
2.5 TABLET ORAL
Refills: 0 | Status: DISCONTINUED | OUTPATIENT
Start: 2019-05-29 | End: 2019-05-31

## 2019-05-29 RX ORDER — HYDROMORPHONE HYDROCHLORIDE 2 MG/ML
0.5 INJECTION INTRAMUSCULAR; INTRAVENOUS; SUBCUTANEOUS EVERY 4 HOURS
Refills: 0 | Status: DISCONTINUED | OUTPATIENT
Start: 2019-05-29 | End: 2019-06-03

## 2019-05-29 RX ORDER — SODIUM CHLORIDE 9 MG/ML
3 INJECTION INTRAMUSCULAR; INTRAVENOUS; SUBCUTANEOUS EVERY 8 HOURS
Refills: 0 | Status: DISCONTINUED | OUTPATIENT
Start: 2019-05-29 | End: 2019-06-04

## 2019-05-29 RX ORDER — PIPERACILLIN AND TAZOBACTAM 4; .5 G/20ML; G/20ML
3.38 INJECTION, POWDER, LYOPHILIZED, FOR SOLUTION INTRAVENOUS EVERY 8 HOURS
Refills: 0 | Status: DISCONTINUED | OUTPATIENT
Start: 2019-05-29 | End: 2019-06-04

## 2019-05-29 RX ORDER — OXYCODONE HYDROCHLORIDE 5 MG/1
5 TABLET ORAL EVERY 4 HOURS
Refills: 0 | Status: DISCONTINUED | OUTPATIENT
Start: 2019-05-29 | End: 2019-05-31

## 2019-05-29 RX ORDER — PANTOPRAZOLE SODIUM 20 MG/1
40 TABLET, DELAYED RELEASE ORAL ONCE
Refills: 0 | Status: COMPLETED | OUTPATIENT
Start: 2019-05-29 | End: 2019-05-29

## 2019-05-29 RX ADMIN — PIPERACILLIN AND TAZOBACTAM 25 GRAM(S): 4; .5 INJECTION, POWDER, LYOPHILIZED, FOR SOLUTION INTRAVENOUS at 21:40

## 2019-05-29 RX ADMIN — SODIUM CHLORIDE 3 MILLILITER(S): 9 INJECTION INTRAMUSCULAR; INTRAVENOUS; SUBCUTANEOUS at 21:45

## 2019-05-29 RX ADMIN — HYDROMORPHONE HYDROCHLORIDE 0.5 MILLIGRAM(S): 2 INJECTION INTRAMUSCULAR; INTRAVENOUS; SUBCUTANEOUS at 18:03

## 2019-05-29 RX ADMIN — HYDROMORPHONE HYDROCHLORIDE 0.5 MILLIGRAM(S): 2 INJECTION INTRAMUSCULAR; INTRAVENOUS; SUBCUTANEOUS at 07:56

## 2019-05-29 RX ADMIN — GABAPENTIN 100 MILLIGRAM(S): 400 CAPSULE ORAL at 21:40

## 2019-05-29 RX ADMIN — METHADONE HYDROCHLORIDE 2.5 MILLIGRAM(S): 40 TABLET ORAL at 21:40

## 2019-05-29 RX ADMIN — PANTOPRAZOLE SODIUM 40 MILLIGRAM(S): 20 TABLET, DELAYED RELEASE ORAL at 15:08

## 2019-05-29 RX ADMIN — OXYCODONE HYDROCHLORIDE 5 MILLIGRAM(S): 5 TABLET ORAL at 00:45

## 2019-05-29 RX ADMIN — HYDROMORPHONE HYDROCHLORIDE 0.5 MILLIGRAM(S): 2 INJECTION INTRAMUSCULAR; INTRAVENOUS; SUBCUTANEOUS at 08:15

## 2019-05-29 NOTE — CONSULT NOTE ADULT - PROBLEM SELECTOR RECOMMENDATION 9
Patient reports improvement with IV Dilaudid this AM.  Patient on Methadone 2.5mg PO as an outpatient - she would like to restart tonight post procedure - ordered.  Continue Oxycodone 5mg PO q4h PRN moderate and Dilaudid 0.5mg IV q4h PRN severe dyspnea. Bowel regimen. Patient reports improvement with IV Dilaudid this AM.  Patient on Methadone 2.5mg PO as an outpatient - she would like to restart tonight post procedure - ordered.  Continue Oxycodone 5mg PO q4h PRN moderate and Dilaudid 0.5mg IV q4h PRN severe dyspnea. Bowel regimen.  Patient scheduled to get Pleurx today.

## 2019-05-29 NOTE — CONSULT NOTE ADULT - ATTENDING COMMENTS
Pt seen with NP.  Agree with above plan.  Symptom management - Methadone and PRN Dilaudid for dyspnea as above.  Goals - No further DMT being offered per oncology, recommended hospice services.  Discussed in length with pt, not ready to make any decisions.  Pending to get Pleurx today.

## 2019-05-29 NOTE — CHART NOTE - NSCHARTNOTEFT_GEN_A_CORE
OR rescheduled for 8:30am tomorrow.   NPO d/c'd.  NPO after 12mn tonight  Consent obtained.   Pt/team aware.

## 2019-05-29 NOTE — PROGRESS NOTE ADULT - PROBLEM SELECTOR PLAN 4
-c/w therapeutic lovenox restart post procedure at 70 mg sq daily -c/w therapeutic lovenox restart post procedure at 70 mg sq daily if ok with CT surgery -s/p IVC c/w  lovenox restart post procedure previously on lovenox 40 due to hemm pericardial effusion

## 2019-05-29 NOTE — CONSULT NOTE ADULT - PROBLEM SELECTOR RECOMMENDATION 4
Patient of Dr. Harper.  No disease modifying treatment is being offered at this point given patient's poor nutritional and performance status.  Recommendation at this time is hospice as per Dr. Harper.  Continue supportive care.

## 2019-05-29 NOTE — CONSULT NOTE ADULT - ASSESSMENT
54F with R lung adenocarcinoma s/p 1 dose of immunotherapy with keytruda on 4/15, chronic right pleural effusion s/p pleurx, subxiphoid pericardial window on 3/20/19, PE on Lovenox s/p IVC filter placement presenting with shortness of breath, found to have worsening left sided effusion.   Of note patient is s/p bleomycin pericardial sclerosis 5/13    -Agree with treatment of HCAP  -Continue with low dose lovenox given bloody pericardial effusion  -Agree with left pleurex placement  -Palliative care consult. Patient was on home hospice. Current PS would preclude her from receiving any systemic therapy, can follow up with oncology outpatient if symptoms improve to continue immunotherapy.   -Supportive care, pain control, Nutrition, PT, DVT ppx  -Outpatient oncology f/u    Will follow. Please do not hesitate to call back with questions.     Thuy Harper MD  Medical Oncology Attending

## 2019-05-29 NOTE — PROGRESS NOTE ADULT - SUBJECTIVE AND OBJECTIVE BOX
Pt seen at bedside. States + SOB. NO distress at present.   Wearing NC oxygen.     Vital Signs Last 24 Hrs  T(C): 36.7 (29 May 2019 05:57), Max: 36.9 (28 May 2019 17:20)  T(F): 98 (29 May 2019 05:57), Max: 98.5 (28 May 2019 17:20)  HR: 111 (29 May 2019 05:57) (68 - 118)  BP: 123/73 (29 May 2019 05:57) (116/74 - 141/68)  BP(mean): --  RR: 20 (29 May 2019 05:57) (18 - 22)  SpO2: 100% (29 May 2019 05:57) (95% - 100%)    A+O x 3  MAEE  Lungs dec throughout bilat  Rt. Pleurx capped  RRR S1 S2, Tachycardic.   No abd pain    Echo and CT scan with trivial pericardial effusion. No intervention indicated  CT of chest this am showing progression of disease. Sml Rt. pleural effusion.   Moderate loculated left pleural effusion.   All the above reviewed by Dr. Morris.   Plan:  Will plan to add on pt today for OR for Left Pleurx placement for palliative purpose   Continue to drain Rt. Pleurx 2-3 x per week  Recommend Palliative care follow up  Above d/w pt, in agreement to plan   D/w primary team Tele PA

## 2019-05-29 NOTE — CONSULT NOTE ADULT - CONSULT REASON
pericardial effusion
NSCLC
Symptom management and complex decision making in relation to goals of care

## 2019-05-29 NOTE — CONSULT NOTE ADULT - SUBJECTIVE AND OBJECTIVE BOX
HPI: Obtained from H&P  55 yo F, never smoker with recently dx’ed stage IV NSCLC, c/b chronic R. pleural effusion s/p VATs, pleurX catheter, also pericardial effusion with early tamponade physiology  s/p IR subxiphoid pericardial window with bleomycin pericardial sclerosis on last admission , also PE on therapeutic Lovenox s/p IVC filter placement presenting with shortness of breath, weakness x 4 days. SOB is constant and patient is severely uncomfortable. She reports fatigue, weakness, poor appetite with little PO intake, nausea, and abdominal pain. Main complaint is shortness of breath which is constant, not worse with exertion, though she has not left bed. At last visit with outpatient oncologist she expressed desire to hold treatment as she "is not sure body can handle more" and decision was made to proceed with palliative care with hopes of regaining functional status and being able to continue pembrolizumab therapy at some point in future.     Currently sitting at the side of the bed in no acute distress      In ED Vital Signs Last 24 Hrs  T(C): 36.9 (28 May 2019 17:20), Max: 36.9 (28 May 2019 17:20)  T(F): 98.5 (28 May 2019 17:20), Max: 98.5 (28 May 2019 17:20)  HR: 110 (28 May 2019 17:20) (68 - 118)  BP: 133/75 (28 May 2019 17:20) (116/74 - 141/68)  RR: 19 (28 May 2019 17:20) (19 - 22)  SpO2: 100% (28 May 2019 17:20) (100% - 100%)    Bedside U/S showed evidence of re-accumulation of pericardial effusion, though I have not witnessed. (28 May 2019 17:28)    PERTINENT PM/SXH:   Malignant pericardial effusion  Pulmonary embolus  Lung cancer    S/P thoracentesis  S/P pericardiocentesis  History of lung surgery  No significant past surgical history    FAMILY HISTORY:  Family history of coronary artery disease (Sibling): Brother  Family history of hypertension: Mother  Family history of diabetes mellitus (DM): Mother      SOCIAL HISTORY:   Significant other/partner: Yes [x ]  No [ ] Children:  Yes [x ]  No [ ] Islam/Spirituality: Restoration   Substance hx: Yes[ ]  No [x ]   Tobacco hx:  Yes [ ] No [x ]   Alcohol hx: Yes [ ] No [x ]xx   Home Opioid hx:  Yes [x ]   Living Situation: [x ]Home  [ ]Long term care  [ ]Rehab [ ]Other    ADVANCE DIRECTIVES:    MOLST  Yes x[ ] No [ ]  Living Will  Yes [ ]  No [ ]     [x ] Health Care Proxy(s)  [ ] Surrogate(s)  [ ] Guardian           Name(s): Phone Number(s): Grace Bliss [phone]    BASELINE (I)ADL(s) (prior to admission):  [place]: [ ]Total  [x ] Moderate [ ]Dependent    Allergies    No Known Allergies    Intolerances    IV contrast (Rash)  MEDICATIONS  (STANDING):  enoxaparin Injectable 40 milliGRAM(s) SubCutaneous daily  gabapentin 100 milliGRAM(s) Oral at bedtime  pantoprazole    Tablet 40 milliGRAM(s) Oral before breakfast    MEDICATIONS  (PRN):  acetaminophen   Tablet .. 975 milliGRAM(s) Oral every 6 hours PRN Mild Pain (1 - 3)  ALBUTerol    90 MICROgram(s) HFA Inhaler 2 Puff(s) Inhalation every 6 hours PRN Shortness of Breath and/or Wheezing  HYDROmorphone  Injectable 0.5 milliGRAM(s) IV Push every 4 hours PRN Severe Pain (7 - 10)  metoclopramide 5 milliGRAM(s) Oral three times a day before meals PRN nausea  oxyCODONE    IR 5 milliGRAM(s) Oral every 4 hours PRN Moderate Pain (4 - 6)    PRESENT SYMPTOMS: [ ]Unable to obtain due to poor mentation   Source if other than patient:  [ ]Family   [ ]Team     Pain (Impact on QOL):  Denies    PAIN AD Score:     [email or web address] (press ctrl +  left click to view)    Dyspnea:  Yes [x ] No [ ] - [ ]Mild [x ]Moderate [ x]Severe  Anxiety:    Yes [ ] No [x ] - [ ]Mild [ ]Moderate [ ]Severe  Fatigue:    Yes [x ] No [ ] - [x ]Mild [x ]Moderate [ ]Severe  Nausea:    Yes [ ] No [ x] - [ ]Mild [ ]Moderate [ ]Severe                         Loss of appetite: Yes [x ] No [ ] - [ ]Mild [ ]Moderate [x ]Severe             Constipation:  Yes [ ] No [x ] - [ ]Mild [ ]Moderate [ ]Severe  Grief: Yes [x ] No [ ]     Other Symptoms:  [x ]All other review of systems negative     Karnofsky Performance Score/Palliative Performance Status Version 2:  50-60%    http://palliative.info/resource_material/[email or web address].pdf    PHYSICAL EXAM:  Vital Signs Last 24 Hrs  T(C): 36.7 (29 May 2019 05:57), Max: 36.9 (28 May 2019 17:20)  T(F): 98 (29 May 2019 05:57), Max: 98.5 (28 May 2019 17:20)  HR: 111 (29 May 2019 05:57) (68 - 111)  BP: 123/73 (29 May 2019 05:57) (117/67 - 141/68)  BP(mean): --  RR: 20 (29 May 2019 05:57) (18 - 20)  SpO2: 100% (29 May 2019 05:57) (95% - 100%) I&O's Summary    28 May 2019 07:01  -  29 May 2019 07:00  --------------------------------------------------------  IN: 200 mL / OUT: 100 mL / NET: 100 mL        GENERAL:  [x ]Alert  [ x]Oriented x4   [ ]Lethargic  [x ]Cachexia  [ ]Unarousable  [ ]Verbal  [ ]Non-Verbal  PULMONARY:   decreased to right and left lower lobe - right sided pleurx in place   CARDIOVASCULAR:    [x ]Regular [ ]Irregular [x ]Tachy  [ ]Ford [ ]Murmur [ ]Other  GASTROINTESTINAL:  [ x]Soft  [ ]Distended   [x ]+BS  [x ]Non tender [ ]Tender  [ ]PEG [ ]OGT/ NGT  Last BM: 19    GENITOURINARY:  [ x]Normal [ ] Incontinent   [ ]Oliguria/Anuria   [ ]Sanchez  MUSCULOSKELETAL:   [ ]Normal   [ x]Weakness  [ ]Bed/Wheelchair bound [ ]Edema  NEUROLOGIC:   [x ]No focal deficits  [ ] Cognitive impairment  [ ] Dysphagia [ ]Dysarthria [ ] Paresis [ ]Other   SKIN:   [x ]Normal   [ ]Pressure ulcer(s)  [ ]Rash    LABS:                        11.6   12.47 )-----------( 823      ( 29 May 2019 05:51 )             38.6       136  |  98  |  6<L>  ----------------------------<  84  3.9   |  25  |  0.58    Ca    8.6      29 May 2019 05:51  Phos  3.4       Mg     1.7         TPro  6.8  /  Alb  3.2<L>  /  TBili  0.4  /  DBili  x   /  AST  21  /  ALT  25  /  AlkPhos  143<H>    PT/INR - ( 29 May 2019 05:51 )   PT: 13.9 SEC;   INR: 1.24          PTT - ( 29 May 2019 05:51 )  PTT:30.7 SEC    Urinalysis Basic - ( 29 May 2019 09:00 )    Color: YELLOW / Appearance: CLEAR / S.017 / pH: 6.0  Gluc: NEGATIVE / Ketone: SMALL  / Bili: NEGATIVE / Urobili: NORMAL   Blood: NEGATIVE / Protein: 10 / Nitrite: NEGATIVE   Leuk Esterase: NEGATIVE / RBC: x / WBC x   Sq Epi: x / Non Sq Epi: x / Bacteria: x      RADIOLOGY & ADDITIONAL STUDIES: Reviewed  CT chest  IMPRESSION: Trace pericardial fluid and/or pericardial thickening is   unchanged since May 12, 2019.    2 left lung patchy or nodular consolidations suggestive of pneumonia   predominantly new since May 12, 2019.    Small to moderate-sized left pleural effusion may have slightly increased   in size since May 12, 2019. Left lower lung interlobular septal   thickening may be due to fluid overload.    Opacification of large portions of the right lung base associated with   small loculated right pleural effusion and pleural thickening   corresponding to the history of lung cancer with metastatic disease is   predominantly unchanged since May 12, 2019.    Right axillary and subpectoral prominent lymph nodes.      PROTEIN CALORIE MALNUTRITION PRESENT: [ ] Yes [ ] No  [ ] PPSV2 < or = to 30% [ ] significant weight loss  [ ] poor nutritional intake [ ] catabolic state [ ] anasarca     Albumin, Serum: 3.2 g/dL (19 @ 14:50)      REFERRALS:   [ ]Chaplaincy  [ ] Hospice  [ ]Child Life  [ ]Social Work  [ ]Case management [ ]Holistic Therapy   Goals of Care Discussion Document: HPI: Obtained from H&P  55 yo F, never smoker with recently dx’ed stage IV NSCLC, c/b chronic R. pleural effusion s/p VATs, pleurX catheter, also pericardial effusion with early tamponade physiology  s/p IR subxiphoid pericardial window with bleomycin pericardial sclerosis on last admission , also PE on therapeutic Lovenox s/p IVC filter placement presenting with shortness of breath, weakness x 4 days. SOB is constant and patient is severely uncomfortable. She reports fatigue, weakness, poor appetite with little PO intake, nausea, and abdominal pain. Main complaint is shortness of breath which is constant, not worse with exertion, though she has not left bed. At last visit with outpatient oncologist she expressed desire to hold treatment as she "is not sure body can handle more" and decision was made to proceed with palliative care with hopes of regaining functional status and being able to continue pembrolizumab therapy at some point in future.     Currently sitting at the side of the bed in no acute distress      In ED Vital Signs Last 24 Hrs  T(C): 36.9 (28 May 2019 17:20), Max: 36.9 (28 May 2019 17:20)  T(F): 98.5 (28 May 2019 17:20), Max: 98.5 (28 May 2019 17:20)  HR: 110 (28 May 2019 17:20) (68 - 118)  BP: 133/75 (28 May 2019 17:20) (116/74 - 141/68)  RR: 19 (28 May 2019 17:20) (19 - 22)  SpO2: 100% (28 May 2019 17:20) (100% - 100%)    Bedside U/S showed evidence of re-accumulation of pericardial effusion, though I have not witnessed. (28 May 2019 17:28)    PERTINENT PM/SXH:   Malignant pericardial effusion  Pulmonary embolus  Lung cancer    S/P thoracentesis  S/P pericardiocentesis  History of lung surgery  No significant past surgical history    FAMILY HISTORY:  Family history of coronary artery disease (Sibling): Brother  Family history of hypertension: Mother  Family history of diabetes mellitus (DM): Mother    SOCIAL HISTORY:   Significant other/partner: Yes [x ]  No [ ] Children:  Yes [x ]  No [ ] Taoist/Spirituality: Hinduism   Substance hx: Yes[ ]  No [x ]   Tobacco hx:  Yes [ ] No [x ]   Alcohol hx: Yes [ ] No [x ]xx   Home Opioid hx:  Yes [x ]   Living Situation: [x ]Home  [ ]Long term care  [ ]Rehab [ ]Other    ADVANCE DIRECTIVES:    MOLST  Yes x[ ] No [ ]  Living Will  Yes [ ]  No [ ]     [x ] Health Care Proxy(s)  [ ] Surrogate(s)  [ ] Guardian           Name(s): Phone Number(s): Grace Bliss (son) [phone]    BASELINE (I)ADL(s) (prior to admission):  [place]: [ ]Total  [x ] Moderate [ ]Dependent    Allergies    No Known Allergies    Intolerances    IV contrast (Rash)  MEDICATIONS  (STANDING):  enoxaparin Injectable 40 milliGRAM(s) SubCutaneous daily  gabapentin 100 milliGRAM(s) Oral at bedtime  pantoprazole    Tablet 40 milliGRAM(s) Oral before breakfast    MEDICATIONS  (PRN):  acetaminophen   Tablet .. 975 milliGRAM(s) Oral every 6 hours PRN Mild Pain (1 - 3)  ALBUTerol    90 MICROgram(s) HFA Inhaler 2 Puff(s) Inhalation every 6 hours PRN Shortness of Breath and/or Wheezing  HYDROmorphone  Injectable 0.5 milliGRAM(s) IV Push every 4 hours PRN Severe Pain (7 - 10)  metoclopramide 5 milliGRAM(s) Oral three times a day before meals PRN nausea  oxyCODONE    IR 5 milliGRAM(s) Oral every 4 hours PRN Moderate Pain (4 - 6)    PRESENT SYMPTOMS: [ ]Unable to obtain due to poor mentation   Source if other than patient:  [ ]Family   [ ]Team     Pain (Impact on QOL):  Denies    PAIN AD Score:     [email or web address] (press ctrl +  left click to view)    Dyspnea:  Yes [x ] No [ ] - [ ]Mild [x ]Moderate [ x]Severe  Anxiety:    Yes [ ] No [x ] - [ ]Mild [ ]Moderate [ ]Severe  Fatigue:    Yes [x ] No [ ] - [x ]Mild [x ]Moderate [ ]Severe  Nausea:    Yes [ ] No [ x] - [ ]Mild [ ]Moderate [ ]Severe                         Loss of appetite: Yes [x ] No [ ] - [ ]Mild [ ]Moderate [x ]Severe             Constipation:  Yes [ ] No [x ] - [ ]Mild [ ]Moderate [ ]Severe  Grief: Yes [x ] No [ ]     Other Symptoms:  [x ]All other review of systems negative     Karnofsky Performance Score/Palliative Performance Status Version 2:  50-60%    http://palliative.info/resource_material/[email or web address].pdf    PHYSICAL EXAM:  Vital Signs Last 24 Hrs  T(C): 36.7 (29 May 2019 05:57), Max: 36.9 (28 May 2019 17:20)  T(F): 98 (29 May 2019 05:57), Max: 98.5 (28 May 2019 17:20)  HR: 111 (29 May 2019 05:57) (68 - 111)  BP: 123/73 (29 May 2019 05:57) (117/67 - 141/68)  BP(mean): --  RR: 20 (29 May 2019 05:57) (18 - 20)  SpO2: 100% (29 May 2019 05:57) (95% - 100%) I&O's Summary    28 May 2019 07:01  -  29 May 2019 07:00  --------------------------------------------------------  IN: 200 mL / OUT: 100 mL / NET: 100 mL    GENERAL:  [x ]Alert  [ x]Oriented x4   [ ]Lethargic  [x ]Cachexia  [ ]Unarousable  [x ]Verbal  [ ]Non-Verbal  PULMONARY:   decreased to right and left lower lobe - right sided pleurx in place   CARDIOVASCULAR:    [x ]Regular [ ]Irregular [x ]Tachy  [ ]Ford [ ]Murmur [ ]Other  GASTROINTESTINAL:  [ x]Soft  [ ]Distended   [x ]+BS  [x ]Non tender [ ]Tender  [ ]PEG [ ]OGT/ NGT  Last BM: 19    GENITOURINARY:  [ x]Normal [ ] Incontinent   [ ]Oliguria/Anuria   [ ]Sanchez  MUSCULOSKELETAL:   [ ]Normal   [ x]Weakness  [ ]Bed/Wheelchair bound [ ]Edema  NEUROLOGIC:   [x ]No focal deficits  [ ] Cognitive impairment  [ ] Dysphagia [ ]Dysarthria [ ] Paresis [ ]Other   SKIN:   [x ]Normal   [ ]Pressure ulcer(s)  [ ]Rash    LABS:                        11.6   12.47 )-----------( 823      ( 29 May 2019 05:51 )             38.6       136  |  98  |  6<L>  ----------------------------<  84  3.9   |  25  |  0.58    Ca    8.6      29 May 2019 05:51  Phos  3.4       Mg     1.7         TPro  6.8  /  Alb  3.2<L>  /  TBili  0.4  /  DBili  x   /  AST  21  /  ALT  25  /  AlkPhos  143<H>    PT/INR - ( 29 May 2019 05:51 )   PT: 13.9 SEC;   INR: 1.24        PTT - ( 29 May 2019 05:51 )  PTT:30.7 SEC    Urinalysis Basic - ( 29 May 2019 09:00 )    Color: YELLOW / Appearance: CLEAR / S.017 / pH: 6.0  Gluc: NEGATIVE / Ketone: SMALL  / Bili: NEGATIVE / Urobili: NORMAL   Blood: NEGATIVE / Protein: 10 / Nitrite: NEGATIVE   Leuk Esterase: NEGATIVE / RBC: x / WBC x   Sq Epi: x / Non Sq Epi: x / Bacteria: x    RADIOLOGY & ADDITIONAL STUDIES: Reviewed  CT chest  IMPRESSION: Trace pericardial fluid and/or pericardial thickening is   unchanged since May 12, 2019.    2 left lung patchy or nodular consolidations suggestive of pneumonia   predominantly new since May 12, 2019.    Small to moderate-sized left pleural effusion may have slightly increased   in size since May 12, 2019. Left lower lung interlobular septal   thickening may be due to fluid overload.    Opacification of large portions of the right lung base associated with   small loculated right pleural effusion and pleural thickening   corresponding to the history of lung cancer with metastatic disease is   predominantly unchanged since May 12, 2019.    Right axillary and subpectoral prominent lymph nodes.    PROTEIN CALORIE MALNUTRITION PRESENT: [x ] Yes [ ] No  [ ] PPSV2 < or = to 30% [x ] significant weight loss  [ ] poor nutritional intake [ ] catabolic state [ ] anasarca     Albumin, Serum: 3.2 g/dL (19 @ 14:50)    REFERRALS:   [ ]Chaplaincy  [ ] Hospice  [ ]Child Life  [ ]Social Work  [ ]Case management [ ]Holistic Therapy   Goals of Care Discussion Document:

## 2019-05-29 NOTE — PROGRESS NOTE ADULT - PROBLEM SELECTOR PLAN 1
Possible progression of disease vs post obstructive PNA.   -CT surgery to do LT pleurex cath today   -start zosyn given 2 new infiltrate on CT   -continue supplemental O2 as needed Possible progression of disease vs post obstructive PNA.   -CT surgery to do LT pleurex cath today   -start zosyn given 2 new infiltrate on CT and coughing   -continue supplemental O2 as needed

## 2019-05-29 NOTE — CONSULT NOTE ADULT - SUBJECTIVE AND OBJECTIVE BOX
Patient is a 54y old  Female who presents with a chief complaint of shortness of breath (29 May 2019 14:00)      HPI:  55 yo F, never smoker with stage IV NSCLC, c/b chronic R. pleural effusion s/p VATs, pleurX catheter, also pericardial effusion with early tamponade physiology  s/p IR subxiphoid pericardial window with bleomycin pericardial sclerosis on last admission 5/7, also PE on therapeutic Lovenox s/p IVC filter placement presenting with shortness of breath, weakness x 4 days. SOB is constant and patient is severely uncomfortable. She reports fatigue, weakness, poor appetite with little PO intake, nausea, and abdominal pain. Main complaint is shortness of breath which is constant, not worse with exertion, though she has not left bed. At last visit with outpatient oncologist she expressed desire to hold treatment as she "is not sure body can handle more" and decision was made to proceed with palliative care with hopes of regaining functional status and being able to continue pembrolizumab therapy at some point in future.     Denies fevers/chills/chest pain, vomiting, diarrhea, rashes, sick contacts.     In ED Vital Signs Last 24 Hrs  T(C): 36.9 (28 May 2019 17:20), Max: 36.9 (28 May 2019 17:20)  T(F): 98.5 (28 May 2019 17:20), Max: 98.5 (28 May 2019 17:20)  HR: 110 (28 May 2019 17:20) (68 - 118)  BP: 133/75 (28 May 2019 17:20) (116/74 - 141/68)  RR: 19 (28 May 2019 17:20) (19 - 22)  SpO2: 100% (28 May 2019 17:20) (100% - 100%)    Bedside U/S showed evidence of re-accumulation of pericardial effusion, though I have not witnessed. (28 May 2019 17:28)       Oncologic History:      ROS: as above     PAST MEDICAL & SURGICAL HISTORY:  Malignant pericardial effusion  Pulmonary embolus  Lung cancer: with mets, adenocardinoma  S/P thoracentesis  S/P pericardiocentesis  History of lung surgery: VATS      SOCIAL HISTORY:    FAMILY HISTORY:  Family history of coronary artery disease (Sibling): Brother  Family history of hypertension: Mother  Family history of diabetes mellitus (DM): Mother      MEDICATIONS  (STANDING):  enoxaparin Injectable 40 milliGRAM(s) SubCutaneous daily  gabapentin 100 milliGRAM(s) Oral at bedtime  methadone    Tablet 2.5 milliGRAM(s) Oral <User Schedule>  piperacillin/tazobactam IVPB. 3.375 Gram(s) IV Intermittent every 8 hours    MEDICATIONS  (PRN):  acetaminophen   Tablet .. 975 milliGRAM(s) Oral every 6 hours PRN Mild Pain (1 - 3)  ALBUTerol    90 MICROgram(s) HFA Inhaler 2 Puff(s) Inhalation every 6 hours PRN Shortness of Breath and/or Wheezing  HYDROmorphone  Injectable 0.5 milliGRAM(s) IV Push every 4 hours PRN Severe Pain/dyspnea  metoclopramide 5 milliGRAM(s) Oral three times a day before meals PRN nausea  oxyCODONE    IR 5 milliGRAM(s) Oral every 4 hours PRN Moderate Pain/dyspnea      Allergies    No Known Allergies    Intolerances    IV contrast (Rash)      Vital Signs Last 24 Hrs  T(C): 36.8 (29 May 2019 17:03), Max: 36.9 (28 May 2019 17:20)  T(F): 98.3 (29 May 2019 17:03), Max: 98.5 (28 May 2019 17:20)  HR: 115 (29 May 2019 17:03) (105 - 115)  BP: 112/60 (29 May 2019 17:03) (112/60 - 135/83)  BP(mean): --  RR: 20 (29 May 2019 17:03) (18 - 20)  SpO2: 99% (29 May 2019 17:03) (95% - 100%)    PHYSICAL EXAM  General: adult in NAD, cachectic, dyspneic  HEENT: clear oropharynx  Neck: supple  CV: normal S1/S2   Lungs: positive air movement b/l ant lungs  Abdomen: soft non-tender non-distended, no hepatosplenomegaly  Ext: 1+ LE edema  Skin: no rashes and no petechiae  Neuro: alert and oriented X 3, none focal    LABS:                          11.6   12.47 )-----------( 823      ( 29 May 2019 05:51 )             38.6         Mean Cell Volume : 83.2 fL  Mean Cell Hemoglobin : 25.0 pg  Mean Cell Hemoglobin Concentration : 30.1 %  Auto Neutrophil # : 8.53 K/uL  Auto Lymphocyte # : 1.08 K/uL  Auto Monocyte # : 1.42 K/uL  Auto Eosinophil # : 1.29 K/uL  Auto Basophil # : 0.11 K/uL  Auto Neutrophil % : 68.4 %  Auto Lymphocyte % : 8.7 %  Auto Monocyte % : 11.4 %  Auto Eosinophil % : 10.3 %  Auto Basophil % : 0.9 %      Serial CBC's  05-29 @ 05:51  Hct-38.6 / Hgb-11.6 / Plat-823 / RBC-4.64 / WBC-12.47  Serial CBC's  05-28 @ 14:50  Hct-38.9 / Hgb-11.9 / Plat-820 / RBC-4.73 / WBC-11.05      05-29    136  |  98  |  6<L>  ----------------------------<  84  3.9   |  25  |  0.58    Ca    8.6      29 May 2019 05:51  Phos  3.4     05-29  Mg     1.7     05-29    TPro  6.8  /  Alb  3.2<L>  /  TBili  0.4  /  DBili  x   /  AST  21  /  ALT  25  /  AlkPhos  143<H>  05-28      PT/INR - ( 29 May 2019 05:51 )   PT: 13.9 SEC;   INR: 1.24          PTT - ( 29 May 2019 05:51 )  PTT:30.7 SEC        RADIOLOGY & ADDITIONAL STUDIES:  reviwed    IMPRESSION: Trace pericardial fluid and/or pericardial thickening is   unchanged since May 12, 2019.    2 left lung patchy or nodular consolidations suggestive of pneumonia   predominantly new since May 12, 2019.    Small to moderate-sized left pleural effusion may have slightly increased   in size since May 12, 2019. Left lower lung interlobular septal   thickening may be due to fluid overload.    Opacification of large portions of the right lung base associated with   small loculated right pleural effusion and pleural thickening   corresponding to the history of lung cancer with metastatic disease is   predominantly unchanged since May 12, 2019.    Right axillary and subpectoral prominent lymph nodes.

## 2019-05-29 NOTE — CONSULT NOTE ADULT - PROBLEM SELECTOR RECOMMENDATION 3
Extensive conversation with patient regarding hospice - which was recommended as an outpatient - and patient declined services.  She is aware that the goal of hospice is to provide quality of life through symptom management and states she will consider hospice and discuss with her family.  A MOLST was completed as an outpatient with Dr. Allen indicating patient DNR/DNI - reconfirmed with patient and family at bedside - still in agreement with patient's wishes, copy placed in patient's chart.  Primary team and bedside nurse aware.  Psychosocial support provided.

## 2019-05-29 NOTE — PROGRESS NOTE ADULT - SUBJECTIVE AND OBJECTIVE BOX
Patient is a 54y old  Female who presents with a chief complaint of shortness of breath (29 May 2019 12:57)      SUBJECTIVE / OVERNIGHT EVENTS: Pt on oxygen at home     MEDICATIONS  (STANDING):  enoxaparin Injectable 40 milliGRAM(s) SubCutaneous daily  gabapentin 100 milliGRAM(s) Oral at bedtime  methadone    Tablet 2.5 milliGRAM(s) Oral <User Schedule>  pantoprazole  Injectable 40 milliGRAM(s) IV Push once    MEDICATIONS  (PRN):  acetaminophen   Tablet .. 975 milliGRAM(s) Oral every 6 hours PRN Mild Pain (1 - 3)  ALBUTerol    90 MICROgram(s) HFA Inhaler 2 Puff(s) Inhalation every 6 hours PRN Shortness of Breath and/or Wheezing  HYDROmorphone  Injectable 0.5 milliGRAM(s) IV Push every 4 hours PRN Severe Pain/dyspnea  metoclopramide 5 milliGRAM(s) Oral three times a day before meals PRN nausea  oxyCODONE    IR 5 milliGRAM(s) Oral every 4 hours PRN Moderate Pain/dyspnea        CAPILLARY BLOOD GLUCOSE        I&O's Summary    28 May 2019 07:01  -  29 May 2019 07:00  --------------------------------------------------------  IN: 200 mL / OUT: 100 mL / NET: 100 mL        T(C): 36.3 (19 @ 12:50), Max: 36.9 (19 @ 17:20)  HR: 109 (19 @ 12:50) (68 - 111)  BP: 123/74 (19 @ 12:50) (117/67 - 141/68)  RR: 20 (19 @ 12:50) (18 - 20)  SpO2: 100% (19 @ 12:50) (95% - 100%)    PHYSICAL EXAM:  GENERAL: cachetic   HEAD: Normocephalic  EYES: EOMI, conjunctiva and sclera clear  NECK: Supple, No JVD  CHEST/LUNG: +RT chest wall pleurex decreased BS bilateral   HEART: Regular rate and rhythm;   ABDOMEN: Soft, Nontender, Nondistended; Bowel sounds present  EXTREMITIES:  2+ Peripheral Pulses, No clubbing, cyanosis, or edema  PSYCH: AAOx3  NEUROLOGY: non-focal      LABS:                        11.6   12.47 )-----------( 823      ( 29 May 2019 05:51 )             38.6         136  |  98  |  6<L>  ----------------------------<  84  3.9   |  25  |  0.58    Ca    8.6      29 May 2019 05:51  Phos  3.4       Mg     1.7         TPro  6.8  /  Alb  3.2<L>  /  TBili  0.4  /  DBili  x   /  AST  21  /  ALT  25  /  AlkPhos  143<H>      PT/INR - ( 29 May 2019 05:51 )   PT: 13.9 SEC;   INR: 1.24          PTT - ( 29 May 2019 05:51 )  PTT:30.7 SEC      Urinalysis Basic - ( 29 May 2019 09:00 )    Color: YELLOW / Appearance: CLEAR / S.017 / pH: 6.0  Gluc: NEGATIVE / Ketone: SMALL  / Bili: NEGATIVE / Urobili: NORMAL   Blood: NEGATIVE / Protein: 10 / Nitrite: NEGATIVE   Leuk Esterase: NEGATIVE / RBC: x / WBC x   Sq Epi: x / Non Sq Epi: x / Bacteria: x          RADIOLOGY & ADDITIONAL TESTS:    Imaging Personally Reviewed:< from: CT Chest No Cont (19 @ 09:58) >    IMPRESSION: Trace pericardial fluid and/or pericardial thickening is   unchanged since May 12, 2019.    2 left lung patchy or nodular consolidations suggestive of pneumonia   predominantly new since May 12, 2019.    Small to moderate-sized left pleural effusion may have slightly increased   in size since May 12, 2019. Left lower lung interlobular septal   thickening may be due to fluid overload.    Opacification of large portions of the right lung base associated with   small loculated right pleural effusion and pleural thickening   corresponding to the history of lung cancer with metastatic disease is   predominantly unchanged since May 12, 2019.    Right axillary and subpectoral prominent lymph nodes.      < end of copied text >  < from: Transthoracic Echocardiogram (19 @ 17:46) >  CONCLUSIONS:  1. Normal left ventricular internal dimensions and wall  thicknesses.  2. Hyperdynamic left ventricle.  3. Normal left ventricular diastolic function.  4. Normal right ventricular size with decreased right  ventricular systolic function.  5. Smallcircumferential pericardial effusion. Based on the  echocardiographic appearance of the fluid and its  interaction with the heart, this is likely a chronic  problem. The images in the subcostal views give a  suspiciously peculiar fluid collection/mass anteriorly as  it appears more firm rather than fluid like, partially  compressing the RA. There is no echocardiographic evidence  of tamponade.  6. Left pleural effusion.  *** Compared with echocardiogram of 2019, no  significant changes noted.    < end of copied text >      Consultant(s) Notes Reviewed:      Care Discussed with Consultants/Other Providers: Patient is a 54y old  Female who presents with a chief complaint of shortness of breath (29 May 2019 12:57)      SUBJECTIVE / OVERNIGHT EVENTS: Pt on oxygen at home coughing bringing up sputum yellowish in color.      MEDICATIONS  (STANDING):  enoxaparin Injectable 40 milliGRAM(s) SubCutaneous daily  gabapentin 100 milliGRAM(s) Oral at bedtime  methadone    Tablet 2.5 milliGRAM(s) Oral <User Schedule>  pantoprazole  Injectable 40 milliGRAM(s) IV Push once    MEDICATIONS  (PRN):  acetaminophen   Tablet .. 975 milliGRAM(s) Oral every 6 hours PRN Mild Pain (1 - 3)  ALBUTerol    90 MICROgram(s) HFA Inhaler 2 Puff(s) Inhalation every 6 hours PRN Shortness of Breath and/or Wheezing  HYDROmorphone  Injectable 0.5 milliGRAM(s) IV Push every 4 hours PRN Severe Pain/dyspnea  metoclopramide 5 milliGRAM(s) Oral three times a day before meals PRN nausea  oxyCODONE    IR 5 milliGRAM(s) Oral every 4 hours PRN Moderate Pain/dyspnea        CAPILLARY BLOOD GLUCOSE        I&O's Summary    28 May 2019 07:01  -  29 May 2019 07:00  --------------------------------------------------------  IN: 200 mL / OUT: 100 mL / NET: 100 mL        T(C): 36.3 (19 @ 12:50), Max: 36.9 (19 @ 17:20)  HR: 109 (19 @ 12:50) (68 - 111)  BP: 123/74 (19 @ 12:50) (117/67 - 141/68)  RR: 20 (19 @ 12:50) (18 - 20)  SpO2: 100% (19 @ 12:50) (95% - 100%)    PHYSICAL EXAM:  GENERAL: cachetic   HEAD: Normocephalic  EYES: EOMI, conjunctiva and sclera clear  NECK: Supple, No JVD  CHEST/LUNG: +RT chest wall pleurex decreased BS bilateral   HEART: Regular rate and rhythm;   ABDOMEN: Soft, Nontender, Nondistended; Bowel sounds present  EXTREMITIES:  2+ Peripheral Pulses, No clubbing, cyanosis, or edema  PSYCH: AAOx3  NEUROLOGY: non-focal      LABS:                        11.6   12.47 )-----------( 823      ( 29 May 2019 05:51 )             38.6         136  |  98  |  6<L>  ----------------------------<  84  3.9   |  25  |  0.58    Ca    8.6      29 May 2019 05:51  Phos  3.4       Mg     1.7         TPro  6.8  /  Alb  3.2<L>  /  TBili  0.4  /  DBili  x   /  AST  21  /  ALT  25  /  AlkPhos  143<H>      PT/INR - ( 29 May 2019 05:51 )   PT: 13.9 SEC;   INR: 1.24          PTT - ( 29 May 2019 05:51 )  PTT:30.7 SEC      Urinalysis Basic - ( 29 May 2019 09:00 )    Color: YELLOW / Appearance: CLEAR / S.017 / pH: 6.0  Gluc: NEGATIVE / Ketone: SMALL  / Bili: NEGATIVE / Urobili: NORMAL   Blood: NEGATIVE / Protein: 10 / Nitrite: NEGATIVE   Leuk Esterase: NEGATIVE / RBC: x / WBC x   Sq Epi: x / Non Sq Epi: x / Bacteria: x          RADIOLOGY & ADDITIONAL TESTS:    Imaging Personally Reviewed:< from: CT Chest No Cont (19 @ 09:58) >    IMPRESSION: Trace pericardial fluid and/or pericardial thickening is   unchanged since May 12, 2019.    2 left lung patchy or nodular consolidations suggestive of pneumonia   predominantly new since May 12, 2019.    Small to moderate-sized left pleural effusion may have slightly increased   in size since May 12, 2019. Left lower lung interlobular septal   thickening may be due to fluid overload.    Opacification of large portions of the right lung base associated with   small loculated right pleural effusion and pleural thickening   corresponding to the history of lung cancer with metastatic disease is   predominantly unchanged since May 12, 2019.    Right axillary and subpectoral prominent lymph nodes.      < end of copied text >  < from: Transthoracic Echocardiogram (19 @ 17:46) >  CONCLUSIONS:  1. Normal left ventricular internal dimensions and wall  thicknesses.  2. Hyperdynamic left ventricle.  3. Normal left ventricular diastolic function.  4. Normal right ventricular size with decreased right  ventricular systolic function.  5. Smallcircumferential pericardial effusion. Based on the  echocardiographic appearance of the fluid and its  interaction with the heart, this is likely a chronic  problem. The images in the subcostal views give a  suspiciously peculiar fluid collection/mass anteriorly as  it appears more firm rather than fluid like, partially  compressing the RA. There is no echocardiographic evidence  of tamponade.  6. Left pleural effusion.  *** Compared with echocardiogram of 2019, no  significant changes noted.    < end of copied text >      Consultant(s) Notes Reviewed:      Care Discussed with Consultants/Other Providers:

## 2019-05-30 ENCOUNTER — APPOINTMENT (OUTPATIENT)
Dept: THORACIC SURGERY | Facility: HOSPITAL | Age: 54
End: 2019-05-30

## 2019-05-30 ENCOUNTER — APPOINTMENT (OUTPATIENT)
Dept: HEMATOLOGY ONCOLOGY | Facility: CLINIC | Age: 54
End: 2019-05-30

## 2019-05-30 ENCOUNTER — RESULT REVIEW (OUTPATIENT)
Age: 54
End: 2019-05-30

## 2019-05-30 LAB
ANION GAP SERPL CALC-SCNC: 15 MMO/L — HIGH (ref 7–14)
APTT BLD: 28.8 SEC — SIGNIFICANT CHANGE UP (ref 27.5–36.3)
BASOPHILS # BLD AUTO: 0.1 K/UL — SIGNIFICANT CHANGE UP (ref 0–0.2)
BASOPHILS NFR BLD AUTO: 0.6 % — SIGNIFICANT CHANGE UP (ref 0–2)
BUN SERPL-MCNC: 7 MG/DL — SIGNIFICANT CHANGE UP (ref 7–23)
CALCIUM SERPL-MCNC: 8.7 MG/DL — SIGNIFICANT CHANGE UP (ref 8.4–10.5)
CHLORIDE SERPL-SCNC: 96 MMOL/L — LOW (ref 98–107)
CO2 SERPL-SCNC: 25 MMOL/L — SIGNIFICANT CHANGE UP (ref 22–31)
CREAT SERPL-MCNC: 0.63 MG/DL — SIGNIFICANT CHANGE UP (ref 0.5–1.3)
EOSINOPHIL # BLD AUTO: 1.27 K/UL — HIGH (ref 0–0.5)
EOSINOPHIL NFR BLD AUTO: 8.1 % — HIGH (ref 0–6)
GLUCOSE SERPL-MCNC: 90 MG/DL — SIGNIFICANT CHANGE UP (ref 70–99)
HCT VFR BLD CALC: 40.3 % — SIGNIFICANT CHANGE UP (ref 34.5–45)
HGB BLD-MCNC: 12.1 G/DL — SIGNIFICANT CHANGE UP (ref 11.5–15.5)
IMM GRANULOCYTES NFR BLD AUTO: 0.4 % — SIGNIFICANT CHANGE UP (ref 0–1.5)
LYMPHOCYTES # BLD AUTO: 0.59 K/UL — LOW (ref 1–3.3)
LYMPHOCYTES # BLD AUTO: 3.8 % — LOW (ref 13–44)
MAGNESIUM SERPL-MCNC: 1.7 MG/DL — SIGNIFICANT CHANGE UP (ref 1.6–2.6)
MCHC RBC-ENTMCNC: 24.7 PG — LOW (ref 27–34)
MCHC RBC-ENTMCNC: 30 % — LOW (ref 32–36)
MCV RBC AUTO: 82.4 FL — SIGNIFICANT CHANGE UP (ref 80–100)
MONOCYTES # BLD AUTO: 1.47 K/UL — HIGH (ref 0–0.9)
MONOCYTES NFR BLD AUTO: 9.4 % — SIGNIFICANT CHANGE UP (ref 2–14)
NEUTROPHILS # BLD AUTO: 12.21 K/UL — HIGH (ref 1.8–7.4)
NEUTROPHILS NFR BLD AUTO: 77.7 % — HIGH (ref 43–77)
NRBC # FLD: 0 K/UL — SIGNIFICANT CHANGE UP (ref 0–0)
PHOSPHATE SERPL-MCNC: 3.8 MG/DL — SIGNIFICANT CHANGE UP (ref 2.5–4.5)
PLATELET # BLD AUTO: 760 K/UL — HIGH (ref 150–400)
PMV BLD: 9.2 FL — SIGNIFICANT CHANGE UP (ref 7–13)
POTASSIUM SERPL-MCNC: 4.1 MMOL/L — SIGNIFICANT CHANGE UP (ref 3.5–5.3)
POTASSIUM SERPL-SCNC: 4.1 MMOL/L — SIGNIFICANT CHANGE UP (ref 3.5–5.3)
RBC # BLD: 4.89 M/UL — SIGNIFICANT CHANGE UP (ref 3.8–5.2)
RBC # FLD: 14.9 % — HIGH (ref 10.3–14.5)
SODIUM SERPL-SCNC: 136 MMOL/L — SIGNIFICANT CHANGE UP (ref 135–145)
WBC # BLD: 15.7 K/UL — HIGH (ref 3.8–10.5)
WBC # FLD AUTO: 15.7 K/UL — HIGH (ref 3.8–10.5)

## 2019-05-30 PROCEDURE — 32550 INSERT PLEURAL CATH: CPT | Mod: 78

## 2019-05-30 PROCEDURE — 88342 IMHCHEM/IMCYTCHM 1ST ANTB: CPT | Mod: 26

## 2019-05-30 PROCEDURE — 32601 THORACOSCOPY DIAGNOSTIC: CPT | Mod: 78

## 2019-05-30 PROCEDURE — 71045 X-RAY EXAM CHEST 1 VIEW: CPT | Mod: 26

## 2019-05-30 PROCEDURE — 88341 IMHCHEM/IMCYTCHM EA ADD ANTB: CPT | Mod: 26

## 2019-05-30 PROCEDURE — 99233 SBSQ HOSP IP/OBS HIGH 50: CPT

## 2019-05-30 PROCEDURE — 32601 THORACOSCOPY DIAGNOSTIC: CPT | Mod: AS,78

## 2019-05-30 PROCEDURE — 88112 CYTOPATH CELL ENHANCE TECH: CPT | Mod: 26

## 2019-05-30 PROCEDURE — 88305 TISSUE EXAM BY PATHOLOGIST: CPT | Mod: 26

## 2019-05-30 RX ORDER — ACETAMINOPHEN 500 MG
650 TABLET ORAL ONCE
Refills: 0 | Status: COMPLETED | OUTPATIENT
Start: 2019-05-30 | End: 2019-05-30

## 2019-05-30 RX ORDER — HYDROMORPHONE HYDROCHLORIDE 2 MG/ML
0.5 INJECTION INTRAMUSCULAR; INTRAVENOUS; SUBCUTANEOUS ONCE
Refills: 0 | Status: DISCONTINUED | OUTPATIENT
Start: 2019-05-30 | End: 2019-05-30

## 2019-05-30 RX ORDER — ONDANSETRON 8 MG/1
4 TABLET, FILM COATED ORAL ONCE
Refills: 0 | Status: COMPLETED | OUTPATIENT
Start: 2019-05-30 | End: 2019-05-30

## 2019-05-30 RX ORDER — SODIUM CHLORIDE 9 MG/ML
1000 INJECTION, SOLUTION INTRAVENOUS
Refills: 0 | Status: DISCONTINUED | OUTPATIENT
Start: 2019-05-30 | End: 2019-06-04

## 2019-05-30 RX ORDER — FENTANYL CITRATE 50 UG/ML
50 INJECTION INTRAVENOUS
Refills: 0 | Status: DISCONTINUED | OUTPATIENT
Start: 2019-05-30 | End: 2019-05-30

## 2019-05-30 RX ORDER — FENTANYL CITRATE 50 UG/ML
25 INJECTION INTRAVENOUS
Refills: 0 | Status: DISCONTINUED | OUTPATIENT
Start: 2019-05-30 | End: 2019-05-30

## 2019-05-30 RX ORDER — ACETAMINOPHEN 500 MG
650 TABLET ORAL EVERY 6 HOURS
Refills: 0 | Status: DISCONTINUED | OUTPATIENT
Start: 2019-05-30 | End: 2019-05-30

## 2019-05-30 RX ORDER — SODIUM CHLORIDE 9 MG/ML
250 INJECTION INTRAMUSCULAR; INTRAVENOUS; SUBCUTANEOUS ONCE
Refills: 0 | Status: COMPLETED | OUTPATIENT
Start: 2019-05-30 | End: 2019-05-30

## 2019-05-30 RX ORDER — SENNA PLUS 8.6 MG/1
2 TABLET ORAL AT BEDTIME
Refills: 0 | Status: DISCONTINUED | OUTPATIENT
Start: 2019-05-30 | End: 2019-06-04

## 2019-05-30 RX ORDER — IPRATROPIUM/ALBUTEROL SULFATE 18-103MCG
3 AEROSOL WITH ADAPTER (GRAM) INHALATION ONCE
Refills: 0 | Status: COMPLETED | OUTPATIENT
Start: 2019-05-30 | End: 2019-05-30

## 2019-05-30 RX ORDER — DOCUSATE SODIUM 100 MG
100 CAPSULE ORAL THREE TIMES A DAY
Refills: 0 | Status: DISCONTINUED | OUTPATIENT
Start: 2019-05-30 | End: 2019-06-04

## 2019-05-30 RX ADMIN — ENOXAPARIN SODIUM 40 MILLIGRAM(S): 100 INJECTION SUBCUTANEOUS at 14:25

## 2019-05-30 RX ADMIN — SODIUM CHLORIDE 500 MILLILITER(S): 9 INJECTION INTRAMUSCULAR; INTRAVENOUS; SUBCUTANEOUS at 13:55

## 2019-05-30 RX ADMIN — HYDROMORPHONE HYDROCHLORIDE 0.5 MILLIGRAM(S): 2 INJECTION INTRAMUSCULAR; INTRAVENOUS; SUBCUTANEOUS at 11:42

## 2019-05-30 RX ADMIN — GABAPENTIN 100 MILLIGRAM(S): 400 CAPSULE ORAL at 21:52

## 2019-05-30 RX ADMIN — METHADONE HYDROCHLORIDE 2.5 MILLIGRAM(S): 40 TABLET ORAL at 21:52

## 2019-05-30 RX ADMIN — FENTANYL CITRATE 25 MICROGRAM(S): 50 INJECTION INTRAVENOUS at 12:30

## 2019-05-30 RX ADMIN — SODIUM CHLORIDE 30 MILLILITER(S): 9 INJECTION, SOLUTION INTRAVENOUS at 10:39

## 2019-05-30 RX ADMIN — METHADONE HYDROCHLORIDE 2.5 MILLIGRAM(S): 40 TABLET ORAL at 10:43

## 2019-05-30 RX ADMIN — FENTANYL CITRATE 25 MICROGRAM(S): 50 INJECTION INTRAVENOUS at 10:55

## 2019-05-30 RX ADMIN — Medication 100 MILLIGRAM(S): at 21:51

## 2019-05-30 RX ADMIN — FENTANYL CITRATE 25 MICROGRAM(S): 50 INJECTION INTRAVENOUS at 10:38

## 2019-05-30 RX ADMIN — FENTANYL CITRATE 25 MICROGRAM(S): 50 INJECTION INTRAVENOUS at 12:08

## 2019-05-30 RX ADMIN — PIPERACILLIN AND TAZOBACTAM 25 GRAM(S): 4; .5 INJECTION, POWDER, LYOPHILIZED, FOR SOLUTION INTRAVENOUS at 05:17

## 2019-05-30 RX ADMIN — SODIUM CHLORIDE 3 MILLILITER(S): 9 INJECTION INTRAMUSCULAR; INTRAVENOUS; SUBCUTANEOUS at 05:17

## 2019-05-30 RX ADMIN — SENNA PLUS 2 TABLET(S): 8.6 TABLET ORAL at 21:51

## 2019-05-30 RX ADMIN — SODIUM CHLORIDE 3 MILLILITER(S): 9 INJECTION INTRAMUSCULAR; INTRAVENOUS; SUBCUTANEOUS at 15:33

## 2019-05-30 RX ADMIN — PIPERACILLIN AND TAZOBACTAM 25 GRAM(S): 4; .5 INJECTION, POWDER, LYOPHILIZED, FOR SOLUTION INTRAVENOUS at 14:26

## 2019-05-30 RX ADMIN — PIPERACILLIN AND TAZOBACTAM 25 GRAM(S): 4; .5 INJECTION, POWDER, LYOPHILIZED, FOR SOLUTION INTRAVENOUS at 21:53

## 2019-05-30 RX ADMIN — Medication 260 MILLIGRAM(S): at 12:12

## 2019-05-30 RX ADMIN — ONDANSETRON 4 MILLIGRAM(S): 8 TABLET, FILM COATED ORAL at 16:03

## 2019-05-30 RX ADMIN — Medication 650 MILLIGRAM(S): at 12:30

## 2019-05-30 RX ADMIN — Medication 3 MILLILITER(S): at 10:47

## 2019-05-30 RX ADMIN — HYDROMORPHONE HYDROCHLORIDE 0.5 MILLIGRAM(S): 2 INJECTION INTRAMUSCULAR; INTRAVENOUS; SUBCUTANEOUS at 12:43

## 2019-05-30 RX ADMIN — HYDROMORPHONE HYDROCHLORIDE 0.5 MILLIGRAM(S): 2 INJECTION INTRAMUSCULAR; INTRAVENOUS; SUBCUTANEOUS at 13:00

## 2019-05-30 RX ADMIN — Medication 100 MILLIGRAM(S): at 14:25

## 2019-05-30 RX ADMIN — HYDROMORPHONE HYDROCHLORIDE 0.5 MILLIGRAM(S): 2 INJECTION INTRAMUSCULAR; INTRAVENOUS; SUBCUTANEOUS at 12:05

## 2019-05-30 NOTE — PROGRESS NOTE ADULT - SUBJECTIVE AND OBJECTIVE BOX
Patient is a 54y old  Female who presents with a chief complaint of shortness of breath (29 May 2019 17:08)      SUBJECTIVE / OVERNIGHT EVENTS: s/p LT pleurex cath no fever    MEDICATIONS  (STANDING):  docusate sodium 100 milliGRAM(s) Oral three times a day  enoxaparin Injectable 40 milliGRAM(s) SubCutaneous daily  gabapentin 100 milliGRAM(s) Oral at bedtime  lactated ringers. 1000 milliLiter(s) (30 mL/Hr) IV Continuous <Continuous>  methadone    Tablet 2.5 milliGRAM(s) Oral <User Schedule>  piperacillin/tazobactam IVPB. 3.375 Gram(s) IV Intermittent every 8 hours  senna 2 Tablet(s) Oral at bedtime  sodium chloride 0.9% lock flush 3 milliLiter(s) IV Push every 8 hours    MEDICATIONS  (PRN):  acetaminophen   Tablet .. 650 milliGRAM(s) Oral every 6 hours PRN Mild Pain (1 - 3)  ALBUTerol    90 MICROgram(s) HFA Inhaler 2 Puff(s) Inhalation every 6 hours PRN Shortness of Breath and/or Wheezing  fentaNYL    Injectable 25 MICROGram(s) IV Push every 5 minutes PRN Moderate Pain (4 - 6)  fentaNYL    Injectable 50 MICROGram(s) IV Push every 5 minutes PRN Severe Pain (7 - 10)  HYDROmorphone  Injectable 0.5 milliGRAM(s) IV Push every 4 hours PRN Severe Pain/dyspnea  metoclopramide 5 milliGRAM(s) Oral three times a day before meals PRN nausea  ondansetron Injectable 4 milliGRAM(s) IV Push once PRN Nausea and/or Vomiting  oxyCODONE    IR 5 milliGRAM(s) Oral every 4 hours PRN Moderate Pain/dyspnea        CAPILLARY BLOOD GLUCOSE        I&O's Summary    29 May 2019 07:  -  30 May 2019 07:00  --------------------------------------------------------  IN: 120 mL / OUT: 150 mL / NET: -30 mL    30 May 2019 07:  -  30 May 2019 14:58  --------------------------------------------------------  IN: 170 mL / OUT: 575 mL / NET: -405 mL        T(C): 36.9 (19 @ 14:45), Max: 37.1 (19 @ 05:00)  HR: 110 (19 @ 14:30) (80 - 124)  BP: 103/61 (19 @ 14:30) (84/54 - 123/73)  RR: 20 (19 @ 14:30) (10 - 33)  SpO2: 100% (19 @ 14:30) (93% - 100%)    PHYSICAL EXAM:  GENERAL: cachetic   HEAD: Normocephalic  EYES: EOMI, conjunctiva and sclera clear  NECK: Supple, No JVD  CHEST/LUNG: +RT chest wall pleurex decreased BS bilateral   HEART: Regular rate and rhythm;   ABDOMEN: Soft, Nontender, Nondistended; Bowel sounds present  EXTREMITIES:  2+ Peripheral Pulses, No clubbing, cyanosis, or edema  PSYCH: AAOx3  NEUROLOGY: non-focal      LABS:                        12.1   15.70 )-----------( 760      ( 30 May 2019 05:49 )             40.3     0530    136  |  96<L>  |  7   ----------------------------<  90  4.1   |  25  |  0.63    Ca    8.7      30 May 2019 05:49  Phos  3.8       Mg     1.7     30      PT/INR - ( 29 May 2019 05:51 )   PT: 13.9 SEC;   INR: 1.24          PTT - ( 30 May 2019 05:49 )  PTT:28.8 SEC      Urinalysis Basic - ( 29 May 2019 09:00 )    Color: YELLOW / Appearance: CLEAR / S.017 / pH: 6.0  Gluc: NEGATIVE / Ketone: SMALL  / Bili: NEGATIVE / Urobili: NORMAL   Blood: NEGATIVE / Protein: 10 / Nitrite: NEGATIVE   Leuk Esterase: NEGATIVE / RBC: x / WBC x   Sq Epi: x / Non Sq Epi: x / Bacteria: x          RADIOLOGY & ADDITIONAL TESTS:    Imaging Personally Reviewed:    Consultant(s) Notes Reviewed:      Care Discussed with Consultants/Other Providers: Patient is a 54y old  Female who presents with a chief complaint of shortness of breath (29 May 2019 17:08)      SUBJECTIVE / OVERNIGHT EVENTS: s/p LT pleurex cath no fever    MEDICATIONS  (STANDING):  docusate sodium 100 milliGRAM(s) Oral three times a day  enoxaparin Injectable 40 milliGRAM(s) SubCutaneous daily  gabapentin 100 milliGRAM(s) Oral at bedtime  lactated ringers. 1000 milliLiter(s) (30 mL/Hr) IV Continuous <Continuous>  methadone    Tablet 2.5 milliGRAM(s) Oral <User Schedule>  piperacillin/tazobactam IVPB. 3.375 Gram(s) IV Intermittent every 8 hours  senna 2 Tablet(s) Oral at bedtime  sodium chloride 0.9% lock flush 3 milliLiter(s) IV Push every 8 hours    MEDICATIONS  (PRN):  acetaminophen   Tablet .. 650 milliGRAM(s) Oral every 6 hours PRN Mild Pain (1 - 3)  ALBUTerol    90 MICROgram(s) HFA Inhaler 2 Puff(s) Inhalation every 6 hours PRN Shortness of Breath and/or Wheezing  fentaNYL    Injectable 25 MICROGram(s) IV Push every 5 minutes PRN Moderate Pain (4 - 6)  fentaNYL    Injectable 50 MICROGram(s) IV Push every 5 minutes PRN Severe Pain (7 - 10)  HYDROmorphone  Injectable 0.5 milliGRAM(s) IV Push every 4 hours PRN Severe Pain/dyspnea  metoclopramide 5 milliGRAM(s) Oral three times a day before meals PRN nausea  ondansetron Injectable 4 milliGRAM(s) IV Push once PRN Nausea and/or Vomiting  oxyCODONE    IR 5 milliGRAM(s) Oral every 4 hours PRN Moderate Pain/dyspnea        CAPILLARY BLOOD GLUCOSE        I&O's Summary    29 May 2019 07:  -  30 May 2019 07:00  --------------------------------------------------------  IN: 120 mL / OUT: 150 mL / NET: -30 mL    30 May 2019 07:  -  30 May 2019 14:58  --------------------------------------------------------  IN: 170 mL / OUT: 575 mL / NET: -405 mL        T(C): 36.9 (19 @ 14:45), Max: 37.1 (19 @ 05:00)  HR: 110 (19 @ 14:30) (80 - 124)  BP: 103/61 (19 @ 14:30) (84/54 - 123/73)  RR: 20 (19 @ 14:30) (10 - 33)  SpO2: 100% (19 @ 14:30) (93% - 100%)    PHYSICAL EXAM:  GENERAL: cachetic   HEAD: Normocephalic  EYES: EOMI, conjunctiva and sclera clear  NECK: Supple, No JVD  CHEST/LUNG: +RT chest wall pleurex decreased BS bilateral +LT chest tube in place   HEART: Regular rate and rhythm;   ABDOMEN: Soft, Nontender, Nondistended; Bowel sounds present  EXTREMITIES:  2+ Peripheral Pulses, No clubbing, cyanosis, or edema  PSYCH: AAOx3  NEUROLOGY: non-focal      LABS:                        12.1   15.70 )-----------( 760      ( 30 May 2019 05:49 )             40.3     30    136  |  96<L>  |  7   ----------------------------<  90  4.1   |  25  |  0.63    Ca    8.7      30 May 2019 05:49  Phos  3.8       Mg     1.7     30      PT/INR - ( 29 May 2019 05:51 )   PT: 13.9 SEC;   INR: 1.24          PTT - ( 30 May 2019 05:49 )  PTT:28.8 SEC      Urinalysis Basic - ( 29 May 2019 09:00 )    Color: YELLOW / Appearance: CLEAR / S.017 / pH: 6.0  Gluc: NEGATIVE / Ketone: SMALL  / Bili: NEGATIVE / Urobili: NORMAL   Blood: NEGATIVE / Protein: 10 / Nitrite: NEGATIVE   Leuk Esterase: NEGATIVE / RBC: x / WBC x   Sq Epi: x / Non Sq Epi: x / Bacteria: x          RADIOLOGY & ADDITIONAL TESTS:    Imaging Personally Reviewed:< from: Xray Chest 1 View- PORTABLE-Urgent (19 @ 10:18) >  Impression:    Interval decrease in size of left pleural effusion following left-sided   Pleurx catheter placement. Small left pneumothorax with left chest tube   in place. No mediastinal shift.    < end of copied text >      Consultant(s) Notes Reviewed:      Care Discussed with Consultants/Other Providers:

## 2019-05-30 NOTE — BRIEF OPERATIVE NOTE - NSICDXBRIEFPROCEDURE_GEN_ALL_CORE_FT
PROCEDURES:  Video-assisted thoracoscopic surgery (VATS) on left side 30-May-2019 09:48:26 placement of pleurx catheter Jennifer Moore

## 2019-05-30 NOTE — PROGRESS NOTE ADULT - PROBLEM SELECTOR PLAN 4
-s/p IVC c/w  lovenox restart post procedure previously on lovenox 40 due to hemm pericardial effusion

## 2019-05-30 NOTE — PROGRESS NOTE ADULT - PROBLEM SELECTOR PLAN 1
-Possible progression of disease vs post obstructive PNA.   - s/p pleurex cath   -start zosyn given 2 new infiltrate on CT and coughing   -continue supplemental O2 as needed -Possible progression of disease vs post obstructive PNA.   - s/p pleurex cath with small pneumothorax with chest tube placement  -start zosyn given 2 new infiltrate on CT and coughing   -continue supplemental O2 as needed

## 2019-05-31 ENCOUNTER — TRANSCRIPTION ENCOUNTER (OUTPATIENT)
Age: 54
End: 2019-05-31

## 2019-05-31 LAB
ANION GAP SERPL CALC-SCNC: 14 MMO/L — SIGNIFICANT CHANGE UP (ref 7–14)
BUN SERPL-MCNC: 9 MG/DL — SIGNIFICANT CHANGE UP (ref 7–23)
CALCIUM SERPL-MCNC: 8.2 MG/DL — LOW (ref 8.4–10.5)
CHLORIDE SERPL-SCNC: 95 MMOL/L — LOW (ref 98–107)
CO2 SERPL-SCNC: 25 MMOL/L — SIGNIFICANT CHANGE UP (ref 22–31)
CREAT SERPL-MCNC: 0.71 MG/DL — SIGNIFICANT CHANGE UP (ref 0.5–1.3)
GLUCOSE SERPL-MCNC: 89 MG/DL — SIGNIFICANT CHANGE UP (ref 70–99)
HCT VFR BLD CALC: 35.7 % — SIGNIFICANT CHANGE UP (ref 34.5–45)
HGB BLD-MCNC: 10.8 G/DL — LOW (ref 11.5–15.5)
MAGNESIUM SERPL-MCNC: 1.6 MG/DL — SIGNIFICANT CHANGE UP (ref 1.6–2.6)
MCHC RBC-ENTMCNC: 25.8 PG — LOW (ref 27–34)
MCHC RBC-ENTMCNC: 30.3 % — LOW (ref 32–36)
MCV RBC AUTO: 85.4 FL — SIGNIFICANT CHANGE UP (ref 80–100)
NRBC # FLD: 0 K/UL — SIGNIFICANT CHANGE UP (ref 0–0)
PLATELET # BLD AUTO: 627 K/UL — HIGH (ref 150–400)
PMV BLD: 9.1 FL — SIGNIFICANT CHANGE UP (ref 7–13)
POTASSIUM SERPL-MCNC: 4 MMOL/L — SIGNIFICANT CHANGE UP (ref 3.5–5.3)
POTASSIUM SERPL-SCNC: 4 MMOL/L — SIGNIFICANT CHANGE UP (ref 3.5–5.3)
RBC # BLD: 4.18 M/UL — SIGNIFICANT CHANGE UP (ref 3.8–5.2)
RBC # FLD: 14.8 % — HIGH (ref 10.3–14.5)
SODIUM SERPL-SCNC: 134 MMOL/L — LOW (ref 135–145)
WBC # BLD: 10.92 K/UL — HIGH (ref 3.8–10.5)
WBC # FLD AUTO: 10.92 K/UL — HIGH (ref 3.8–10.5)

## 2019-05-31 PROCEDURE — 99233 SBSQ HOSP IP/OBS HIGH 50: CPT

## 2019-05-31 PROCEDURE — 71045 X-RAY EXAM CHEST 1 VIEW: CPT | Mod: 26

## 2019-05-31 RX ORDER — METHADONE HYDROCHLORIDE 40 MG/1
2.5 TABLET ORAL EVERY 8 HOURS
Refills: 0 | Status: DISCONTINUED | OUTPATIENT
Start: 2019-05-31 | End: 2019-06-04

## 2019-05-31 RX ORDER — OXYCODONE HYDROCHLORIDE 5 MG/1
5 TABLET ORAL EVERY 4 HOURS
Refills: 0 | Status: DISCONTINUED | OUTPATIENT
Start: 2019-05-31 | End: 2019-06-04

## 2019-05-31 RX ORDER — POLYETHYLENE GLYCOL 3350 17 G/17G
17 POWDER, FOR SOLUTION ORAL AT BEDTIME
Refills: 0 | Status: DISCONTINUED | OUTPATIENT
Start: 2019-05-31 | End: 2019-06-04

## 2019-05-31 RX ORDER — LIDOCAINE 4 G/100G
1 CREAM TOPICAL DAILY
Refills: 0 | Status: DISCONTINUED | OUTPATIENT
Start: 2019-05-31 | End: 2019-06-04

## 2019-05-31 RX ADMIN — HYDROMORPHONE HYDROCHLORIDE 0.5 MILLIGRAM(S): 2 INJECTION INTRAMUSCULAR; INTRAVENOUS; SUBCUTANEOUS at 08:36

## 2019-05-31 RX ADMIN — HYDROMORPHONE HYDROCHLORIDE 0.5 MILLIGRAM(S): 2 INJECTION INTRAMUSCULAR; INTRAVENOUS; SUBCUTANEOUS at 14:00

## 2019-05-31 RX ADMIN — PIPERACILLIN AND TAZOBACTAM 25 GRAM(S): 4; .5 INJECTION, POWDER, LYOPHILIZED, FOR SOLUTION INTRAVENOUS at 05:16

## 2019-05-31 RX ADMIN — SODIUM CHLORIDE 3 MILLILITER(S): 9 INJECTION INTRAMUSCULAR; INTRAVENOUS; SUBCUTANEOUS at 22:30

## 2019-05-31 RX ADMIN — HYDROMORPHONE HYDROCHLORIDE 0.5 MILLIGRAM(S): 2 INJECTION INTRAMUSCULAR; INTRAVENOUS; SUBCUTANEOUS at 23:56

## 2019-05-31 RX ADMIN — HYDROMORPHONE HYDROCHLORIDE 0.5 MILLIGRAM(S): 2 INJECTION INTRAMUSCULAR; INTRAVENOUS; SUBCUTANEOUS at 00:10

## 2019-05-31 RX ADMIN — Medication 100 MILLIGRAM(S): at 22:19

## 2019-05-31 RX ADMIN — SODIUM CHLORIDE 3 MILLILITER(S): 9 INJECTION INTRAMUSCULAR; INTRAVENOUS; SUBCUTANEOUS at 05:15

## 2019-05-31 RX ADMIN — ENOXAPARIN SODIUM 40 MILLIGRAM(S): 100 INJECTION SUBCUTANEOUS at 13:19

## 2019-05-31 RX ADMIN — HYDROMORPHONE HYDROCHLORIDE 0.5 MILLIGRAM(S): 2 INJECTION INTRAMUSCULAR; INTRAVENOUS; SUBCUTANEOUS at 23:41

## 2019-05-31 RX ADMIN — HYDROMORPHONE HYDROCHLORIDE 0.5 MILLIGRAM(S): 2 INJECTION INTRAMUSCULAR; INTRAVENOUS; SUBCUTANEOUS at 00:25

## 2019-05-31 RX ADMIN — OXYCODONE HYDROCHLORIDE 5 MILLIGRAM(S): 5 TABLET ORAL at 18:15

## 2019-05-31 RX ADMIN — SODIUM CHLORIDE 3 MILLILITER(S): 9 INJECTION INTRAMUSCULAR; INTRAVENOUS; SUBCUTANEOUS at 13:29

## 2019-05-31 RX ADMIN — METHADONE HYDROCHLORIDE 2.5 MILLIGRAM(S): 40 TABLET ORAL at 22:20

## 2019-05-31 RX ADMIN — SODIUM CHLORIDE 30 MILLILITER(S): 9 INJECTION, SOLUTION INTRAVENOUS at 13:29

## 2019-05-31 RX ADMIN — METHADONE HYDROCHLORIDE 2.5 MILLIGRAM(S): 40 TABLET ORAL at 15:27

## 2019-05-31 RX ADMIN — METHADONE HYDROCHLORIDE 2.5 MILLIGRAM(S): 40 TABLET ORAL at 09:54

## 2019-05-31 RX ADMIN — PIPERACILLIN AND TAZOBACTAM 25 GRAM(S): 4; .5 INJECTION, POWDER, LYOPHILIZED, FOR SOLUTION INTRAVENOUS at 22:29

## 2019-05-31 RX ADMIN — Medication 100 MILLIGRAM(S): at 05:16

## 2019-05-31 RX ADMIN — PIPERACILLIN AND TAZOBACTAM 25 GRAM(S): 4; .5 INJECTION, POWDER, LYOPHILIZED, FOR SOLUTION INTRAVENOUS at 13:19

## 2019-05-31 RX ADMIN — GABAPENTIN 100 MILLIGRAM(S): 400 CAPSULE ORAL at 22:20

## 2019-05-31 RX ADMIN — HYDROMORPHONE HYDROCHLORIDE 0.5 MILLIGRAM(S): 2 INJECTION INTRAMUSCULAR; INTRAVENOUS; SUBCUTANEOUS at 09:10

## 2019-05-31 RX ADMIN — OXYCODONE HYDROCHLORIDE 5 MILLIGRAM(S): 5 TABLET ORAL at 17:35

## 2019-05-31 RX ADMIN — SENNA PLUS 2 TABLET(S): 8.6 TABLET ORAL at 22:19

## 2019-05-31 RX ADMIN — HYDROMORPHONE HYDROCHLORIDE 0.5 MILLIGRAM(S): 2 INJECTION INTRAMUSCULAR; INTRAVENOUS; SUBCUTANEOUS at 13:19

## 2019-05-31 NOTE — DISCHARGE NOTE NURSING/CASE MANAGEMENT/SOCIAL WORK - NSDCDPATPORTLINK_GEN_ALL_CORE
You can access the [facility] Patient Portal, offered by [facility], by registering with the following website: [email or web address]

## 2019-05-31 NOTE — PROGRESS NOTE ADULT - PROBLEM SELECTOR PLAN 2
-s/p subxdiphoid pericardial window placed on 5/7 by CT surgery   -repeat TTE small pericardial effusion

## 2019-05-31 NOTE — DISCHARGE NOTE NURSING/CASE MANAGEMENT/SOCIAL WORK - NSSCCARECORD_GEN_ALL_CORE
Home Care Agency/Durable Medical Equipment Agency Durable Medical Equipment Agency/Community Resource/Home Care Agency

## 2019-05-31 NOTE — PROGRESS NOTE ADULT - SUBJECTIVE AND OBJECTIVE BOX
Post-Anesthetic Evaluation: S/P -VATS    Seen 08:40 am  Patient seen.  Nurse and PA talked with.  Patient is stable with   improved blood pressures. Will be discharged to home hospice.    The Patient was interviewed and evaluated    Vital Signs Last 24 Hrs  T(C): 36.7 (31 May 2019 09:00), Max: 36.9 (30 May 2019 14:45)  T(F): 98.1 (31 May 2019 09:00), Max: 98.4 (30 May 2019 14:45)  HR: 106 (31 May 2019 09:00) (98 - 117)  BP: 103/64 (31 May 2019 09:00) (84/54 - 114/62)  BP(mean): 67 (30 May 2019 14:30) (58 - 74)  RR: 18 (31 May 2019 09:00) (10 - 29)  SpO2: 97% (31 May 2019 09:00) (96% - 100%)    Evaluation:      (X) No apparent complications or complaints regarding anesthesia care at this time  (X) All questions were answered    Condition:  (X) Stable      ( ) Guarded      ( ) Critical    Recommendations:  (X) None     ( ) Other:

## 2019-05-31 NOTE — PROGRESS NOTE ADULT - PROBLEM SELECTOR PLAN 4
Re-addressed recommendation for hospice services, per daughter pts   at [place] and she believes pt is under the impression she would have to be moved from her home. Reiterated that hospice services would be provided at home and that it would a good support in the community. Patient indicated that she will think about it, daughter will also re-enforce that the services would be delivered at her home.   A MOLST was completed as an outpatient with Dr. Allen indicating patient DNR/DNI - reconfirmed with patient and family at bedside - still in agreement with patient's wishes, copy placed in patient's chart.  Primary team and bedside nurse aware.  Psychosocial support provided.

## 2019-05-31 NOTE — PROGRESS NOTE ADULT - SUBJECTIVE AND OBJECTIVE BOX
55 yo F, never smoker with recently dx’ed stage IV NSCLC, c/b chronic R. pleural effusion s/p VATs, pleurX catheter, also pericardial effusion with early tamponade physiology  s/p IR subxiphoid pericardial window with bleomycin pericardial sclerosis on last admission 5/7, also PE on therapeutic Lovenox s/p IVC filter placement presenting with shortness of breath, weakness x 4 days. SOB is constant and patient is severely uncomfortable. She reports fatigue, weakness, poor appetite with little PO intake, nausea, and abdominal pain. Main complaint is shortness of breath which is constant, not worse with exertion, though she has not left bed. At last visit with outpatient oncologist she expressed desire to hold treatment as she "is not sure body can handle more" and decision was made to proceed with palliative care with hopes of regaining functional status and being able to continue pembrolizumab therapy at some point in future.Denies fevers/chills/chest pain, vomiting, diarrhea, rashes, sick contacts. Bedside U/S showed evidence of re-accumulation of pericardial effusion, pleurx cath drained today. CT CHest showed trace pericardial effusion with increased Left pleural effusion. ECHO showed no tamonade physiology. Pt underwent a Left VATS w/ PLeurX catheter insertion. PleurX drained 920cc initially.    Pt in bed NAD, feeling very weak. PleurX to suction     Subjective:    Vital Signs:  Vital Signs Last 24 Hrs  T(C): 36.7 (05-31-19 @ 05:00), Max: 36.9 (05-30-19 @ 14:45)  T(F): 98 (05-31-19 @ 05:00), Max: 98.4 (05-30-19 @ 14:45)  HR: 109 (05-31-19 @ 05:00) (98 - 124)  BP: 105/66 (05-31-19 @ 05:00) (84/54 - 123/73)  RR: 18 (05-31-19 @ 05:00) (10 - 33)  SpO2: 99% (05-31-19 @ 05:00) (93% - 100%) on (O2)    Telemetry/Alarms:                          10.8   10.92 )-----------( 627      ( 31 May 2019 05:56 )             35.7     05-31    134<L>  |  95<L>  |  9   ----------------------------<  89  4.0   |  25  |  0.71    Ca    8.2<L>      31 May 2019 05:56  Phos  3.8     05-30  Mg     1.6     05-31      PTT - ( 30 May 2019 05:49 )  PTT:28.8 SEC  MEDICATIONS  (STANDING):  docusate sodium 100 milliGRAM(s) Oral three times a day  enoxaparin Injectable 40 milliGRAM(s) SubCutaneous daily  gabapentin 100 milliGRAM(s) Oral at bedtime  lactated ringers. 1000 milliLiter(s) (30 mL/Hr) IV Continuous <Continuous>  methadone    Tablet 2.5 milliGRAM(s) Oral <User Schedule>  piperacillin/tazobactam IVPB. 3.375 Gram(s) IV Intermittent every 8 hours  senna 2 Tablet(s) Oral at bedtime  sodium chloride 0.9% lock flush 3 milliLiter(s) IV Push every 8 hours    MEDICATIONS  (PRN):  ALBUTerol    90 MICROgram(s) HFA Inhaler 2 Puff(s) Inhalation every 6 hours PRN Shortness of Breath and/or Wheezing  HYDROmorphone  Injectable 0.5 milliGRAM(s) IV Push every 4 hours PRN Severe Pain/dyspnea  metoclopramide 5 milliGRAM(s) Oral three times a day before meals PRN nausea  oxyCODONE    IR 5 milliGRAM(s) Oral every 4 hours PRN Moderate Pain/dyspnea      Physical exam  General: WN/WD NAD  Neurology: Awake, nonfocal, PAZ x 4  Eyes: Scleras clear, PERRLA/ EOMI, Gross vision intact  ENT:Gross hearing intact, grossly patent pharynx, no stridor  Neck: Neck supple, trachea midline, No JVD,   Respiratory: CTA B/L, No wheezing, rales, rhonchi  CV: RRR, S1S2, no murmurs, rubs or gallops  Abdominal: Soft, NT, ND +BS,   Extremities: No edema, + peripheral pulses  Skin: No Rashes, Hematoma, Ecchymosis  Lymphatic: No Neck, axilla, groin LAD  Psych: Oriented x 3, normal affect  Incisions: healed Subxipiod incision  Tubes: B/L PleurX intact, Right capped, Left to suction    I&O's Summary    30 May 2019 07:01  -  31 May 2019 07:00  --------------------------------------------------------  IN: 1123.7 mL / OUT: 1470 mL / NET: -346.3 mL        Assessment  54y Female  w/ PAST MEDICAL & SURGICAL HISTORY:  Malignant pericardial effusion  Pulmonary embolus  Lung cancer: with mets, adenocardinoma  S/P thoracentesis  S/P pericardiocentesis  History of lung surgery: VATS  admitted with complaints of Patient is a 54y old  Female who presents with a chief complaint of shortness of breath (30 May 2019 14:57)  s/p 5/30 Left VATS PleurX catheter    PLAN  Cap PleurX prior to discharge  can place on waterseal  Case management for PleurX  management  Plan d/w Dr Michaels

## 2019-05-31 NOTE — DISCHARGE NOTE PROVIDER - CARE PROVIDERS DIRECT ADDRESSES
,DirectAddress_Unknown ,DirectAddress_Unknown,[email or web address]

## 2019-05-31 NOTE — PROGRESS NOTE ADULT - PROBLEM SELECTOR PLAN 1
No significant improvement s/p fluid drainage and placement of pigtail. Still tachypneic, only used 3 PRNs of IV Dilaudid in the past 24hrs - 30mg PO Morphine, would recommend increase in methadone to 2.5mg PO TID. Last QTc 400 (5/28).  Continue 5mg PO oxycodone PRN for pain/SOB, pt encouraged to use breakthrough medication.   Bowel regimen, started Miralax QHS PRN, if no BM today please give a dose. Last BM 5/28.

## 2019-05-31 NOTE — PROGRESS NOTE ADULT - SUBJECTIVE AND OBJECTIVE BOX
SUBJECTIVE AND OBJECTIVE: pt indicates that SOB and pain on deep inspiration worsened after procedure.   INTERVAL HPI/OVERNIGHT EVENTS: S/p pigtail placement, still with significant SOB while at rest. Used 3 PRNs of IV Dilaudid in the past 24hrs     DNR on chart: Yes    Allergies    No Known Allergies    Intolerances    IV contrast (Rash)  MEDICATIONS  (STANDING):  docusate sodium 100 milliGRAM(s) Oral three times a day  enoxaparin Injectable 40 milliGRAM(s) SubCutaneous daily  gabapentin 100 milliGRAM(s) Oral at bedtime  lactated ringers. 1000 milliLiter(s) (30 mL/Hr) IV Continuous <Continuous>  methadone    Tablet 2.5 milliGRAM(s) Oral <User Schedule>  piperacillin/tazobactam IVPB. 3.375 Gram(s) IV Intermittent every 8 hours  senna 2 Tablet(s) Oral at bedtime  sodium chloride 0.9% lock flush 3 milliLiter(s) IV Push every 8 hours    MEDICATIONS  (PRN):  ALBUTerol    90 MICROgram(s) HFA Inhaler 2 Puff(s) Inhalation every 6 hours PRN Shortness of Breath and/or Wheezing  HYDROmorphone  Injectable 0.5 milliGRAM(s) IV Push every 4 hours PRN Severe Pain/dyspnea  metoclopramide 5 milliGRAM(s) Oral three times a day before meals PRN nausea  oxyCODONE    IR 5 milliGRAM(s) Oral every 4 hours PRN Moderate Pain/dyspnea    ITEMS UNCHECKED ARE NOT PRESENT    PRESENT SYMPTOMS: [ ]Unable to obtain due to poor mentation   Source if other than patient:  [ ]Family   [ ]Team     Pain (Impact on QOL):  impedes her taking a deep breath.   Location:  Minimal acceptable level (0-10 scale):      2-3/10             Aggravating factors: deep inspiration.   Quality: aching, sharp pain.   Radiation:  Severity (0-10 scale):    Timing:    Dyspnea:                           [ ]Mild [ ]Moderate [x ]Severe  Anxiety:                             [ ]Mild [ ]Moderate [ ]Severe  Fatigue:                             [ ]Mild [x ]Moderate [ ]Severe  Nausea:                             [ ]Mild [ ]Moderate [ ]Severe  Loss of appetite:              [ ]Mild [ ]Moderate [ ]Severe  Constipation:                    [ ]Mild [ ]Moderate [ ]Severe    PAIN AD Score:	  [email or web address]/[email or web address] (Ctrl + left click to view)    Other Symptoms:  [x ]All other review of systems negative     Karnofsky Performance Score/Palliative Performance Status Version 2: 40 %    http://palliative.info/resource_material/[email or web address].pdf    PHYSICAL EXAM:  Vital Signs Last 24 Hrs  T(C): 36.8 (31 May 2019 13:18), Max: 36.9 (30 May 2019 14:45)  T(F): 98.2 (31 May 2019 13:18), Max: 98.4 (30 May 2019 14:45)  HR: 112 (31 May 2019 13:18) (98 - 112)  BP: 114/67 (31 May 2019 13:18) (84/54 - 114/67)  BP(mean): 67 (30 May 2019 14:30) (61 - 67)  RR: 19 (31 May 2019 13:18) (10 - 20)  SpO2: 98% (31 May 2019 13:18) (96% - 100%) I&O's Summary    30 May 2019 07:01  -  31 May 2019 07:00  --------------------------------------------------------  IN: 1123.7 mL / OUT: 1470 mL / NET: -346.3 mL    31 May 2019 07:01  -  31 May 2019 13:32  --------------------------------------------------------  IN: 0 mL / OUT: 130 mL / NET: -130 mL     GENERAL:  [x ]Alert  [x ]Oriented x 3  [ ]Lethargic  [ ]Cachexia  [ ]Unarousable  [x ]Verbal  [ ]Non-Verbal  Behavioral:   [ ] Anxiety  [ ] Delirium [ ] Agitation [ ] Other  HEENT:  [x ]Normal   [ ]Dry mouth   [ ]ET Tube/Trach  [ ]Oral lesions  PULMONARY:   [ ]Clear [ x]Tachypnea  [ ]Audible excessive secretions   [ ]Rhonchi        [ ]Right [ ]Left [ ]Bilateral  [ ]Crackles        [ ]Right [ ]Left [ ]Bilateral  [ ]Wheezing     [ ]Right [ ]Left [ ]Bilateral  CARDIOVASCULAR:    x[ ]Regular [ ]Irregular [ ]Tachy  [ ]Ford [ ]Murmur [ ]Other  GASTROINTESTINAL:  [x ]Soft  [ ]Distended   [x ]+BS  [ ]Non tender [ ]Tender  [ ]PEG [ ]OGT/ NGT   Last BM: 5/28   GENITOURINARY:  [x ]Normal [ ] Incontinent   [ ]Oliguria/Anuria   [ ]Sanchez  MUSCULOSKELETAL:   [ ]Normal   [x ]Weakness  [ ]Bed/Wheelchair bound [ ]Edema  NEUROLOGIC:   [x ]No focal deficits  [ ] Cognitive impairment  [ ] Dysphagia [ ]Dysarthria [ ] Paresis [ ]Other   SKIN:   [x ]Normal   [ ]Pressure ulcer(s)  [ ]Rash    CRITICAL CARE:  [ ] Shock Present  [ ]Septic [ ]Cardiogenic [ ]Neurologic [ ]Hypovolemic  [ ]  Vasopressors [ ]  Inotropes   [ ] Respiratory failure present  [ ] Acute  [ ] Chronic [ ] Hypoxic  [ ] Hypercarbic [ ] Other  [ ] Other organ failure     GRIEF   [x ] Yes  [ ] No    LABS:                        10.8   10.92 )-----------( 627      ( 31 May 2019 05:56 )             35.7     05-31    134<L>  |  95<L>  |  9   ----------------------------<  89  4.0   |  25  |  0.71    Ca    8.2<L>      31 May 2019 05:56  Phos  3.8     05-30  Mg     1.6     05-31    PTT - ( 30 May 2019 05:49 )  PTT:28.8 SEC    RADIOLOGY & ADDITIONAL STUDIES: reviewed.     Protein Calorie Malnutrition Present: [x ] yes [ ] no  [ ] PPSV2 < or = 30%  [x ] significant weight loss [ ] poor nutritional intake [ ] anasarca [ ] catabolic state Albumin, Serum: 3.2 g/dL (05-28-19 @ 14:50)  Artificial Nutrition [ ]     REFERRALS:   [ ]Chaplaincy  [ ] Hospice  [ ]Child Life  [ ]Social Work  [ ]Case management [ ]Holistic Therapy   Goals of Care Document:

## 2019-05-31 NOTE — PROGRESS NOTE ADULT - SUBJECTIVE AND OBJECTIVE BOX
Patient is a 54y old  Female who presents with a chief complaint of shortness of breath (31 May 2019 13:32)      SUBJECTIVE / OVERNIGHT EVENTS: Pt noted to have pain LT sided Pleurx to suction worse with cough and deep breathing. afebrile     MEDICATIONS  (STANDING):  docusate sodium 100 milliGRAM(s) Oral three times a day  enoxaparin Injectable 40 milliGRAM(s) SubCutaneous daily  gabapentin 100 milliGRAM(s) Oral at bedtime  lactated ringers. 1000 milliLiter(s) (30 mL/Hr) IV Continuous <Continuous>  methadone    Tablet 2.5 milliGRAM(s) Oral every 8 hours  piperacillin/tazobactam IVPB. 3.375 Gram(s) IV Intermittent every 8 hours  senna 2 Tablet(s) Oral at bedtime  sodium chloride 0.9% lock flush 3 milliLiter(s) IV Push every 8 hours    MEDICATIONS  (PRN):  ALBUTerol    90 MICROgram(s) HFA Inhaler 2 Puff(s) Inhalation every 6 hours PRN Shortness of Breath and/or Wheezing  HYDROmorphone  Injectable 0.5 milliGRAM(s) IV Push every 4 hours PRN Severe Pain/dyspnea  metoclopramide 5 milliGRAM(s) Oral three times a day before meals PRN nausea  oxyCODONE    IR 5 milliGRAM(s) Oral every 4 hours PRN Moderate Pain/dyspnea  polyethylene glycol 3350 17 Gram(s) Oral at bedtime PRN Constipation        CAPILLARY BLOOD GLUCOSE        I&O's Summary    30 May 2019 07:01  -  31 May 2019 07:00  --------------------------------------------------------  IN: 1123.7 mL / OUT: 1470 mL / NET: -346.3 mL    31 May 2019 07:01  -  31 May 2019 13:56  --------------------------------------------------------  IN: 0 mL / OUT: 130 mL / NET: -130 mL        T(C): 36.8 (05-31-19 @ 13:18), Max: 36.9 (05-30-19 @ 14:45)  HR: 112 (05-31-19 @ 13:18) (98 - 112)  BP: 114/67 (05-31-19 @ 13:18) (85/52 - 114/67)  RR: 19 (05-31-19 @ 13:18) (12 - 20)  SpO2: 98% (05-31-19 @ 13:18) (96% - 100%)    PHYSICAL EXAM:  GENERAL: cachetic dyspnea at rest chronic   HEAD: Normocephalic  EYES: EOMI, conjunctiva and sclera clear  NECK: Supple, No JVD  CHEST/LUNG: +RT chest wall pleurex decreased BS bilateral +LT pleurex on waterseal   HEART: Regular rate and rhythm;   ABDOMEN: Soft, Nontender, Nondistended; Bowel sounds present  EXTREMITIES:  2+ Peripheral Pulses, No clubbing, cyanosis, or edema  PSYCH: AAOx3  NEUROLOGY: non-focal    LABS:                        10.8   10.92 )-----------( 627      ( 31 May 2019 05:56 )             35.7     05-31    134<L>  |  95<L>  |  9   ----------------------------<  89  4.0   |  25  |  0.71    Ca    8.2<L>      31 May 2019 05:56  Phos  3.8     05-30  Mg     1.6     05-31      PTT - ( 30 May 2019 05:49 )  PTT:28.8 SEC            RADIOLOGY & ADDITIONAL TESTS:    Imaging Personally Reviewed:    Consultant(s) Notes Reviewed:      Care Discussed with Consultants/Other Providers:

## 2019-05-31 NOTE — DISCHARGE NOTE PROVIDER - HOSPITAL COURSE
THIS  is a 53 yo F admitted with R lung adenocarcinoma on chemo admitted with re-accumulation of the pericardial effussion. Patient with chronic right pleural effussion s/p pleurax catheter on the left side. Patient with metastic lung disease moderate loculated left pleural effussion CTS placed left pleurax catheter. CT chest trace pericardial fluid and pericardial thickening unchanged. The CXR post left pleurax catheter showed a decrease in the size of the left pleural effussion. She will be discharged with the pleurax catheters and have them drained 3 times a week by a visiting nurse THIS  is a 53 yo F admitted with R lung adenocarcinoma on chemo admitted with re-accumulation of the pericardial effussion. Patient with chronic right pleural effussion s/p pleurax catheter on the left side. Patient with metastic lung disease moderate loculated left pleural effussion CTS placed left pleurax catheter. CT chest trace pericardial fluid and pericardial thickening unchanged. The CXR post left pleurax catheter showed a decrease in the size of the left pleural effussion. She will be discharged with the pleurax catheters and have them drained 3 times a week by a visiting nurse, amount to be drained approximately 500mL per side. Patient seen by palliative and at this time is declining home care . THIS  is a 55 yo F admitted with R lung adenocarcinoma on chemo admitted with re-accumulation of the pericardial effussion. Patient with chronic right pleural effussion s/p pleurax catheter on the left side. Patient with metastic lung disease moderate loculated left pleural effussion CTS placed left pleurax catheter. CT chest trace pericardial fluid and pericardial thickening unchanged. The CXR post left pleurax catheter showed a decrease in the size of the left pleural effussion.  Patient seen by palliative and at this time is declining home care .     She will be discharged with the pleurax catheters and have them drained 3 times a week by a visiting nurse, amount to be drained approximately 500mL per side each time as patient can tolerate. THIS  is a 55 yo F admitted with R lung adenocarcinoma on chemo admitted with re-accumulation of the pericardial effussion. Patient with chronic right pleural effussion s/p pleurax catheter on the left side. Patient with metastic lung disease moderate loculated left pleural effussion CTS placed left pleurax catheter. CT chest trace pericardial fluid and pericardial thickening unchanged. The CXR post left pleurax catheter showed a decrease in the size of the left pleural effussion.  Patient was followed by palliative and recommended to continue methadone and oxycodone. Heme/onc and palliative, followed.    	She will be discharged with the pleurax catheters and have them drained 3 times a week by a visiting nurse, amount to be drained approximately 500mL per side each time as patient can tolerate.    	Patient agreed to home hospice and was discharged to home on 6/4 medications sent to [facility] pharmacy. THIS  is a 55 yo F admitted with R lung adenocarcinoma on chemo admitted with re-accumulation of the pericardial effussion. Patient with chronic right pleural effussion s/p pleurax catheter on the left side. Patient with metastic lung disease moderate loculated left pleural effussion CTS placed left pleurax catheter. CT chest trace pericardial fluid and pericardial thickening unchanged. The CXR post left pleurax catheter showed a decrease in the size of the left pleural effussion.  Patient was followed by palliative and recommended to continue methadone and oxycodone. Heme/onc and palliative, followed.    	She will be discharged with the pleurax catheters and have them drained 3 times a week by a visiting nurse, amount to be drained approximately 500mL per side each time as patient can tolerate. Dressing to be changes after each drainage the dressing kit is included in the kits for drainage.     	Patient agreed to home hospice and was discharged to home on 6/4 medications sent to [facility] pharmacy.

## 2019-05-31 NOTE — PROGRESS NOTE ADULT - PROBLEM SELECTOR PLAN 1
- Possible progression of disease vs post obstructive PNA.   - s/p pleurex cath with small pneumothorax Lt pleurex on waterseal  - start zosyn day 3/5 given 2 new infiltrate on CT and coughing can change augmentin on discharge   -continue supplemental O2 as needed  - case d/w CM/PA/palliative unable to get drainage cannister case d/w pt and palliative in conjunction with family at this point refusing hospice. CM   -increase methadone to TID as per palliative recs and oxycodone PRN with bowel regimen   -bowel regimen - Possible progression of disease vs post obstructive PNA.   - s/p pleurex cath with small pneumothorax Lt pleurex on waterseal  - start zosyn day 3/5 given 2 new infiltrate on CT and coughing can change augmentin on discharge   -continue supplemental O2 as needed  - case d/w CM/PA/palliative unable to get drainage cannister case d/w pt and palliative in conjunction with family at this point refusing hospice. CM   -increase methadone to TID as per palliative recs and oxycodone PRN with bowel regimen   -add lidocaine patch to LT chest wall - Possible progression of disease vs post obstructive PNA.   - s/p pleurex cath with small pneumothorax Lt pleurex on waterseal  - start zosyn day 3/5 given 2 new infiltrate on CT and coughing can change augmentin on discharge   -continue supplemental O2 as needed  - case d/w CM/PA/palliative unable to get drainage cannister case d/w pt and palliative in conjunction with family at this point refusing hospice. CM working on safe discharge plan.   -increase methadone to TID as per palliative recs and oxycodone PRN with bowel regimen   -add lidocaine patch to LT chest wall - Possible progression of disease vs post obstructive PNA.   - s/p pleurex cath with small pneumothorax Lt pleurex on waterseal  -  zosyn day 3/5 given 2 new infiltrate on CT and coughing can change Augmentin on discharge   -continue supplemental O2 as needed  - case d/w CM/PA/palliative unable to get drainage cannister case d/w pt and palliative in conjunction with family at this point refusing hospice. CM working on safe discharge plan.   -increase methadone to TID as per palliative recs and oxycodone PRN with bowel regimen   -add lidocaine patch to LT chest wall

## 2019-05-31 NOTE — DISCHARGE NOTE PROVIDER - PROVIDER TOKENS
PROVIDER:[TOKEN:[[id number]]] PROVIDER:[TOKEN:[[id number]]],PROVIDER:[TOKEN:[[id number]],FOLLOWUP:[2 weeks]]

## 2019-05-31 NOTE — DISCHARGE NOTE PROVIDER - CARE PROVIDER_API CALL
Thyu Harper)  Internal Medicine; Medical Oncology  [address] [place] [address]  Phone: [phone]  Fax: [phone]  Follow Up Time: Thuy Harper)  Internal Medicine; Medical Oncology  [address] [place] [address]  Phone: [phone]  Fax: [phone]  Follow Up Time:     Asif Morris)  Surgery; Thoracic Surgery  [address] [place] [address]  Phone: [phone]  Fax: [phone]  Follow Up Time: 2 weeks

## 2019-06-01 PROCEDURE — G9005: CPT

## 2019-06-01 PROCEDURE — 71045 X-RAY EXAM CHEST 1 VIEW: CPT | Mod: 26

## 2019-06-01 PROCEDURE — 99233 SBSQ HOSP IP/OBS HIGH 50: CPT

## 2019-06-01 RX ORDER — ONDANSETRON 8 MG/1
4 TABLET, FILM COATED ORAL ONCE
Refills: 0 | Status: COMPLETED | OUTPATIENT
Start: 2019-06-01 | End: 2019-06-01

## 2019-06-01 RX ADMIN — HYDROMORPHONE HYDROCHLORIDE 0.5 MILLIGRAM(S): 2 INJECTION INTRAMUSCULAR; INTRAVENOUS; SUBCUTANEOUS at 10:28

## 2019-06-01 RX ADMIN — ENOXAPARIN SODIUM 40 MILLIGRAM(S): 100 INJECTION SUBCUTANEOUS at 13:24

## 2019-06-01 RX ADMIN — OXYCODONE HYDROCHLORIDE 5 MILLIGRAM(S): 5 TABLET ORAL at 11:46

## 2019-06-01 RX ADMIN — HYDROMORPHONE HYDROCHLORIDE 0.5 MILLIGRAM(S): 2 INJECTION INTRAMUSCULAR; INTRAVENOUS; SUBCUTANEOUS at 10:15

## 2019-06-01 RX ADMIN — HYDROMORPHONE HYDROCHLORIDE 0.5 MILLIGRAM(S): 2 INJECTION INTRAMUSCULAR; INTRAVENOUS; SUBCUTANEOUS at 17:40

## 2019-06-01 RX ADMIN — PIPERACILLIN AND TAZOBACTAM 25 GRAM(S): 4; .5 INJECTION, POWDER, LYOPHILIZED, FOR SOLUTION INTRAVENOUS at 05:21

## 2019-06-01 RX ADMIN — LIDOCAINE 1 PATCH: 4 CREAM TOPICAL at 13:23

## 2019-06-01 RX ADMIN — METHADONE HYDROCHLORIDE 2.5 MILLIGRAM(S): 40 TABLET ORAL at 22:23

## 2019-06-01 RX ADMIN — OXYCODONE HYDROCHLORIDE 5 MILLIGRAM(S): 5 TABLET ORAL at 23:58

## 2019-06-01 RX ADMIN — ONDANSETRON 4 MILLIGRAM(S): 8 TABLET, FILM COATED ORAL at 20:40

## 2019-06-01 RX ADMIN — SODIUM CHLORIDE 3 MILLILITER(S): 9 INJECTION INTRAMUSCULAR; INTRAVENOUS; SUBCUTANEOUS at 05:23

## 2019-06-01 RX ADMIN — OXYCODONE HYDROCHLORIDE 5 MILLIGRAM(S): 5 TABLET ORAL at 12:21

## 2019-06-01 RX ADMIN — PIPERACILLIN AND TAZOBACTAM 25 GRAM(S): 4; .5 INJECTION, POWDER, LYOPHILIZED, FOR SOLUTION INTRAVENOUS at 13:25

## 2019-06-01 RX ADMIN — SODIUM CHLORIDE 3 MILLILITER(S): 9 INJECTION INTRAMUSCULAR; INTRAVENOUS; SUBCUTANEOUS at 22:09

## 2019-06-01 RX ADMIN — HYDROMORPHONE HYDROCHLORIDE 0.5 MILLIGRAM(S): 2 INJECTION INTRAMUSCULAR; INTRAVENOUS; SUBCUTANEOUS at 17:24

## 2019-06-01 RX ADMIN — GABAPENTIN 100 MILLIGRAM(S): 400 CAPSULE ORAL at 22:23

## 2019-06-01 RX ADMIN — SODIUM CHLORIDE 3 MILLILITER(S): 9 INJECTION INTRAMUSCULAR; INTRAVENOUS; SUBCUTANEOUS at 13:26

## 2019-06-01 RX ADMIN — METHADONE HYDROCHLORIDE 2.5 MILLIGRAM(S): 40 TABLET ORAL at 05:21

## 2019-06-01 RX ADMIN — LIDOCAINE 1 PATCH: 4 CREAM TOPICAL at 18:36

## 2019-06-01 RX ADMIN — PIPERACILLIN AND TAZOBACTAM 25 GRAM(S): 4; .5 INJECTION, POWDER, LYOPHILIZED, FOR SOLUTION INTRAVENOUS at 22:22

## 2019-06-01 NOTE — PROGRESS NOTE ADULT - SUBJECTIVE AND OBJECTIVE BOX
CHIEF COMPLAINT: Patient is a 54y old  female who presents with a chief complaint of shortness of breath (01 Jun 2019 12:55)      SUBJECTIVE / OVERNIGHT EVENTS:    Complains of L-sided CP. Reasonably controlled. Continues to experience SOB. Asking about management of L pleurex catheter.    MEDICATIONS  (STANDING):  docusate sodium 100 milliGRAM(s) Oral three times a day  enoxaparin Injectable 40 milliGRAM(s) SubCutaneous daily  gabapentin 100 milliGRAM(s) Oral at bedtime  lactated ringers. 1000 milliLiter(s) (30 mL/Hr) IV Continuous <Continuous>  lidocaine   Patch 1 Patch Transdermal daily  methadone    Tablet 2.5 milliGRAM(s) Oral every 8 hours  piperacillin/tazobactam IVPB. 3.375 Gram(s) IV Intermittent every 8 hours  senna 2 Tablet(s) Oral at bedtime  sodium chloride 0.9% lock flush 3 milliLiter(s) IV Push every 8 hours    MEDICATIONS  (PRN):  ALBUTerol    90 MICROgram(s) HFA Inhaler 2 Puff(s) Inhalation every 6 hours PRN Shortness of Breath and/or Wheezing  HYDROmorphone  Injectable 0.5 milliGRAM(s) IV Push every 4 hours PRN Severe Pain/dyspnea  metoclopramide 5 milliGRAM(s) Oral three times a day before meals PRN nausea  oxyCODONE    IR 5 milliGRAM(s) Oral every 4 hours PRN Severe Pain (7 - 10)  polyethylene glycol 3350 17 Gram(s) Oral at bedtime PRN Constipation      VITALS:  T(F): 97.2 (06-01-19 @ 10:20), Max: 98 (05-31-19 @ 17:18)  HR: 120 (06-01-19 @ 10:20) (99 - 120)  BP: 104/78 (06-01-19 @ 10:20) (101/66 - 109/61)  RR: 24 (06-01-19 @ 10:20) (18 - 24)  SpO2: 100% (06-01-19 @ 10:20)      CAPILLARY BLOOD GLUCOSE    Output     I&O's Summary  T(F): 97.2 (06-01-19 @ 10:20), Max: 98 (05-31-19 @ 17:18)  HR: 120 (06-01-19 @ 10:20) (99 - 120)  BP: 104/78 (06-01-19 @ 10:20) (101/66 - 109/61)  RR: 24 (06-01-19 @ 10:20) (18 - 24)  SpO2: 100% (06-01-19 @ 10:20)    PHYSICAL EXAM:  GENERAL: NAD, well-developed  HEAD:  Atraumatic, Normocephalic  EYES: EOMI  NECK: Supple, No JVD  CHEST/LUNG: bibasilar crackles  HEART: nl S1/S2  ABDOMEN: nondistended, soft  EXTREMITIES:  no LE edema  PSYCH: alert, answering questions appropriately  NEUROLOGY: non-focal  SKIN: No rashes noted    LABS:              10.8                 134  | 25   | 9            10.92 >-----------< 627     ------------------------< 89                    35.7                 4.0  | 95   | 0.71                                         Ca 8.2   Mg 1.6   Ph x                          MICROBIOLOGY:        RADIOLOGY & ADDITIONAL TESTS:    Imaging Personally Reviewed:    < from: CT Chest No Cont (05.29.19 @ 09:58) >      < end of copied text >        [x] Care Discussed with Consultants/Other Providers:      Evin Ramires M.D.  Hospitalist  Pager [phone]

## 2019-06-01 NOTE — PROGRESS NOTE ADULT - PROBLEM SELECTOR PLAN 2
-s/p subxiphoid pericardial window placed on 5/7 by CT surgery   -repeat TTE small pericardial effusion

## 2019-06-01 NOTE — PROGRESS NOTE ADULT - PROBLEM SELECTOR PLAN 1
- Possible progression of disease vs post obstructive PNA.   - s/p L pleurex cath with small pneumothorax  - management of L hydropneumothorax per thoracic surgery  - drainage of b/l pleurex catheters per thoracic surgery  - cont zosyn given 2 new infiltrate on CT and coughing can change Augmentin on discharge for 7 day course  -continue supplemental O2 as needed  - case d/w CM/PA/palliative unable to get drainage cannister case d/w pt and palliative in conjunction with family at this point refusing hospice. CM working on safe discharge plan.   -increase methadone to TID as per palliative recs and oxycodone PRN with bowel regimen   -add lidocaine patch to LT chest wall

## 2019-06-01 NOTE — PROGRESS NOTE ADULT - SUBJECTIVE AND OBJECTIVE BOX
Subjective: pt with pain complaints, receiving medication at time of assessment, pt in bed with  at bedside    Vital Signs:  Vital Signs Last 24 Hrs  T(C): 36.2 (06-01-19 @ 10:20), Max: 36.8 (05-31-19 @ 13:18)  T(F): 97.2 (06-01-19 @ 10:20), Max: 98.2 (05-31-19 @ 13:18)  HR: 120 (06-01-19 @ 10:20) (99 - 120)  BP: 104/78 (06-01-19 @ 10:20) (101/66 - 114/67)  RR: 24 (06-01-19 @ 10:20) (18 - 24)  SpO2: 100% (06-01-19 @ 10:20) (98% - 100%) on (O2)    Telemetry/Alarms:  General: WN/WD NAD  Neurology: Awake, nonfocal, PAZ x 4  Eyes: Scleras clear, PERRLA/ EOMI, Gross vision intact  ENT:Gross hearing intact, grossly patent pharynx, no stridor  Neck: Neck supple, trachea midline, No JVD,   Respiratory: CTA B/L, No wheezing, rales, rhonchi  CV: RRR, S1S2, no murmurs, rubs or gallops  Abdominal: Soft, NT, ND +BS,   Extremities: No edema, + peripheral pulses  Skin: No Rashes, Hematoma, Ecchymosis  Lymphatic: No Neck, axilla, groin LAD  Psych: Oriented x 3, normal affect  Incisions: c,d,i  Tubes: R pleurX catheter capped; L pleurX catheter was to a pleurovac today, drainage 580 in last 24h; capped today because of anticipated discharge which has now been postphoned   Relevant labs, radiology and Medications reviewed                        10.8   10.92 )-----------( 627      ( 31 May 2019 05:56 )             35.7     05-31    134<L>  |  95<L>  |  9   ----------------------------<  89  4.0   |  25  |  0.71    Ca    8.2<L>      31 May 2019 05:56  Mg     1.6     05-31        MEDICATIONS  (STANDING):  docusate sodium 100 milliGRAM(s) Oral three times a day  enoxaparin Injectable 40 milliGRAM(s) SubCutaneous daily  gabapentin 100 milliGRAM(s) Oral at bedtime  lactated ringers. 1000 milliLiter(s) (30 mL/Hr) IV Continuous <Continuous>  lidocaine   Patch 1 Patch Transdermal daily  methadone    Tablet 2.5 milliGRAM(s) Oral every 8 hours  piperacillin/tazobactam IVPB. 3.375 Gram(s) IV Intermittent every 8 hours  senna 2 Tablet(s) Oral at bedtime  sodium chloride 0.9% lock flush 3 milliLiter(s) IV Push every 8 hours    MEDICATIONS  (PRN):  ALBUTerol    90 MICROgram(s) HFA Inhaler 2 Puff(s) Inhalation every 6 hours PRN Shortness of Breath and/or Wheezing  HYDROmorphone  Injectable 0.5 milliGRAM(s) IV Push every 4 hours PRN Severe Pain/dyspnea  metoclopramide 5 milliGRAM(s) Oral three times a day before meals PRN nausea  oxyCODONE    IR 5 milliGRAM(s) Oral every 4 hours PRN Severe Pain (7 - 10)  polyethylene glycol 3350 17 Gram(s) Oral at bedtime PRN Constipation    Pertinent Physical Exam  I&O's Summary    31 May 2019 07:01  -  01 Jun 2019 07:00  --------------------------------------------------------  IN: 350 mL / OUT: 630 mL / NET: -280 mL        Assessment  54y Female  w/ PAST MEDICAL & SURGICAL HISTORY:  Malignant pericardial effusion  Pulmonary embolus  Lung cancer: with mets, adenocardinoma  S/P thoracentesis  S/P pericardiocentesis  History of lung surgery: VATS  admitted with complaints of shortness of breath (30 May 2019 14:57)  s/p 5/30 Left VATS PleurX catheter    PLAN  L pleurX capped today  R pleurX remains capped  order placed for RNs to drain both pleurX catheters 3x weekly (Monday, Wednesday and Friday)  Case management for PleurX  management and home care needs  Disposition: plan to discharge pt home on Monday - pleurX should be drained prior to discharge on Monday  Plan d/w Dr Michaels  please contact thoracic surgery [phone] with concerns  continue care per primary medical team    Ally DALY [id number] Subjective: pt with pain complaints, receiving medication at time of assessment, pt in bed with  at bedside    Vital Signs:  Vital Signs Last 24 Hrs  T(C): 36.2 (06-01-19 @ 10:20), Max: 36.8 (05-31-19 @ 13:18)  T(F): 97.2 (06-01-19 @ 10:20), Max: 98.2 (05-31-19 @ 13:18)  HR: 120 (06-01-19 @ 10:20) (99 - 120)  BP: 104/78 (06-01-19 @ 10:20) (101/66 - 114/67)  RR: 24 (06-01-19 @ 10:20) (18 - 24)  SpO2: 100% (06-01-19 @ 10:20) (98% - 100%) on (O2)    Telemetry/Alarms:  General: WN/WD NAD  Neurology: Awake, nonfocal, PAZ x 4  Eyes: Scleras clear, PERRLA/ EOMI, Gross vision intact  ENT:Gross hearing intact, grossly patent pharynx, no stridor  Neck: Neck supple, trachea midline, No JVD,   Respiratory: CTA B/L, No wheezing, rales, rhonchi  CV: RRR, S1S2, no murmurs, rubs or gallops  Abdominal: Soft, NT, ND +BS,   Extremities: No edema, + peripheral pulses  Skin: No Rashes, Hematoma, Ecchymosis  Lymphatic: No Neck, axilla, groin LAD  Psych: Oriented x 3, normal affect  Incisions: c,d,i  Tubes: R pleurX catheter capped; L pleurX catheter was to a pleurovac today, drainage 580 in last 24h; capped today because of anticipated discharge which has now been postphoned   Relevant labs, radiology and Medications reviewed                        10.8   10.92 )-----------( 627      ( 31 May 2019 05:56 )             35.7     05-31    134<L>  |  95<L>  |  9   ----------------------------<  89  4.0   |  25  |  0.71    Ca    8.2<L>      31 May 2019 05:56  Mg     1.6     05-31        MEDICATIONS  (STANDING):  docusate sodium 100 milliGRAM(s) Oral three times a day  enoxaparin Injectable 40 milliGRAM(s) SubCutaneous daily  gabapentin 100 milliGRAM(s) Oral at bedtime  lactated ringers. 1000 milliLiter(s) (30 mL/Hr) IV Continuous <Continuous>  lidocaine   Patch 1 Patch Transdermal daily  methadone    Tablet 2.5 milliGRAM(s) Oral every 8 hours  piperacillin/tazobactam IVPB. 3.375 Gram(s) IV Intermittent every 8 hours  senna 2 Tablet(s) Oral at bedtime  sodium chloride 0.9% lock flush 3 milliLiter(s) IV Push every 8 hours    MEDICATIONS  (PRN):  ALBUTerol    90 MICROgram(s) HFA Inhaler 2 Puff(s) Inhalation every 6 hours PRN Shortness of Breath and/or Wheezing  HYDROmorphone  Injectable 0.5 milliGRAM(s) IV Push every 4 hours PRN Severe Pain/dyspnea  metoclopramide 5 milliGRAM(s) Oral three times a day before meals PRN nausea  oxyCODONE    IR 5 milliGRAM(s) Oral every 4 hours PRN Severe Pain (7 - 10)  polyethylene glycol 3350 17 Gram(s) Oral at bedtime PRN Constipation    Pertinent Physical Exam  I&O's Summary    31 May 2019 07:01  -  01 Jun 2019 07:00    IN: 350 mL / OUT: 630 mL / NET: -280 mL          < from: Xray Chest 1 View- PORTABLE-Routine (06.01.19 @ 10:29) >  EXAM:  XR CHEST PORTABLE ROUTINE 1V        PROCEDURE DATE:  Jun 1 2019         INTERPRETATION:  TIME OF EXAM: June 1, 2019 at 9:27 AM    CLINICAL INFORMATION: Pleurx catheter placement.    TECHNIQUE:   Portable chest    INTERPRETATION:     Left-sided Pleurx catheter and right-sided Pleurx catheter unchanged.   Opacity in the right mid and lower lung fields consistent with effusion   and atelectasis. Left hydropneumothorax appears similar. The heart is not   enlarged.      COMPARISON:  May 31 without significant change.      IMPRESSION:  Left hydropneumothorax and right-sided pleural effusion with   chest tubes unchanged.    < end of copied text >  --------------------------------------------------------      Assessment  54y Female  w/ PAST MEDICAL & SURGICAL HISTORY:  Malignant pericardial effusion  Pulmonary embolus  Lung cancer: with mets, adenocardinoma  S/P thoracentesis  S/P pericardiocentesis  History of lung surgery: VATS  admitted with complaints of shortness of breath (30 May 2019 14:57)  s/p 5/30 Left VATS PleurX catheter    PLAN  L pleurX capped today  R pleurX remains capped  order placed for RNs to drain both pleurX catheters 3x weekly (Monday, Wednesday and Friday)  Case management for PleurX  management and home care needs  Disposition: plan to discharge pt home on Monday - pleurX should be drained prior to discharge on Monday  CXR reviewed - no intervention warranted for hydroPTX  Plan d/w Dr Michaels  please contact thoracic surgery [phone] with concerns  continue care per primary medical team    Ally DALY [id number]

## 2019-06-02 PROCEDURE — 99233 SBSQ HOSP IP/OBS HIGH 50: CPT

## 2019-06-02 RX ORDER — METOCLOPRAMIDE HCL 10 MG
5 TABLET ORAL EVERY 6 HOURS
Refills: 0 | Status: DISCONTINUED | OUTPATIENT
Start: 2019-06-02 | End: 2019-06-02

## 2019-06-02 RX ORDER — ONDANSETRON 8 MG/1
4 TABLET, FILM COATED ORAL EVERY 6 HOURS
Refills: 0 | Status: DISCONTINUED | OUTPATIENT
Start: 2019-06-02 | End: 2019-06-04

## 2019-06-02 RX ORDER — ONDANSETRON 8 MG/1
4 TABLET, FILM COATED ORAL ONCE
Refills: 0 | Status: COMPLETED | OUTPATIENT
Start: 2019-06-02 | End: 2019-06-02

## 2019-06-02 RX ADMIN — HYDROMORPHONE HYDROCHLORIDE 0.5 MILLIGRAM(S): 2 INJECTION INTRAMUSCULAR; INTRAVENOUS; SUBCUTANEOUS at 16:55

## 2019-06-02 RX ADMIN — Medication 5 MILLIGRAM(S): at 01:41

## 2019-06-02 RX ADMIN — HYDROMORPHONE HYDROCHLORIDE 0.5 MILLIGRAM(S): 2 INJECTION INTRAMUSCULAR; INTRAVENOUS; SUBCUTANEOUS at 02:54

## 2019-06-02 RX ADMIN — HYDROMORPHONE HYDROCHLORIDE 0.5 MILLIGRAM(S): 2 INJECTION INTRAMUSCULAR; INTRAVENOUS; SUBCUTANEOUS at 10:50

## 2019-06-02 RX ADMIN — HYDROMORPHONE HYDROCHLORIDE 0.5 MILLIGRAM(S): 2 INJECTION INTRAMUSCULAR; INTRAVENOUS; SUBCUTANEOUS at 17:55

## 2019-06-02 RX ADMIN — PIPERACILLIN AND TAZOBACTAM 25 GRAM(S): 4; .5 INJECTION, POWDER, LYOPHILIZED, FOR SOLUTION INTRAVENOUS at 13:00

## 2019-06-02 RX ADMIN — Medication 5 MILLIGRAM(S): at 10:22

## 2019-06-02 RX ADMIN — SODIUM CHLORIDE 30 MILLILITER(S): 9 INJECTION, SOLUTION INTRAVENOUS at 21:38

## 2019-06-02 RX ADMIN — PIPERACILLIN AND TAZOBACTAM 25 GRAM(S): 4; .5 INJECTION, POWDER, LYOPHILIZED, FOR SOLUTION INTRAVENOUS at 05:38

## 2019-06-02 RX ADMIN — ONDANSETRON 4 MILLIGRAM(S): 8 TABLET, FILM COATED ORAL at 21:59

## 2019-06-02 RX ADMIN — ONDANSETRON 4 MILLIGRAM(S): 8 TABLET, FILM COATED ORAL at 12:18

## 2019-06-02 RX ADMIN — SODIUM CHLORIDE 3 MILLILITER(S): 9 INJECTION INTRAMUSCULAR; INTRAVENOUS; SUBCUTANEOUS at 05:27

## 2019-06-02 RX ADMIN — HYDROMORPHONE HYDROCHLORIDE 0.5 MILLIGRAM(S): 2 INJECTION INTRAMUSCULAR; INTRAVENOUS; SUBCUTANEOUS at 21:44

## 2019-06-02 RX ADMIN — METHADONE HYDROCHLORIDE 2.5 MILLIGRAM(S): 40 TABLET ORAL at 13:00

## 2019-06-02 RX ADMIN — SODIUM CHLORIDE 3 MILLILITER(S): 9 INJECTION INTRAMUSCULAR; INTRAVENOUS; SUBCUTANEOUS at 14:00

## 2019-06-02 RX ADMIN — ENOXAPARIN SODIUM 40 MILLIGRAM(S): 100 INJECTION SUBCUTANEOUS at 11:47

## 2019-06-02 RX ADMIN — SODIUM CHLORIDE 3 MILLILITER(S): 9 INJECTION INTRAMUSCULAR; INTRAVENOUS; SUBCUTANEOUS at 21:39

## 2019-06-02 RX ADMIN — OXYCODONE HYDROCHLORIDE 5 MILLIGRAM(S): 5 TABLET ORAL at 00:28

## 2019-06-02 RX ADMIN — HYDROMORPHONE HYDROCHLORIDE 0.5 MILLIGRAM(S): 2 INJECTION INTRAMUSCULAR; INTRAVENOUS; SUBCUTANEOUS at 10:21

## 2019-06-02 RX ADMIN — PIPERACILLIN AND TAZOBACTAM 25 GRAM(S): 4; .5 INJECTION, POWDER, LYOPHILIZED, FOR SOLUTION INTRAVENOUS at 21:38

## 2019-06-02 RX ADMIN — Medication 5 MILLIGRAM(S): at 20:00

## 2019-06-02 RX ADMIN — METHADONE HYDROCHLORIDE 2.5 MILLIGRAM(S): 40 TABLET ORAL at 05:37

## 2019-06-02 RX ADMIN — LIDOCAINE 1 PATCH: 4 CREAM TOPICAL at 23:00

## 2019-06-02 RX ADMIN — LIDOCAINE 1 PATCH: 4 CREAM TOPICAL at 11:47

## 2019-06-02 RX ADMIN — LIDOCAINE 1 PATCH: 4 CREAM TOPICAL at 19:00

## 2019-06-02 RX ADMIN — HYDROMORPHONE HYDROCHLORIDE 0.5 MILLIGRAM(S): 2 INJECTION INTRAMUSCULAR; INTRAVENOUS; SUBCUTANEOUS at 03:24

## 2019-06-02 RX ADMIN — HYDROMORPHONE HYDROCHLORIDE 0.5 MILLIGRAM(S): 2 INJECTION INTRAMUSCULAR; INTRAVENOUS; SUBCUTANEOUS at 21:59

## 2019-06-02 RX ADMIN — LIDOCAINE 1 PATCH: 4 CREAM TOPICAL at 01:26

## 2019-06-02 NOTE — PROVIDER CONTACT NOTE (OTHER) - ASSESSMENT
pt denies SOB, resting comfortably in bed. VSS 98.3, , /62, RR 20, 98% on NC. lung sounds remain coarse throughout fields w periodic wheezes present

## 2019-06-02 NOTE — PROVIDER CONTACT NOTE (OTHER) - SITUATION
pt complained of subjective feeling of L sided lung heaviness similar to previously experienced before initiation of L chest tube drainage

## 2019-06-02 NOTE — CHART NOTE - NSCHARTNOTEFT_GEN_A_CORE
Patient with bilateral pleurX catheters that are capped, now complaining of fullness in the left lung similar to how she felt on presentation.  At this time patient without complaints of chest pain or shortness of breath.      T(C): 36.8 (06-02-19 @ 01:53), Max: 37.2 (06-01-19 @ 17:22)  HR: 100 (06-02-19 @ 01:53) (99 - 120)  BP: 101/62 (06-02-19 @ 01:53) (97/58 - 104/78)  RR: 20 (06-02-19 @ 01:53) (18 - 24)  SpO2: 98% (06-02-19 @ 01:53) (98% - 100%)    Lungs: Left side course breath sounds throughout.  Right side CTA    Plan: Case discussed with Thoracic PA who agrees with draining the left side PleurX at this time.  Will drain and monitor output.

## 2019-06-02 NOTE — PROGRESS NOTE ADULT - PROBLEM SELECTOR PLAN 4
-s/p IVC c/w  lovenox restart post procedure previously on lovenox 40 due to hemorrhagic pericardial effusion

## 2019-06-02 NOTE — PROGRESS NOTE ADULT - PROBLEM SELECTOR PLAN 1
multifactorial, progression of disease, possible post obstructive PNA and pleural effusions    - s/p L pleurex cath with small pneumothorax  - management of L hydropneumothorax per thoracic surgery  - drainage of b/l pleurex catheters per thoracic surgery  - cont zosyn given 2 new infiltrate on CT and coughing   -continue supplemental O2 as needed  family at this point refusing hospice. CM working on safe discharge plan.   -increase methadone to TID as per palliative recs and oxycodone PRN with bowel regimen   -add lidocaine patch to LT chest wall

## 2019-06-02 NOTE — PROGRESS NOTE ADULT - SUBJECTIVE AND OBJECTIVE BOX
Patient is a 54y old  Female who presents with a chief complaint of shortness of breath (02 Jun 2019 13:01)      SUBJECTIVE / OVERNIGHT EVENTS:  Pt was seen and examined in AM. Pt reports uncontrolled pain in left chest, relieved after being drained. still feels nausea with vomiting. no fever/chills/dizziness    MEDICATIONS  (STANDING):  docusate sodium 100 milliGRAM(s) Oral three times a day  enoxaparin Injectable 40 milliGRAM(s) SubCutaneous daily  gabapentin 100 milliGRAM(s) Oral at bedtime  lactated ringers. 1000 milliLiter(s) (30 mL/Hr) IV Continuous <Continuous>  lidocaine   Patch 1 Patch Transdermal daily  methadone    Tablet 2.5 milliGRAM(s) Oral every 8 hours  piperacillin/tazobactam IVPB. 3.375 Gram(s) IV Intermittent every 8 hours  senna 2 Tablet(s) Oral at bedtime  sodium chloride 0.9% lock flush 3 milliLiter(s) IV Push every 8 hours    MEDICATIONS  (PRN):  ALBUTerol    90 MICROgram(s) HFA Inhaler 2 Puff(s) Inhalation every 6 hours PRN Shortness of Breath and/or Wheezing  HYDROmorphone  Injectable 0.5 milliGRAM(s) IV Push every 4 hours PRN Severe Pain/dyspnea  metoclopramide Injectable 5 milliGRAM(s) IV Push every 6 hours PRN nausea and or vomiting  oxyCODONE    IR 5 milliGRAM(s) Oral every 4 hours PRN Severe Pain (7 - 10)  polyethylene glycol 3350 17 Gram(s) Oral at bedtime PRN Constipation      T(C): 37 (06-02-19 @ 12:51), Max: 37.2 (06-01-19 @ 17:22)  HR: 102 (06-02-19 @ 12:51) (99 - 111)  BP: 115/71 (06-02-19 @ 12:51) (97/58 - 115/71)  RR: 19 (06-02-19 @ 12:51) (19 - 21)  SpO2: 96% (06-02-19 @ 12:51) (96% - 100%)  CAPILLARY BLOOD GLUCOSE        I&O's Summary    01 Jun 2019 07:01  -  02 Jun 2019 07:00  --------------------------------------------------------  IN: 1265 mL / OUT: 950 mL / NET: 315 mL    02 Jun 2019 07:01  -  02 Jun 2019 16:45  --------------------------------------------------------  IN: 0 mL / OUT: 400 mL / NET: -400 mL        PHYSICAL EXAM:  GENERAL: in mild distress, cachectic, chronic ill appearance   HEAD:  Atraumatic, Normocephalic  EYES: EOMI, PERRLA, conjunctiva and sclera clear  NECK: Supple, No JVD  CHEST/LUNG: No wheeze; nonlabored in breathing, b/l chest tube   HEART: s1 s2, tachycardia   ABDOMEN: Soft, Nontender, Nondistended; Bowel sounds present  EXTREMITIES:  2+ Peripheral Pulses, No clubbing, cyanosis, or edema  PSYCH: AAOx3, calm   NEUROLOGY: non-focal  SKIN: No rashes or lesions    LABS:                    RADIOLOGY & ADDITIONAL TESTS:    Imaging Personally Reviewed:    Consultant(s) Notes Reviewed:      Care Discussed with Consultants/Other Providers: Patient is a 54y old  Female who presents with a chief complaint of shortness of breath (02 Jun 2019 13:01)      SUBJECTIVE / OVERNIGHT EVENTS:  Pt was seen and examined in AM. Pt reports uncontrolled pain in left chest, relieved after being drained. still feels nausea with vomiting. no fever/chills/dizziness  Tele reviewed, sinus tachy    MEDICATIONS  (STANDING):  docusate sodium 100 milliGRAM(s) Oral three times a day  enoxaparin Injectable 40 milliGRAM(s) SubCutaneous daily  gabapentin 100 milliGRAM(s) Oral at bedtime  lactated ringers. 1000 milliLiter(s) (30 mL/Hr) IV Continuous <Continuous>  lidocaine   Patch 1 Patch Transdermal daily  methadone    Tablet 2.5 milliGRAM(s) Oral every 8 hours  piperacillin/tazobactam IVPB. 3.375 Gram(s) IV Intermittent every 8 hours  senna 2 Tablet(s) Oral at bedtime  sodium chloride 0.9% lock flush 3 milliLiter(s) IV Push every 8 hours    MEDICATIONS  (PRN):  ALBUTerol    90 MICROgram(s) HFA Inhaler 2 Puff(s) Inhalation every 6 hours PRN Shortness of Breath and/or Wheezing  HYDROmorphone  Injectable 0.5 milliGRAM(s) IV Push every 4 hours PRN Severe Pain/dyspnea  metoclopramide Injectable 5 milliGRAM(s) IV Push every 6 hours PRN nausea and or vomiting  oxyCODONE    IR 5 milliGRAM(s) Oral every 4 hours PRN Severe Pain (7 - 10)  polyethylene glycol 3350 17 Gram(s) Oral at bedtime PRN Constipation      T(C): 37 (06-02-19 @ 12:51), Max: 37.2 (06-01-19 @ 17:22)  HR: 102 (06-02-19 @ 12:51) (99 - 111)  BP: 115/71 (06-02-19 @ 12:51) (97/58 - 115/71)  RR: 19 (06-02-19 @ 12:51) (19 - 21)  SpO2: 96% (06-02-19 @ 12:51) (96% - 100%)  CAPILLARY BLOOD GLUCOSE        I&O's Summary    01 Jun 2019 07:01  -  02 Jun 2019 07:00  --------------------------------------------------------  IN: 1265 mL / OUT: 950 mL / NET: 315 mL    02 Jun 2019 07:01  -  02 Jun 2019 16:45  --------------------------------------------------------  IN: 0 mL / OUT: 400 mL / NET: -400 mL        PHYSICAL EXAM:  GENERAL: in mild distress, cachectic, chronic ill appearance   HEAD:  Atraumatic, Normocephalic  EYES: EOMI, PERRLA, conjunctiva and sclera clear  NECK: Supple, No JVD  CHEST/LUNG: No wheeze; nonlabored in breathing, b/l chest tube   HEART: s1 s2, tachycardia   ABDOMEN: Soft, Nontender, Nondistended; Bowel sounds present  EXTREMITIES:  2+ Peripheral Pulses, No clubbing, cyanosis, or edema  PSYCH: AAOx3, calm   NEUROLOGY: non-focal  SKIN: No rashes or lesions    LABS:                    RADIOLOGY & ADDITIONAL TESTS:    Imaging Personally Reviewed:    Consultant(s) Notes Reviewed:      Care Discussed with Consultants/Other Providers:

## 2019-06-03 ENCOUNTER — APPOINTMENT (OUTPATIENT)
Dept: HEMATOLOGY ONCOLOGY | Facility: CLINIC | Age: 54
End: 2019-06-03

## 2019-06-03 DIAGNOSIS — K59.00 CONSTIPATION, UNSPECIFIED: ICD-10-CM

## 2019-06-03 PROBLEM — C80.1 MALIGNANT (PRIMARY) NEOPLASM, UNSPECIFIED: Chronic | Status: ACTIVE | Noted: 2019-05-28

## 2019-06-03 PROCEDURE — 99233 SBSQ HOSP IP/OBS HIGH 50: CPT

## 2019-06-03 PROCEDURE — 99232 SBSQ HOSP IP/OBS MODERATE 35: CPT

## 2019-06-03 RX ORDER — HYDROMORPHONE HYDROCHLORIDE 2 MG/ML
0.5 INJECTION INTRAMUSCULAR; INTRAVENOUS; SUBCUTANEOUS ONCE
Refills: 0 | Status: DISCONTINUED | OUTPATIENT
Start: 2019-06-03 | End: 2019-06-03

## 2019-06-03 RX ADMIN — METHADONE HYDROCHLORIDE 2.5 MILLIGRAM(S): 40 TABLET ORAL at 05:06

## 2019-06-03 RX ADMIN — HYDROMORPHONE HYDROCHLORIDE 0.5 MILLIGRAM(S): 2 INJECTION INTRAMUSCULAR; INTRAVENOUS; SUBCUTANEOUS at 02:31

## 2019-06-03 RX ADMIN — OXYCODONE HYDROCHLORIDE 5 MILLIGRAM(S): 5 TABLET ORAL at 05:36

## 2019-06-03 RX ADMIN — SODIUM CHLORIDE 30 MILLILITER(S): 9 INJECTION, SOLUTION INTRAVENOUS at 05:45

## 2019-06-03 RX ADMIN — ENOXAPARIN SODIUM 40 MILLIGRAM(S): 100 INJECTION SUBCUTANEOUS at 11:49

## 2019-06-03 RX ADMIN — OXYCODONE HYDROCHLORIDE 5 MILLIGRAM(S): 5 TABLET ORAL at 11:03

## 2019-06-03 RX ADMIN — METHADONE HYDROCHLORIDE 2.5 MILLIGRAM(S): 40 TABLET ORAL at 21:40

## 2019-06-03 RX ADMIN — Medication 100 MILLIGRAM(S): at 14:32

## 2019-06-03 RX ADMIN — HYDROMORPHONE HYDROCHLORIDE 0.5 MILLIGRAM(S): 2 INJECTION INTRAMUSCULAR; INTRAVENOUS; SUBCUTANEOUS at 02:46

## 2019-06-03 RX ADMIN — GABAPENTIN 100 MILLIGRAM(S): 400 CAPSULE ORAL at 21:40

## 2019-06-03 RX ADMIN — PIPERACILLIN AND TAZOBACTAM 25 GRAM(S): 4; .5 INJECTION, POWDER, LYOPHILIZED, FOR SOLUTION INTRAVENOUS at 05:45

## 2019-06-03 RX ADMIN — SODIUM CHLORIDE 3 MILLILITER(S): 9 INJECTION INTRAMUSCULAR; INTRAVENOUS; SUBCUTANEOUS at 14:44

## 2019-06-03 RX ADMIN — OXYCODONE HYDROCHLORIDE 5 MILLIGRAM(S): 5 TABLET ORAL at 10:03

## 2019-06-03 RX ADMIN — PIPERACILLIN AND TAZOBACTAM 25 GRAM(S): 4; .5 INJECTION, POWDER, LYOPHILIZED, FOR SOLUTION INTRAVENOUS at 14:43

## 2019-06-03 RX ADMIN — SODIUM CHLORIDE 30 MILLILITER(S): 9 INJECTION, SOLUTION INTRAVENOUS at 21:40

## 2019-06-03 RX ADMIN — LIDOCAINE 1 PATCH: 4 CREAM TOPICAL at 11:48

## 2019-06-03 RX ADMIN — SODIUM CHLORIDE 3 MILLILITER(S): 9 INJECTION INTRAMUSCULAR; INTRAVENOUS; SUBCUTANEOUS at 05:07

## 2019-06-03 RX ADMIN — METHADONE HYDROCHLORIDE 2.5 MILLIGRAM(S): 40 TABLET ORAL at 13:38

## 2019-06-03 RX ADMIN — LIDOCAINE 1 PATCH: 4 CREAM TOPICAL at 19:43

## 2019-06-03 RX ADMIN — SODIUM CHLORIDE 3 MILLILITER(S): 9 INJECTION INTRAMUSCULAR; INTRAVENOUS; SUBCUTANEOUS at 21:25

## 2019-06-03 RX ADMIN — Medication 5 MILLIGRAM(S): at 14:32

## 2019-06-03 RX ADMIN — PIPERACILLIN AND TAZOBACTAM 25 GRAM(S): 4; .5 INJECTION, POWDER, LYOPHILIZED, FOR SOLUTION INTRAVENOUS at 21:40

## 2019-06-03 RX ADMIN — ONDANSETRON 4 MILLIGRAM(S): 8 TABLET, FILM COATED ORAL at 09:16

## 2019-06-03 RX ADMIN — OXYCODONE HYDROCHLORIDE 5 MILLIGRAM(S): 5 TABLET ORAL at 14:32

## 2019-06-03 RX ADMIN — OXYCODONE HYDROCHLORIDE 5 MILLIGRAM(S): 5 TABLET ORAL at 05:06

## 2019-06-03 RX ADMIN — ONDANSETRON 4 MILLIGRAM(S): 8 TABLET, FILM COATED ORAL at 19:48

## 2019-06-03 RX ADMIN — OXYCODONE HYDROCHLORIDE 5 MILLIGRAM(S): 5 TABLET ORAL at 15:00

## 2019-06-03 RX ADMIN — HYDROMORPHONE HYDROCHLORIDE 0.5 MILLIGRAM(S): 2 INJECTION INTRAMUSCULAR; INTRAVENOUS; SUBCUTANEOUS at 17:39

## 2019-06-03 NOTE — PROGRESS NOTE ADULT - PROBLEM SELECTOR PLAN 1
multifactorial, progression of disease, possible post obstructive PNA and pleural effusions    - s/p L pleurex cath with small pneumothorax  - management of L hydropneumothorax per thoracic surgery  - drainage of b/l pleurex catheters per thoracic surgery  - cont zosyn given 2 new infiltrate on CT and coughing   -continue supplemental O2 as needed  family at this point refusing hospice. CM working on safe discharge plan.   -methadone to TID as per palliative recs and oxycodone PRN with bowel regimen   -lidocaine patch to LT chest wall

## 2019-06-03 NOTE — PROGRESS NOTE ADULT - SUBJECTIVE AND OBJECTIVE BOX
SUBJECTIVE AND OBJECTIVE: Pt indicates that pain is improved. Still having some discomfort in area of CT.    INTERVAL HPI/OVERNIGHT EVENTS: S/p pigtail placement, has been using PRNs of Dilaudid and Oxycodone, not consistently taking Methadone at increased dose of TID.     DNR on chart: Yes    Allergies    No Known Allergies    Intolerances    IV contrast (Rash)    MEDICATIONS  (STANDING):  bisacodyl 5 milliGRAM(s) Oral once  docusate sodium 100 milliGRAM(s) Oral three times a day  enoxaparin Injectable 40 milliGRAM(s) SubCutaneous daily  gabapentin 100 milliGRAM(s) Oral at bedtime  lactated ringers. 1000 milliLiter(s) (30 mL/Hr) IV Continuous <Continuous>  lidocaine   Patch 1 Patch Transdermal daily  methadone    Tablet 2.5 milliGRAM(s) Oral every 8 hours  piperacillin/tazobactam IVPB. 3.375 Gram(s) IV Intermittent every 8 hours  senna 2 Tablet(s) Oral at bedtime  sodium chloride 0.9% lock flush 3 milliLiter(s) IV Push every 8 hours    MEDICATIONS  (PRN):  ALBUTerol    90 MICROgram(s) HFA Inhaler 2 Puff(s) Inhalation every 6 hours PRN Shortness of Breath and/or Wheezing  ondansetron Injectable 4 milliGRAM(s) IV Push every 6 hours PRN Nausea and/or Vomiting  oxyCODONE    IR 5 milliGRAM(s) Oral every 4 hours PRN Severe Pain (7 - 10)  polyethylene glycol 3350 17 Gram(s) Oral at bedtime PRN Constipation    ITEMS UNCHECKED ARE NOT PRESENT    PRESENT SYMPTOMS: [ ]Unable to obtain due to poor mentation   Source if other than patient:  [ ]Family   [ ]Team     Pain (Impact on QOL):  impedes her taking a deep breath.   Location:  Minimal acceptable level (0-10 scale):      2-3/10             Aggravating factors: deep inspiration.   Quality: aching, sharp pain.   Radiation:  Severity (0-10 scale):    Timing:    Dyspnea:                           [x ]Mild [ ]Moderate [ ]Severe  Anxiety:                             [ ]Mild [ ]Moderate [ ]Severe  Fatigue:                             [ ]Mild [x ]Moderate [ ]Severe  Nausea:                             [ ]Mild [ ]Moderate [ ]Severe  Loss of appetite:              [ ]Mild [ ]Moderate [ ]Severe  Constipation:                    [x ]Mild [ ]Moderate [ ]Severe    PAIN AD Score:	  http://geriatrictoolkit.[email or web address]/cog/painad.pdf (Ctrl + left click to view)    Other Symptoms:  [x ]All other review of systems negative     Karnofsky Performance Score/Palliative Performance Status Version 2: 40 %    http://palliative.info/resource_material/PPSv2.pdf    PHYSICAL EXAM:  Vital Signs Last 24 Hrs  T(C): 36.6 (03 Jun 2019 05:43), Max: 36.9 (02 Jun 2019 20:00)  T(F): 97.9 (03 Jun 2019 05:43), Max: 98.5 (02 Jun 2019 20:00)  HR: 98 (03 Jun 2019 05:43) (98 - 135)  BP: 120/71 (03 Jun 2019 05:43) (99/58 - 120/71)  BP(mean): --  RR: 16 (03 Jun 2019 05:43) (16 - 20)  SpO2: 100% (03 Jun 2019 05:43) (96% - 100%) I&O's Summary    02 Jun 2019 07:01  -  03 Jun 2019 07:00  --------------------------------------------------------  IN: 0 mL / OUT: 760 mL / NET: -760 mL    GENERAL:  [x ]Alert  [x ]Oriented x 3  [ ]Lethargic  [ ]Cachexia  [ ]Unarousable  [x ]Verbal  [ ]Non-Verbal  Behavioral:   [ ] Anxiety  [ ] Delirium [ ] Agitation [ ] Other  HEENT:  [ ]Normal   [x ]Dry mouth   [ ]ET Tube/Trach  [ ]Oral lesions  PULMONARY:   [ ]Clear [ x]Tachypnea  [ ]Audible excessive secretions   [ ]Rhonchi        [ ]Right [ ]Left [ ]Bilateral  [ ]Crackles        [ ]Right [ ]Left [ ]Bilateral  [ ]Wheezing     [ ]Right [ ]Left [ ]Bilateral  CARDIOVASCULAR:    x[ ]Regular [ ]Irregular [ ]Tachy  [ ]Ford [ ]Murmur [ ]Other  GASTROINTESTINAL:  [x ]Soft  [ ]Distended   [x ]+BS  [ ]Non tender [ ]Tender  [ ]PEG [ ]OGT/ NGT   Last BM: 5/28   GENITOURINARY:  [x ]Normal [ ] Incontinent   [ ]Oliguria/Anuria   [ ]Sanchez  MUSCULOSKELETAL:   [ ]Normal   [x ]Weakness  [ ]Bed/Wheelchair bound [ ]Edema  NEUROLOGIC:   [x ]No focal deficits  [ ] Cognitive impairment  [ ] Dysphagia [ ]Dysarthria [ ] Paresis [ ]Other   SKIN:   [x ]Normal   [ ]Pressure ulcer(s)  [ ]Rash    CRITICAL CARE:  [ ] Shock Present  [ ]Septic [ ]Cardiogenic [ ]Neurologic [ ]Hypovolemic  [ ]  Vasopressors [ ]  Inotropes   [ ] Respiratory failure present  [ ] Acute  [ ] Chronic [ ] Hypoxic  [ ] Hypercarbic [ ] Other  [ ] Other organ failure     GRIEF   [x ] Yes  [ ] No    LABS: reviewed.     RADIOLOGY & ADDITIONAL STUDIES: reviewed.     Protein Calorie Malnutrition Present: [x ] yes [ ] no  [ ] PPSV2 < or = 30%  [x ] significant weight loss [ ] poor nutritional intake [ ] anasarca [ ] catabolic state Albumin, Serum: 3.2 g/dL (05-28-19 @ 14:50)  Artificial Nutrition [ ]     REFERRALS:   [ ]Chaplaincy  [ ] Hospice  [ ]Child Life  [ ]Social Work  [ ]Case management [ ]Holistic Therapy   Goals of Care Document:

## 2019-06-03 NOTE — PROGRESS NOTE ADULT - SUBJECTIVE AND OBJECTIVE BOX
Patient is a 54y old  Female who presents with a chief complaint of shortness of breath (02 Jun 2019 13:01)      SUBJECTIVE / OVERNIGHT EVENTS:  Patient seen and examined this morning. States that she remains in pain and uncomfortable. Son who is at bedside reports that she had refused methadone.     MEDICATIONS  (STANDING):  bisacodyl 5 milliGRAM(s) Oral once  docusate sodium 100 milliGRAM(s) Oral three times a day  enoxaparin Injectable 40 milliGRAM(s) SubCutaneous daily  gabapentin 100 milliGRAM(s) Oral at bedtime  lactated ringers. 1000 milliLiter(s) (30 mL/Hr) IV Continuous <Continuous>  lidocaine   Patch 1 Patch Transdermal daily  methadone    Tablet 2.5 milliGRAM(s) Oral every 8 hours  piperacillin/tazobactam IVPB. 3.375 Gram(s) IV Intermittent every 8 hours  senna 2 Tablet(s) Oral at bedtime  sodium chloride 0.9% lock flush 3 milliLiter(s) IV Push every 8 hours    MEDICATIONS  (PRN):  ALBUTerol    90 MICROgram(s) HFA Inhaler 2 Puff(s) Inhalation every 6 hours PRN Shortness of Breath and/or Wheezing  ondansetron Injectable 4 milliGRAM(s) IV Push every 6 hours PRN Nausea and/or Vomiting  oxyCODONE    IR 5 milliGRAM(s) Oral every 4 hours PRN Severe Pain (7 - 10)  polyethylene glycol 3350 17 Gram(s) Oral at bedtime PRN Constipation      PHYSICAL EXAM:  T(C): 36.6 (06-03-19 @ 05:43), Max: 36.9 (06-02-19 @ 20:00)  HR: 98 (06-03-19 @ 05:43) (98 - 135)  BP: 120/71 (06-03-19 @ 05:43) (99/58 - 120/71)  RR: 16 (06-03-19 @ 05:43) (16 - 20)  SpO2: 100% (06-03-19 @ 05:43) (96% - 100%)  I&O's Summary    02 Jun 2019 07:01  -  03 Jun 2019 07:00  --------------------------------------------------------  IN: 0 mL / OUT: 760 mL / NET: -760 mL      GENERAL: cachectic, weak, sickly appearing, older than stated age  EYES: conjunctiva and sclera clear  CHEST/LUNG: decreased air entry bilaterally, poor inspiratory effort  HEART: Regular rate and rhythm; No murmurs, rubs, or gallops  ABDOMEN: Soft, Nontender, Nondistended; Bowel sounds present  PSYCH: AAOx3, depressed        LABS:  CAPILLARY BLOOD GLUCOSE                          RADIOLOGY & ADDITIONAL TESTS:    Imaging Personally Reviewed:    Consultant(s) Notes Reviewed:      Care Discussed with Consultants/Other Providers:

## 2019-06-03 NOTE — PROGRESS NOTE ADULT - PROBLEM SELECTOR PLAN 1
Improved. S/p pigtail placement. Pt not consistently using the dose increase of methadone, has missed a dose every day since the increase of dose. Discussed with pt again the importance of taking the methadone consistently regardless of the level of pain as it is a long acting/maintenance medication. Continue with 2.5mg PO TID, please encourage pt to take the medication.   Last QTc 400 (5/28).  Continue 5mg PO oxycodone PRN for pain/SOB, pt encouraged to use breakthrough medication.

## 2019-06-03 NOTE — PROGRESS NOTE ADULT - SUBJECTIVE AND OBJECTIVE BOX
Patient is a 54y old  Female who presents with a chief complaint of shortness of breath (02 Jun 2019 13:01)      SUBJECTIVE / OVERNIGHT EVENTS:  Patient seen and examined this morning. States that she remains in pain and uncomfortable. She has been refusing methadone    MEDICATIONS  (STANDING):  bisacodyl 5 milliGRAM(s) Oral once  docusate sodium 100 milliGRAM(s) Oral three times a day  enoxaparin Injectable 40 milliGRAM(s) SubCutaneous daily  gabapentin 100 milliGRAM(s) Oral at bedtime  lactated ringers. 1000 milliLiter(s) (30 mL/Hr) IV Continuous <Continuous>  lidocaine   Patch 1 Patch Transdermal daily  methadone    Tablet 2.5 milliGRAM(s) Oral every 8 hours  piperacillin/tazobactam IVPB. 3.375 Gram(s) IV Intermittent every 8 hours  senna 2 Tablet(s) Oral at bedtime  sodium chloride 0.9% lock flush 3 milliLiter(s) IV Push every 8 hours    MEDICATIONS  (PRN):  ALBUTerol    90 MICROgram(s) HFA Inhaler 2 Puff(s) Inhalation every 6 hours PRN Shortness of Breath and/or Wheezing  ondansetron Injectable 4 milliGRAM(s) IV Push every 6 hours PRN Nausea and/or Vomiting  oxyCODONE    IR 5 milliGRAM(s) Oral every 4 hours PRN Severe Pain (7 - 10)  polyethylene glycol 3350 17 Gram(s) Oral at bedtime PRN Constipation      PHYSICAL EXAM:  T(C): 36.6 (06-03-19 @ 05:43), Max: 36.9 (06-02-19 @ 20:00)  HR: 98 (06-03-19 @ 05:43) (98 - 135)  BP: 120/71 (06-03-19 @ 05:43) (99/58 - 120/71)  RR: 16 (06-03-19 @ 05:43) (16 - 20)  SpO2: 100% (06-03-19 @ 05:43) (96% - 100%)  I&O's Summary    02 Jun 2019 07:01  -  03 Jun 2019 07:00  --------------------------------------------------------  IN: 0 mL / OUT: 760 mL / NET: -760 mL      GENERAL: cachectic, weak, sickly appearing, older than stated age  EYES: conjunctiva and sclera clear  CHEST/LUNG: decreased air entry bilaterally, poor inspiratory effort  HEART: Regular rate and rhythm; No murmurs, rubs, or gallops  ABDOMEN: Soft, Nontender, Nondistended; Bowel sounds present  PSYCH: AAOx3, depressed        LABS:  CAPILLARY BLOOD GLUCOSE                          RADIOLOGY & ADDITIONAL TESTS:    Imaging Personally Reviewed:    Consultant(s) Notes Reviewed:      Care Discussed with Consultants/Other Providers:

## 2019-06-03 NOTE — PROGRESS NOTE ADULT - PROBLEM SELECTOR PLAN 2
No BM since 5/28. Pt refusing to take medications, explained in detail the importance of keeping a bowel regimen while on opiates. Encourage pt to take medications. If no BM today please give a dose of PRN Miralax.

## 2019-06-04 VITALS
TEMPERATURE: 98 F | DIASTOLIC BLOOD PRESSURE: 79 MMHG | HEART RATE: 82 BPM | OXYGEN SATURATION: 98 % | RESPIRATION RATE: 16 BRPM | SYSTOLIC BLOOD PRESSURE: 103 MMHG

## 2019-06-04 DIAGNOSIS — R11.0 NAUSEA: ICD-10-CM

## 2019-06-04 PROCEDURE — 99233 SBSQ HOSP IP/OBS HIGH 50: CPT

## 2019-06-04 PROCEDURE — 99239 HOSP IP/OBS DSCHRG MGMT >30: CPT

## 2019-06-04 RX ORDER — GABAPENTIN 400 MG/1
1 CAPSULE ORAL
Qty: 30 | Refills: 0
Start: 2019-06-04 | End: 2019-07-03

## 2019-06-04 RX ORDER — METHADONE HYDROCHLORIDE 40 MG/1
0.5 TABLET ORAL
Qty: 45 | Refills: 0
Start: 2019-06-04 | End: 2019-07-03

## 2019-06-04 RX ORDER — METHADONE HYDROCHLORIDE 40 MG/1
0.5 TABLET ORAL
Qty: 21 | Refills: 0
Start: 2019-06-04 | End: 2019-06-17

## 2019-06-04 RX ORDER — METHADONE HYDROCHLORIDE 40 MG/1
0.5 TABLET ORAL
Qty: 0 | Refills: 0 | DISCHARGE

## 2019-06-04 RX ORDER — ONDANSETRON 8 MG/1
1 TABLET, FILM COATED ORAL
Qty: 90 | Refills: 0
Start: 2019-06-04 | End: 2019-07-03

## 2019-06-04 RX ORDER — LIDOCAINE 4 G/100G
1 CREAM TOPICAL
Qty: 0 | Refills: 0 | DISCHARGE
Start: 2019-06-04

## 2019-06-04 RX ORDER — METHADONE HYDROCHLORIDE 40 MG/1
2.5 TABLET ORAL
Qty: 0 | Refills: 0 | DISCHARGE
Start: 2019-06-04

## 2019-06-04 RX ORDER — OXYCODONE HYDROCHLORIDE 5 MG/1
1 TABLET ORAL
Qty: 84 | Refills: 0
Start: 2019-06-04 | End: 2019-06-17

## 2019-06-04 RX ORDER — ONDANSETRON 8 MG/1
4 TABLET, FILM COATED ORAL THREE TIMES A DAY
Refills: 0 | Status: DISCONTINUED | OUTPATIENT
Start: 2019-06-04 | End: 2019-06-04

## 2019-06-04 RX ORDER — METHADONE HYDROCHLORIDE 40 MG/1
2.5 TABLET ORAL
Qty: 0 | Refills: 0 | DISCHARGE

## 2019-06-04 RX ADMIN — SODIUM CHLORIDE 3 MILLILITER(S): 9 INJECTION INTRAMUSCULAR; INTRAVENOUS; SUBCUTANEOUS at 13:33

## 2019-06-04 RX ADMIN — PIPERACILLIN AND TAZOBACTAM 25 GRAM(S): 4; .5 INJECTION, POWDER, LYOPHILIZED, FOR SOLUTION INTRAVENOUS at 06:28

## 2019-06-04 RX ADMIN — OXYCODONE HYDROCHLORIDE 5 MILLIGRAM(S): 5 TABLET ORAL at 14:37

## 2019-06-04 RX ADMIN — SODIUM CHLORIDE 30 MILLILITER(S): 9 INJECTION, SOLUTION INTRAVENOUS at 06:28

## 2019-06-04 RX ADMIN — ENOXAPARIN SODIUM 40 MILLIGRAM(S): 100 INJECTION SUBCUTANEOUS at 13:45

## 2019-06-04 RX ADMIN — ONDANSETRON 4 MILLIGRAM(S): 8 TABLET, FILM COATED ORAL at 02:04

## 2019-06-04 RX ADMIN — ONDANSETRON 4 MILLIGRAM(S): 8 TABLET, FILM COATED ORAL at 07:52

## 2019-06-04 RX ADMIN — LIDOCAINE 1 PATCH: 4 CREAM TOPICAL at 00:50

## 2019-06-04 RX ADMIN — LIDOCAINE 1 PATCH: 4 CREAM TOPICAL at 13:46

## 2019-06-04 RX ADMIN — OXYCODONE HYDROCHLORIDE 5 MILLIGRAM(S): 5 TABLET ORAL at 13:45

## 2019-06-04 RX ADMIN — OXYCODONE HYDROCHLORIDE 5 MILLIGRAM(S): 5 TABLET ORAL at 02:04

## 2019-06-04 RX ADMIN — SODIUM CHLORIDE 3 MILLILITER(S): 9 INJECTION INTRAMUSCULAR; INTRAVENOUS; SUBCUTANEOUS at 06:21

## 2019-06-04 RX ADMIN — ONDANSETRON 4 MILLIGRAM(S): 8 TABLET, FILM COATED ORAL at 13:45

## 2019-06-04 RX ADMIN — Medication 100 MILLIGRAM(S): at 15:15

## 2019-06-04 RX ADMIN — OXYCODONE HYDROCHLORIDE 5 MILLIGRAM(S): 5 TABLET ORAL at 09:54

## 2019-06-04 RX ADMIN — METHADONE HYDROCHLORIDE 2.5 MILLIGRAM(S): 40 TABLET ORAL at 06:27

## 2019-06-04 RX ADMIN — OXYCODONE HYDROCHLORIDE 5 MILLIGRAM(S): 5 TABLET ORAL at 08:56

## 2019-06-04 RX ADMIN — METHADONE HYDROCHLORIDE 2.5 MILLIGRAM(S): 40 TABLET ORAL at 13:51

## 2019-06-04 NOTE — PROGRESS NOTE ADULT - PROBLEM SELECTOR PROBLEM 4
Pulmonary embolism
Debility
Lung cancer
Palliative care encounter
Pulmonary embolism
Pulmonary embolism

## 2019-06-04 NOTE — PROGRESS NOTE ADULT - PROVIDER SPECIALTY LIST ADULT
Anesthesia
CT Surgery
Heme/Onc
Hospitalist
Palliative Care
Palliative Care
Thoracic Surgery
Thoracic Surgery
Palliative Care
Hospitalist

## 2019-06-04 NOTE — PROGRESS NOTE ADULT - PROBLEM SELECTOR PLAN 1
multifactorial, progression of disease, possible post obstructive PNA and pleural effusions    - s/p L pleurex cath with small pneumothorax  - management of L hydropneumothorax per thoracic surgery  - drainage of b/l pleurex catheters per thoracic surgery  -continue supplemental O2 as needed  -methadone to TID as per palliative recs and oxycodone PRN with bowel regimen   -lidocaine patch to LT chest wall    Pt to be discharged w/home hospice

## 2019-06-04 NOTE — PROGRESS NOTE ADULT - PROBLEM SELECTOR PROBLEM 3
Lung cancer
Constipation
Lung cancer
Lung cancer
Debility
Lung cancer

## 2019-06-04 NOTE — PROGRESS NOTE ADULT - PROBLEM SELECTOR PLAN 5
dvt ppx: therapeutic lovenox  diet: as tolerated, nutrition consult for protein calorie malnutrition  GOC- dicussed with palliative DNR/DNI
dvt ppx:  lovenox 40  diet: as tolerated, nutrition consult for protein calorie malnutrition  GOC- DNR/DNI
dvt ppx:  lovenox 40  diet: as tolerated, nutrition consult for protein calorie malnutrition  GOC- DNR/DNI  DC w/home hospice
dvt ppx: therapeutic lovenox  diet: as tolerated, nutrition consult for protein calorie malnutrition  GOC- dicussed with palliative DNR/DNI
Patient of Dr. Harper.  No disease modifying treatment is being offered at this point given patient's poor nutritional and performance status.  Recommendation at this time is hospice as per Dr. Harper.  Continue supportive care. Meeting with [facility] today at 1PM.
Re-addressed again, recommendation for hospice services, per daughter pts   at [facility] and she believes pt is under the impression she would have to be moved from her home. Reiterated that hospice services would be provided at home and that it would a good support in the community. Patient indicated that she will think about it, no decisions made.   A MOLST was completed as an outpatient with Dr. Allen indicating patient DNR/DNI - reconfirmed with patient and family at bedside - still in agreement with patient's wishes, copy placed in patient's chart.  Primary team and bedside nurse aware.  Psychosocial support provided.
dvt ppx: therapeutic lovenox  diet: as tolerated, nutrition consult for protein calorie malnutrition  GOC- DNR/DNI
dvt ppx: therapeutic lovenox  diet: as tolerated, nutrition consult for protein calorie malnutrition  GOC- DNR/DNI
dvt ppx:  lovenox 40  diet: as tolerated, nutrition consult for protein calorie malnutrition  GOC- DNR/DNI

## 2019-06-04 NOTE — PROGRESS NOTE ADULT - ASSESSMENT
54 year old female with NSCLC - admitted with dyspnea.  Palliative consulted for symptom management and goals of care.
54F with PMHx of metastatic R lung adenocarcinoma on chemo, chronic right pleural effusion s/p pleurx and VATS, subxiphoid pericardial window on 3/20/19, PE on Lovenox s/p IVC filter placement presenting with shortness of breath and weakness x 4 days concerning for re-accumulation of pericardial effusion +/- worsening L. pleural effusion.
54F with PMHx of metastatic R lung adenocarcinoma on chemo, chronic right pleural effusion s/p pleurx and VATS, subxiphoid pericardial window on 3/20/19, PE on Lovenox s/p IVC filter placement presenting with shortness of breath and weakness x 4 days concerning for re-accumulation of pericardial effusion +/- worsening L. pleural effusion. CT chest progression of LAD. 2 left patchy/nodular consolidation new since 5/12 concern for PNA.
54 year old female with NSCLC - admitted with dyspnea.  Palliative consulted for symptom management and goals of care.
54 year old female with NSCLC - admitted with dyspnea.  Palliative consulted for symptom management and goals of care.
54F with PMHx of metastatic R lung adenocarcinoma on chemo, chronic right pleural effusion s/p pleurx and VATS, subxiphoid pericardial window on 3/20/19, PE on Lovenox s/p IVC filter placement presenting with shortness of breath and weakness x 4 days concerning for re-accumulation of pericardial effusion +/- worsening L. pleural effusion. CT chest progression of LAD. 2 left patchy/nodular consolidation new since 5/12 concern for PNA.
54F with metastatic right lung adenocarcinoma s/p Keytruda x 1 on 4/15, chronic right pleural effusion s/p Pleurex and VATS, subxiphoid pericardial window on 3/20/19, PE on Lovenox s/p IVC filter placement presenting with shortness of breath and weakness x 4 days concerning for re-accumulation of pericardial effusion +/- worsening left pleural effusion. H/O hemorrhagic effusion so although h/o PE she is on Lovenox 40 mg to lower risk of bleed.  Patient with declining performance status, course complicated by malignant effusions which are re-accumulating despite intervention. She is not a good candidate for further chemotherapy due to poor PS and is hospice appropriate. She is DNR. Palliative care following but she has been refusing methadone so is in pain.  Reinforced taking long acting medication but would consider increasing frequency of prn as well.     Please do not hesitate to call with any questions.    Jennifer Barragan MD  Oncology Attending Physician   [facility]
54F with PMHx of metastatic R lung adenocarcinoma on chemo, chronic right pleural effusion s/p pleurx and VATS, subxiphoid pericardial window on 3/20/19, PE on Lovenox s/p IVC filter placement presenting with shortness of breath and weakness x 4 days concerning for re-accumulation of pericardial effusion +/- worsening L. pleural effusion. CT chest progression of LAD. 2 left patchy/nodular consolidation new since 5/12 concern for PNA.
54F with PMHx of metastatic R lung adenocarcinoma on chemo, chronic right pleural effusion s/p pleurx and VATS, subxiphoid pericardial window on 3/20/19, PE on Lovenox s/p IVC filter placement presenting with shortness of breath and weakness x 4 days concerning for re-accumulation of pericardial effusion +/- worsening L. pleural effusion.

## 2019-06-04 NOTE — PROGRESS NOTE ADULT - REASON FOR ADMISSION
shortness of breath

## 2019-06-04 NOTE — PROGRESS NOTE ADULT - PROBLEM SELECTOR PROBLEM 2
Malignant pericardial effusion
Nausea
Constipation
Debility
Malignant pericardial effusion

## 2019-06-04 NOTE — PROGRESS NOTE ADULT - PROBLEM SELECTOR PLAN 3
-Patient with metastatic lung cancer and declining performance status, course has been complicated by malignant effusions which are re-accumulating despite intervention. Prognosis is poor, appreciate palliative following to assist with pain management and comfort. Oxy PRN with dilaudid IV for breakthrough  -consult inpatient oncology
-Patient with metastatic lung cancer and declining performance status, course has been complicated by malignant effusions which are re-accumulating despite intervention. Prognosis is poor, appreciate palliative following to assist with pain management and comfort.   -currently on dilaudid/Oxy IR and methadone as per palliative   -Due to performance status, not likely to receive any further cancer targeting therapy
-Patient with metastatic lung cancer and declining performance status, course has been complicated by malignant effusions which are re-accumulating despite intervention. Prognosis is poor, appreciate palliative following to assist with pain management and comfort.   -currently on dilaudid/Oxy IR and methadone as per palliative   -consult inpatient oncology-reviewed current performance status from receiving chemo.
-Patient with metastatic lung cancer and declining performance status, course has been complicated by malignant effusions which are re-accumulating despite intervention. Prognosis is poor, appreciate palliative following to assist with pain management and comfort.   -currently on dilaudid/Oxy IR and methadone as per palliative   -Due to performance status, not likely to receive any further cancer targeting therapy
-Patient with metastatic lung cancer and declining performance status, course has been complicated by malignant effusions which are re-accumulating despite intervention. Prognosis is poor, appreciate palliative following to assist with pain management and comfort.   -currently on dilaudid/Oxy IR and methadone as per palliative   -consult inpatient oncology-reviewed current performance status from receiving chemo.
-Patient with metastatic lung cancer and declining performance status, course has been complicated by malignant effusions which are re-accumulating despite intervention. Prognosis is poor, appreciate palliative following to assist with pain management and comfort.   -currently on dilaudid/Oxy IR and methadone as per palliative   -consult inpatient oncology-reviewed current performance status from receiving chemo.
Assist with ADL's.  Skin care PRN.  Fall precautions.
Patient of Dr. Harper.  No disease modifying treatment is being offered at this point given patient's poor nutritional and performance status.  Recommendation at this time is hospice as per Dr. Harper.  Continue supportive care.
Resolved. BM on 6/3. Explained in detail the importance of keeping a bowel regimen while on opiates. Encourage pt to take medications.
-Patient with metastatic lung cancer and declining performance status, course has been complicated by malignant effusions which are re-accumulating despite intervention. Prognosis is poor, appreciate palliative following to assist with pain management and comfort.   -currently on dilaudid/Oxy IR and methadone as per palliative   -consult inpatient oncology-reviewed current performance status from receiving chemo.

## 2019-06-04 NOTE — PROGRESS NOTE ADULT - PROBLEM SELECTOR PLAN 2
Recommend starting 4mg PO Zofran TID atc before meals. Can use PRN Zofran, or low dose ativan 0.25mg PO q6hrs pRN for breakthrough nausea/vomiting.

## 2019-06-04 NOTE — PROGRESS NOTE ADULT - SUBJECTIVE AND OBJECTIVE BOX
Patient is a 54y old  Female who presents with a chief complaint of shortness of breath (04 Jun 2019 11:56)      SUBJECTIVE / OVERNIGHT EVENTS:  Patient seen and examined this morning with family bedside. Reports more coughing this morning with clear sputum production. No fever or chills.     MEDICATIONS  (STANDING):  docusate sodium 100 milliGRAM(s) Oral three times a day  enoxaparin Injectable 40 milliGRAM(s) SubCutaneous daily  gabapentin 100 milliGRAM(s) Oral at bedtime  lactated ringers. 1000 milliLiter(s) (30 mL/Hr) IV Continuous <Continuous>  lidocaine   Patch 1 Patch Transdermal daily  methadone    Tablet 2.5 milliGRAM(s) Oral every 8 hours  ondansetron    Tablet 4 milliGRAM(s) Oral three times a day  senna 2 Tablet(s) Oral at bedtime  sodium chloride 0.9% lock flush 3 milliLiter(s) IV Push every 8 hours    MEDICATIONS  (PRN):  ALBUTerol    90 MICROgram(s) HFA Inhaler 2 Puff(s) Inhalation every 6 hours PRN Shortness of Breath and/or Wheezing  benzonatate 100 milliGRAM(s) Oral every 8 hours PRN Cough  ondansetron Injectable 4 milliGRAM(s) IV Push every 6 hours PRN Nausea and/or Vomiting  oxyCODONE    IR 5 milliGRAM(s) Oral every 4 hours PRN Severe Pain (7 - 10)  polyethylene glycol 3350 17 Gram(s) Oral at bedtime PRN Constipation      PHYSICAL EXAM:  T(C): 36.9 (06-04-19 @ 13:26), Max: 37 (06-03-19 @ 21:26)  HR: 109 (06-04-19 @ 13:26) (102 - 109)  BP: 110/68 (06-04-19 @ 13:26) (96/58 - 116/69)  RR: 16 (06-04-19 @ 13:26) (16 - 18)  SpO2: 99% (06-04-19 @ 13:26) (99% - 100%)  I&O's Summary    GENERAL: cachectic, weak, sickly appearing, older than stated age, periodic coughing noted  EYES: conjunctiva and sclera clear  CHEST/LUNG: decreased air entry bilaterally, poor inspiratory effort  HEART: Regular rate and rhythm; No murmurs, rubs, or gallops  ABDOMEN: Soft, Nontender, Nondistended; Bowel sounds present  PSYCH: AAOx3, depressed    LABS:  CAPILLARY BLOOD GLUCOSE                          RADIOLOGY & ADDITIONAL TESTS:    Imaging Personally Reviewed:    Consultant(s) Notes Reviewed:      Care Discussed with Consultants/Other Providers:

## 2019-06-04 NOTE — GOALS OF CARE CONVERSATION - PERSONAL ADVANCE DIRECTIVE - CONVERSATION DETAILS
[facility]: Consents signed. Oxygen,bed,corina,wc,nebulizer and commode to be sent in by %:00. Lito Brandon RN

## 2019-06-04 NOTE — PROGRESS NOTE ADULT - PROBLEM SELECTOR PLAN 1
Improved. S/p pigtail placement.   On discharge:  -Continue with Methadone 2.5mg TID.   -Continue oxycodone 5mg q4hrs PRN for breakthrough pain/SOB.   -Pending [facility] meeting, if pt does not go home with hospice services, will need f/u appointment with Dr. Allen.  Discussed with pt again the importance of taking the methadone consistently regardless of the level of pain as it is a long acting/maintenance medication.   Last QTc 400 (5/28).

## 2019-06-04 NOTE — PROGRESS NOTE ADULT - SUBJECTIVE AND OBJECTIVE BOX
SUBJECTIVE AND OBJECTIVE: Pt indicates that pain is improved. Had Bm yesterday.     INTERVAL HPI/OVERNIGHT EVENTS: S/p pigtail placement. Indicates that pain regimen is appropriate, accepted hospice services, meeting today at 1PM with [facility].    DNR on chart: Yes    Allergies    No Known Allergies    Intolerances    IV contrast (Rash)    MEDICATIONS  (STANDING):  docusate sodium 100 milliGRAM(s) Oral three times a day  enoxaparin Injectable 40 milliGRAM(s) SubCutaneous daily  gabapentin 100 milliGRAM(s) Oral at bedtime  lactated ringers. 1000 milliLiter(s) (30 mL/Hr) IV Continuous <Continuous>  lidocaine   Patch 1 Patch Transdermal daily  methadone    Tablet 2.5 milliGRAM(s) Oral every 8 hours  ondansetron    Tablet 4 milliGRAM(s) Oral three times a day  piperacillin/tazobactam IVPB. 3.375 Gram(s) IV Intermittent every 8 hours  senna 2 Tablet(s) Oral at bedtime  sodium chloride 0.9% lock flush 3 milliLiter(s) IV Push every 8 hours    MEDICATIONS  (PRN):  ALBUTerol    90 MICROgram(s) HFA Inhaler 2 Puff(s) Inhalation every 6 hours PRN Shortness of Breath and/or Wheezing  ondansetron Injectable 4 milliGRAM(s) IV Push every 6 hours PRN Nausea and/or Vomiting  oxyCODONE    IR 5 milliGRAM(s) Oral every 4 hours PRN Severe Pain (7 - 10)  polyethylene glycol 3350 17 Gram(s) Oral at bedtime PRN Constipation    ITEMS UNCHECKED ARE NOT PRESENT    PRESENT SYMPTOMS: [ ]Unable to obtain due to poor mentation   Source if other than patient:  [ ]Family   [ ]Team     Pain (Impact on QOL):  impedes her taking a deep breath.   Location:  Minimal acceptable level (0-10 scale):      2-3/10             Aggravating factors: deep inspiration.   Quality: aching, sharp pain.   Radiation:  Severity (0-10 scale):    Timing:    Dyspnea:                           [x ]Mild [ ]Moderate [ ]Severe  Anxiety:                             [ ]Mild [ ]Moderate [ ]Severe  Fatigue:                             [ ]Mild [x ]Moderate [ ]Severe  Nausea:                             [ ]Mild [ ]Moderate [ ]Severe  Loss of appetite:              [ ]Mild [ ]Moderate [ ]Severe  Constipation:                    [x ]Mild [ ]Moderate [ ]Severe    PAIN AD Score:	  http://geriatrictoolkit.[facility]/cog/painad.pdf (Ctrl + left click to view)    Other Symptoms:  [x ]All other review of systems negative     Karnofsky Performance Score/Palliative Performance Status Version 2: 40 %    http://palliative.info/resource_material/PPSv2.pdf    PHYSICAL EXAM:  Vital Signs Last 24 Hrs  T(C): 36.4 (04 Jun 2019 06:17), Max: 37 (03 Jun 2019 21:26)  T(F): 97.6 (04 Jun 2019 06:17), Max: 98.6 (03 Jun 2019 21:26)  HR: 109 (04 Jun 2019 06:17) (102 - 109)  BP: 116/69 (04 Jun 2019 06:17) (96/58 - 116/69)  BP(mean): --  RR: 16 (04 Jun 2019 06:17) (16 - 18)  SpO2: 99% (04 Jun 2019 06:17) (99% - 100%) I&O's Summary    GENERAL:  [x ]Alert  [x ]Oriented x 3  [ ]Lethargic  [ ]Cachexia  [ ]Unarousable  [x ]Verbal  [ ]Non-Verbal  Behavioral:   [ ] Anxiety  [ ] Delirium [ ] Agitation [ ] Other  HEENT:  [ ]Normal   [x ]Dry mouth   [ ]ET Tube/Trach  [ ]Oral lesions  PULMONARY:   [ ]Clear [ x]Tachypnea  [ ]Audible excessive secretions   [ ]Rhonchi        [ ]Right [ ]Left [ ]Bilateral  [ ]Crackles        [ ]Right [ ]Left [ ]Bilateral  [ ]Wheezing     [ ]Right [ ]Left [ ]Bilateral  CARDIOVASCULAR:    x[ ]Regular [ ]Irregular [ ]Tachy  [ ]Ford [ ]Murmur [ ]Other  GASTROINTESTINAL:  [x ]Soft  [ ]Distended   [x ]+BS  [ ]Non tender [ ]Tender  [ ]PEG [ ]OGT/ NGT   Last BM: 6/3   GENITOURINARY:  [x ]Normal [ ] Incontinent   [ ]Oliguria/Anuria   [ ]Sanchez  MUSCULOSKELETAL:   [ ]Normal   [x ]Weakness  [ ]Bed/Wheelchair bound [ ]Edema  NEUROLOGIC:   [x ]No focal deficits  [ ] Cognitive impairment  [ ] Dysphagia [ ]Dysarthria [ ] Paresis [ ]Other   SKIN:   [x ]Normal   [ ]Pressure ulcer(s)  [ ]Rash    CRITICAL CARE:  [ ] Shock Present  [ ]Septic [ ]Cardiogenic [ ]Neurologic [ ]Hypovolemic  [ ]  Vasopressors [ ]  Inotropes   [ ] Respiratory failure present  [ ] Acute  [ ] Chronic [ ] Hypoxic  [ ] Hypercarbic [ ] Other  [ ] Other organ failure     GRIEF   [x ] Yes  [ ] No    LABS: reviewed.     RADIOLOGY & ADDITIONAL STUDIES: reviewed.     Protein Calorie Malnutrition Present: [x ] yes [ ] no  [ ] PPSV2 < or = 30%  [x ] significant weight loss [ ] poor nutritional intake [ ] anasarca [ ] catabolic state Albumin, Serum: 3.2 g/dL (05-28-19 @ 14:50)  Artificial Nutrition [ ]     REFERRALS:   [ ]Chaplaincy  [ ] Hospice  [ ]Child Life  [ ]Social Work  [ ]Case management [ ]Holistic Therapy   Goals of Care Document:

## 2019-06-04 NOTE — PROGRESS NOTE ADULT - PROBLEM SELECTOR PLAN 6
Pending [facility] meeting at 1PM, recommendation is for pt to go home with hospice services. If pt does not go home with hospice services pt needs f/u appointment with Dr. Allen.

## 2019-06-04 NOTE — PROGRESS NOTE ADULT - PROBLEM SELECTOR PROBLEM 5
Need for prophylactic measure
Lung cancer
Need for prophylactic measure
Need for prophylactic measure
Palliative care encounter
Need for prophylactic measure

## 2019-06-04 NOTE — PROGRESS NOTE ADULT - PROBLEM SELECTOR PROBLEM 1
Dyspnea
Shortness of breath
Shortness of breath
Dyspnea
Dyspnea
Shortness of breath

## 2019-06-11 ENCOUNTER — APPOINTMENT (OUTPATIENT)
Dept: HEMATOLOGY ONCOLOGY | Facility: CLINIC | Age: 54
End: 2019-06-11

## 2019-06-12 ENCOUNTER — APPOINTMENT (OUTPATIENT)
Dept: INFUSION THERAPY | Facility: HOSPITAL | Age: 54
End: 2019-06-12

## 2019-06-16 LAB — ACID FAST STN SPT: SIGNIFICANT CHANGE UP

## 2019-06-18 ENCOUNTER — APPOINTMENT (OUTPATIENT)
Dept: HEMATOLOGY ONCOLOGY | Facility: CLINIC | Age: 54
End: 2019-06-18

## 2019-06-19 ENCOUNTER — APPOINTMENT (OUTPATIENT)
Dept: INFUSION THERAPY | Facility: HOSPITAL | Age: 54
End: 2019-06-19

## 2019-09-26 NOTE — PATIENT PROFILE ADULT - NSPROGENARRIVEDFROM_GEN_A_NUR
1. Caller Name: pt                                         Call Back Number: [phone] (home) [phone] (work)      Patient approves a detailed voicemail message: N\A    Patient is on the [facility] Schedule tomorrow for Flu and 4 year  vaccine/injection.    SPECIFIC Action To Be Taken: Orders pending, please sign.  
home

## 2020-01-19 NOTE — ED ADULT NURSE NOTE - NSIMPLEMENTINTERV_GEN_ALL_ED
Eyes with no visual disturbances.  Ears clean and dry and no hearing difficulties. Nose with pink mucosa and no drainage.  Mouth mucous membranes moist and pink.  No tenderness or swelling to throat or neck. Implemented All Universal Safety Interventions:  [place] to call system. Call bell, personal items and telephone within reach. Instruct patient to call for assistance. Room bathroom lighting operational. Non-slip footwear when patient is off stretcher. Physically safe environment: no spills, clutter or unnecessary equipment. Stretcher in lowest position, wheels locked, appropriate side rails in place.

## 2021-01-01 NOTE — ED ADULT NURSE REASSESSMENT NOTE - NS ED NURSE REASSESS COMMENT FT1
pt A&Ox3, breathing even & unlabored, coughing sputum, BP 94/71, MD at bedside, benadryl administered  & NS running as per MD order, denies n/v/d, c/o dizziness & CP when coughing,  stat chest xray ordered, sinus tachycardia on CM, 675 red tinged fluid drained from pleural vac, will continue to monitor. 97

## 2021-01-23 NOTE — ED PROVIDER NOTE - NS_EDPROVIDERDISPOUSERTYPE_ED_A_ED
If you are a smoker, it is important for your health to stop smoking. Please be aware that second hand smoke is also harmful.
Attending Attestation (For Attendings USE Only)...

## 2021-03-03 NOTE — PROGRESS NOTE ADULT - SUBJECTIVE AND OBJECTIVE BOX
Impression: S/P Cataract Extraction by phacoemulsification with IOL placement OS - 1 Day. Encounter for surgical aftercare following surgery on a sense organ  Z48.810. Excellent post op course   Post operative instructions reviewed - Condition is improving - Plan: ok, to proceed with surgery OD. --Continue Ocuflox--Advised patient to use artificial tears for comfort. Patient is a 54y old  Female who presents with a chief complaint of shortness of breath, cough (06 May 2019 20:23)      SUBJECTIVE / OVERNIGHT EVENTS: Pt seen and examined. Overnight events noted. Still c/o dyspnea. no CP.     MEDICATIONS  (STANDING):  ALBUTerol/ipratropium for Nebulization 3 milliLiter(s) Nebulizer every 6 hours  gabapentin 100 milliGRAM(s) Oral every 12 hours  heparin  Infusion.  Unit(s)/Hr (9 mL/Hr) IV Continuous <Continuous>  methylPREDNISolone sodium succinate Injectable 40 milliGRAM(s) IV Push daily  metoclopramide 5 milliGRAM(s) Oral three times a day before meals  pantoprazole    Tablet 40 milliGRAM(s) Oral before breakfast  piperacillin/tazobactam IVPB. 3.375 Gram(s) IV Intermittent every 8 hours  senna 2 Tablet(s) Oral at bedtime  sodium chloride 1 Gram(s) Oral two times a day  vancomycin  IVPB 1000 milliGRAM(s) IV Intermittent daily    MEDICATIONS  (PRN):  heparin  Injectable 4000 Unit(s) IV Push every 6 hours PRN For aPTT less than 40  heparin  Injectable 2000 Unit(s) IV Push every 6 hours PRN For aPTT between 40 - 57  HYDROcodone/homatropine Syrup 5 milliLiter(s) Oral every 6 hours PRN Cough  oxyCODONE    IR 5 milliGRAM(s) Oral every 3 hours PRN moderate and severe pain/shortness of breath      Vital Signs Last 24 Hrs  T(C): 36.4 (07 May 2019 08:00), Max: 36.7 (06 May 2019 14:45)  T(F): 97.5 (07 May 2019 08:00), Max: 98 (06 May 2019 14:45)  HR: 103 (07 May 2019 08:00) (87 - 114)  BP: 111/76 (07 May 2019 08:00) (102/49 - 111/76)  BP(mean): --  RR: 18 (07 May 2019 08:00) (18 - 20)  SpO2: 97% (07 May 2019 08:00) (95% - 100%)  CAPILLARY BLOOD GLUCOSE        I&O's Summary    06 May 2019 07:01  -  07 May 2019 07:00  --------------------------------------------------------  IN: 377 mL / OUT: 65 mL / NET: 312 mL        PHYSICAL EXAM:  GENERAL: NAD, well-developed  HEAD:  Atraumatic, Normocephalic  EYES: EOMI, PERRLA, conjunctiva and sclera clear  NECK: Supple, No JVD  CHEST/LUNG: Decreased BS at right hemothorax; No wheeze  HEART: Regular tachy at 100; No murmurs, rubs, or gallops  ABDOMEN: Soft, Nontender, Nondistended; Bowel sounds present  EXTREMITIES:  2+ Peripheral Pulses, No clubbing, cyanosis, or edema  PSYCH: AAOx3  NEUROLOGY: non-focal  SKIN: No rashes or lesions    LABS:                        10.3   12.03 )-----------( 855      ( 07 May 2019 04:40 )             32.8     05-07    130<L>  |  95<L>  |  14  ----------------------------<  134<H>  4.4   |  22  |  0.69    Ca    8.8      07 May 2019 04:40    TPro  7.1  /  Alb  3.5  /  TBili  0.2  /  DBili  x   /  AST  20  /  ALT  26  /  AlkPhos  129<H>  05-07    PT/INR - ( 07 May 2019 04:40 )   PT: 13.7 SEC;   INR: 1.23          PTT - ( 07 May 2019 04:40 )  PTT:31.7 SEC  < from: Transthoracic Echocardiogram (05.06.19 @ 17:19) >  CONCLUSIONS:  1. Mitral annular calcification, otherwise normal mitral  valve. Minimal mitral regurgitation.  2. Endocardium not well visualized; grossly normal left  ventricular systolic function.  3. The right ventricle is not well visualized; grossly  normal right ventricular systolic function.  4. Large pericardial effuson, measuring about  2.5 cm  lateral to the left ventricle.  Moderate pericardial  effusion, measuring about  1.6 cm posterior to the left  ventricle and about  1.8 cm around the LV apex.  Echodense  material is visualized within the effusion and may  represent thrombus.  Diastolic collapse of the right  ventricular outflow tract is seen.  In addition, increased  respirophasic variability in the transmitral and  transtricuspid spectral Doppler signals is seen.  These  findings are consistent with early cardiac tamponade.  5. Bilateral pleural effusions.  *** Compared with echocardiogram of 3/25/2019, a large  pericardial effusion is now present, with early cardiac  tamponade physiology.  Findings discussed with Sapna DALY.    < end of copied text >          RADIOLOGY & ADDITIONAL TESTS:    Imaging Personally Reviewed:    Consultant(s) Notes Reviewed:  CT surgery    Care Discussed with Consultants/Other Providers:

## 2021-03-16 NOTE — PATIENT PROFILE ADULT - FALL HARM RISK CONCLUSION
Transition of Care Plan   RUR-  Medium: 24%   DISPOSITION: The disposition plan is home with home health; [address] [facility] F/U with PCP/Specialist     Transport: Sister; Rita Brown [phone]             Transition of Care Plan:     The Plan for Transition of Care is related to the following treatment goals: [facility]    The Patient  was provided with a choice of provider and agrees  with the discharge plan. Yes [x] No []    A Freedom of choice list was provided with basic dialogue that supports the patient's individualized plan of care/goals and shares the quality data associated with the providers. Yes [x] No []      The pt has been accepted by [facility]. The cm has notified  Anibal Gibbons the admissions coordinator [phone] about the pt's dc orders. The cm faxed the pt's wound care orders to [phone] attnRj Gibbons. Care Management Interventions  PCP Verified by CM: Yes  Palliative Care Criteria Met (RRAT>21 & CHF Dx)?: No  Mode of Transport at Discharge: Other (see comment)(Sister; diana Paredes [phone])  Transition of Care Consult (CM Consult): Discharge Planning, Home Health(PHILLY referral sent to [facility] for Wound care )  [address]: No  [facility] Patient already serviced by other home care/hospice agency  MyChart Signup: No  Discharge Durable Medical Equipment: No(Motorized wheelchair, cane)  Physical Therapy Consult: No  Occupational Therapy Consult: No  Speech Therapy Consult: No  Current Support Network:  Other(The pt lives with her 15year old child. )  Confirm Follow Up Transport: Friends(Dejon SULLIVAN;[phone])  The Patient and/or Patient Representative was Provided with a Choice of Provider and Agrees with the Discharge Plan?: Yes  Freedom of Choice List was Provided with Basic Dialogue that Supports the Patient's Individualized Plan of Care/Goals, Treatment Preferences and Shares the Quality Data Associated with the Providers?: Yes  Discharge Location  Discharge Placement: Home with home health([facility])    Medicare pt has received, reviewed, and signed 2nd IM letter informing them of their right to appeal the discharge. Signed copy has been placed on pt bedside chart.       CM: [address] JAYY,   [phone] Fall with Harm Risk

## 2021-04-23 NOTE — ED ADULT NURSE REASSESSMENT NOTE - NS ED NURSE REASSESS COMMENT FT1
Report given to ANTONIO Montana in [facility]. Pt waiting to be brought over to area. Will continue to monitor. [FreeTextEntry1] : 52 yo female with pmhx and presentation as above\par cad/pci of lad with joahnna\par dyslipidemia\par asthma\par cont all meds\par labs reviewed, all at goal\par dapt and statin to be cont.\par aggressive risk modif\par diet and act as tolerated\par repeat labs\par rtc 4 months

## 2021-04-28 NOTE — ED ADULT TRIAGE NOTE - NSTRIAGECARE_GEN_A_ER
EKG
advanced care planning discussed and pt is a full code, accepts cpr and trial of intubation only with sister

## 2021-06-23 NOTE — PROGRESS NOTE ADULT - PROVIDER SPECIALTY LIST ADULT
Anesthesia
Anesthesia
Heme/Onc
Internal Medicine
Pain Medicine
Thoracic Surgery
Vascular Surgery
Internal Medicine
Warm
Internal Medicine

## 2022-03-23 NOTE — PATIENT PROFILE ADULT - HAS THE PATIENT BEEN ADMITTED FROM SHORT TERM REHAB?
Health Maintenance Due   Topic Date Due   • COVID-19 Vaccine (1) Never done   • Pneumococcal Vaccine 0-64 (1 of 2 - PPSV23) Never done   • Hepatitis B Vaccine (1 of 3 - Risk 3-dose series) Never done   • DTaP/Tdap/Td Vaccine (1 - Tdap) Never done   • Shingles Vaccine (1 of 2) Never done   • Influenza Vaccine (1) Never done   • Colorectal Cancer Screen-  05/10/2022   • Depression Screening  09/17/2022       Patient is due for topics as listed above but is not proceeding with Immunization(s) COVID-19, Dtap/Tdap/Td, Hep B, Influenza, Pneumococcal and Shingles at this time. Education provided for Immunization(s) COVID-19, Dtap/Tdap/Td, Hep B, Influenza, Pneumococcal and Shingles.     no

## 2022-09-08 NOTE — H&P ADULT - NSICDXPASTMEDICALHX_GEN_ALL_CORE_FT
Patient stated she has questions on [place] paperwork. Stated she needs to submit paperwork today so she would like a call back today. Please contact patient to advise.   PAST MEDICAL HISTORY:  Lung cancer with mets, adenocardinoma    Malignant pericardial effusion     Pulmonary embolus

## 2023-01-17 NOTE — PHYSICAL THERAPY INITIAL EVALUATION ADULT - LEVEL OF INDEPENDENCE: STAIR NEGOTIATION, REHAB EVAL
1/17/2023      RE:  Savanna Donis  [address]  [place] [address]        To Whom It May Concern:    Savanna Donis was seen in urgent care today for illness, please excuse her from work Gabriele, January 15 through Wednesday, January 18.     Sincerely,        Isabella Thompson NP  [facility], [address]  [address] [facility] [address]  Dept Phone: [phone]                   independent

## 2023-02-21 NOTE — PROCEDURE NOTE - NSPROCNAME_GEN_A_CORE
General Glycopyrrolate Pregnancy And Lactation Text: This medication is Pregnancy Category B and is considered safe during pregnancy. It is unknown if it is excreted breast milk.

## 2023-03-01 NOTE — DISCHARGE NOTE PROVIDER - NSDCACTIVITY_GEN_ALL_CORE
----- Message from Angely Srivastava sent at 3/1/2023  9:19 AM CST -----  Contact: Pt at [phone]  Type:  RX Refill Request    Who Called:  PT  Refill or New Rx:  Refill  RX Name and Strength:  metoprolol succinate (TOPROL-XL) 100 MG 24 hr tablet  atorvastatin (LIPITOR) 80 MG tablet  How is the patient currently taking it? (ex. 1XDay):  1XDay  Is this a 30 day or 90 day RX:  90  Preferred Pharmacy with phone number:    [id number] - [place], [place] - [address]  [address] [facility] [address]  Phone: [phone] Fax: [phone]  Local or Mail Order:  Local  Ordering Provider:  Dinah Park Call Back Number:  [phone]  Additional Information:  Pt calling for Refill.  Please call back to advise.    
----- Message from Marta Joiner sent at 3/1/2023  4:39 PM CST -----  Regarding: refill  Contact: pt at  [phone]  Type:  RX Refill Request    Who Called:  porfirio  Refill or New Rx:  refill  RX Name and Strength:    atorvastatin (LIPITOR) 80 MG tablet 90 tablet 0 11/22/2022 11/22/2023   Sig - Route: Take 1 tablet (80 mg total) by mouth every evening. - Oral   Sent to pharmacy as: atorvastatin (LIPITOR) 80 MG tablet   E-Prescribing Status: Receipt confirmed by pharmacy (11/22/2022  8:48 AM CST)     How is the patient currently taking it? (ex. 1XDay):  see abov  Is this a 30 day or 90 day RX:  see above  Preferred Pharmacy with phone number:    [id number] - [place], [place] - [address]  [address] [facility] [address]  Phone: [phone] Fax: [phone]    Local or Mail Order:  local  Ordering Provider:  dr. marlo Park Call Back Number:  [phone]    Additional Information:  Patient has two days left of med.      
Do not make important decisions/Do not drive or operate machinery/Stairs allowed/No heavy lifting/straining/Walking - Outdoors allowed/Walking - Indoors allowed/Sex allowed

## 2023-03-31 NOTE — ED ADULT NURSE NOTE - NSSUSCREENINGQ4_ED_ALL_ED
Warfarin education provided on 3/31/2023 verbally and in writing.  Discussed effects of warfarin, importance of checking INR, drug-drug and drug-food interactions, and signs/symptoms of bleeding and clotting.  Patient verbalized understanding through teach back.  All pertinent questions were answered.       No

## 2023-05-02 NOTE — PROGRESS NOTE ADULT - PROBLEM SELECTOR PLAN 7
- DVT prophylaxis -; IVC filter in place; will transition to LOVENOX (son will give)   - Diet - regular  - dispo: needs VNS for pleurx
5
- DVT prophylaxis - heparin drip restarted; IVC filter in place; will transition to LOVENOX (son will give)   - Diet - regular  - dispo: needs VNS for pleurx

## 2023-06-07 NOTE — PROGRESS NOTE ADULT - PROBLEM SELECTOR PLAN 2
-s/p subxdiphoid pericardial window placed on 5/7 by CT surgery   -repeat TTE small pericardial effusion Undermining Type: Entire Wound

## 2023-06-21 NOTE — PATIENT PROFILE ADULT - HARM RISK FACTORS
After verifying lead stability fluoroscopically and confirming hemostasis, the pocket was closed by Dr. Lui using 2-0 Monocryl for the subcutaneous layer and a single layer of 4-0 Vicryl for the subcuticular layer. Exofin skin glue applied to incision site.  no

## 2023-08-17 NOTE — ED ADULT NURSE NOTE - EXTENSIONS OF SELF_ADULT
None Abdominal tenderness Abdominal tenderness Abdominal tenderness Abdominal tenderness Abdominal tenderness Abdominal tenderness Abdominal tenderness

## 2024-02-19 NOTE — ASSESSMENT
[FreeTextEntry1] : 55 y/o F, never smoker, w/ no sig PMHx, who presented to [facility] ED in Feb c/o productive cough x 2 months, she was hospitalized and during work-up found lung mass and pleural effusion s/p Thoracentesis, path revealed +Mets AdenoCA. \par \par Patient then presented to [facility] ED beginning of March c/o worsening SOB, CXR showed large Rt sided pleural effusion. CTA on 3/2/19 showed multiple acute P.E. in the MUMTAZ and LLL and in the lingular artery, a large Rt sided pleural effusion w/ partial collapse of the Rt lung and obstruction of the RLL bronchus, a poorly defined RLL mass seen. She is s/p thoracentesis w/ 2L drained.\par Then s/p Rt VATS drainage of 1L Rt pleural effusion, pleural bx, and placement of Rt PleurX on 3/4/19. Path of Rt pleural fluid revealed +Mets AdenoCA, lung primary. Rt pleura revealed Mets poorly-diff NSCC. +TTF-1, negative P40, +PD-L1.\par Also s/p IVC filter placement on 3/4/19 by Dr. Patricia Michael.\par Patient was discharged home on 3/7/19 on enoxaparin 60mg/0.6mL SQ injection. Rt pleurX to be drained 3 times a week by VNS.\par \par Rt PleurX has been draining 500mL each drainage, patient did not feel "empty" after drainages, VNS nurse has been using 500mL drainage kit and stop draining when one bottle is filled.\par \par I have reviewed the patient's medical records and diagnostic images at time of this office consultation and have made the following recommendation:\par 1. Patient has Stg IV Lung AdenoCA mets to pleura and pleural fluid, recommended patient to f/u with Hem/Onc for chemotherapy.\par 2. Order 1L drainage kit, instruct VNS to drain until dry, continue to drain 3 times a week and RTC in 2mo w/ CXR.\par \par \par Written by Mercedes Olivo NP, acting as a scribe for Dr. Asif Nguyễn.\par \par The documentation recorded by the scribe accurately reflects the service I personally performed and the decisions made by me. ASIF NGUYỄN MD
Patient informed

## 2024-02-22 NOTE — ED ADULT NURSE NOTE - CHIEF COMPLAINT QUOTE
pt diagnosed with lung ca 4 weeks ago, c.o sob today. also endorsing cough, no chest pain or fever, ekg in progress
80

## 2024-09-29 NOTE — PRE-OP CHECKLIST - ALLERGIES REVIEWED
[0] : 2) Feeling down, depressed, or hopeless: Not at all (0) [de-identified] : pre-preg - rare - now during preg - no ETOH at all [[id number]] : 0 done [FreeTextEntry3] : first preg [With Patient/Caregiver] : , with patient/caregiver [Name: ___] : Health Care Proxy's Name: [unfilled]  [Monthly or less (1 pt)] : Monthly or less (1 point) [1 or 2 (0 pts)] : 1 or 2 (0 points) [Never (0 pts)] : Never (0 points) [Yes] : Yes [Never] : Never [NO] : No [No Retinopathy] : No retinopathy [Patient reported PAP Smear was normal] : Patient reported PAP Smear was normal [None] : None [With Family] : lives with family [Employed] : employed [] :  [# Of Children ___] : has [unfilled] children [Feels Safe at Home] : Feels safe at home [Fully functional (bathing, dressing, toileting, transferring, walking, feeding)] : Fully functional (bathing, dressing, toileting, transferring, walking, feeding) [Fully functional (using the telephone, shopping, preparing meals, housekeeping, doing laundry, using] : Fully functional and needs no help or supervision to perform IADLs (using the telephone, shopping, preparing meals, housekeeping, doing laundry, using transportation, managing medications and managing finances) [Smoke Detector] : smoke detector [Carbon Monoxide Detector] : carbon monoxide detector [Seat Belt] :  uses seat belt [Designated Healthcare Proxy] : Designated healthcare proxy [Relationship: ___] : Relationship: [unfilled] [No falls in past year] : Patient reported no falls in the past year [Nearly Every Day (3)] : 5.) Poor appetite or overeating? Nearly every day [Several Days (1)] : 7.) Trouble concentrating on things, such as reading a newspaper or watching television? Several days [1/2 of Days or More (2)] : 8.) Moving or speaking so slowly that other people could have noticed, or the opposite, moving or speaking faster than usual? Half the days or more [Not at All (0)] : 9.) Thoughts that you would be off dead or of hurting yourself in some way? Not at all [Moderately Severe] : Severity of Depression is Moderately Severe [Not at all] : How difficult have these problems made it for you to do your work, take care of things at home, or get along with people? Not at all [PHQ-9 Positive] : PHQ-9 Positive [I have developed a follow-up plan documented below in the note.] : I have developed a follow-up plan documented below in the note. [Time Spent: ___ Minutes] : I spent [unfilled] minutes performing a depression screening for this patient. [HIV test declined] : HIV test declined [Hepatitis C test declined] : Hepatitis C test declined [Audit-CScore] : 1 [de-identified] : 10,000 steps a day [de-identified] : mostly carb diet - has to avoid too much protein due to CKD,  [[id number]] : 17 [EyeExamDate] : 10/23 [Change in mental status noted] : No change in mental status noted [Sexually Active] : not sexually active [Reports changes in hearing] : Reports no changes in hearing [Reports changes in vision] : Reports no changes in vision [MammogramComments] : NA [PapSmearDate] : 5/21 [ColonoscopyComments] : NA [BoneDensityComments] : NA [de-identified] : part time [AdvancecareDate] : 09/24

## 2025-05-14 NOTE — PHYSICAL THERAPY INITIAL EVALUATION ADULT - FOLLOWS COMMANDS/ANSWERS QUESTIONS, REHAB EVAL
100% of the time Time-based billing (NON-critical care) Time-based billing (NON-critical care) Time-based billing (NON-critical care) Time-based billing (NON-critical care) Time-based billing (NON-critical care) Time-based billing (NON-critical care) Time-based billing (NON-critical care) Time-based billing (NON-critical care) Time-based billing (NON-critical care) Time-based billing (NON-critical care) Time-based billing (NON-critical care)
